# Patient Record
Sex: FEMALE | Race: BLACK OR AFRICAN AMERICAN | NOT HISPANIC OR LATINO
[De-identification: names, ages, dates, MRNs, and addresses within clinical notes are randomized per-mention and may not be internally consistent; named-entity substitution may affect disease eponyms.]

---

## 2016-12-18 NOTE — ED PROVIDER NOTE - CRITICAL CARE PROVIDED
interpretation of diagnostic studies/consultation with other physicians/documentation/additional history taking/direct patient care (not related to procedure)

## 2016-12-18 NOTE — ED PROVIDER NOTE - CRITICAL CARE INDICATION, MLM
patient was critically ill... Patient was critically ill with a high probability of imminent or life threatening deterioration.

## 2016-12-18 NOTE — ED PROVIDER NOTE - OBJECTIVE STATEMENT
66year old female presents BIBA from home, pt was found by her neighbor on the floor, pt was not seen by them in two days, they became concerned and entered her home, pt was found dishelved with feces on her and altered 66year old female presents BIBA from home, pt was found by her neighbor on the floor, pt was not seen by them in two days, they became concerned and entered her home, pt was found dishelved with feces on her and altered pt is unable to provide any history due to her altered state

## 2016-12-18 NOTE — ED PROVIDER NOTE - CARE PLAN
Principal Discharge DX:	Altered mental status Principal Discharge DX:	Altered mental status  Secondary Diagnosis:	Rhabdomyolysis  Secondary Diagnosis:	ARF (acute renal failure)

## 2016-12-18 NOTE — ED ADULT TRIAGE NOTE - CHIEF COMPLAINT QUOTE
pt down in bathroom for unknown length of times. neighbors called 911 after not hearing from patient for 2 days. as per pt's neighbor pt is more confused than baseline. at triage pt non verbal. pt noted with bruising and contracture to left arm. as per EMS pt was lying on her right arm when found. pt has history of htn and diabetes

## 2016-12-19 NOTE — H&P ADULT. - PROBLEM SELECTOR PLAN 7
-secondary to renal failure  -IV hydration now  if bicarb remain low/drops may need sodium bicarb drip  or renal consult for questionable dialysis.

## 2016-12-19 NOTE — H&P ADULT. - PROBLEM SELECTOR PLAN 8
-became hypotensive in ED  -fluids given  -sepsis work up sent: blood cult, ua , urine cult and lactate  antibiotics.  -

## 2016-12-19 NOTE — PROGRESS NOTE ADULT - PROBLEM SELECTOR PLAN 6
- bruise present to left upper thigh, could be due to fall as well/  -TTP/ITP work up, lDH, hepatoglobin, peripheral smear

## 2016-12-19 NOTE — CONSULT NOTE ADULT - SUBJECTIVE AND OBJECTIVE BOX
Patient is a 66y old  Female who presents with a chief complaint of unresponsiveness (19 Dec 2016 08:49)    HPI:  Patient is a 66 year old female with unknown PMHx, per ED, patient found unresponsive . Patient was not seen by neighbors in two days, they became concerned and entered her home and found patient on the floor covered with feces.  In ED patient found CK >14,000, in renal failure, hyperkalemic, cocktail was given, including kayexelate. Then patient had melena. stool occult blood was positive, low plts and metabolic acidosis. Patient was initially admitted to Telemetry, however this AM, found to be hypotensive, 94/56 was given fluids. ICU consulted, seen at bedside, alert, but confused. Patient is being admitted for AMS, Acute renal failure and rhabdomyolysis. (19 Dec 2016 08:49)    PAST MEDICAL & SURGICAL HISTORY:    FAMILY HISTORY:    No Known Allergies    MEDICATIONS  (STANDING):  insulin lispro (HumaLOG) corrective regimen sliding scale  SubCutaneous three times a day before meals  insulin lispro (HumaLOG) corrective regimen sliding scale  SubCutaneous at bedtime  dextrose 5%. 1000milliLiter(s) IV Continuous <Continuous>  dextrose 50% Injectable 12.5Gram(s) IV Push once  dextrose 50% Injectable 25Gram(s) IV Push once  dextrose 50% Injectable 25Gram(s) IV Push once  piperacillin/tazobactam IVPB. 3.375Gram(s) IV Intermittent every 12 hours  sodium chloride 0.9%. 1000milliLiter(s) IV Continuous <Continuous>  silver sulfADIAZINE 1% Cream 1Application(s) Topical daily    MEDICATIONS  (PRN):  dextrose Gel 1Dose(s) Oral once PRN Blood Glucose LESS THAN 70 milliGRAM(s)/deciliter  glucagon  Injectable 1milliGRAM(s) IntraMuscular once PRN Glucose LESS THAN 70 milligrams/deciliter    Vital Signs Last 24 Hrs  T(C): 36.4, Max: 36.7 (12-18 @ 23:30)  T(F): 97.6, Max: 98 (12-18 @ 23:30)  HR: 69 (64 - 109)  BP: 115/62 (96/52 - 137/116)  BP(mean): 74 (72 - 87)  RR: 19 (19 - 30)  SpO2: 97% (85% - 100%)    CAPILLARY BLOOD GLUCOSE  231 (19 Dec 2016 11:30)  243 (19 Dec 2016 08:07)    PHYSICAL EXAM:      T(C): 36.4, Max: 36.7 (12-18 @ 23:30)  HR: 69 (64 - 109)  BP: 115/62 (96/52 - 137/116)  RR: 19 (19 - 30)  SpO2: 97% (85% - 100%)  Wt(kg): --  Respiratory: clear anteriorly, decreased BS at bases  Cardiovascular: S1 S2  Gastrointestinal: soft NT ND +BS  Extremities:    1 + edema noted left side leg chest wall, breast and hand thermal injury              19 Dec 2016 04:19    146    |  110    |  113    ----------------------------<  324    4.5     |  15     |  7.34     Ca    8.5        19 Dec 2016 04:19    TPro  7.0    /  Alb  2.3    /  TBili  0.7    /  DBili  x      /  AST  347    /  ALT  108    /  AlkPhos  142    19 Dec 2016 04:19                          14.6   23.9  )-----------( 40       ( 19 Dec 2016 04:19 )             41.9     Urinalysis Basic - ( 18 Dec 2016 22:09 )    Color: Yellow / Appearance: very cloudy / S.015 / pH: x  Gluc: x / Ketone: Trace  / Bili: Small / Urobili: 1 mg/dL   Blood: x / Protein: 500 mg/dL / Nitrite: Positive   Leuk Esterase: Moderate / RBC: 3-5 /HPF / WBC >50   Sq Epi: x / Non Sq Epi: Moderate / Bacteria: Many      ABG - ( 18 Dec 2016 22:43 )  pH: x     /  pCO2: 24    /  pO2: 87    / HCO3: 12    / Base Excess: -11.4 /  SaO2: 94          Assessment and Plan  ANCELMO suspected pigment ATN due to severe rhabdomyolysis; oliguric.  Will likely require hemodialysis in the next 24 hours.  Labs q 8, follow K trend.  IVF with bicarbonate with no benefit with established ANCELMO; may be harmful if muscle edema worsens.  Discussed with Critical Care team.

## 2016-12-19 NOTE — H&P ADULT. - PROBLEM SELECTOR PLAN 2
-freitas insertion  -monitor urine output  -renal consult for questionable dialysis if remain acidodic, hyperkalemic -freitas insertion  -monitor urine output  -renal consult for questionable dialysis if remain acidotic, hyperkalemic

## 2016-12-19 NOTE — H&P ADULT. - ASSESSMENT
Patient is a 66 year old female with unknown PMHx, per ED, patient found unresponsive, admitted to critical care for Metabolic Encephalopathy, Acute Renal Failure, Hyperkalemia, Thrombocytopenia, Rhabdomyolysis.

## 2016-12-19 NOTE — PROGRESS NOTE ADULT - SUBJECTIVE AND OBJECTIVE BOX
Patient is a 66y old  Female who presents with a chief complaint of unresponsiveness (19 Dec 2016 08:49)      INTERVAL HPI/OVERNIGHT EVENTS:    66year old female presents BIBA from home, pt was found by her neighbor on the floor, pt was not seen by them in two days, they became concerned and entered her home, pt was found dishevelled with feces on her and altered  admitted with rhabdo,acute renal failure    MEDICATIONS  (STANDING):  insulin lispro (HumaLOG) corrective regimen sliding scale  SubCutaneous three times a day before meals  insulin lispro (HumaLOG) corrective regimen sliding scale  SubCutaneous at bedtime  dextrose 5%. 1000milliLiter(s) IV Continuous <Continuous>  dextrose 50% Injectable 12.5Gram(s) IV Push once  dextrose 50% Injectable 25Gram(s) IV Push once  dextrose 50% Injectable 25Gram(s) IV Push once  piperacillin/tazobactam IVPB. 3.375Gram(s) IV Intermittent every 12 hours  silver sulfADIAZINE 1% Cream 1Application(s) Topical daily  sodium bicarbonate  Infusion 0.179mEq/kG/Hr IV Continuous <Continuous>    MEDICATIONS  (PRN):  dextrose Gel 1Dose(s) Oral once PRN Blood Glucose LESS THAN 70 milliGRAM(s)/deciliter  glucagon  Injectable 1milliGRAM(s) IntraMuscular once PRN Glucose LESS THAN 70 milligrams/deciliter        REVIEW OF SYSTEMS:  CONSTITUTIONAL: No fever, weight loss, or fatigue  EYES: No eye pain, visual disturbances, or discharge  ENMT:  No difficulty hearing, tinnitus, vertigo; No sinus or throat pain  NECK: No pain or stiffness  RESPIRATORY: No cough, wheezing, chills or hemoptysis; No shortness of breath  CARDIOVASCULAR: No chest pain, palpitations, dizziness, or leg swelling  GASTROINTESTINAL: No abdominal or epigastric pain. No nausea, vomiting, or hematemesis; No diarrhea or constipation. No melena or hematochezia.  GENITOURINARY: No dysuria, frequency, hematuria, or incontinence  NEUROLOGICAL: No headaches, memory loss, loss of strength, numbness, or tremors  SKIN: No itching, burning, rashes, or lesions      Vital Signs Last 24 Hrs  T(C): 36.6, Max: 36.6 (- @ 23:16)  T(F): 97.8, Max: 97.8 (-19 @ 23:16)  HR: 71 (64 - 81)  BP: 163/60 (80/57 - 163/60)  BP(mean): 76 (62 - 100)  RR: 23 (16 - 28)  SpO2: 99% (85% - 100%)    PHYSICAL EXAM:  GENERAL: NAD, well-groomed, well-developed  HEAD:  Atraumatic, Normocephalic  EYES: EOMI, PERRLA, conjunctiva and sclera clear  ENMT: No tonsillar erythema, exudates, or enlargement; Moist mucous membranes   NECK: Supple, No JVD   NERVOUS SYSTEM:  Alert & Oriented X3, Good concentration; Motor Strength 5/5 B/L upper and lower extremities; DTRs 2+ intact and symmetric  CHEST/LUNG: Clear to percussion bilaterally; No rales, rhonchi, wheezing, or rubs  HEART: Regular rate and rhythm; No murmurs, rubs, or gallops  ABDOMEN: Soft, Nontender, Nondistended; Bowel sounds present  EXTREMITIES:  2+ Peripheral Pulses, No clubbing, cyanosis, or edema  LYMPH: No lymphadenopathy noted  SKIN: No rashes or lesions    LABS:                        13.5   19.8  )-----------( 36       ( 19 Dec 2016 12:35 )             39.9     19 Dec 2016 21:29    151    |  117    |  124    ----------------------------<  253    3.6     |  16     |  6.53     Ca    7.0        19 Dec 2016 21:29  Phos  6.3       19 Dec 2016 12:35  Mg     2.7       19 Dec 2016 12:35    TPro  6.4    /  Alb  2.0    /  TBili  0.5    /  DBili  x      /  AST  351    /  ALT  116    /  AlkPhos  134    19 Dec 2016 12:35    PT/INR - ( 18 Dec 2016 19:57 )   PT: 13.5 sec;   INR: 1.20 ratio         PTT - ( 18 Dec 2016 19:57 )  PTT:23.5 sec  Urinalysis Basic - ( 18 Dec 2016 22:09 )    Color: Yellow / Appearance: very cloudy / S.015 / pH: x  Gluc: x / Ketone: Trace  / Bili: Small / Urobili: 1 mg/dL   Blood: x / Protein: 500 mg/dL / Nitrite: Positive   Leuk Esterase: Moderate / RBC: 3-5 /HPF / WBC >50   Sq Epi: x / Non Sq Epi: Moderate / Bacteria: Many      CAPILLARY BLOOD GLUCOSE  230 (19 Dec 2016 21:47)  196 (19 Dec 2016 16:52)  231 (19 Dec 2016 11:30)  243 (19 Dec 2016 08:07)      RADIOLOGY & ADDITIONAL TESTS:    Imaging Personally Reviewed:  [ ] YES  [ ] NO    Consultant(s) Notes Reviewed:  [ ] YES  [ ] NO    Care Discussed with Consultants/Other Providers [ ] YES  [ ] NO

## 2016-12-19 NOTE — H&P ADULT. - PROBLEM SELECTOR PLAN 6
- bruise present to left upper thigh, could be due to fall as well/  -TTP/ITP work up - bruise present to left upper thigh, could be due to fall as well/  -TTP/ITP work up, lDH, hepatoglobin, preipheral smear

## 2016-12-19 NOTE — H&P ADULT. - SKIN COMMENTS
left upper thigh echymosis, and right knee laceration, and left arm laceration left lower, upper leg and breast ecchymosis, and right knee laceration, and left arm laceration, left hand hematoma

## 2016-12-19 NOTE — CONSULT NOTE ADULT - ASSESSMENT
I think these are pressure injuries, DTI, with skin necrosis, partial or full skin thickness, due to lying on the floor for unknown  amt of time, ?? in cold resulting in injuries above, rhabdo and renal failure

## 2016-12-19 NOTE — CONSULT NOTE ADULT - SUBJECTIVE AND OBJECTIVE BOX
Vascular Attending:  I was called by ICU DEVAN Gallegos for eval of left hand, forearm and ischemia and skin necrotic leisons on multiple areas of body. Left radial artery was not palpable,      HPI:  Patient is a 66 year old female with unknown PMHx, per ED, patient found unresponsive . Patient was not seen by neighbors in two days, they became concerned and entered her home and found patient on the floor covered with feces.  In ED patient found CK >14,000, in renal failure, hyperkalemic, cocktail was given, including kayexelate. Then patient had melena. stool occult blood was positive and metabolic acidosis. Patient was initially admitted to Telemetry, however this AM, found to be hypotensive, 94/56 was given fluids. ICU consulted, seen at bedside, alert, but confused. Patient is being admitted for AMS, Acute renal failure and rhabdomyolysis. (19 Dec 2016 08:49)      PAST MEDICAL & SURGICAL HISTORY:      REVIEW OF SYSTEMS Unable to review , pt not responsive,awake but non verbal   	    MEDICATIONS  (STANDING):  insulin lispro (HumaLOG) corrective regimen sliding scale  SubCutaneous three times a day before meals  insulin lispro (HumaLOG) corrective regimen sliding scale  SubCutaneous at bedtime  dextrose 5%. 1000milliLiter(s) IV Continuous <Continuous>  dextrose 50% Injectable 12.5Gram(s) IV Push once  dextrose 50% Injectable 25Gram(s) IV Push once  dextrose 50% Injectable 25Gram(s) IV Push once  piperacillin/tazobactam IVPB. 3.375Gram(s) IV Intermittent every 12 hours  sodium chloride 0.9%. 1000milliLiter(s) IV Continuous <Continuous>  furosemide   Injectable 60milliGRAM(s) IV Push once    MEDICATIONS  (PRN):  dextrose Gel 1Dose(s) Oral once PRN Blood Glucose LESS THAN 70 milliGRAM(s)/deciliter  glucagon  Injectable 1milliGRAM(s) IntraMuscular once PRN Glucose LESS THAN 70 milligrams/deciliter      Allergies    No Known Allergies    Intolerances        SOCIAL HISTORY:      Vital Signs Last 24 Hrs  T(C): 36.3, Max: 36.7 (12-18 @ 23:30)  T(F): 97.4, Max: 98 (12-18 @ 23:30)  HR: 65 (64 - 109)  BP: 111/63 (96/52 - 137/116)  BP(mean): 74 (72 - 87)  RR: 27 (21 - 30)  SpO2: 97% (85% - 100%)    PHYSICAL EXAM:      Constitutional:Non verabal AMS,    Eyes:    ENT:    Neck:    Breasts: Necrotic skin patch left breast of 8cms on left upper and outer quadrant    Back:    Respiratory:    Cardiovascular:    Gastrointestinal:    Genitourinary:    Rectal:    Extremities:Similar skin necrotic patches on left lat thigh, laft lat leg., left hand Volar, extending to the left forearm, volar and dorsal area    Vascular :Palpable left radial, and right radial, No arterial ischemia of the left had. No hematoma.    Neurological:    Skin: Necrosis of the skin on left side of body as discussed above.    Lymph Nodes:    Musculoskeletal:    Psychiatric:      Pulses:   Right:                                                                          Left:  FEM [ ]2+ [ ]1+ [ ]doppler                                             FEM [ ]2+ [ ]1+ [ ]doppler    POP [ ]2+ [ ]1+ [ ]doppler                                             POP [ ]2+ [ ]1+ [ ]doppler    DP [ ]2+ [ ]1+ [ ]doppler                                                DP [ ]2+ [ ]1+ [ ]doppler  PT[ ]2+ [ ]1+ [ ]doppler                                                  PT [ ]2+ [ ]1+ [ ]doppler      LABS:                        14.6   23.9  )-----------( 40       ( 19 Dec 2016 04:19 )             41.9     19 Dec 2016 04:19    146    |  110    |  113    ----------------------------<  324    4.5     |  15     |  7.34     Ca    8.5        19 Dec 2016 04:19    TPro  7.0    /  Alb  2.3    /  TBili  0.7    /  DBili  x      /  AST  347    /  ALT  108    /  AlkPhos  142    19 Dec 2016 04:19    PT/INR - ( 18 Dec 2016 19:57 )   PT: 13.5 sec;   INR: 1.20 ratio         PTT - ( 18 Dec 2016 19:57 )  PTT:23.5 sec  Urinalysis Basic - ( 18 Dec 2016 22:09 )    Color: Yellow / Appearance: very cloudy / S.015 / pH: x  Gluc: x / Ketone: Trace  / Bili: Small / Urobili: 1 mg/dL   Blood: x / Protein: 500 mg/dL / Nitrite: Positive   Leuk Esterase: Moderate / RBC: 3-5 /HPF / WBC >50   Sq Epi: x / Non Sq Epi: Moderate / Bacteria: Many        RADIOLOGY & ADDITIONAL STUDIES    Impression and Plan:

## 2016-12-19 NOTE — H&P ADULT. - HISTORY OF PRESENT ILLNESS
Patient is a 66 year old female with unknown PMHx, per ED, patient found unresponsive . Patient was not seen by neighbors in two days, they became concerned and entered her home and found patient on the floor covered with feces.  In ED patient found CK >14,000, in renal failure, hyperkalemic, cocktail was given, including kayexelate. Then patient had melena. stool occult blood was positive and metabolic acidosis. Patient was initially admitted to Telemetry, however this AM, found to be hypotensive, 94/56 was given fluids. ICU consulted, seen at bedside, alert, but confused. Patient is being admitted for AMS, Acute renal failure and rhabdomyolysis. Patient is a 66 year old female with unknown PMHx, per ED, patient found unresponsive . Patient was not seen by neighbors in two days, they became concerned and entered her home and found patient on the floor covered with feces.  In ED patient found CK >14,000, in renal failure, hyperkalemic, cocktail was given, including kayexelate. Then patient had melena. stool occult blood was positive, low plts and metabolic acidosis. Patient was initially admitted to Telemetry, however this AM, found to be hypotensive, 94/56 was given fluids. ICU consulted, seen at bedside, alert, but confused. Patient is being admitted for AMS, Acute renal failure and rhabdomyolysis.

## 2016-12-19 NOTE — PATIENT PROFILE ADULT. - LOCATION
LEFT BREAST LEFT HIP LEFT FOREARM EXTENDING TO LEFT HAND 1ST & 2ND FINGER LEFT OUTER LEG LEFT WRIST EXTENDING TO LEFT PALM

## 2016-12-19 NOTE — H&P ADULT. - PROBLEM SELECTOR PLAN 1
-pt found urnesponsive on floor, unknown cause  -awake but confuse  -monitor airway -pt found urnesponsive on floor, unknown cause  -awake, oriented x1 but confuse  -monitor airway

## 2016-12-20 NOTE — PROGRESS NOTE ADULT - ASSESSMENT
Patient is a 66 year old female with unknown PMHx, per ED, patient found unresponsive, admitted to critical care for Metabolic Encephalopathy, Acute Renal Failure, Hyperkalemia, Thrombocytopenia, Rhabdomyolysis.     ·  Problem: Metabolic encephalopathy.  Plan: -pt found urnesponsive on floor, unknown cause  -more awake, oriented x2   -monitor airway.     ·  Problem: ARF (acute renal failure).  Plan: urine output improved, off bicarb drip now as per renal  -d/c IVF and lasix 80mg as per renal  -monitor urine output  -HD if worsens, currently improving    ·  Problem: Rhabdomyolysis.  Plan: -aggressive hydration  -follow ck level closely  -CK level trending down     Problem: Melena.  Plan: stool occult blood +  -cbc serially  -no evidence of GIB     Problem: Thrombocytopenia. Plan: LDH high, haptoglobin , fup peripheral smear  -fup with Heme for TTP eval      Problem: Sepsis.  Plan: -became hypotensive in ED, fluids given, now stable  -sepsis work up sent: blood cult growing GNR, cont on broad sepctrum abx.     DVT/GI ppx

## 2016-12-20 NOTE — PROGRESS NOTE ADULT - SUBJECTIVE AND OBJECTIVE BOX
Patient is a 66y old  Female who presents with a chief complaint of unresponsiveness (19 Dec 2016 08:49)      INTERVAL HPI/OVERNIGHT EVENTS:    Awake and responsive  hemodynamically stable    MEDICATIONS  (STANDING):  insulin lispro (HumaLOG) corrective regimen sliding scale  SubCutaneous at bedtime  dextrose 5%. 1000milliLiter(s) IV Continuous <Continuous>  dextrose 50% Injectable 12.5Gram(s) IV Push once  dextrose 50% Injectable 25Gram(s) IV Push once  dextrose 50% Injectable 25Gram(s) IV Push once  piperacillin/tazobactam IVPB. 3.375Gram(s) IV Intermittent every 12 hours  silver sulfADIAZINE 1% Cream 1Application(s) Topical daily  sodium chloride 0.45%. 1000milliLiter(s) IV Continuous <Continuous>  insulin lispro (HumaLOG) corrective regimen sliding scale  SubCutaneous three times a day before meals    MEDICATIONS  (PRN):  dextrose Gel 1Dose(s) Oral once PRN Blood Glucose LESS THAN 70 milliGRAM(s)/deciliter  glucagon  Injectable 1milliGRAM(s) IntraMuscular once PRN Glucose LESS THAN 70 milligrams/deciliter        REVIEW OF SYSTEMS:  CONSTITUTIONAL: No fever, weight loss, or fatigue  EYES: No eye pain, visual disturbances, or discharge  ENMT:  No difficulty hearing, tinnitus, vertigo; No sinus or throat pain  NECK: No pain or stiffness  RESPIRATORY: No cough, wheezing, chills or hemoptysis; No shortness of breath  CARDIOVASCULAR: No chest pain, palpitations, dizziness, or leg swelling  GASTROINTESTINAL: No abdominal or epigastric pain. No nausea, vomiting, or hematemesis; No diarrhea or constipation. No melena or hematochezia.  GENITOURINARY: No dysuria, frequency, hematuria, or incontinence  NEUROLOGICAL: No headaches, memory loss, loss of strength, numbness, or tremors  SKIN: No itching, burning, rashes, or lesions      Vital Signs Last 24 Hrs  T(C): 36.6, Max: 36.7 (12-20 @ 15:00)  T(F): 97.8, Max: 98 (12-20 @ 15:00)  HR: 66 (64 - 79)  BP: 138/65 (126/72 - 143/69)  BP(mean): 83 (78 - 96)  RR: 19 (16 - 32)  SpO2: 94% (81% - 100%)    PHYSICAL EXAM:  GENERAL: NAD, well-groomed, well-developed  HEAD:  Atraumatic, Normocephalic  EYES: EOMI, PERRLA, conjunctiva and sclera clear  ENMT: No tonsillar erythema, exudates, or enlargement; Moist mucous membranes   NECK: Supple, No JVD   NERVOUS SYSTEM:  Alert & Oriented X3, Good concentration; Motor Strength 5/5 B/L upper and lower extremities; DTRs 2+ intact and symmetric  CHEST/LUNG: Clear to percussion bilaterally; No rales, rhonchi, wheezing, or rubs  HEART: Regular rate and rhythm; No murmurs, rubs, or gallops  ABDOMEN: Soft, Nontender, Nondistended; Bowel sounds present  EXTREMITIES:  2+ Peripheral Pulses, No clubbing, cyanosis, or edema  LYMPH: No lymphadenopathy noted  SKIN: No rashes or lesions    LABS:                        12.8   14.4  )-----------( 46       ( 20 Dec 2016 05:49 )             36.6     20 Dec 2016 23:40    150    |  111    |  130    ----------------------------<  210    3.3     |  24     |  6.94     Ca    7.1        20 Dec 2016 23:40  Phos  6.6       20 Dec 2016 03:39  Mg     2.5       20 Dec 2016 03:39    TPro  6.4    /  Alb  2.0    /  TBili  0.5    /  DBili  x      /  AST  351    /  ALT  116    /  AlkPhos  134    19 Dec 2016 12:35        CAPILLARY BLOOD GLUCOSE  198 (20 Dec 2016 23:30)  214 (20 Dec 2016 17:28)  291 (20 Dec 2016 11:06)  319 (20 Dec 2016 07:58)      RADIOLOGY & ADDITIONAL TESTS:    Imaging Personally Reviewed:  [ ] YES  [ ] NO    Consultant(s) Notes Reviewed:  [ ] YES  [ ] NO    Care Discussed with Consultants/Other Providers [ ] YES  [ ] NO

## 2016-12-20 NOTE — PROGRESS NOTE ADULT - PROBLEM SELECTOR PLAN 1
-pt found unresponsive on floor, unknown cause  -awake, oriented x1 but confused  Airway patent  improving

## 2016-12-20 NOTE — PROGRESS NOTE ADULT - SUBJECTIVE AND OBJECTIVE BOX
Patient seen in follow up for  ANCELMO. Noted increasing UO; labs reviewed.      MEDICATIONS  (STANDING):  insulin lispro (HumaLOG) corrective regimen sliding scale  SubCutaneous three times a day before meals  insulin lispro (HumaLOG) corrective regimen sliding scale  SubCutaneous at bedtime  dextrose 5%. 1000milliLiter(s) IV Continuous <Continuous>  dextrose 50% Injectable 12.5Gram(s) IV Push once  dextrose 50% Injectable 25Gram(s) IV Push once  dextrose 50% Injectable 25Gram(s) IV Push once  piperacillin/tazobactam IVPB. 3.375Gram(s) IV Intermittent every 12 hours  silver sulfADIAZINE 1% Cream 1Application(s) Topical daily  sodium chloride 0.45%. 1000milliLiter(s) IV Continuous <Continuous>  furosemide   Injectable 80milliGRAM(s) IV Push once    MEDICATIONS  (PRN):  dextrose Gel 1Dose(s) Oral once PRN Blood Glucose LESS THAN 70 milliGRAM(s)/deciliter  glucagon  Injectable 1milliGRAM(s) IntraMuscular once PRN Glucose LESS THAN 70 milligrams/deciliter    PHYSICAL EXAM:      T(C): 36.4, Max: 36.6 (12-19 @ 23:16)  HR: 69 (64 - 79)  BP: 137/72 (87/53 - 163/60)  RR: 24 (16 - 27)  SpO2: 100% (81% - 100%)  Wt(kg): --  Respiratory: clear anteriorly, decreased BS at bases  Cardiovascular: S1 S2  Gastrointestinal: soft NT ND +BS  Extremities:  2 + edema                                    12.8   14.4  )-----------( 46       ( 20 Dec 2016 05:49 )             36.6     20 Dec 2016 11:22    150    |  111    |  129    ----------------------------<  312    3.1     |  24     |  6.92     Ca    6.8        20 Dec 2016 11:22  Phos  6.6       20 Dec 2016 03:39  Mg     2.5       20 Dec 2016 03:39    TPro  6.4    /  Alb  2.0    /  TBili  0.5    /  DBili  x      /  AST  351    /  ALT  116    /  AlkPhos  134    19 Dec 2016 12:35    ABG - ( 19 Dec 2016 15:52 )  pH: x     /  pCO2: 25    /  pO2: 90    / HCO3: 13    / Base Excess: -10.8 /  SaO2: 95                LIVER FUNCTIONS - ( 19 Dec 2016 12:35 )  Alb: 2.0 g/dL / Pro: 6.4 gm/dL / ALK PHOS: 134 U/L / ALT: 116 U/L / AST: 351 U/L / GGT: x             Assessment and Plan:    ANCELMO suspected pigment ATN, nonoliguric trend.  D/C bicarbonate IVF.  judicious K repletion.  Lasix 80 mg IV challenge as pt with edema and skin breakdown risk.  Will follow clinical course and HD indications closely 12-24 hrs.

## 2016-12-20 NOTE — PROGRESS NOTE ADULT - ASSESSMENT
More edema left wrist volar aspect and left palm, Most likely due to reactionary fluid? blood. Hand eval, consult called b  , i spoke with Dr lynne , He will see the pt

## 2016-12-20 NOTE — CONSULT NOTE ADULT - SUBJECTIVE AND OBJECTIVE BOX
see dictated note.  rec: keep left hand elevated  local wound care as you are doing  close observation, r/o impending compartment syndrome  will likely eventually require surgical intervention when medically better stabilized and gangrenous tissues better demarcated or if increased swelling of left hand noted.  prognosis for salvaging a fullu functioning hand is guarded.

## 2016-12-20 NOTE — PROGRESS NOTE ADULT - SUBJECTIVE AND OBJECTIVE BOX
Patient is a 66y old  Female who presents with a chief complaint of unresponsiveness (19 Dec 2016 08:49)    piperacillin/tazobactam IVPB. 3.375  piperacillin/tazobactam IVPB. 3.375    Allergies    No Known Allergies    Intolerances        Vital Signs Last 24 Hrs  T(C): 36.4, Max: 36.6 (12-19 @ 23:16)  T(F): 97.6, Max: 97.8 (12-19 @ 23:16)  HR: 68 (64 - 79)  BP: 131/65 (80/57 - 163/60)  BP(mean): 82 (62 - 100)  RR: 18 (16 - 27)  SpO2: 100% (81% - 100%)  I&O's Detail      Physical Exam:The left hand  and the left wrist are more swollen than yesterday. Palm has edema, fingers in flexed position but can be extended without difficulty or pain.There is blistering of the left LE wounds and left hand dorsum with sloughing of the superficial layers of skin. Left hand is warm and no arterial ischemia.Left radial artery pulse is present.    General: NAD, resting comfortably in bed  Pulmonary: Nonlabored breathing, no respiratory distress  Cardiovascular: NSR  Abdominal: soft, NT/ND  Extremities: WWP  Pulses:   Right:                                                                          Left:  FEM [ ]2+ [ ]1+ [ ]doppler                                             FEM [ ]2+ [ ]1+ [ ]doppler    POP [ ]2+ [ ]1+ [ ]doppler                                             POP [ ]2+ [ ]1+ [ ]doppler    DP [ ]2+ [ ]1+ [ ]doppler                                                DP [ ]2+ [ ]1+ [ ]doppler  PT[ ]2+ [ ]1+ [ ]doppler                                                  PT [ ]2+ [ ]1+ [ ]doppler      LABS:                        12.8   14.4  )-----------( 46       ( 20 Dec 2016 05:49 )             36.6     20 Dec 2016 03:39    150    |  115    |  121    ----------------------------<  318    3.8     |  18     |  6.56     Ca    6.9        20 Dec 2016 03:39  Phos  6.6       20 Dec 2016 03:39  Mg     2.5       20 Dec 2016 03:39    TPro  6.4    /  Alb  2.0    /  TBili  0.5    /  DBili  x      /  AST  351    /  ALT  116    /  AlkPhos  134    19 Dec 2016 12:35    PT/INR - ( 18 Dec 2016 19:57 )   PT: 13.5 sec;   INR: 1.20 ratio         PTT - ( 18 Dec 2016 19:57 )  PTT:23.5 sec  CAPILLARY BLOOD GLUCOSE  291 (20 Dec 2016 11:06)  319 (20 Dec 2016 07:58)  230 (19 Dec 2016 21:47)  196 (19 Dec 2016 16:52)    Radiology and Additional Studies:    Assessment and Plan: 66yFemaleALTERED MENTAL STATUS, ACUTE RENAL FAILURE   RHABDOMYOLYSIS  FOUND ON FLOOR

## 2016-12-20 NOTE — PROGRESS NOTE ADULT - SUBJECTIVE AND OBJECTIVE BOX
HPI:  Patient is a 66 year old female with unknown PMHx, per ED, patient found unresponsive . Patient was not seen by neighbors in two days, they became concerned and entered her home and found patient on the floor covered with feces.  In ED patient found CK >14,000, in renal failure, hyperkalemic, cocktail was given, including kayexelate. Then patient had melena. stool occult blood was positive, low plts and metabolic acidosis. Patient was initially admitted to Telemetry, however this AM, found to be hypotensive, 94/56 was given fluids. ICU consulted, seen at bedside, alert, but confused. Patient is being admitted for AMS, Acute renal failure and rhabdomyolysis. (19 Dec 2016 08:49)    Over 24 Hrs: pt CK is trending down, urine output improving, blood growing GNR. MS improved.    PAST MEDICAL & SURGICAL HISTORY:      ## ROS:  CONSTITUTIONAL: No fever, chills  EYES: No eye pain, visual disturbances  ENMT:  No difficulty hearing, No sinus or throat pain  RESPIRATORY: No cough, wheezing, hemoptysis; No shortness of breath  CARDIOVASCULAR: No chest pain, palpitations  GASTROINTESTINAL: No abdominal or epigastric pain. No nausea, vomiting, or hematemesis; No diarrhea. No melena or hematochezia.  GENITOURINARY: No dysuria, frequency, hematuria  NEUROLOGICAL: No headaches  ENDOCRINE: No heat or cold intolerance  MUSCULOSKELETAL: No joint pain or swelling; No muscle, back, or extremity pain    ## O/E:  Vitals: T(C): 36.7, Max: 36.7 (12-20 @ 15:00)  HR: 66 (64 - 79)  BP: 132/65 (105/58 - 163/60)  BP(mean): 83 (69 - 96)  RR: 18 (16 - 27)  SpO2: 98% (81% - 100%)  Wt(kg): --  Gen: lying comfortably in bed in no apparent distress  HEENT: PERRL, EOMI  Resp: CTA B/L no c/r/w  CVS: S1S2 no m/r/g  Abd: soft NT/ND +BS  Ext: no c/c/e  Neuro: A&Ox2    I & Os for 24h ending 12-20 @ 07:00  =============================================  IN: 5300 ml / OUT: 570 ml / NET: 4730 ml    I & Os for current day (as of 12-20 @ 15:35)  =============================================  IN: 1840 ml / OUT: 500 ml / NET: 1340 ml        ## Labs:                        12.8   14.4  )-----------( 46       ( 20 Dec 2016 05:49 )             36.6     20 Dec 2016 11:22    150    |  111    |  129    ----------------------------<  312    3.1     |  24     |  6.92     Ca    6.8        20 Dec 2016 11:22  Phos  6.6       20 Dec 2016 03:39  Mg     2.5       20 Dec 2016 03:39    TPro  6.4    /  Alb  2.0    /  TBili  0.5    /  DBili  x      /  AST  351    /  ALT  116    /  AlkPhos  134    19 Dec 2016 12:35    PT/INR - ( 18 Dec 2016 19:57 )   PT: 13.5 sec;   INR: 1.20 ratio         PTT - ( 18 Dec 2016 19:57 )  PTT:23.5 sec  ABG - ( 19 Dec 2016 15:52 )  pH: x     /  pCO2: 25    /  pO2: 90    / HCO3: 13    / Base Excess: -10.8 /  SaO2: 95          CARDIAC MARKERS ( 20 Dec 2016 11:22 )  .101 ng/mL / x     / 87976 U/L / x     / 70.6 ng/mL  CARDIAC MARKERS ( 19 Dec 2016 12:35 )  .117 ng/mL / x     / 14149 U/L / x     / x      CARDIAC MARKERS ( 19 Dec 2016 04:19 )  x     / x     / >00235 U/L / x     / x      CARDIAC MARKERS ( 19 Dec 2016 02:20 )  x     / x     / >60072 U/L / x     / x      CARDIAC MARKERS ( 18 Dec 2016 21:51 )  .225 ng/mL / x     / >91550 U/L / x     / 181.5 ng/mL      MEDICATIONS  (STANDING):  insulin lispro (HumaLOG) corrective regimen sliding scale  SubCutaneous three times a day before meals  insulin lispro (HumaLOG) corrective regimen sliding scale  SubCutaneous at bedtime  dextrose 5%. 1000milliLiter(s) IV Continuous <Continuous>  dextrose 50% Injectable 12.5Gram(s) IV Push once  dextrose 50% Injectable 25Gram(s) IV Push once  dextrose 50% Injectable 25Gram(s) IV Push once  piperacillin/tazobactam IVPB. 3.375Gram(s) IV Intermittent every 12 hours  silver sulfADIAZINE 1% Cream 1Application(s) Topical daily  sodium chloride 0.45%. 1000milliLiter(s) IV Continuous <Continuous>    MEDICATIONS  (PRN):  dextrose Gel 1Dose(s) Oral once PRN Blood Glucose LESS THAN 70 milliGRAM(s)/deciliter  glucagon  Injectable 1milliGRAM(s) IntraMuscular once PRN Glucose LESS THAN 70 milligrams/deciliter      ## Code status:  Goals of care discussion: [x] yes [ ] no  [x] full code  [ ] DNR  [ ] DNI  [ ] ALPHONSO

## 2016-12-20 NOTE — PROGRESS NOTE ADULT - ASSESSMENT
Patient is a 66 year old female with unknown PMHx, per ED, patient found unresponsive, admitted to critical care for Metabolic Encephalopathy, Acute Renal Failure, Hyperkalemia, Thrombocytopenia, Rhabdomyolysis.   Gram neg sepsis- improving

## 2016-12-21 NOTE — SWALLOW BEDSIDE ASSESSMENT ADULT - SWALLOW EVAL: RECOMMENDED FEEDING/EATING TECHNIQUES
maintain upright posture during/after eating for 30 mins/position upright (90 degrees)/small sips/bites

## 2016-12-21 NOTE — DIETITIAN INITIAL EVALUATION ADULT. - PERTINENT LABORATORY DATA
12-21 Na 149 mmol/L<H> Glu 218 mg/dL<H> K+ 4.4 mmol/L Cr  6.82 mg/dL<H>  mg/dL<H> Phos 7.0 mg/dL<H> Alb 2.0(12/19)   PAB n/a   Hgb 13.9 g/dL Hct 40.5 %, Mg=2.5(12/21)

## 2016-12-21 NOTE — PROGRESS NOTE ADULT - ASSESSMENT
Patient is a 66 year old female with unknown PMHx, per ED, patient found unresponsive, admitted to critical care for Metabolic Encephalopathy, Acute Renal Failure, Hyperkalemia, Thrombocytopenia, Rhabdomyolysis.     Problem: Metabolic encephalopathy.  Plan: -pt found unresponsive on floor, likely sec to sepsis and hypotension  -more awake, oriented x2   -monitor airway.     Problem: ARF (acute renal failure).  Plan: urine output much improved, off bicarb drip now as per renal  -monitor urine output, cr trending down  -HD if worsens, currently improving    Problem: Rhabdomyolysis.  resolving  -follow ck level closely  -CK level trending down     Problem: Melena.  Plan: initial stool occult blood +  -cbc serially  -no evidence of GIB     Problem: Thrombocytopenia. seen by Heme, no schistiocytes, likely sec to sepsis.     Problem: Sepsis.  Plan: -became hypotensive in ED, fluids given, now stable  -blood cult growing GNR, cont on broad sepctrum abx.     DVT/GI ppx

## 2016-12-21 NOTE — DIETITIAN INITIAL EVALUATION ADULT. - NS AS NUTRI INTERV MEALS SNACK
Other (specify)/Texture-modified diet/Recommend Renal/CCCO  with snack/Nectar thick liquids & Provide diet pudding & yogurt 3 x day Recommend Renal/CCCO  with snack/Puree/Nectar thick liquids & Provide diet pudding & yogurt 3 x day/Other (specify)/Texture-modified diet

## 2016-12-21 NOTE — PROGRESS NOTE ADULT - SUBJECTIVE AND OBJECTIVE BOX
Patient is a 66y old  Female who presents with a chief complaint of unresponsiveness (19 Dec 2016 08:49)      INTERVAL HPI/OVERNIGHT EVENTS:  stable  improving mental status    MEDICATIONS  (STANDING):  insulin lispro (HumaLOG) corrective regimen sliding scale  SubCutaneous at bedtime  dextrose 5%. 1000milliLiter(s) IV Continuous <Continuous>  dextrose 50% Injectable 12.5Gram(s) IV Push once  dextrose 50% Injectable 25Gram(s) IV Push once  dextrose 50% Injectable 25Gram(s) IV Push once  piperacillin/tazobactam IVPB. 3.375Gram(s) IV Intermittent every 12 hours  silver sulfADIAZINE 1% Cream 1Application(s) Topical daily  sodium chloride 0.45%. 1000milliLiter(s) IV Continuous <Continuous>  insulin lispro (HumaLOG) corrective regimen sliding scale  SubCutaneous three times a day before meals  calcium acetate 1334milliGRAM(s) Oral three times a day with meals    MEDICATIONS  (PRN):  dextrose Gel 1Dose(s) Oral once PRN Blood Glucose LESS THAN 70 milliGRAM(s)/deciliter  glucagon  Injectable 1milliGRAM(s) IntraMuscular once PRN Glucose LESS THAN 70 milligrams/deciliter        REVIEW OF SYSTEMS:  CONSTITUTIONAL: No fever, weight loss, or fatigue  EYES: No eye pain, visual disturbances, or discharge  ENMT:  No difficulty hearing, tinnitus, vertigo; No sinus or throat pain  NECK: No pain or stiffness  RESPIRATORY: No cough, wheezing, chills or hemoptysis; No shortness of breath  CARDIOVASCULAR: No chest pain, palpitations, dizziness, or leg swelling  GASTROINTESTINAL: No abdominal or epigastric pain. No nausea, vomiting, or hematemesis; No diarrhea or constipation. No melena or hematochezia.  GENITOURINARY: No dysuria, frequency, hematuria, or incontinence  NEUROLOGICAL: No headaches, memory loss, loss of strength, numbness, or tremors  SKIN: No itching, burning, rashes, or lesions      Vital Signs Last 24 Hrs  T(C): 36.7, Max: 37.1 (12-21 @ 15:22)  T(F): 98, Max: 98.8 (12-21 @ 15:22)  HR: 60 (57 - 82)  BP: 138/65 (108/58 - 155/139)  BP(mean): 84 (71 - 143)  RR: 15 (13 - 21)  SpO2: 97% (93% - 99%)    PHYSICAL EXAM:  GENERAL: NAD, well-groomed, well-developed  HEAD:  Atraumatic, Normocephalic  EYES: EOMI, PERRLA, conjunctiva and sclera clear  ENMT: No tonsillar erythema, exudates, or enlargement; Moist mucous membranes   NECK: Supple, No JVD   NERVOUS SYSTEM:  Alert & Oriented X3, Good concentration; Motor Strength 5/5 B/L upper and lower extremities; DTRs 2+ intact and symmetric  CHEST/LUNG: Clear to percussion bilaterally; No rales, rhonchi, wheezing, or rubs  HEART: Regular rate and rhythm; No murmurs, rubs, or gallops  ABDOMEN: Soft, Nontender, Nondistended; Bowel sounds present  EXTREMITIES:  2+ Peripheral Pulses, No clubbing, cyanosis, or edema. left upper/lower extremity swelling and skin changes  LYMPH: No lymphadenopathy noted  SKIN: No rashes or lesions    LABS:                        13.9   14.4  )-----------( 43       ( 21 Dec 2016 03:49 )             40.5     21 Dec 2016 03:49    149    |  110    |  132    ----------------------------<  218    4.4     |  22     |  6.82     Ca    7.1        21 Dec 2016 03:49  Phos  7.0       21 Dec 2016 03:49  Mg     2.5       21 Dec 2016 03:49          CAPILLARY BLOOD GLUCOSE  163 (21 Dec 2016 22:00)  225 (21 Dec 2016 16:58)  176 (21 Dec 2016 11:41)  179 (21 Dec 2016 07:34)      RADIOLOGY & ADDITIONAL TESTS:    Imaging Personally Reviewed:  [ ] YES  [ ] NO    Consultant(s) Notes Reviewed:  [ ] YES  [ ] NO    Care Discussed with Consultants/Other Providers [ ] YES  [ ] NO

## 2016-12-21 NOTE — PROGRESS NOTE ADULT - SUBJECTIVE AND OBJECTIVE BOX
pts mental status appears to be improving since yesterday  vss  left hand/wrist/forearm with somewhat improved swelling, mildly tender but with no fluctuance and/or drainage.  able to flex and extend but w fingers mildly contracted. the areas of ischemic tissue, both volar and dorsally with minimal to no demarcation at this time.  wounds redressed by me.  discussed with ICU and medical PAs. will continue current local wound care while awaiting better demarcation and improvement of her medical status. in the meantime, ICU will arrange a volar splint to minimize contractures.  keep elevated.

## 2016-12-21 NOTE — PROGRESS NOTE ADULT - SUBJECTIVE AND OBJECTIVE BOX
HPI:  Patient is a 66 year old female with unknown PMHx, per ED, patient found unresponsive . Patient was not seen by neighbors in two days, they became concerned and entered her home and found patient on the floor covered with feces.  In ED patient found CK >14,000, in renal failure, hyperkalemic, cocktail was given, including kayexelate. Then patient had melena. stool occult blood was positive, low plts and metabolic acidosis. Patient was initially admitted to Telemetry, however this AM, found to be hypotensive, 94/56 was given fluids. ICU consulted, seen at bedside, alert, but confused. Patient is being admitted for AMS, Acute renal failure and rhabdomyolysis. (19 Dec 2016 08:49)    Over 24 Hrs: pt more awake, diuresing well, GNR in blood and urine.      ## ROS:  CONSTITUTIONAL: No fever, chills  EYES: No eye pain, visual disturbances  ENMT:  No difficulty hearing, No sinus or throat pain  RESPIRATORY: No cough, wheezing, hemoptysis; No shortness of breath  CARDIOVASCULAR: No chest pain, palpitations  GASTROINTESTINAL: No abdominal or epigastric pain. No nausea, vomiting, or hematemesis; No diarrhea. No melena or hematochezia.  GENITOURINARY: No dysuria, frequency, hematuria  NEUROLOGICAL: No headaches  ENDOCRINE: No heat or cold intolerance  MUSCULOSKELETAL: No joint pain or swelling; No muscle, back, or extremity pain    ## O/E:  Vitals: T(C): 36.6, Max: 36.7 (12-20 @ 15:00)  HR: 65 (57 - 82)  BP: 148/71 (127/69 - 148/71)  BP(mean): 90 (77 - 90)  RR: 16 (13 - 32)  SpO2: 97% (93% - 100%)  Wt(kg): --  Gen: lying comfortably in bed in no apparent distress  HEENT: PERRL, EOMI  Resp: CTA B/L no c/r/w  CVS: S1S2 no m/r/g  Abd: soft NT/ND +BS  Ext: no c/c/e  Neuro: A&Ox2    I & Os for 24h ending 12-21 @ 07:00  =============================================  IN: 2540 ml / OUT: 1505 ml / NET: 1035 ml    I & Os for current day (as of 12-21 @ 14:03)  =============================================  IN: 240 ml / OUT: 600 ml / NET: -360 ml        ## Labs:                        13.9   14.4  )-----------( 43       ( 21 Dec 2016 03:49 )             40.5     21 Dec 2016 03:49    149    |  110    |  132    ----------------------------<  218    4.4     |  22     |  6.82     Ca    7.1        21 Dec 2016 03:49  Phos  7.0       21 Dec 2016 03:49  Mg     2.5       21 Dec 2016 03:49        ABG - ( 19 Dec 2016 15:52 )  pH: x     /  pCO2: 25    /  pO2: 90    / HCO3: 13    / Base Excess: -10.8 /  SaO2: 95          CARDIAC MARKERS ( 20 Dec 2016 11:22 )  .101 ng/mL / x     / 22526 U/L / x     / 70.6 ng/mL    ## Imaging: reviewed    MEDICATIONS  (STANDING):  insulin lispro (HumaLOG) corrective regimen sliding scale  SubCutaneous at bedtime  dextrose 5%. 1000milliLiter(s) IV Continuous <Continuous>  dextrose 50% Injectable 12.5Gram(s) IV Push once  dextrose 50% Injectable 25Gram(s) IV Push once  dextrose 50% Injectable 25Gram(s) IV Push once  piperacillin/tazobactam IVPB. 3.375Gram(s) IV Intermittent every 12 hours  silver sulfADIAZINE 1% Cream 1Application(s) Topical daily  sodium chloride 0.45%. 1000milliLiter(s) IV Continuous <Continuous>  insulin lispro (HumaLOG) corrective regimen sliding scale  SubCutaneous three times a day before meals    MEDICATIONS  (PRN):  dextrose Gel 1Dose(s) Oral once PRN Blood Glucose LESS THAN 70 milliGRAM(s)/deciliter  glucagon  Injectable 1milliGRAM(s) IntraMuscular once PRN Glucose LESS THAN 70 milligrams/deciliter      ## Code status:  Goals of care discussion: [x] yes [ ] no  [x] full code  [ ] DNR  [ ] DNI  [ ] MOLST

## 2016-12-21 NOTE — DIETITIAN INITIAL EVALUATION ADULT. - PROBLEM SELECTOR PLAN 2
-freitas insertion  -monitor urine output  -renal consult for questionable dialysis if remain acidotic, hyperkalemic

## 2016-12-21 NOTE — PROGRESS NOTE ADULT - SUBJECTIVE AND OBJECTIVE BOX
Patient seen in follow up for ANCELMO; appears comfortable; no distress.  UO improving.    MEDICATIONS  (STANDING):  insulin lispro (HumaLOG) corrective regimen sliding scale  SubCutaneous at bedtime  dextrose 5%. 1000milliLiter(s) IV Continuous <Continuous>  dextrose 50% Injectable 12.5Gram(s) IV Push once  dextrose 50% Injectable 25Gram(s) IV Push once  dextrose 50% Injectable 25Gram(s) IV Push once  piperacillin/tazobactam IVPB. 3.375Gram(s) IV Intermittent every 12 hours  silver sulfADIAZINE 1% Cream 1Application(s) Topical daily  sodium chloride 0.45%. 1000milliLiter(s) IV Continuous <Continuous>  insulin lispro (HumaLOG) corrective regimen sliding scale  SubCutaneous three times a day before meals    MEDICATIONS  (PRN):  dextrose Gel 1Dose(s) Oral once PRN Blood Glucose LESS THAN 70 milliGRAM(s)/deciliter  glucagon  Injectable 1milliGRAM(s) IntraMuscular once PRN Glucose LESS THAN 70 milligrams/deciliter    PHYSICAL EXAM:      T(C): 37.1, Max: 37.1 (12-21 @ 15:22)  HR: 62 (57 - 82)  BP: 108/58 (108/58 - 148/71)  RR: 15 (13 - 22)  SpO2: 99% (93% - 99%)  Wt(kg): --  Respiratory: clear anteriorly, decreased BS at bases  Cardiovascular: S1 S2  Gastrointestinal: soft NT ND +BS  Extremities:    2 +edema                                    13.9   14.4  )-----------( 43       ( 21 Dec 2016 03:49 )             40.5     21 Dec 2016 03:49    149    |  110    |  132    ----------------------------<  218    4.4     |  22     |  6.82     Ca    7.1        21 Dec 2016 03:49  Phos  7.0       21 Dec 2016 03:49  Mg     2.5       21 Dec 2016 03:49            Assessment and Plan:  ANCELMO, suspected pigment, ischemic ATN, nonoliguric.  Will follow very closely for HD indications; indices stabilizing with improved UO trend.   Will add phoslo.

## 2016-12-21 NOTE — SWALLOW BEDSIDE ASSESSMENT ADULT - SLP GENERAL OBSERVATIONS
pt. seen bedside, alert and oriented x1-2, she demonstrated difficulty responding to simple/biographical questions. Pt. did not verbalize wants and needs or follow directions. pt. seen bedside, alert and oriented x1-2, she demonstrated difficulty responding to simple/biographical questions. Pt. did not verbalize wants and needs or follow directions. Pt. inconsistently verbalized, producing 1-2 utterances. pt. seen bedside, alert and oriented x1-2. she demonstrated difficulty responding to simple/biographical questions. Pt. did not verbalize wants and needs or follow directions. Pt. inconsistently verbalized, producing 1-2 utterances.

## 2016-12-21 NOTE — SWALLOW BEDSIDE ASSESSMENT ADULT - SWALLOW EVAL: DIAGNOSIS
pt. is a 65 y/o female admitted pt. is a 65 y/o female admitted with AMS who presented with oropharyngeal phases of swallow grossly WNL, no overt signs of aspiration.

## 2016-12-21 NOTE — PROGRESS NOTE ADULT - SUBJECTIVE AND OBJECTIVE BOX
Patient seen and examined bedside in ICU with Dr Glass. Denies pain and offers no complaints.     T(F): 97.8, Max: 98 (12-20 @ 20:00)  HR: 65 (57 - 82)  BP: 148/71 (127/69 - 148/71)  RR: 16 (13 - 32)  SpO2: 97% (93% - 100%)    CAPILLARY BLOOD GLUCOSE  176 (21 Dec 2016 11:41)  179 (21 Dec 2016 07:34)  198 (20 Dec 2016 23:30)  214 (20 Dec 2016 17:28)      PHYSICAL EXAM:  General: NAD, WDWN  Neuro:  Alert & awake  HEENT: NCAT, EOMI  CV: +S1+S2 regular rate and rhythm  Lung: respirations nonlabored  Extremities: Left hand/wrist swelling improving. Nontender. 2+radial pulse. Hand is warm. +superficial discoloration and blistering posterior hand/wrist/arm. +Dark discoloration of palmar region.     LABS:                        13.9   14.4  )-----------( 43       ( 21 Dec 2016 03:49 )             40.5     21 Dec 2016 03:49    149    |  110    |  132    ----------------------------<  218    4.4     |  22     |  6.82     Ca    7.1        21 Dec 2016 03:49  Phos  7.0       21 Dec 2016 03:49  Mg     2.5       21 Dec 2016 03:49      Impression: 66y Female admitted with ALTERED MENTAL STATUS, ACUTE RENAL FAILURE, RHABDOMYOLYSIS  FOUND ON FLOOR with DTI and likely thermal injury to left upper extremity/hand and left thigh and chest    Plan  As discussed with Dr Glass, Dr Forbes and ICU team, continue to elevate LUE.   -will likely require debridement of palm next week (plastics)  -OT/PT for passive ROM and cock up splint for finger extension.  -continue present ICU supportive care Patient seen and examined bedside in ICU with Dr Glass. Denies pain and offers no complaints.     T(F): 97.8, Max: 98 (12-20 @ 20:00)  HR: 65 (57 - 82)  BP: 148/71 (127/69 - 148/71)  RR: 16 (13 - 32)  SpO2: 97% (93% - 100%)    CAPILLARY BLOOD GLUCOSE  176 (21 Dec 2016 11:41)  179 (21 Dec 2016 07:34)  198 (20 Dec 2016 23:30)  214 (20 Dec 2016 17:28)      PHYSICAL EXAM:  General: NAD, WDWN  Neuro:  Alert & awake  HEENT: NCAT, EOMI  CV: +S1+S2 regular rate and rhythm  Lung: respirations nonlabored  Extremities: Left hand/wrist swelling improving. Nontender. 2+radial pulse. Hand is warm. +superficial discoloration and mild blistering posterior hand/wrist/arm. +Dark discoloration of volar aspect of palm, predominantly of thenar eminence    LABS:                        13.9   14.4  )-----------( 43       ( 21 Dec 2016 03:49 )             40.5     21 Dec 2016 03:49    149    |  110    |  132    ----------------------------<  218    4.4     |  22     |  6.82     Ca    7.1        21 Dec 2016 03:49  Phos  7.0       21 Dec 2016 03:49  Mg     2.5       21 Dec 2016 03:49      Impression: 66y Female admitted with ALTERED MENTAL STATUS, ACUTE RENAL FAILURE, RHABDOMYOLYSIS  FOUND ON FLOOR with DTI and likely thermal injury to left upper extremity/hand and left thigh and chest    Plan  As discussed with Dr Glass, Dr Forbes and ICU team, continue to elevate LUE.   -will likely require debridement of palm next week (plastics)  -OT/PT for passive ROM and cock up splint for finger extension.  -continue present ICU supportive care

## 2016-12-21 NOTE — DIETITIAN INITIAL EVALUATION ADULT. - FACTORS AFF FOOD INTAKE
other (specify)/difficulty swallowing/change in mental status/Increased confusion; Pt coughing this am with thin liquids per RN

## 2016-12-21 NOTE — PROGRESS NOTE ADULT - PROBLEM SELECTOR PLAN 2
-freitas   -monitor urine output  -renal consult for questionable dialysis if remain acidotic, hyperkalemic

## 2016-12-21 NOTE — CONSULT NOTE ADULT - SUBJECTIVE AND OBJECTIVE BOX
REASON FOR CONSULTATION:  Thrombocytopenia.        Allergies    No Known Allergies    Intolerances        MEDICATIONS  (STANDING):  insulin lispro (HumaLOG) corrective regimen sliding scale  SubCutaneous at bedtime  dextrose 5%. 1000milliLiter(s) IV Continuous <Continuous>  dextrose 50% Injectable 12.5Gram(s) IV Push once  dextrose 50% Injectable 25Gram(s) IV Push once  dextrose 50% Injectable 25Gram(s) IV Push once  piperacillin/tazobactam IVPB. 3.375Gram(s) IV Intermittent every 12 hours  silver sulfADIAZINE 1% Cream 1Application(s) Topical daily  sodium chloride 0.45%. 1000milliLiter(s) IV Continuous <Continuous>  insulin lispro (HumaLOG) corrective regimen sliding scale  SubCutaneous three times a day before meals    MEDICATIONS  (PRN):  dextrose Gel 1Dose(s) Oral once PRN Blood Glucose LESS THAN 70 milliGRAM(s)/deciliter  glucagon  Injectable 1milliGRAM(s) IntraMuscular once PRN Glucose LESS THAN 70 milligrams/deciliter      Vital Signs Last 24 Hrs  T(C): 36, Max: 36.7 (12-20 @ 15:00)  T(F): 96.8, Max: 98 (12-20 @ 15:00)  HR: 58 (57 - 79)  BP: 128/68 (127/69 - 143/69)  BP(mean): 83 (77 - 91)  RR: 14 (13 - 32)  SpO2: 98% (94% - 100%)    PHYSICAL EXAM:        EYES: EOMI, PERRLA, conjunctiva and sclera clear    NECK: Supple, No JVD, Normal thyroid     CHEST/LUNG: Clear to percussion bilaterally;   HEART: Regular rate and rhythm;   ABDOMEN: Soft, Nontender, Nondistended;     LYMPH: No lymphadenopathy noted        LABS:                        13.9   14.4  )-----------( 43       ( 21 Dec 2016 03:49 )             40.5     21 Dec 2016 03:49    149    |  110    |  132    ----------------------------<  218    4.4     |  22     |  6.82     Ca    7.1        21 Dec 2016 03:49  Phos  7.0       21 Dec 2016 03:49  Mg     2.5       21 Dec 2016 03:49    TPro  6.4    /  Alb  2.0    /  TBili  0.5    /  DBili  x      /  AST  351    /  ALT  116    /  AlkPhos  134    19 Dec 2016 12:35            RADIOLOGY & ADDITIONAL STUDIES:    PATHOLOGY:

## 2016-12-21 NOTE — SWALLOW BEDSIDE ASSESSMENT ADULT - COMMENTS
CT Head 12/18/16 IMPRESSION: NONCONTRAST HEAD CT: No acute intracranial hemorrhage, mass effect, or midline shift. Microvascular disease. 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    X-Ray Chest 12/21/16 IMPRESSION: Stable chest. No focal airspace opacities are noted.

## 2016-12-21 NOTE — DIETITIAN INITIAL EVALUATION ADULT. - PROBLEM SELECTOR PLAN 6
- bruise present to left upper thigh, could be due to fall as well/  -TTP/ITP work up, lDH, hepatoglobin, preipheral smear

## 2016-12-21 NOTE — SWALLOW BEDSIDE ASSESSMENT ADULT - SLP PERTINENT HISTORY OF CURRENT PROBLEM
Patient is a 66 year old female with unknown PMHx, per ED, patient found unresponsive . Patient was not seen by neighbors in two days, they became concerned and entered her home and found patient on the floor covered with feces.  In ED patient found CK >14,000, in renal failure, hyperkalemic, cocktail was given, including kayexelate. Then patient had melena. stool occult blood was positive, low plts and metabolic acidosis. Patient was initially admitted to Telemetry, however this AM, found to be hypotensive, 94/56 was given fluids. ICU consulted, seen at bedside, alert, but confused. Patient is being admitted for AMS, Acute renal failure and rhabdomyolysis.

## 2016-12-22 NOTE — PROGRESS NOTE ADULT - SUBJECTIVE AND OBJECTIVE BOX
Patient is a 66y old  Female who presents with a chief complaint of unresponsiveness (19 Dec 2016 08:49)      INTERVAL HPI/OVERNIGHT EVENTS:  more alert, less confused  no fever  denies new symptoms    MEDICATIONS  (STANDING):  insulin lispro (HumaLOG) corrective regimen sliding scale  SubCutaneous at bedtime  dextrose 5%. 1000milliLiter(s) IV Continuous <Continuous>  dextrose 50% Injectable 12.5Gram(s) IV Push once  dextrose 50% Injectable 25Gram(s) IV Push once  dextrose 50% Injectable 25Gram(s) IV Push once  piperacillin/tazobactam IVPB. 3.375Gram(s) IV Intermittent every 12 hours  silver sulfADIAZINE 1% Cream 1Application(s) Topical daily  insulin lispro (HumaLOG) corrective regimen sliding scale  SubCutaneous three times a day before meals  calcium acetate 1334milliGRAM(s) Oral three times a day with meals  insulin glargine Injectable (LANTUS) 10Unit(s) SubCutaneous at bedtime  influenza   Vaccine 0.5milliLiter(s) IntraMuscular once    MEDICATIONS  (PRN):  dextrose Gel 1Dose(s) Oral once PRN Blood Glucose LESS THAN 70 milliGRAM(s)/deciliter  glucagon  Injectable 1milliGRAM(s) IntraMuscular once PRN Glucose LESS THAN 70 milligrams/deciliter        REVIEW OF SYSTEMS:  CONSTITUTIONAL: No fever, weight loss, or fatigue  EYES: No eye pain, visual disturbances, or discharge  ENMT:  No difficulty hearing, tinnitus, vertigo; No sinus or throat pain  NECK: No pain or stiffness  RESPIRATORY: No cough, wheezing, chills or hemoptysis; No shortness of breath  CARDIOVASCULAR: No chest pain, palpitations, dizziness, or leg swelling  GASTROINTESTINAL: No abdominal or epigastric pain. No nausea, vomiting, or hematemesis; No diarrhea or constipation. No melena or hematochezia.  GENITOURINARY: No dysuria, frequency, hematuria, or incontinence  NEUROLOGICAL: No headaches, memory loss, loss of strength, numbness, or tremors  SKIN: No itching, burning, rashes, or lesions      Vital Signs Last 24 Hrs  T(C): 36.9, Max: 37.7 (12-22 @ 15:03)  T(F): 98.4, Max: 99.8 (12-22 @ 15:03)  HR: 67 (54 - 76)  BP: 130/58 (110/58 - 161/73)  BP(mean): 77 (71 - 91)  RR: 18 (10 - 22)  SpO2: 95% (94% - 98%)    PHYSICAL EXAM:  GENERAL: NAD, well-groomed, well-developed  HEAD:  Atraumatic, Normocephalic  EYES: EOMI, PERRLA, conjunctiva and sclera clear  ENMT: No tonsillar erythema, exudates, or enlargement; Moist mucous membranes   NECK: Supple, No JVD   NERVOUS SYSTEM:  Alert & Oriented X3, Good concentration; Motor Strength 5/5 B/L upper and lower extremities; DTRs 2+ intact and symmetric  CHEST/LUNG: Clear to percussion bilaterally; No rales, rhonchi, wheezing, or rubs  HEART: Regular rate and rhythm; No murmurs, rubs, or gallops  ABDOMEN: Soft, Nontender, Nondistended; Bowel sounds present  EXTREMITIES:  2+ Peripheral Pulses, No clubbing, cyanosis, or edema  LYMPH: No lymphadenopathy noted  SKIN:left upper and lower extremity skin lesions improving(pressure)    LABS:                        12.5   14.9  )-----------( 65       ( 22 Dec 2016 03:38 )             36.7     22 Dec 2016 03:38    152    |  111    |  130    ----------------------------<  193    3.0     |  23     |  6.78     Ca    7.1        22 Dec 2016 03:38  Phos  8.0       22 Dec 2016 03:38  Mg     2.3       22 Dec 2016 03:38          CAPILLARY BLOOD GLUCOSE  210 (22 Dec 2016 22:23)  207 (22 Dec 2016 11:39)  179 (22 Dec 2016 07:54)      RADIOLOGY & ADDITIONAL TESTS:    Imaging Personally Reviewed:  [ ] YES  [ ] NO    Consultant(s) Notes Reviewed:  [ ] YES  [ ] NO    Care Discussed with Consultants/Other Providers [ ] YES  [ ] NO

## 2016-12-22 NOTE — CONSULT NOTE ADULT - SUBJECTIVE AND OBJECTIVE BOX
HPI:  Patient is a 66 year old female with unknown PMHx, per ED, patient found unresponsive . Patient was not seen by neighbors in two days, they became concerned and entered her home and found patient on the floor covered with feces.  In ED patient found CK >14,000, in renal failure, hyperkalemic, cocktail was given, including kayexelate. Then patient had melena. stool occult blood was positive, low plts and metabolic acidosis. Patient was initially admitted to Telemetry, however this AM, found to be hypotensive, 94/56 was given fluids. ICU consulted, seen at bedside, alert, but confused. Patient is being admitted for AMS, Acute renal failure and rhabdomyolysis.  all events noted       PAST MEDICAL & SURGICAL HISTORY:      SOCHX:   tobacco,  -  alcohol    FMHX: FA/MO  - contributory       Recent Travel:    Immunizations:    Allergies    No Known Allergies    Intolerances        MEDICATIONS  (STANDING):  insulin lispro (HumaLOG) corrective regimen sliding scale  SubCutaneous at bedtime  dextrose 5%. 1000milliLiter(s) IV Continuous <Continuous>  dextrose 50% Injectable 12.5Gram(s) IV Push once  dextrose 50% Injectable 25Gram(s) IV Push once  dextrose 50% Injectable 25Gram(s) IV Push once  piperacillin/tazobactam IVPB. 3.375Gram(s) IV Intermittent every 12 hours  silver sulfADIAZINE 1% Cream 1Application(s) Topical daily  insulin lispro (HumaLOG) corrective regimen sliding scale  SubCutaneous three times a day before meals  calcium acetate 1334milliGRAM(s) Oral three times a day with meals  insulin glargine Injectable (LANTUS) 10Unit(s) SubCutaneous at bedtime  influenza   Vaccine 0.5milliLiter(s) IntraMuscular once    MEDICATIONS  (PRN):  dextrose Gel 1Dose(s) Oral once PRN Blood Glucose LESS THAN 70 milliGRAM(s)/deciliter  glucagon  Injectable 1milliGRAM(s) IntraMuscular once PRN Glucose LESS THAN 70 milligrams/deciliter      REVIEW OF SYSTEMS:  CONSTITUTIONAL: No fever, weight loss, or fatigue  EYES: No eye pain, visual disturbances, or discharge  ENMT:  No difficulty hearing, tinnitus, vertigo; No sinus or throat pain  NECK: No pain or stiffness  BREASTS: No pain, masses, or nipple discharge  RESPIRATORY: No cough, wheezing, chills or hemoptysis; No shortness of breath  CARDIOVASCULAR: No chest pain, palpitations, dizziness, or leg swelling  GASTROINTESTINAL: No abdominal or epigastric pain. No nausea, vomiting, or hematemesis; No diarrhea or constipation. No melena or hematochezia.  GENITOURINARY: No dysuria, frequency, hematuria, or incontinence  NEUROLOGICAL: No headaches, memory loss, loss of strength, numbness, or tremors  SKIN: No itching, burning, rashes, or lesions   LYMPH NODES: No enlarged glands  ENDOCRINE: No heat or cold intolerance; No hair loss  MUSCULOSKELETAL: No joint pain or swelling; No muscle, back, or extremity pain  PSYCHIATRIC: No depression, anxiety, mood swings, or difficulty sleeping  HEME/LYMPH: No easy bruising, or bleeding gums  ALLERGY AND IMMUNOLOGIC: No hives or eczema    VITAL SIGNS:    T(C): 36.9, Max: 37.7 (12-22 @ 15:03)  T(F): 98.4, Max: 99.8 (12-22 @ 15:03)  HR: 67 (54 - 76)  BP: 130/58 (110/58 - 161/73)  RR: 18 (10 - 22)  SpO2: 95% (94% - 98%)  Wt(kg): --    PHYSICAL EXAM:    GENERAL: NAD, well-groomed, well-developed  HEAD:  Atraumatic, Normocephalic  EYES: EOMI, PERRLA, conjunctiva and sclera clear  ENMT: No tonsillar erythema, exudates, or enlargement; Moist mucous membranes, Good dentition, No lesions  NECK: Supple, No JVD, Normal thyroid  NERVOUS SYSTEM:  Alert & Oriented X3, Good concentration; Motor Strength 5/5 B/L upper and lower extremities; DTRs 2+ intact and symmetric  CHEST/LUNG: Clear to percussion bilaterally; No rales, rhonchi, wheezing bilaterally  HEART: Regular rate and rhythm; No murmurs, rubs, or gallops  ABDOMEN: Soft, Nontender, Nondistended; Bowel sounds present  EXTREMITIES:  2+ Peripheral Pulses, No clubbing, cyanosis, or edema  LYMPH: No lymphadenopathy noted  SKIN: No rashes or lesions  BACK: no pressor sore     LABS:                         12.5   14.9  )-----------( 65       ( 22 Dec 2016 03:38 )             36.7     22 Dec 2016 03:38    152    |  111    |  130    ----------------------------<  193    3.0     |  23     |  6.78     Ca    7.1        22 Dec 2016 03:38  Phos  8.0       22 Dec 2016 03:38  Mg     2.3       22 Dec 2016 03:38              CARDIAC MARKERS ( 22 Dec 2016 03:38 )  x     / x     / 6830 U/L / x     / x                  Vancomycin Level, Trough: 13.4 ug/mL (12-22 @ 03:38)        Culture Results:   Growth in aerobic and anaerobic bottles: Escherichia coli  See previous culture [id number] (12-19 @ 18:15)  Culture Results:   >100,000 CFU/ml Escherichia coli (12-19 @ 08:14)  Culture Results:   Growth in aerobic and anaerobic bottles: Escherichia coli (12-19 @ 08:10)                Radiology: HPI:  Patient is a 66 year old female with unknown PMHx, per ED, patient found unresponsive . Patient was not seen by neighbors in two days, they became concerned and entered her home and found patient on the floor covered with feces.  In ED patient found CK >14,000, in renal failure, hyperkalemic, cocktail was given, including kayexelate. Then patient had melena. stool occult blood was positive, low plts and metabolic acidosis. Patient was initially admitted to Telemetry, however this AM, found to be hypotensive, 94/56 was given fluids.  Patient is being admitted for AMS, Acute renal failure and rhabdomyolysis.  all events noted   remains in icu but mentally is much better      PAST MEDICAL & SURGICAL HISTORY:      SOCHX:   tobacco,  -  alcohol    FMHX: FA/MO  - contributory       Recent Travel:    Immunizations:    Allergies    No Known Allergies    Intolerances        MEDICATIONS  (STANDING):  insulin lispro (HumaLOG) corrective regimen sliding scale  SubCutaneous at bedtime  dextrose 5%. 1000milliLiter(s) IV Continuous <Continuous>  dextrose 50% Injectable 12.5Gram(s) IV Push once  dextrose 50% Injectable 25Gram(s) IV Push once  dextrose 50% Injectable 25Gram(s) IV Push once  piperacillin/tazobactam IVPB. 3.375Gram(s) IV Intermittent every 12 hours  silver sulfADIAZINE 1% Cream 1Application(s) Topical daily  insulin lispro (HumaLOG) corrective regimen sliding scale  SubCutaneous three times a day before meals  calcium acetate 1334milliGRAM(s) Oral three times a day with meals  insulin glargine Injectable (LANTUS) 10Unit(s) SubCutaneous at bedtime  influenza   Vaccine 0.5milliLiter(s) IntraMuscular once    MEDICATIONS  (PRN):  dextrose Gel 1Dose(s) Oral once PRN Blood Glucose LESS THAN 70 milliGRAM(s)/deciliter  glucagon  Injectable 1milliGRAM(s) IntraMuscular once PRN Glucose LESS THAN 70 milligrams/deciliter      REVIEW OF SYSTEMS:  poor historian   all events noted   pain left hand         VITAL SIGNS:    T(C): 36.9, Max: 37.7 (12-22 @ 15:03)  T(F): 98.4, Max: 99.8 (12-22 @ 15:03)  HR: 67 (54 - 76)  BP: 130/58 (110/58 - 161/73)  RR: 18 (10 - 22)  SpO2: 95% (94% - 98%)  Wt(kg): --    PHYSICAL EXAM:    GENERAL: NAD, well-groomed, well-developed  HEAD:  Atraumatic, Normocephalic  EYES: EOMI, PERRLA, conjunctiva and sclera clear  ENMT: No tonsillar erythema, exudates, or enlargement; Moist mucous membranes, Good dentition, No lesions  NECK: Supple, No JVD, Normal thyroid  NERVOUS SYSTEM:  Alert & Oriented X 2   bilateral weak    upper and lower extremities; DTRs 2+ intact and symmetric  CHEST/LUNG: Clear to percussion bilaterally; No rales, rhonchi, wheezing bilaterally  HEART: Regular rate and rhythm; No murmurs, rubs, or gallops  ABDOMEN: Soft, Nontender, Nondistended; Bowel sounds present  EXTREMITIES:  2+ Peripheral Pulses, No clubbing, cyanosis, or edema  LYMPH: No lymphadenopathy noted  SKIN: lefty  hand and arm is swollen large necrotic area on hand stiff and areas of blackness   left leg large area of skin necrosis ; also in back    LABS:                         12.5   14.9  )-----------( 65       ( 22 Dec 2016 03:38 )             36.7     22 Dec 2016 03:38    152    |  111    |  130    ----------------------------<  193    3.0     |  23     |  6.78     Ca    7.1        22 Dec 2016 03:38  Phos  8.0       22 Dec 2016 03:38  Mg     2.3       22 Dec 2016 03:38              CARDIAC MARKERS ( 22 Dec 2016 03:38 )  x     / x     / 6830 U/L / x     / x                  Vancomycin Level, Trough: 13.4 ug/mL (12-22 @ 03:38)        Culture Results:   Growth in aerobic and anaerobic bottles: Escherichia coli  See previous culture [id number] (12-19 @ 18:15)  Culture Results:   >100,000 CFU/ml Escherichia coli (12-19 @ 08:14)  Culture Results:   Growth in aerobic and anaerobic bottles: Escherichia coli (12-19 @ 08:10)                Radiology:  MPRESSION:   Stable chest.  No focal airspace opacities are noted.

## 2016-12-22 NOTE — CHART NOTE - NSCHARTNOTEFT_GEN_A_CORE
Pt stable for transfer to medical floor.  ID called for consult to follow on floors.  Signed out to Dr. Molina.

## 2016-12-22 NOTE — PROGRESS NOTE ADULT - SUBJECTIVE AND OBJECTIVE BOX
Subjective: No pain or dyspnea. Improving appetite. Denies nausea, vomiting      MEDICATIONS  (STANDING):  insulin lispro (HumaLOG) corrective regimen sliding scale  SubCutaneous at bedtime  dextrose 5%. 1000milliLiter(s) IV Continuous <Continuous>  dextrose 50% Injectable 12.5Gram(s) IV Push once  dextrose 50% Injectable 25Gram(s) IV Push once  dextrose 50% Injectable 25Gram(s) IV Push once  piperacillin/tazobactam IVPB. 3.375Gram(s) IV Intermittent every 12 hours  silver sulfADIAZINE 1% Cream 1Application(s) Topical daily  insulin lispro (HumaLOG) corrective regimen sliding scale  SubCutaneous three times a day before meals  calcium acetate 1334milliGRAM(s) Oral three times a day with meals  insulin glargine Injectable (LANTUS) 10Unit(s) SubCutaneous at bedtime  influenza   Vaccine 0.5milliLiter(s) IntraMuscular once    MEDICATIONS  (PRN):  dextrose Gel 1Dose(s) Oral once PRN Blood Glucose LESS THAN 70 milliGRAM(s)/deciliter  glucagon  Injectable 1milliGRAM(s) IntraMuscular once PRN Glucose LESS THAN 70 milligrams/deciliter          T(C): 35.8, Max: 37.1 (12-21 @ 15:22)  HR: 62 (54 - 82)  BP: 140/87 (108/58 - 155/139)  RR: 15 (10 - 21)  SpO2: 94% (94% - 99%)  Wt(kg): --        I&O's Detail    2470/1920       PHYSICAL EXAM:    GENERAL: no distress  EYES: EOMI, PERRLA, conjunctiva and sclera clear  NECK: Supple, no inc in JVP  CHEST/LUNG: Clear  HEART: S1S2  ABDOMEN: Soft, Nontender, Nondistended; Bowel sounds present  EXTREMITIES:  no pedal edema. L arm protective device.   NEURO: no asterixis      LABS:  CBC Full  -  ( 22 Dec 2016 03:38 )  WBC Count : 14.9 K/uL  Hemoglobin : 12.5 g/dL  Hematocrit : 36.7 %  Platelet Count - Automated : 65 K/uL  Mean Cell Volume : 88.1 fl  Mean Cell Hemoglobin : 30.0 pg  Mean Cell Hemoglobin Concentration : 34.0 gm/dL  Auto Neutrophil # : x  Auto Lymphocyte # : x  Auto Monocyte # : x  Auto Eosinophil # : x  Auto Basophil # : x  Auto Neutrophil % : x  Auto Lymphocyte % : x  Auto Monocyte % : x  Auto Eosinophil % : x  Auto Basophil % : x    22 Dec 2016 03:38    152    |  111    |  130    ----------------------------<  193    3.0     |  23     |  6.78     Ca    7.1        22 Dec 2016 03:38  Phos  8.0       22 Dec 2016 03:38  Mg     2.3       22 Dec 2016 03:38          Culture Results:   Growth in anaerobic bottle: Gram Negative Rods  Growth in aerobic bottle: Gram Negative Rods Identification and  susceptibility to follow.  See previous culture [id number] (12-19 @ 18:15)      CXR: clear    ASSESSMENT and PLAN:    * ANCELMO -- multifactorial ATN including pigment induced. Non-oliguric, indices have reached plateau.   No overt uremia. No dialytic indications at present. Cont to monitor closely with daily re-eval.   Consider D5W gtt at 75cc/h while tightly controlling glycemia. Encourage PO water.   Supplement K with caution in face of markedly reduced clearance.

## 2016-12-22 NOTE — CONSULT NOTE ADULT - CONSULT REASON
multiple wounds
Hematoma /ischemia left upper extremity, skin leisons
Thrombocytopenia.
sepsis
ANCELMO

## 2016-12-22 NOTE — PROGRESS NOTE ADULT - SUBJECTIVE AND OBJECTIVE BOX
HPI:  Patient is a 66 year old female with unknown PMHx, per ED, patient found unresponsive . Patient was not seen by neighbors in two days, they became concerned and entered her home and found patient on the floor covered with feces.  In ED patient found CK >14,000, in renal failure, hyperkalemic, cocktail was given, including kayexelate. Then patient had melena. stool occult blood was positive, low plts and metabolic acidosis. Patient was initially admitted to Telemetry, however this AM, found to be hypotensive, 94/56 was given fluids. ICU consulted, seen at bedside, alert, but confused. Patient is being admitted for AMS, Acute renal failure and rhabdomyolysis. (19 Dec 2016 08:49)    Over 24 Hrs: pt is much more awake, good urine output. cr stable.      ## ROS:  CONSTITUTIONAL: No fever, chills  EYES: No eye pain, visual disturbances  ENMT:  No difficulty hearing, No sinus or throat pain  RESPIRATORY: No cough, wheezing, hemoptysis; No shortness of breath  CARDIOVASCULAR: No chest pain, palpitations  GASTROINTESTINAL: No abdominal or epigastric pain. No nausea, vomiting, or hematemesis; No diarrhea. No melena or hematochezia.  GENITOURINARY: No dysuria, frequency, hematuria  NEUROLOGICAL: No headaches  ENDOCRINE: No heat or cold intolerance  MUSCULOSKELETAL: No joint pain or swelling;+ hand swelling    ## O/E:  Vitals: T(C): 35.8, Max: 37.1 (12-21 @ 15:22)  HR: 76 (54 - 76)  BP: 161/73 (108/58 - 161/73)  BP(mean): 90 (71 - 143)  RR: 22 (10 - 22)  SpO2: 98% (94% - 99%)  Wt(kg): --  Gen: lying comfortably in bed in no apparent distress  HEENT: PERRL, EOMI  Resp: CTA B/L no c/r/w  CVS: S1S2 no m/r/g  Abd: soft NT/ND +BS  Ext: + bruises, + hand and breast hematoma  Neuro: A&Ox3    I & Os for 24h ending 12-22 @ 07:00  =============================================  IN: 2470 ml / OUT: 1920 ml / NET: 550 ml    I & Os for current day (as of 12-22 @ 14:21)  =============================================  IN: 280 ml / OUT: 400 ml / NET: -120 ml        ## Labs:                        12.5   14.9  )-----------( 65       ( 22 Dec 2016 03:38 )             36.7     22 Dec 2016 03:38    152    |  111    |  130    ----------------------------<  193    3.0     |  23     |  6.78     Ca    7.1        22 Dec 2016 03:38  Phos  8.0       22 Dec 2016 03:38  Mg     2.3       22 Dec 2016 03:38    CARDIAC MARKERS ( 22 Dec 2016 03:38 )  x     / x     / 6830 U/L / x     / x          MEDICATIONS  (STANDING):  insulin lispro (HumaLOG) corrective regimen sliding scale  SubCutaneous at bedtime  dextrose 5%. 1000milliLiter(s) IV Continuous <Continuous>  dextrose 50% Injectable 12.5Gram(s) IV Push once  dextrose 50% Injectable 25Gram(s) IV Push once  dextrose 50% Injectable 25Gram(s) IV Push once  piperacillin/tazobactam IVPB. 3.375Gram(s) IV Intermittent every 12 hours  silver sulfADIAZINE 1% Cream 1Application(s) Topical daily  insulin lispro (HumaLOG) corrective regimen sliding scale  SubCutaneous three times a day before meals  calcium acetate 1334milliGRAM(s) Oral three times a day with meals  insulin glargine Injectable (LANTUS) 10Unit(s) SubCutaneous at bedtime  influenza   Vaccine 0.5milliLiter(s) IntraMuscular once    MEDICATIONS  (PRN):  dextrose Gel 1Dose(s) Oral once PRN Blood Glucose LESS THAN 70 milliGRAM(s)/deciliter  glucagon  Injectable 1milliGRAM(s) IntraMuscular once PRN Glucose LESS THAN 70 milligrams/deciliter    ## Code status:  Goals of care discussion: [x] yes [ ] no  [x] full code  [ ] DNR  [ ] DNI  [ ] ROWDYST

## 2016-12-22 NOTE — OCCUPATIONAL THERAPY INITIAL EVALUATION ADULT - GENERAL OBSERVATIONS, REHAB EVAL
Pt was encountered supine in bed; NAD, cardiac monitor on, L arm propped on arm wedge, freitas catheter in palce, AXOX2, cooperative, confused at times, followed 1 step commands, requires verbal and physical cueing for task sequencing, L lateral calf dressing dry and intact, L hip foam dressing, L hand gauze dressing clean and intact, L hand 1+ edema, sensation intact all extremities. During functional evaluation, pt voiced to therapist "I have to use bed now." Communicated to RN about situation. Will follow up at a later time to perform functional assessment.

## 2016-12-22 NOTE — PROGRESS NOTE ADULT - SUBJECTIVE AND OBJECTIVE BOX
Sepsis.      Allergies    No Known Allergies    Intolerances        MEDICATIONS  (STANDING):  insulin lispro (HumaLOG) corrective regimen sliding scale  SubCutaneous at bedtime  dextrose 5%. 1000milliLiter(s) IV Continuous <Continuous>  dextrose 50% Injectable 12.5Gram(s) IV Push once  dextrose 50% Injectable 25Gram(s) IV Push once  dextrose 50% Injectable 25Gram(s) IV Push once  piperacillin/tazobactam IVPB. 3.375Gram(s) IV Intermittent every 12 hours  silver sulfADIAZINE 1% Cream 1Application(s) Topical daily  insulin lispro (HumaLOG) corrective regimen sliding scale  SubCutaneous three times a day before meals  calcium acetate 1334milliGRAM(s) Oral three times a day with meals  insulin glargine Injectable (LANTUS) 10Unit(s) SubCutaneous at bedtime  influenza   Vaccine 0.5milliLiter(s) IntraMuscular once    MEDICATIONS  (PRN):  dextrose Gel 1Dose(s) Oral once PRN Blood Glucose LESS THAN 70 milliGRAM(s)/deciliter  glucagon  Injectable 1milliGRAM(s) IntraMuscular once PRN Glucose LESS THAN 70 milligrams/deciliter      Vital Signs Last 24 Hrs  T(C): 35.8, Max: 37.1 (12-21 @ 15:22)  T(F): 96.4, Max: 98.8 (12-21 @ 15:22)  HR: 62 (54 - 82)  BP: 140/87 (108/58 - 155/139)  BP(mean): 91 (71 - 143)  RR: 15 (10 - 21)  SpO2: 94% (94% - 99%)    PHYSICAL EXAM:    GENERAL: NAD,   HEAD:  Atraumatic, Normocephalic  EYES: EOMI, PERRLA, conjunctiva and sclera clear    NECK: Supple, No JVD, Normal thyroid  NERVOUS SYSTEM:    CHEST/LUNG: Clear to percussion bilaterally; No rales, rhonchi,   HEART: Regular rate and rhythm;   ABDOMEN: Soft, Nontender.  EXTREMITIES:   edema:-  LYMPH: No lymphadenopathy noted        LABS:                        12.5   14.9  )-----------( 65       ( 22 Dec 2016 03:38 )             36.7     22 Dec 2016 03:38    152    |  111    |  130    ----------------------------<  193    3.0     |  23     |  6.78     Ca    7.1        22 Dec 2016 03:38  Phos  8.0       22 Dec 2016 03:38  Mg     2.3       22 Dec 2016 03:38              RADIOLOGY & ADDITIONAL STUDIES:    PATHOLOGY:

## 2016-12-22 NOTE — PROGRESS NOTE ADULT - SUBJECTIVE AND OBJECTIVE BOX
pt appears more alert and oriented but still does not recall the syncopal event.  vss  left hand and forearm and thigh/leg wound areas are stable and slowly demarcating with no erythema od drainage or fluctuance. redressed by me.  left thenar/hypothenar and wrist regions appear to have deep and probably full thickness skin necrosis and will likely eventually require debridement and some form of reconstructive surgery depending on the final demarcated area which may not be apparent for another few days.  discussed w nursing and pt at the bedside and awaiting ot eval for splinting.  conyinue local care for now.

## 2016-12-22 NOTE — PROGRESS NOTE ADULT - ASSESSMENT
Patient is a 66 year old female with unknown PMHx, per ED, patient found unresponsive, admitted to critical care for Metabolic Encephalopathy, Acute Renal Failure, Hyperkalemia, Thrombocytopenia, Rhabdomyolysis.     Problem: Metabolic encephalopathy.  Plan: -pt found unresponsive on floor at home, likely sec to sepsis and hypotension  -more awake, oriented x3     Problem: ARF (acute renal failure).  Plan: urine output much improved  -monitor urine output, cr trending down  -HD if worsens, currently improving    Problem: Rhabdomyolysis.  resolving  -follow ck level closely  -CK level trending down     Problem: Melena.  Plan: initial stool occult blood +  -cbc serially  -no evidence of GIB     Problem: Thrombocytopenia. seen by Heme, no schistiocytes, likely sec to sepsis.     Problem: Sepsis.  Plan: -became hypotensive in ED, fluids given, now stable  -blood cult growing ecoli, cont on zosyn.    DVT/GI ppx

## 2016-12-22 NOTE — STUDENT SIGN OFF DOCUMENT - DOCUMENTS STUDENTS ARE SIGNED OFF ON
Assessment and Intervention/Constant Observation/Vital Signs/Plan of Care Physical Therapy Evaluation

## 2016-12-23 NOTE — PROGRESS NOTE ADULT - PROBLEM SELECTOR PLAN 6
has resolved  multiple areas of skin necroses   will need prolonged plastics follow up and debridement   silvadine for now

## 2016-12-23 NOTE — PROGRESS NOTE ADULT - SUBJECTIVE AND OBJECTIVE BOX
HPI:  Patient is a 66 year old female with unknown PMHx, per ED, patient found unresponsive . Patient was not seen by neighbors in two days, they became concerned and entered her home and found patient on the floor covered with feces.  In ED patient found CK >14,000, in renal failure, hyperkalemic, cocktail was given, including kayexelate. Then patient had melena. stool occult blood was positive, low plts and metabolic acidosis. Patient was initially admitted to Telemetry, however later found to be hypotensive, 94/56 was given fluids. ICU consulted, . Patient  admitted for AMS, Acute renal failure and rhabdomyolysis.   now with multiple dtis and skin necrosis at multiple sites     Allergies    No Known Allergies    Intolerances        MEDICATIONS  (STANDING):  insulin lispro (HumaLOG) corrective regimen sliding scale  SubCutaneous at bedtime  dextrose 5%. 1000milliLiter(s) IV Continuous <Continuous>  dextrose 50% Injectable 12.5Gram(s) IV Push once  dextrose 50% Injectable 25Gram(s) IV Push once  dextrose 50% Injectable 25Gram(s) IV Push once  piperacillin/tazobactam IVPB. 3.375Gram(s) IV Intermittent every 12 hours  silver sulfADIAZINE 1% Cream 1Application(s) Topical daily  insulin lispro (HumaLOG) corrective regimen sliding scale  SubCutaneous three times a day before meals  calcium acetate 1334milliGRAM(s) Oral three times a day with meals  insulin glargine Injectable (LANTUS) 10Unit(s) SubCutaneous at bedtime  influenza   Vaccine 0.5milliLiter(s) IntraMuscular once    MEDICATIONS  (PRN):  dextrose Gel 1Dose(s) Oral once PRN Blood Glucose LESS THAN 70 milliGRAM(s)/deciliter  glucagon  Injectable 1milliGRAM(s) IntraMuscular once PRN Glucose LESS THAN 70 milligrams/deciliter      REVIEW OF SYSTEMS:  feels better  not confused now  CONSTITUTIONAL: No fever, chills, weight loss, or fatigue  HEENT: No sore throat, runny nose, ear ache  RESPIRATORY: No cough, wheezing, No shortness of breath  CARDIOVASCULAR: No chest pain, palpitations, dizziness  GASTROINTESTINAL: No abdominal pain. No nausea, vomiting, diarrhea  GENITOURINARY: No dysuria, increase frequency, hematuria, or incontinence  NEUROLOGICAL: No headaches, memory loss, loss of strength, numbness, or tremors, no weakness  EXTREMITY: No pedal edema BLE  SKIN: multiple lesions     VITAL SIGNS:  T(C): 37.3, Max: 37.6 (12-23 @ 04:00)  T(F): 99.2, Max: 99.6 (12-23 @ 04:00)  HR: 70 (66 - 80)  BP: 162/70 (130/58 - 184/98)  RR: 17 (15 - 19)  SpO2: 96% (95% - 98%)  Wt(kg): --    PHYSICAL EXAM:  alert and oriented x3 in nad  GENERAL: not in any distress  HEENT: Neck is supple, normocephalic, atraumatic   CHEST/LUNG: Clear to percussion bilaterally; No rales, rhonchi, wheezing  HEART: Regular rate and rhythm; No murmurs, rubs, or gallops  ABDOMEN: Soft, Nontender, Nondistended; Bowel sounds present, no rebound   EXTREMITIES:  2+ Peripheral Pulses, No clubbing, cyanosis, or edema  GENITOURINARY: NEGATIVE   SKIN: stabe extensive areas of necroses   BACK: no pressor sore   NERVOUS SYSTEM:  Alert & Oriented X3, Good concentration  PSYCH: normal affect     LABS:                         12.9   16.6  )-----------( 111      ( 23 Dec 2016 03:48 )             36.1     23 Dec 2016 03:48    150    |  110    |  133    ----------------------------<  239    3.2     |  24     |  6.38     Ca    7.8        23 Dec 2016 03:48  Phos  7.5       23 Dec 2016 03:48  Mg     2.3       23 Dec 2016 03:48              CARDIAC MARKERS ( 22 Dec 2016 03:38 )  x     / x     / 6830 U/L / x     / x                  Vancomycin Level, Trough: 13.4 ug/mL (12-22 @ 03:38)        Culture Results:   Growth in aerobic and anaerobic bottles: Escherichia coli  See previous culture [id number] (12-19 @ 18:15)  Culture Results:   >100,000 CFU/ml Escherichia coli (12-19 @ 08:14)  Culture Results:   Growth in aerobic and anaerobic bottles: Escherichia coli (12-19 @ 08:10)                Radiology:

## 2016-12-23 NOTE — PROGRESS NOTE ADULT - SUBJECTIVE AND OBJECTIVE BOX
Patient is a 66y old  Female who presents with a chief complaint of unresponsiveness (19 Dec 2016 08:49)      INTERVAL HPI/OVERNIGHT EVENTS:  no new problems    MEDICATIONS  (STANDING):  insulin lispro (HumaLOG) corrective regimen sliding scale  SubCutaneous at bedtime  dextrose 5%. 1000milliLiter(s) IV Continuous <Continuous>  dextrose 50% Injectable 12.5Gram(s) IV Push once  dextrose 50% Injectable 25Gram(s) IV Push once  dextrose 50% Injectable 25Gram(s) IV Push once  piperacillin/tazobactam IVPB. 3.375Gram(s) IV Intermittent every 12 hours  silver sulfADIAZINE 1% Cream 1Application(s) Topical daily  insulin lispro (HumaLOG) corrective regimen sliding scale  SubCutaneous three times a day before meals  calcium acetate 1334milliGRAM(s) Oral three times a day with meals  insulin glargine Injectable (LANTUS) 10Unit(s) SubCutaneous at bedtime  influenza   Vaccine 0.5milliLiter(s) IntraMuscular once  vancomycin  IVPB 1000milliGRAM(s) IV Intermittent once    MEDICATIONS  (PRN):  dextrose Gel 1Dose(s) Oral once PRN Blood Glucose LESS THAN 70 milliGRAM(s)/deciliter  glucagon  Injectable 1milliGRAM(s) IntraMuscular once PRN Glucose LESS THAN 70 milligrams/deciliter        REVIEW OF SYSTEMS:  CONSTITUTIONAL: No fever, weight loss, or fatigue  EYES: No eye pain, visual disturbances, or discharge  ENMT:  No difficulty hearing, tinnitus, vertigo; No sinus or throat pain  NECK: No pain or stiffness  RESPIRATORY: No cough, wheezing, chills or hemoptysis; No shortness of breath  CARDIOVASCULAR: No chest pain, palpitations, dizziness, or leg swelling  GASTROINTESTINAL: No abdominal or epigastric pain. No nausea, vomiting, or hematemesis; No diarrhea or constipation. No melena or hematochezia.  GENITOURINARY: No dysuria, frequency, hematuria, or incontinence  NEUROLOGICAL: No headaches, memory loss, loss of strength, numbness, or tremors  SKIN: No itching, burning, rashes, or lesions .skin DTI  Vital Signs Last 24 Hrs  T(C): 37.2, Max: 37.6 (12-23 @ 04:00)  T(F): 99, Max: 99.6 (12-23 @ 04:00)  HR: 82 (66 - 82)  BP: 154/73 (143/63 - 184/98)  BP(mean): 92 (83 - 120)  RR: 16 (16 - 18)  SpO2: 99% (95% - 99%)    PHYSICAL EXAM:  GENERAL: NAD, well-groomed, well-developed  HEAD:  Atraumatic, Normocephalic  EYES: EOMI, PERRLA, conjunctiva and sclera clear  ENMT: No tonsillar erythema, exudates, or enlargement; Moist mucous membranes   NECK: Supple, No JVD   NERVOUS SYSTEM:  Alert & Oriented X3, Good concentration; Motor Strength 5/5 B/L upper and lower extremities; DTRs 2+ intact and symmetric  CHEST/LUNG: Clear to percussion bilaterally; No rales, rhonchi, wheezing, or rubs  HEART: Regular rate and rhythm; No murmurs, rubs, or gallops  ABDOMEN: Soft, Nontender, Nondistended; Bowel sounds present  EXTREMITIES:  2+ Peripheral Pulses, No clubbing, cyanosis, or edema  LYMPH: No lymphadenopathy noted  SKIN: No rashes or lesions    LABS:                        12.9   16.6  )-----------( 111      ( 23 Dec 2016 03:48 )             36.1     23 Dec 2016 03:48    150    |  110    |  133    ----------------------------<  239    3.2     |  24     |  6.38     Ca    7.8        23 Dec 2016 03:48  Phos  7.5       23 Dec 2016 03:48  Mg     2.3       23 Dec 2016 03:48          CAPILLARY BLOOD GLUCOSE  175 (23 Dec 2016 21:12)  180 (23 Dec 2016 15:53)  204 (23 Dec 2016 07:20)      RADIOLOGY & ADDITIONAL TESTS:    Imaging Personally Reviewed:  [ ] YES  [ ] NO    Consultant(s) Notes Reviewed:  [ ] YES  [ ] NO    Care Discussed with Consultants/Other Providers [ ] YES  [ ] NO

## 2016-12-23 NOTE — PROGRESS NOTE ADULT - ASSESSMENT
Patient is a 66 year old female with unknown PMHx, per ED, patient found unresponsive, admitted to critical care for Metabolic Encephalopathy, Acute Renal Failure, Hyperkalemia, Thrombocytopenia, Rhabdomyolysis.   Continue present therapy including antibiotics

## 2016-12-23 NOTE — PROGRESS NOTE ADULT - SUBJECTIVE AND OBJECTIVE BOX
Patient seen in follow up for ANCELMO; appears comfortable, no distress.    MEDICATIONS  (STANDING):  insulin lispro (HumaLOG) corrective regimen sliding scale  SubCutaneous at bedtime  dextrose 5%. 1000milliLiter(s) IV Continuous <Continuous>  dextrose 50% Injectable 12.5Gram(s) IV Push once  dextrose 50% Injectable 25Gram(s) IV Push once  dextrose 50% Injectable 25Gram(s) IV Push once  piperacillin/tazobactam IVPB. 3.375Gram(s) IV Intermittent every 12 hours  silver sulfADIAZINE 1% Cream 1Application(s) Topical daily  insulin lispro (HumaLOG) corrective regimen sliding scale  SubCutaneous three times a day before meals  calcium acetate 1334milliGRAM(s) Oral three times a day with meals  insulin glargine Injectable (LANTUS) 10Unit(s) SubCutaneous at bedtime  influenza   Vaccine 0.5milliLiter(s) IntraMuscular once    MEDICATIONS  (PRN):  dextrose Gel 1Dose(s) Oral once PRN Blood Glucose LESS THAN 70 milliGRAM(s)/deciliter  glucagon  Injectable 1milliGRAM(s) IntraMuscular once PRN Glucose LESS THAN 70 milligrams/deciliter    PHYSICAL EXAM:      T(C): 37.6, Max: 37.7 (12-22 @ 15:03)  HR: 73 (64 - 80)  BP: 164/66 (130/58 - 184/98)  RR: 18 (15 - 19)  SpO2: 96% (95% - 98%)  Wt(kg): --  Respiratory: clear anteriorly, decreased BS at bases  Cardiovascular: S1 S2  Gastrointestinal: soft NT ND +BS  Extremities:  1-2 +  edema                                    12.9   16.6  )-----------( 111      ( 23 Dec 2016 03:48 )             36.1     23 Dec 2016 03:48    150    |  110    |  133    ----------------------------<  239    3.2     |  24     |  6.38     Ca    7.8        23 Dec 2016 03:48  Phos  7.5       23 Dec 2016 03:48  Mg     2.3       23 Dec 2016 03:48            Assessment and Plan:    ANCELMO, ischemic and pigment ATN; nonoliguric.  Will follow closely for hemodialytic indications.

## 2016-12-24 NOTE — PROGRESS NOTE ADULT - SUBJECTIVE AND OBJECTIVE BOX
Patient is a 66y old  Female who presents with a chief complaint of unresponsiveness (19 Dec 2016 08:49)      INTERVAL HPI/OVERNIGHT EVENTS:    MEDICATIONS  (STANDING):  insulin lispro (HumaLOG) corrective regimen sliding scale  SubCutaneous at bedtime  dextrose 5%. 1000milliLiter(s) IV Continuous <Continuous>  dextrose 50% Injectable 12.5Gram(s) IV Push once  dextrose 50% Injectable 25Gram(s) IV Push once  dextrose 50% Injectable 25Gram(s) IV Push once  piperacillin/tazobactam IVPB. 3.375Gram(s) IV Intermittent every 12 hours  silver sulfADIAZINE 1% Cream 1Application(s) Topical daily  insulin lispro (HumaLOG) corrective regimen sliding scale  SubCutaneous three times a day before meals  calcium acetate 1334milliGRAM(s) Oral three times a day with meals  insulin glargine Injectable (LANTUS) 10Unit(s) SubCutaneous at bedtime  influenza   Vaccine 0.5milliLiter(s) IntraMuscular once    MEDICATIONS  (PRN):  dextrose Gel 1Dose(s) Oral once PRN Blood Glucose LESS THAN 70 milliGRAM(s)/deciliter  glucagon  Injectable 1milliGRAM(s) IntraMuscular once PRN Glucose LESS THAN 70 milligrams/deciliter        REVIEW OF SYSTEMS:  CONSTITUTIONAL: No fever, weight loss, or fatigue  EYES: No eye pain, visual disturbances, or discharge  ENMT:  No difficulty hearing, tinnitus, vertigo; No sinus or throat pain  NECK: No pain or stiffness  RESPIRATORY: No cough, wheezing, chills or hemoptysis; No shortness of breath  CARDIOVASCULAR: No chest pain, palpitations, dizziness, or leg swelling  GASTROINTESTINAL: No abdominal or epigastric pain. No nausea, vomiting, or hematemesis; No diarrhea or constipation. No melena or hematochezia.  GENITOURINARY: No dysuria, frequency, hematuria, or incontinence  NEUROLOGICAL: No headaches, memory loss, loss of strength, numbness, or tremors  SKIN: No itching, burning, rashes, or lesions      Vital Signs Last 24 Hrs  T(C): 36.1, Max: 37.2 (12-24 @ 00:07)  T(F): 97, Max: 99 (12-24 @ 00:07)  HR: 79 (67 - 89)  BP: 152/67 (98/63 - 161/84)  BP(mean): --  RR: 18 (16 - 18)  SpO2: 95% (95% - 99%)    PHYSICAL EXAM:  GENERAL: NAD, well-groomed, well-developed  HEAD:  Atraumatic, Normocephalic  EYES: EOMI, PERRLA, conjunctiva and sclera clear  ENMT: No tonsillar erythema, exudates, or enlargement; Moist mucous membranes   NECK: Supple, No JVD   NERVOUS SYSTEM:  Alert & Oriented X3, Good concentration; Motor Strength 5/5 B/L upper and lower extremities; DTRs 2+ intact and symmetric  CHEST/LUNG: Clear to percussion bilaterally; No rales, rhonchi, wheezing, or rubs  HEART: Regular rate and rhythm; No murmurs, rubs, or gallops  ABDOMEN: Soft, Nontender, Nondistended; Bowel sounds present  EXTREMITIES:  2+ Peripheral Pulses, No clubbing, cyanosis, or edema  LYMPH: No lymphadenopathy noted  SKIN: +abrasions            +hematoma left hand    LABS:                        12.0   18.9  )-----------( 174      ( 24 Dec 2016 07:44 )             35.7     24 Dec 2016 07:44    148    |  109    |  134    ----------------------------<  178    3.2     |  25     |  5.76     Ca    8.1        24 Dec 2016 07:44  Phos  6.4       24 Dec 2016 07:44  Mg     2.5       24 Dec 2016 07:44          CAPILLARY BLOOD GLUCOSE  161 (24 Dec 2016 15:56)  165 (24 Dec 2016 07:51)      RADIOLOGY & ADDITIONAL TESTS:    Imaging Personally Reviewed:  [ ] YES  [ ] NO    Consultant(s) Notes Reviewed:  [ ] YES  [ ] NO    Care Discussed with Consultants/Other Providers [ ] YES  [ ] NO

## 2016-12-24 NOTE — PROGRESS NOTE ADULT - SUBJECTIVE AND OBJECTIVE BOX
Subjective: comfortable      MEDICATIONS  (STANDING):  insulin lispro (HumaLOG) corrective regimen sliding scale  SubCutaneous at bedtime  dextrose 5%. 1000milliLiter(s) IV Continuous <Continuous>  dextrose 50% Injectable 12.5Gram(s) IV Push once  dextrose 50% Injectable 25Gram(s) IV Push once  dextrose 50% Injectable 25Gram(s) IV Push once  piperacillin/tazobactam IVPB. 3.375Gram(s) IV Intermittent every 12 hours  silver sulfADIAZINE 1% Cream 1Application(s) Topical daily  insulin lispro (HumaLOG) corrective regimen sliding scale  SubCutaneous three times a day before meals  calcium acetate 1334milliGRAM(s) Oral three times a day with meals  insulin glargine Injectable (LANTUS) 10Unit(s) SubCutaneous at bedtime  influenza   Vaccine 0.5milliLiter(s) IntraMuscular once    MEDICATIONS  (PRN):  dextrose Gel 1Dose(s) Oral once PRN Blood Glucose LESS THAN 70 milliGRAM(s)/deciliter  glucagon  Injectable 1milliGRAM(s) IntraMuscular once PRN Glucose LESS THAN 70 milligrams/deciliter          T(C): 36.7, Max: 37.3 (12-23 @ 15:36)  HR: 76 (67 - 89)  BP: 158/66 (98/63 - 168/70)  RR: 18 (16 - 18)  SpO2: 99% (96% - 99%)        PHYSICAL EXAM:    ENERAL: no distress  EYES: EOMI, PERRLA, conjunctiva and sclera clear  NECK: Supple, no inc in JVP  CHEST/LUNG: Clear  HEART: S1S2  ABDOMEN: Soft, Nontender, distended; Bowel sounds present  EXTREMITIES:  no pedal edema. L arm protective device.   NEURO: no asterixis        LABS:  CBC Full  -  ( 24 Dec 2016 07:44 )  WBC Count : 18.9 K/uL  Hemoglobin : 12.0 g/dL  Hematocrit : 35.7 %  Platelet Count - Automated : 174 K/uL  Mean Cell Volume : 88.5 fl  Mean Cell Hemoglobin : 29.8 pg  Mean Cell Hemoglobin Concentration : 33.7 gm/dL  Auto Neutrophil # : x  Auto Lymphocyte # : x  Auto Monocyte # : x  Auto Eosinophil # : x  Auto Basophil # : x  Auto Neutrophil % : x  Auto Lymphocyte % : x  Auto Monocyte % : x  Auto Eosinophil % : x  Auto Basophil % : x    24 Dec 2016 07:44    148    |  109    |  134    ----------------------------<  178    3.2     |  25     |  5.76     Ca    8.1        24 Dec 2016 07:44  Phos  6.4       24 Dec 2016 07:44  Mg     2.5       24 Dec 2016 07:44          Culture Results:   No growth to date. (12-23 @ 08:45)  Culture Results:   No growth to date. (12-23 @ 08:43)          ASSESSMENT and PLAN:      ANCELMO -- multifactorial ATN including pigment induced. Non-oliguric, indices have reached plateau.   No overt uremia. No dialytic indications at present. Cont to monitor closely with daily re-eval.   Encourage PO water.   Supplement K with caution in face of markedly reduced clearance.   Check bladder scan

## 2016-12-24 NOTE — PROGRESS NOTE ADULT - SUBJECTIVE AND OBJECTIVE BOX
HPI:  Patient is a 66 year old female with unknown PMHx, per ED, patient found unresponsive . Patient was not seen by neighbors in two days, they became concerned and entered her home and found patient on the floor covered with feces.  In ED patient found CK >14,000, in renal failure, hyperkalemic, cocktail was given, including kayexelate. Then patient had melena. stool occult blood was positive, low plts and metabolic acidosis. Patient was initially admitted to Telemetry, however this AM, found to be hypotensive, 94/56 was given fluids. ICU consulted, seen at bedside, alert, but confused. Patient is being admitted for AMS, Acute renal failure and rhabdomyolysis. (19 Dec 2016 08:49)      Allergies    No Known Allergies    Intolerances        MEDICATIONS  (STANDING):  insulin lispro (HumaLOG) corrective regimen sliding scale  SubCutaneous at bedtime  dextrose 5%. 1000milliLiter(s) IV Continuous <Continuous>  dextrose 50% Injectable 12.5Gram(s) IV Push once  dextrose 50% Injectable 25Gram(s) IV Push once  dextrose 50% Injectable 25Gram(s) IV Push once  piperacillin/tazobactam IVPB. 3.375Gram(s) IV Intermittent every 12 hours  silver sulfADIAZINE 1% Cream 1Application(s) Topical daily  insulin lispro (HumaLOG) corrective regimen sliding scale  SubCutaneous three times a day before meals  calcium acetate 1334milliGRAM(s) Oral three times a day with meals  insulin glargine Injectable (LANTUS) 10Unit(s) SubCutaneous at bedtime  influenza   Vaccine 0.5milliLiter(s) IntraMuscular once    MEDICATIONS  (PRN):  dextrose Gel 1Dose(s) Oral once PRN Blood Glucose LESS THAN 70 milliGRAM(s)/deciliter  glucagon  Injectable 1milliGRAM(s) IntraMuscular once PRN Glucose LESS THAN 70 milligrams/deciliter      REVIEW OF SYSTEMS:    CONSTITUTIONAL: No fever, chills, weight loss, or fatigue  HEENT: No sore throat, runny nose, ear ache  RESPIRATORY: No cough, wheezing, No shortness of breath  CARDIOVASCULAR: No chest pain, palpitations, dizziness  GASTROINTESTINAL: No abdominal pain. No nausea, vomiting, diarrhea  GENITOURINARY: No dysuria, increase frequency, hematuria, or incontinence  NEUROLOGICAL: No headaches, memory loss, loss of strength, numbness, or tremors, no weakness  EXTREMITY: No pedal edema BLE  SKIN: No itching, burning, rashes, or lesions     VITAL SIGNS:  T(C): 36.5, Max: 37.2 (12-24 @ 00:07)  T(F): 97.7, Max: 99 (12-24 @ 00:07)  HR: 77 (67 - 89)  BP: 172/68 (98/63 - 172/68)  RR: 17 (16 - 18)  SpO2: 97% (95% - 99%)  Wt(kg): --    PHYSICAL EXAM:    GENERAL: not in any distress  HEENT: Neck is supple, normocephalic, atraumatic   CHEST/LUNG: Clear to percussion bilaterally; No rales, rhonchi, wheezing  HEART: Regular rate and rhythm; No murmurs, rubs, or gallops  ABDOMEN: Soft, Nontender, Nondistended; Bowel sounds present, no rebound   EXTREMITIES:  2+ Peripheral Pulses, No clubbing, cyanosis, or edema  GENITOURINARY:   SKIN: No rashes or lesions  BACK: no pressor sore   NERVOUS SYSTEM:  Alert & Oriented X3, Good concentration  PSYCH: normal affect     LABS:                         11.0   17.8  )-----------( 198      ( 24 Dec 2016 21:50 )             32.0     24 Dec 2016 07:44    148    |  109    |  134    ----------------------------<  178    3.2     |  25     |  5.76     Ca    8.1        24 Dec 2016 07:44  Phos  6.4       24 Dec 2016 07:44  Mg     2.5       24 Dec 2016 07:44                          Vancomycin Level, Trough: 13.4 ug/mL (12-22 @ 03:38)        Culture Results:   No growth to date. (12-23 @ 08:45)  Culture Results:   No growth to date. (12-23 @ 08:43)  Culture Results:   Growth in aerobic and anaerobic bottles: Escherichia coli  See previous culture [id number] (12-19 @ 18:15)  Culture Results:   >100,000 CFU/ml Escherichia coli (12-19 @ 08:14)  Culture Results:   Growth in aerobic and anaerobic bottles: Escherichia coli (12-19 @ 08:10)                Radiology: HPI:  Patient is a 66 year old female with unknown PMHx, per ED, patient found unresponsive . Patient was not seen by neighbors in two days, they became concerned and entered her home and found patient on the floor covered with feces.  In ED patient found CK >14,000, in renal failure, hyperkalemic, cocktail was given, including kayexelate. Then patient had melena. stool occult blood was positive, low plts and metabolic acidosis. Patient was initially admitted to Telemetry, then icu for hypotension   stabilized and transferred to regular floor  seen with PMD by bedside      Allergies    No Known Allergies    Intolerances        MEDICATIONS  (STANDING):  insulin lispro (HumaLOG) corrective regimen sliding scale  SubCutaneous at bedtime  dextrose 5%. 1000milliLiter(s) IV Continuous <Continuous>  dextrose 50% Injectable 12.5Gram(s) IV Push once  dextrose 50% Injectable 25Gram(s) IV Push once  dextrose 50% Injectable 25Gram(s) IV Push once  piperacillin/tazobactam IVPB. 3.375Gram(s) IV Intermittent every 12 hours  silver sulfADIAZINE 1% Cream 1Application(s) Topical daily  insulin lispro (HumaLOG) corrective regimen sliding scale  SubCutaneous three times a day before meals  calcium acetate 1334milliGRAM(s) Oral three times a day with meals  insulin glargine Injectable (LANTUS) 10Unit(s) SubCutaneous at bedtime  influenza   Vaccine 0.5milliLiter(s) IntraMuscular once    MEDICATIONS  (PRN):  dextrose Gel 1Dose(s) Oral once PRN Blood Glucose LESS THAN 70 milliGRAM(s)/deciliter  glucagon  Injectable 1milliGRAM(s) IntraMuscular once PRN Glucose LESS THAN 70 milligrams/deciliter      REVIEW OF SYSTEMS:    CONSTITUTIONAL: No fever, chills, weight loss, or fatigue  HEENT: No sore throat, runny nose, ear ache  RESPIRATORY: No cough, wheezing, No shortness of breath  CARDIOVASCULAR: No chest pain, palpitations, dizziness  GASTROINTESTINAL: No abdominal pain. No nausea, vomiting, diarrhea  GENITOURINARY: No dysuria, increase frequency, hematuria, or incontinence  NEUROLOGICAL: No headaches, memory loss, loss of strength, numbness, or tremors, no weakness  EXTREMITY: No pedal edema BLE  SKIN: multiple areas of skin necroses but left hand is swollen decreased ROM and stiff with hard hematoma ; ?? plastic follow up   denies pain    VITAL SIGNS:  T(C): 36.5, Max: 37.2 (12-24 @ 00:07)  T(F): 97.7, Max: 99 (12-24 @ 00:07)  HR: 77 (67 - 89)  BP: 172/68 (98/63 - 172/68)  RR: 17 (16 - 18)  SpO2: 97% (95% - 99%)  Wt(kg): --    PHYSICAL EXAM:    GENERAL: not in any distress  HEENT: Neck is supple, normocephalic, atraumatic   CHEST/LUNG: Clear to percussion bilaterally; No rales, rhonchi, wheezing  HEART: Regular rate and rhythm; No murmurs, rubs, or gallops  ABDOMEN: Soft, Nontender, Nondistended; Bowel sounds present, no rebound   EXTREMITIES:  2+ Peripheral Pulses, No clubbing, cyanosis, or mackenzie  SKIN: multiple areas of necroses and hand hematoma tense of left hand   BACK: no pressor sore   NERVOUS SYSTEM:  Alert & Oriented X3, Good concentration  PSYCH: normal affect     LABS:                         11.0   17.8  )-----------( 198      ( 24 Dec 2016 21:50 )             32.0     24 Dec 2016 07:44    148    |  109    |  134    ----------------------------<  178    3.2     |  25     |  5.76     Ca    8.1        24 Dec 2016 07:44  Phos  6.4       24 Dec 2016 07:44  Mg     2.5       24 Dec 2016 07:44                          Vancomycin Level, Trough: 13.4 ug/mL (12-22 @ 03:38)        Culture Results:   No growth to date. (12-23 @ 08:45)  Culture Results:   No growth to date. (12-23 @ 08:43)  Culture Results:   Growth in aerobic and anaerobic bottles: Escherichia coli  See previous culture [id number] (12-19 @ 18:15)  Culture Results:   >100,000 CFU/ml Escherichia coli (12-19 @ 08:14)  Culture Results:   Growth in aerobic and anaerobic bottles: Escherichia coli (12-19 @ 08:10)                Radiology:

## 2016-12-25 NOTE — PROGRESS NOTE ADULT - SUBJECTIVE AND OBJECTIVE BOX
Subjective: no distress      MEDICATIONS  (STANDING):  insulin lispro (HumaLOG) corrective regimen sliding scale  SubCutaneous at bedtime  dextrose 5%. 1000milliLiter(s) IV Continuous <Continuous>  dextrose 50% Injectable 12.5Gram(s) IV Push once  dextrose 50% Injectable 25Gram(s) IV Push once  dextrose 50% Injectable 25Gram(s) IV Push once  piperacillin/tazobactam IVPB. 3.375Gram(s) IV Intermittent every 12 hours  silver sulfADIAZINE 1% Cream 1Application(s) Topical daily  insulin lispro (HumaLOG) corrective regimen sliding scale  SubCutaneous three times a day before meals  calcium acetate 1334milliGRAM(s) Oral three times a day with meals  insulin glargine Injectable (LANTUS) 10Unit(s) SubCutaneous at bedtime  influenza   Vaccine 0.5milliLiter(s) IntraMuscular once    MEDICATIONS  (PRN):  dextrose Gel 1Dose(s) Oral once PRN Blood Glucose LESS THAN 70 milliGRAM(s)/deciliter  glucagon  Injectable 1milliGRAM(s) IntraMuscular once PRN Glucose LESS THAN 70 milligrams/deciliter          T(C): 37, Max: 37 (12-25 @ 04:37)  HR: 82 (75 - 89)  BP: 137/72 (137/72 - 172/68)  RR: 19 (17 - 19)  SpO2: 97% (95% - 99%)         PHYSICAL EXAM:      GENERAL: no distress  EYES: EOMI, PERRLA, conjunctiva and sclera clear  NECK: Supple, no inc in JVP  CHEST/LUNG: Clear  HEART: S1S2  ABDOMEN: Soft, Nontender, distended; Bowel sounds present  EXTREMITIES:  no pedal edema. L arm drssed  NEURO: no asterixis        LABS:  CBC Full  -  ( 25 Dec 2016 06:56 )  WBC Count : 16.9 K/uL  Hemoglobin : 10.7 g/dL  Hematocrit : 31.6 %  Platelet Count - Automated : 210 K/uL  Mean Cell Volume : 87.4 fl  Mean Cell Hemoglobin : 29.7 pg  Mean Cell Hemoglobin Concentration : 34.0 gm/dL  Auto Neutrophil # : x  Auto Lymphocyte # : x  Auto Monocyte # : x  Auto Eosinophil # : x  Auto Basophil # : x  Auto Neutrophil % : x  Auto Lymphocyte % : x  Auto Monocyte % : x  Auto Eosinophil % : x  Auto Basophil % : x    25 Dec 2016 06:56    148    |  110    |  115    ----------------------------<  206    3.4     |  25     |  4.71     Ca    7.9        25 Dec 2016 06:56  Phos  5.2       25 Dec 2016 06:56  Mg     2.5       24 Dec 2016 07:44    TPro  6.2    /  Alb  1.9    /  TBili  0.5    /  DBili  x      /  AST  83     /  ALT  68     /  AlkPhos  104    25 Dec 2016 06:56        Culture Results:   No growth to date. (12-23 @ 08:45)  Culture Results:   No growth to date. (12-23 @ 08:43)        ASSESSMENT and PLAN:    ANCELMO -- multifactorial ATN including pigment induced. Non-oliguric, indices improved over 24h.   No overt uremia. No dialytic indications at present. Cont to monitor closely with daily re-eval.   Encourage PO water.   Supplement K with caution in face of markedly reduced clearance.

## 2016-12-25 NOTE — PROGRESS NOTE ADULT - SUBJECTIVE AND OBJECTIVE BOX
Patient is a 66y old  Female who presents with a chief complaint of unresponsiveness (19 Dec 2016 08:49)      INTERVAL HPI/OVERNIGHT EVENTS:  no acute complaints    K 3.4  CK 2312  Blood culture 12/24/2016: no growth  WBC 16.9  Hemoglobin 10.7 Hematocrit 31.6    MEDICATIONS  (STANDING):  insulin lispro (HumaLOG) corrective regimen sliding scale  SubCutaneous at bedtime  dextrose 5%. 1000milliLiter(s) IV Continuous <Continuous>  dextrose 50% Injectable 12.5Gram(s) IV Push once  dextrose 50% Injectable 25Gram(s) IV Push once  dextrose 50% Injectable 25Gram(s) IV Push once  piperacillin/tazobactam IVPB. 3.375Gram(s) IV Intermittent every 12 hours  silver sulfADIAZINE 1% Cream 1Application(s) Topical daily  insulin lispro (HumaLOG) corrective regimen sliding scale  SubCutaneous three times a day before meals  calcium acetate 1334milliGRAM(s) Oral three times a day with meals  insulin glargine Injectable (LANTUS) 10Unit(s) SubCutaneous at bedtime  influenza   Vaccine 0.5milliLiter(s) IntraMuscular once    MEDICATIONS  (PRN):  dextrose Gel 1Dose(s) Oral once PRN Blood Glucose LESS THAN 70 milliGRAM(s)/deciliter  glucagon  Injectable 1milliGRAM(s) IntraMuscular once PRN Glucose LESS THAN 70 milligrams/deciliter        REVIEW OF SYSTEMS:  CONSTITUTIONAL: No fever, weight loss, or fatigue  EYES: No eye pain, visual disturbances, or discharge  ENMT:  No difficulty hearing, tinnitus, vertigo; No sinus or throat pain  NECK: No pain or stiffness  RESPIRATORY: No cough, wheezing, chills or hemoptysis; No shortness of breath  CARDIOVASCULAR: No chest pain, palpitations, dizziness, or leg swelling  GASTROINTESTINAL: No abdominal or epigastric pain. No nausea, vomiting, or hematemesis; No diarrhea or constipation. No melena or hematochezia.  GENITOURINARY: No dysuria, frequency, hematuria, or incontinence  NEUROLOGICAL: No headaches, memory loss, loss of strength, numbness, or tremors  SKIN: No itching, burning, rashes, or lesions      Vital Signs Last 24 Hrs  T(C): 36.1, Max: 37.2 (12-24 @ 00:07)  T(F): 97, Max: 99 (12-24 @ 00:07)  HR: 79 (67 - 89)  BP: 152/67 (98/63 - 161/84)  BP(mean): --  RR: 18 (16 - 18)  SpO2: 95% (95% - 99%)    PHYSICAL EXAM:  GENERAL: NAD, well-groomed, well-developed  HEAD:  Atraumatic, Normocephalic  EYES: EOMI, PERRLA, conjunctiva and sclera clear  ENMT: No tonsillar erythema, exudates, or enlargement; Moist mucous membranes   NECK: Supple, No JVD   NERVOUS SYSTEM:  Alert & Oriented X3, Good concentration; Motor Strength 5/5 B/L upper and lower extremities; DTRs 2+ intact and symmetric  CHEST/LUNG: Clear to percussion bilaterally; No rales, rhonchi, wheezing, or rubs  HEART: Regular rate and rhythm; No murmurs, rubs, or gallops  ABDOMEN: Soft, Nontender, Nondistended; Bowel sounds present  EXTREMITIES:  2+ Peripheral Pulses, No clubbing, cyanosis, or edema  LYMPH: No lymphadenopathy noted  SKIN: +abrasions            +hematoma left hand    LABS:                        12.0   18.9  )-----------( 174      ( 24 Dec 2016 07:44 )             35.7     24 Dec 2016 07:44    148    |  109    |  134    ----------------------------<  178    3.2     |  25     |  5.76     Ca    8.1        24 Dec 2016 07:44  Phos  6.4       24 Dec 2016 07:44  Mg     2.5       24 Dec 2016 07:44          CAPILLARY BLOOD GLUCOSE  161 (24 Dec 2016 15:56)  165 (24 Dec 2016 07:51)      RADIOLOGY & ADDITIONAL TESTS:    Imaging Personally Reviewed:  [ ] YES  [ ] NO    Consultant(s) Notes Reviewed:  [ ] YES  [ ] NO    Care Discussed with Consultants/Other Providers [ ] YES  [ ] NO

## 2016-12-26 NOTE — PROGRESS NOTE ADULT - ASSESSMENT
- NACELMO  - Rhabdo    Improving renal function and CPK levels. Enocurage PO fluids. Avoid nephrotoxic meds as possible. Will follow lytes and RF trend.

## 2016-12-26 NOTE — PROGRESS NOTE ADULT - SUBJECTIVE AND OBJECTIVE BOX
Patient is a 66 year old female with unknown PMHx, per ED, patient found unresponsive . Patient was not seen by neighbors in two days, they became concerned and entered her home and found patient on the floor covered with feces.  In ED patient found CK >14,000, in renal failure, hyperkalemic, cocktail was given, including kayexelate. Then patient had melena. stool occult blood was positive, low plts and metabolic acidosis. Patient was initially admitted to Telemetry, then icu for hypotension   stabilized and transferred to regular floor    Allergies    No Known Allergies    Intolerances        MEDICATIONS  (STANDING):  insulin lispro (HumaLOG) corrective regimen sliding scale  SubCutaneous at bedtime  dextrose 5%. 1000milliLiter(s) IV Continuous <Continuous>  dextrose 50% Injectable 12.5Gram(s) IV Push once  dextrose 50% Injectable 25Gram(s) IV Push once  dextrose 50% Injectable 25Gram(s) IV Push once  piperacillin/tazobactam IVPB. 3.375Gram(s) IV Intermittent every 12 hours  silver sulfADIAZINE 1% Cream 1Application(s) Topical daily  insulin lispro (HumaLOG) corrective regimen sliding scale  SubCutaneous three times a day before meals  calcium acetate 1334milliGRAM(s) Oral three times a day with meals  insulin glargine Injectable (LANTUS) 10Unit(s) SubCutaneous at bedtime  influenza   Vaccine 0.5milliLiter(s) IntraMuscular once    MEDICATIONS  (PRN):  dextrose Gel 1Dose(s) Oral once PRN Blood Glucose LESS THAN 70 milliGRAM(s)/deciliter  glucagon  Injectable 1milliGRAM(s) IntraMuscular once PRN Glucose LESS THAN 70 milligrams/deciliter      REVIEW OF SYSTEMS:    CONSTITUTIONAL: No fever, chills, weight loss, or fatigue  HEENT: No sore throat, runny nose, ear ache  RESPIRATORY: No cough, wheezing, No shortness of breath  CARDIOVASCULAR: No chest pain, palpitations, dizziness  GASTROINTESTINAL: No abdominal pain. No nausea, vomiting, diarrhea  GENITOURINARY: No dysuria, increase frequency, hematuria, or incontinence  NEUROLOGICAL: No headaches, memory loss, loss of strength, numbness, or tremors, no weakness  EXTREMITY: No pedal edema BLE  SKIN: No itching, burning, rashes, or lesions     VITAL SIGNS:  T(C): 37.2, Max: 37.2 (12-26 @ 13:00)  T(F): 99, Max: 99 (12-26 @ 13:00)  HR: 94 (85 - 94)  BP: 154/81 (138/62 - 154/81)  RR: 18 (17 - 18)  SpO2: 97% (95% - 97%)  Wt(kg): --    PHYSICAL EXAM:    GENERAL: not in any distress  HEENT: Neck is supple, normocephalic, atraumatic   CHEST/LUNG: Clear to percussion bilaterally; No rales, rhonchi, wheezing  HEART: Regular rate and rhythm; No murmurs, rubs, or gallops  ABDOMEN: Soft, Nontender, Nondistended; Bowel sounds present, no rebound   EXTREMITIES:  2+ Peripheral Pulses, No clubbing, cyanosis, or edema   SKIN: multiple areas of skin necroses but left hand is swollen decreased ROM and stiff with hard hematoma ; ?? plastic follow up   denies pain  BACK: no pressor sore   NERVOUS SYSTEM:  Alert & Oriented X3,but something is off still  PSYCH: normal affect     LABS:                         10.6   16.6  )-----------( 294      ( 26 Dec 2016 10:03 )             30.6     26 Dec 2016 10:03    147    |  111    |  109    ----------------------------<  266    3.8     |  24     |  4.03     Ca    8.3        26 Dec 2016 10:03  Phos  5.2       25 Dec 2016 06:56    TPro  6.2    /  Alb  1.9    /  TBili  0.5    /  DBili  x      /  AST  83     /  ALT  68     /  AlkPhos  104    25 Dec 2016 06:56    LIVER FUNCTIONS - ( 25 Dec 2016 06:56 )  Alb: 1.9 g/dL / Pro: 6.2 gm/dL / ALK PHOS: 104 U/L / ALT: 68 U/L / AST: 83 U/L / GGT: x                 CARDIAC MARKERS ( 26 Dec 2016 10:03 )  x     / x     / 1783 U/L / x     / x      CARDIAC MARKERS ( 25 Dec 2016 06:56 )  x     / x     / 2312 U/L / x     / x          Culture Results:   No growth to date. (12-24 @ 10:02)  Culture Results:   No growth to date. (12-23 @ 08:45)  Culture Results:   No growth to date. (12-23 @ 08:43)  Culture Results:   Growth in aerobic and anaerobic bottles: Escherichia coli  See previous culture [id number] (12-19 @ 18:15)                Radiology: Patient is a 66 year old female with unknown PMHx, per ED, patient found unresponsive . Patient was not seen by neighbors in two days, they became concerned and entered her home and found patient on the floor covered with feces.  In ED patient found CK >14,000, in renal failure, hyperkalemic, cocktail was given, including kayexelate. Then patient had melena. stool occult blood was positive, low plts and metabolic acidosis. Patient was initially admitted to Telemetry, then icu for hypotension   stabilized and transferred to regular floor    Allergies    No Known Allergies    Intolerances        MEDICATIONS  (STANDING):  insulin lispro (HumaLOG) corrective regimen sliding scale  SubCutaneous at bedtime  dextrose 5%. 1000milliLiter(s) IV Continuous <Continuous>  dextrose 50% Injectable 12.5Gram(s) IV Push once  dextrose 50% Injectable 25Gram(s) IV Push once  dextrose 50% Injectable 25Gram(s) IV Push once  piperacillin/tazobactam IVPB. 3.375Gram(s) IV Intermittent every 12 hours  silver sulfADIAZINE 1% Cream 1Application(s) Topical daily  insulin lispro (HumaLOG) corrective regimen sliding scale  SubCutaneous three times a day before meals  calcium acetate 1334milliGRAM(s) Oral three times a day with meals  insulin glargine Injectable (LANTUS) 10Unit(s) SubCutaneous at bedtime  influenza   Vaccine 0.5milliLiter(s) IntraMuscular once    MEDICATIONS  (PRN):  dextrose Gel 1Dose(s) Oral once PRN Blood Glucose LESS THAN 70 milliGRAM(s)/deciliter  glucagon  Injectable 1milliGRAM(s) IntraMuscular once PRN Glucose LESS THAN 70 milligrams/deciliter      REVIEW OF SYSTEMS:    CONSTITUTIONAL: No fever, chills, weight loss, or fatigue  HEENT: No sore throat, runny nose, ear ache  RESPIRATORY: No cough, wheezing, No shortness of breath  CARDIOVASCULAR: No chest pain, palpitations, dizziness  GASTROINTESTINAL: No abdominal pain. No nausea, vomiting, diarrhea  GENITOURINARY: No dysuria, increase frequency, hematuria, or incontinence  NEUROLOGICAL: No headaches, memory loss, loss of strength, numbness, or tremors, no weakness  EXTREMITY: No pedal edema BLE  SKIN: No change     VITAL SIGNS:  T(C): 37.2, Max: 37.2 (12-26 @ 13:00)  T(F): 99, Max: 99 (12-26 @ 13:00)  HR: 94 (85 - 94)  BP: 154/81 (138/62 - 154/81)  RR: 18 (17 - 18)  SpO2: 97% (95% - 97%)  Wt(kg): --    PHYSICAL EXAM:    GENERAL: not in any distress  HEENT: Neck is supple, normocephalic, atraumatic   CHEST/LUNG: Clear to percussion bilaterally; No rales, rhonchi, wheezing  HEART: Regular rate and rhythm; No murmurs, rubs, or gallops  ABDOMEN: Soft, Nontender, Nondistended; Bowel sounds present, no rebound   EXTREMITIES:  2+ Peripheral Pulses, No clubbing, cyanosis, or edema   SKIN: multiple areas of skin necroses but left hand is swollen decreased ROM and stiff with hard hematoma ; ?? plastic follow up ;;and hot  denies pain  BACK: no pressor sore   NERVOUS SYSTEM:  Alert & Oriented X3,but something is off still  PSYCH: normal affect     LABS:                         10.6   16.6  )-----------( 294      ( 26 Dec 2016 10:03 )             30.6     26 Dec 2016 10:03    147    |  111    |  109    ----------------------------<  266    3.8     |  24     |  4.03     Ca    8.3        26 Dec 2016 10:03  Phos  5.2       25 Dec 2016 06:56    TPro  6.2    /  Alb  1.9    /  TBili  0.5    /  DBili  x      /  AST  83     /  ALT  68     /  AlkPhos  104    25 Dec 2016 06:56    LIVER FUNCTIONS - ( 25 Dec 2016 06:56 )  Alb: 1.9 g/dL / Pro: 6.2 gm/dL / ALK PHOS: 104 U/L / ALT: 68 U/L / AST: 83 U/L / GGT: x                 CARDIAC MARKERS ( 26 Dec 2016 10:03 )  x     / x     / 1783 U/L / x     / x      CARDIAC MARKERS ( 25 Dec 2016 06:56 )  x     / x     / 2312 U/L / x     / x          Culture Results:   No growth to date. (12-24 @ 10:02)  Culture Results:   No growth to date. (12-23 @ 08:45)  Culture Results:   No growth to date. (12-23 @ 08:43)  Culture Results:   Growth in aerobic and anaerobic bottles: Escherichia coli  See previous culture [id number] (12-19 @ 18:15)                Radiology:

## 2016-12-26 NOTE — PROGRESS NOTE ADULT - PROBLEM SELECTOR PLAN 6
has resolved  multiple areas of skin necroses   will need prolonged plastics follow up and debridement   silvadine for now has resolved  multiple areas of skin necroses   will need prolonged plastics follow up and debridement   silvadine for now  needs plastic ; several blisters need deroofing   hand needs debridement now   will get mri hand

## 2016-12-26 NOTE — PROGRESS NOTE ADULT - PROBLEM SELECTOR PLAN 8
-resolved   antibiotics. to continue; pan sensitive e coli from blood on admission  but ongoing leucocytosis from ??   plastics follow up ?? for hand and large areas of skin necrosis  -

## 2016-12-26 NOTE — PROGRESS NOTE ADULT - SUBJECTIVE AND OBJECTIVE BOX
Patient seen in follow up for       MEDICATIONS  (STANDING):  insulin lispro (HumaLOG) corrective regimen sliding scale  SubCutaneous at bedtime  dextrose 5%. 1000milliLiter(s) IV Continuous <Continuous>  dextrose 50% Injectable 12.5Gram(s) IV Push once  dextrose 50% Injectable 25Gram(s) IV Push once  dextrose 50% Injectable 25Gram(s) IV Push once  piperacillin/tazobactam IVPB. 3.375Gram(s) IV Intermittent every 12 hours  silver sulfADIAZINE 1% Cream 1Application(s) Topical daily  insulin lispro (HumaLOG) corrective regimen sliding scale  SubCutaneous three times a day before meals  calcium acetate 1334milliGRAM(s) Oral three times a day with meals  insulin glargine Injectable (LANTUS) 10Unit(s) SubCutaneous at bedtime  influenza   Vaccine 0.5milliLiter(s) IntraMuscular once    MEDICATIONS  (PRN):  dextrose Gel 1Dose(s) Oral once PRN Blood Glucose LESS THAN 70 milliGRAM(s)/deciliter  glucagon  Injectable 1milliGRAM(s) IntraMuscular once PRN Glucose LESS THAN 70 milligrams/deciliter    T(C): 37.2, Max: 37.7 (12-25 @ 11:00)  HR: 94 (82 - 97)  BP: 154/81 (137/72 - 154/81)  RR: 18 (17 - 19)  SpO2: 97% (95% - 97%)  Wt(kg): --  I&O's Detail      PHYSICAL EXAM:  General: NAD  Respiratory: b/l air entry  Cardiovascular: S1 S2  Gastrointestinal: soft  Extremities:      CBC Full  -  ( 26 Dec 2016 10:03 )  WBC Count : 16.6 K/uL  Hemoglobin : 10.6 g/dL  Hematocrit : 30.6 %  Platelet Count - Automated : 294 K/uL  Mean Cell Volume : 88.9 fl  Mean Cell Hemoglobin : 30.9 pg  Mean Cell Hemoglobin Concentration : 34.7 gm/dL  Auto Neutrophil # : x  Auto Lymphocyte # : x  Auto Monocyte # : x  Auto Eosinophil # : x  Auto Basophil # : x  Auto Neutrophil % : x  Auto Lymphocyte % : x  Auto Monocyte % : x  Auto Eosinophil % : x  Auto Basophil % : x    26 Dec 2016 10:03    147    |  111    |  109    ----------------------------<  266    3.8     |  24     |  4.03     Ca    8.3        26 Dec 2016 10:03  Phos  5.2       25 Dec 2016 06:56    TPro  6.2    /  Alb  1.9    /  TBili  0.5    /  DBili  x      /  AST  83     /  ALT  68     /  AlkPhos  104    25 Dec 2016 06:56        Sodium, Serum: 147 (12-26 @ 10:03)  Sodium, Serum: 148 (12-25 @ 06:56)  Sodium, Serum: 148 (12-24 @ 07:44)    Creatinine, Serum: 4.03 (12-26 @ 10:03)  Creatinine, Serum: 4.71 (12-25 @ 06:56)  Creatinine, Serum: 5.76 (12-24 @ 07:44)    Potassium, Serum: 3.8 (12-26 @ 10:03)  Potassium, Serum: 3.4 (12-25 @ 06:56)  Potassium, Serum: 3.2 (12-24 @ 07:44)    Hemoglobin: 10.6 (12-26 @ 10:03)  Hemoglobin: 10.7 (12-25 @ 06:56)  Hemoglobin: 11.0 (12-24 @ 21:50)  Hemoglobin: 12.0 (12-24 @ 07:44)

## 2016-12-26 NOTE — PROGRESS NOTE ADULT - SUBJECTIVE AND OBJECTIVE BOX
HPI:  Patient is a 66 year old female with unknown PMHx, per ED, patient found unresponsive . Patient was not seen by neighbors in two days, they became concerned and entered her home and found patient on the floor covered with feces.  In ED patient found CK >14,000, in renal failure, hyperkalemic, cocktail was given, including kayexelate. Then patient had melena. stool occult blood was positive, low plts and metabolic acidosis. Patient was initially admitted to Telemetry, however this AM, found to be hypotensive, 94/56 was given fluids. ICU consulted, seen at bedside, alert, but confused. Patient is being admitted for AMS, Acute renal failure and rhabdomyolysis. (19 Dec 2016 08:49). Pt resting in bed. Denies active bleeding      REVIEW OF SYSTEMS      General:	    Skin/Breast:  	  Ophthalmologic:  	  ENMT:	    Respiratory and Thorax: no SOB  	  Cardiovascular:	no chest pain    Gastrointestinal:	 no bleeding    Genitourinary:	    Musculoskeletal:	    Neurological:	    Psychiatric:	    Hematology/Lymphatics:	    Endocrine:	    Allergic/Immunologic:	    MEDICATIONS  (STANDING):  insulin lispro (HumaLOG) corrective regimen sliding scale  SubCutaneous at bedtime  dextrose 5%. 1000milliLiter(s) IV Continuous <Continuous>  dextrose 50% Injectable 12.5Gram(s) IV Push once  dextrose 50% Injectable 25Gram(s) IV Push once  dextrose 50% Injectable 25Gram(s) IV Push once  piperacillin/tazobactam IVPB. 3.375Gram(s) IV Intermittent every 12 hours  silver sulfADIAZINE 1% Cream 1Application(s) Topical daily  insulin lispro (HumaLOG) corrective regimen sliding scale  SubCutaneous three times a day before meals  calcium acetate 1334milliGRAM(s) Oral three times a day with meals  insulin glargine Injectable (LANTUS) 10Unit(s) SubCutaneous at bedtime  influenza   Vaccine 0.5milliLiter(s) IntraMuscular once    MEDICATIONS  (PRN):  dextrose Gel 1Dose(s) Oral once PRN Blood Glucose LESS THAN 70 milliGRAM(s)/deciliter  glucagon  Injectable 1milliGRAM(s) IntraMuscular once PRN Glucose LESS THAN 70 milligrams/deciliter      Vital Signs Last 24 Hrs  T(C): 37.2, Max: 37.2 (12-26 @ 13:00)  T(F): 99, Max: 99 (12-26 @ 13:00)  HR: 94 (85 - 94)  BP: 154/81 (138/62 - 154/81)  BP(mean): --  RR: 18 (17 - 18)  SpO2: 97% (95% - 97%)    PHYSICAL EXAM:      Constitutional:    Eyes:    ENMT:    Neck: supple    Breasts:    Back:    Respiratory: Clear    Cardiovascular: Wnl    Gastrointestinal: soft and non tender    Genitourinary:    Rectal:    Extremities:    Vascular:    Neurological:    Skin:    Lymph Nodes:    Musculoskeletal:    Psychiatric:            HEALTH ISSUES - PROBLEM Dx:  GI bleed: GI bleed  Hyperphosphatemia: Hyperphosphatemia  Hypernatremia: Hypernatremia  Hypokalemia: Hypokalemia  Altered mental status: Altered mental status  Neurogenic orthostatic hypotension: Neurogenic orthostatic hypotension  Sepsis due to Escherichia coli: Sepsis due to Escherichia coli  Sepsis: Sepsis  Hypotension: Hypotension  Metabolic acidosis: Metabolic acidosis  Thrombocytopenia: Thrombocytopenia  Hyperkalemia: Hyperkalemia  Melena: Melena  Rhabdomyolysis: Rhabdomyolysis  ARF (acute renal failure): ARF (acute renal failure)  Metabolic encephalopathy: Metabolic encephalopathy            Assesment & Plan: GI bleeding. Colonoscopy/ EGD when stable

## 2016-12-27 NOTE — PROGRESS NOTE ADULT - PROBLEM SELECTOR PLAN 2
GNR sepsis   Also h/o craniotomy  Patient may have psychosis, may consider Psych evaluation Resolving ANCELMO  Renal in case

## 2016-12-27 NOTE — PROGRESS NOTE ADULT - SUBJECTIVE AND OBJECTIVE BOX
INTERVAL HPI/OVERNIGHT EVENTS:  Patient looks somewhat confused and ? psychotic  +Fever    ANTIBIOTICS/RELEVANT:    MEDICATIONS  (STANDING):  insulin lispro (HumaLOG) corrective regimen sliding scale  SubCutaneous at bedtime  dextrose 5%. 1000milliLiter(s) IV Continuous <Continuous>  dextrose 50% Injectable 12.5Gram(s) IV Push once  dextrose 50% Injectable 25Gram(s) IV Push once  dextrose 50% Injectable 25Gram(s) IV Push once  silver sulfADIAZINE 1% Cream 1Application(s) Topical daily  insulin lispro (HumaLOG) corrective regimen sliding scale  SubCutaneous three times a day before meals  calcium acetate 1334milliGRAM(s) Oral three times a day with meals  insulin glargine Injectable (LANTUS) 10Unit(s) SubCutaneous at bedtime  influenza   Vaccine 0.5milliLiter(s) IntraMuscular once  sodium chloride 0.45%. 1000milliLiter(s) IV Continuous <Continuous>    MEDICATIONS  (PRN):  dextrose Gel 1Dose(s) Oral once PRN Blood Glucose LESS THAN 70 milliGRAM(s)/deciliter  glucagon  Injectable 1milliGRAM(s) IntraMuscular once PRN Glucose LESS THAN 70 milligrams/deciliter      Allergies    No Known Allergies    Intolerances        Vital Signs Last 24 Hrs  T(C): 38.2, Max: 38.2 (12-27 @ 16:43)  T(F): 100.8, Max: 100.8 (12-27 @ 16:43)  HR: 57 (57 - 113)  BP: 156/71 (129/70 - 156/71)  BP(mean): --  RR: 17 (16 - 18)  SpO2: 94% (94% - 99%)    PHYSICAL EXAM:    General: not in any distress    HEENT: no pallor, moist oral cavity    Respiratory: clear to auscultation bilaterally    Cardiovascular: S1, S2, RRR    Gastrointestinal: +BS, soft, NT, NT    Extremities: No pedal edema BLE     LABS:                        10.6   16.6  )-----------( 294      ( 26 Dec 2016 10:03 )             30.6     27 Dec 2016 08:07    149    |  113    |  95     ----------------------------<  290    3.7     |  24     |  3.63     Ca    8.1        27 Dec 2016 08:07  Phos  4.9       27 Dec 2016 08:07    MICROBIOLOGY:  Culture - Blood in AM (12.24.16 @ 10:02)    Specimen Source: .Blood Blood    Culture Results:   No growth to date.  Culture - Urine (12.19.16 @ 08:14)    -  Cefoxitin: S <=8    -  Trimethoprim/Sulfamethoxazole: S <=2/38    -  Aztreonam: S <=4    -  Cefazolin: S <=8    -  Ceftazidime: S <=1    -  Ciprofloxacin: S <=1    -  Meropenem: S <=1    -  Tobramycin: S <=4    -  Ampicillin: S <=8    -  Ampicillin/Sulbactam: S <=8/4    -  Ceftriaxone: S <=1    -  Levofloxacin: S <=2    -  Piperacillin/Tazobactam: S <=16    -  Amikacin: S <=16    -  Cefepime: S <=4    -  Ertapenem: S <=1    -  Gentamicin: S <=4    -  Imipenem: S <=1    -  Nitrofurantoin: S <=32    Specimen Source: .Urine Clean Catch (Midstream)    Culture Results:   >100,000 CFU/ml Escherichia coli    Organism Identification: Escherichia coli    Organism: Escherichia coli    Method Type: NEHEMIAS  Culture - Blood (12.19.16 @ 08:10)    -  Ampicillin: S <=8    -  Aztreonam: S <=4    -  Cefazolin: S <=8    -  Ceftazidime: S <=1    -  Tobramycin: S <=4    -  Amikacin: S <=16    -  Ampicillin/Sulbactam: S <=8/4    -  Ceftriaxone: S <=1    -  Levofloxacin: S <=2    -  Piperacillin/Tazobactam: S <=16    Gram Stain:   Growth in anaerobic bottle: Gram Negative Rods  Growth in aerobic bottle: Gram Negative Rods    -  Cefepime: S <=4    -  Cefoxitin: S <=8    -  Ertapenem: S <=1    -  Gentamicin: S <=4    -  Ciprofloxacin: S <=1    -  Imipenem: S <=1    -  Meropenem: S <=1    -  Trimethoprim/Sulfamethoxazole: S <=2/38    Specimen Source: .Blood Blood    Organism: Escherichia coli    Culture Results:   Growth in aerobic and anaerobic bottles: Escherichia coli    Organism Identification: Escherichia coli    Method Type: NEHEMIAS    RADIOLOGY & ADDITIONAL STUDIES:  CXR  No focal infiltrates or pleural effusions seen. No pneumothorax.  There are degenerative changes of the thoracic spine.  US sonogram: no hydro INTERVAL HPI/OVERNIGHT EVENTS:  +Fever    ANTIBIOTICS/RELEVANT:    MEDICATIONS  (STANDING):  insulin lispro (HumaLOG) corrective regimen sliding scale  SubCutaneous at bedtime  dextrose 5%. 1000milliLiter(s) IV Continuous <Continuous>  dextrose 50% Injectable 12.5Gram(s) IV Push once  dextrose 50% Injectable 25Gram(s) IV Push once  dextrose 50% Injectable 25Gram(s) IV Push once  silver sulfADIAZINE 1% Cream 1Application(s) Topical daily  insulin lispro (HumaLOG) corrective regimen sliding scale  SubCutaneous three times a day before meals  calcium acetate 1334milliGRAM(s) Oral three times a day with meals  insulin glargine Injectable (LANTUS) 10Unit(s) SubCutaneous at bedtime  influenza   Vaccine 0.5milliLiter(s) IntraMuscular once  sodium chloride 0.45%. 1000milliLiter(s) IV Continuous <Continuous>    MEDICATIONS  (PRN):  dextrose Gel 1Dose(s) Oral once PRN Blood Glucose LESS THAN 70 milliGRAM(s)/deciliter  glucagon  Injectable 1milliGRAM(s) IntraMuscular once PRN Glucose LESS THAN 70 milligrams/deciliter      Allergies    No Known Allergies    Intolerances        Vital Signs Last 24 Hrs  T(C): 38.2, Max: 38.2 (12-27 @ 16:43)  T(F): 100.8, Max: 100.8 (12-27 @ 16:43)  HR: 57 (57 - 113)  BP: 156/71 (129/70 - 156/71)  BP(mean): --  RR: 17 (16 - 18)  SpO2: 94% (94% - 99%)    PHYSICAL EXAM:    General: not in any distress    HEENT: no pallor, moist oral cavity    Respiratory: clear to auscultation bilaterally    Cardiovascular: S1, S2, RRR    Gastrointestinal: +BS, soft, NT, NT    Extremities: left hand and leg wound     LABS:                        10.6   16.6  )-----------( 294      ( 26 Dec 2016 10:03 )             30.6     27 Dec 2016 08:07    149    |  113    |  95     ----------------------------<  290    3.7     |  24     |  3.63     Ca    8.1        27 Dec 2016 08:07  Phos  4.9       27 Dec 2016 08:07    MICROBIOLOGY:  Culture - Blood in AM (12.24.16 @ 10:02)    Specimen Source: .Blood Blood    Culture Results:   No growth to date.  Culture - Urine (12.19.16 @ 08:14)    -  Cefoxitin: S <=8    -  Trimethoprim/Sulfamethoxazole: S <=2/38    -  Aztreonam: S <=4    -  Cefazolin: S <=8    -  Ceftazidime: S <=1    -  Ciprofloxacin: S <=1    -  Meropenem: S <=1    -  Tobramycin: S <=4    -  Ampicillin: S <=8    -  Ampicillin/Sulbactam: S <=8/4    -  Ceftriaxone: S <=1    -  Levofloxacin: S <=2    -  Piperacillin/Tazobactam: S <=16    -  Amikacin: S <=16    -  Cefepime: S <=4    -  Ertapenem: S <=1    -  Gentamicin: S <=4    -  Imipenem: S <=1    -  Nitrofurantoin: S <=32    Specimen Source: .Urine Clean Catch (Midstream)    Culture Results:   >100,000 CFU/ml Escherichia coli    Organism Identification: Escherichia coli    Organism: Escherichia coli    Method Type: NEHEMIAS  Culture - Blood (12.19.16 @ 08:10)    -  Ampicillin: S <=8    -  Aztreonam: S <=4    -  Cefazolin: S <=8    -  Ceftazidime: S <=1    -  Tobramycin: S <=4    -  Amikacin: S <=16    -  Ampicillin/Sulbactam: S <=8/4    -  Ceftriaxone: S <=1    -  Levofloxacin: S <=2    -  Piperacillin/Tazobactam: S <=16    Gram Stain:   Growth in anaerobic bottle: Gram Negative Rods  Growth in aerobic bottle: Gram Negative Rods    -  Cefepime: S <=4    -  Cefoxitin: S <=8    -  Ertapenem: S <=1    -  Gentamicin: S <=4    -  Ciprofloxacin: S <=1    -  Imipenem: S <=1    -  Meropenem: S <=1    -  Trimethoprim/Sulfamethoxazole: S <=2/38    Specimen Source: .Blood Blood    Organism: Escherichia coli    Culture Results:   Growth in aerobic and anaerobic bottles: Escherichia coli    Organism Identification: Escherichia coli    Method Type: NEHEMIAS    RADIOLOGY & ADDITIONAL STUDIES:  CXR  No focal infiltrates or pleural effusions seen. No pneumothorax.  There are degenerative changes of the thoracic spine.  US sonogram: no hydro

## 2016-12-27 NOTE — PROGRESS NOTE ADULT - PROBLEM SELECTOR PLAN 1
blood culture and urine culture grew E coli  Renal sonogram=no hydro  CT A/P=left non obstructive stone

## 2016-12-27 NOTE — PROGRESS NOTE ADULT - SUBJECTIVE AND OBJECTIVE BOX
HPI:  Patient is a 66 year old female with unknown PMHx, per ED, patient found unresponsive . Patient was not seen by neighbors in two days, they became concerned and entered her home and found patient on the floor covered with feces.  In ED patient found CK >14,000, in renal failure, hyperkalemic, cocktail was given, including kayexelate. Then patient had melena. stool occult blood was positive, low plts and metabolic acidosis. Patient was initially admitted to Telemetry, however this AM, found to be hypotensive, 94/56 was given fluids. ICU consulted, seen at bedside, alert, but confused. Patient is being admitted for AMS, Acute renal failure and rhabdomyolysis. (19 Dec 2016 08:49). No active bleeding. Awaiting stabilization prior to GI w/u      REVIEW OF SYSTEMS wnl      General:	    Skin/Breast:  	  Ophthalmologic:  	  ENMT:	    Respiratory and Thorax:  	  Cardiovascular:	    Gastrointestinal:	    Genitourinary:	    Musculoskeletal:	    Neurological:	    Psychiatric:	    Hematology/Lymphatics:	    Endocrine:	    Allergic/Immunologic:	    MEDICATIONS  (STANDING):  insulin lispro (HumaLOG) corrective regimen sliding scale  SubCutaneous at bedtime  dextrose 5%. 1000milliLiter(s) IV Continuous <Continuous>  dextrose 50% Injectable 12.5Gram(s) IV Push once  dextrose 50% Injectable 25Gram(s) IV Push once  dextrose 50% Injectable 25Gram(s) IV Push once  silver sulfADIAZINE 1% Cream 1Application(s) Topical daily  insulin lispro (HumaLOG) corrective regimen sliding scale  SubCutaneous three times a day before meals  calcium acetate 1334milliGRAM(s) Oral three times a day with meals  insulin glargine Injectable (LANTUS) 10Unit(s) SubCutaneous at bedtime  influenza   Vaccine 0.5milliLiter(s) IntraMuscular once  meropenem IVPB 500milliGRAM(s) IV Intermittent every 12 hours    MEDICATIONS  (PRN):  dextrose Gel 1Dose(s) Oral once PRN Blood Glucose LESS THAN 70 milliGRAM(s)/deciliter  glucagon  Injectable 1milliGRAM(s) IntraMuscular once PRN Glucose LESS THAN 70 milligrams/deciliter      Vital Signs Last 24 Hrs  T(C): 37.4, Max: 37.6 (12-26 @ 23:07)  T(F): 99.3, Max: 99.6 (12-26 @ 23:07)  HR: 101 (94 - 101)  BP: 148/82 (148/78 - 166/68)  BP(mean): --  RR: 17 (17 - 19)  SpO2: 98% (96% - 99%)    PHYSICAL EXAM:      Constitutional:    Eyes:    ENMT:    Neck: supple    Breasts:    Back:    Respiratory: normal    Cardiovascular: no S3 gallop    Gastrointestinal: normal    Genitourinary:    Rectal:    Extremities:    Vascular:    Neurological:    Skin:    Lymph Nodes:    Musculoskeletal:    Psychiatric:            HEALTH ISSUES - PROBLEM Dx:  GI bleed: GI bleed  Hyperphosphatemia: Hyperphosphatemia  Hypernatremia: Hypernatremia  Hypokalemia: Hypokalemia  Altered mental status: Altered mental status  Neurogenic orthostatic hypotension: Neurogenic orthostatic hypotension  Sepsis due to Escherichia coli: Sepsis due to Escherichia coli  Sepsis: Sepsis  Hypotension: Hypotension  Metabolic acidosis: Metabolic acidosis  Thrombocytopenia: Thrombocytopenia  Hyperkalemia: Hyperkalemia  Melena: Melena  Rhabdomyolysis: Rhabdomyolysis  ARF (acute renal failure): ARF (acute renal failure)  Metabolic encephalopathy: Metabolic encephalopathy            Assesment & Plan: Occult GI bleeding. GI w/u when stable

## 2016-12-27 NOTE — PROGRESS NOTE ADULT - ASSESSMENT
Resolving ATN, CPK down to 1700, mild hypernatremia. To continue with hyponatric IVF, monitor renal indices

## 2016-12-27 NOTE — PROGRESS NOTE ADULT - SUBJECTIVE AND OBJECTIVE BOX
Patient is a 66y old  Female who presents with a chief complaint of unresponsiveness (19 Dec 2016 08:49)      INTERVAL HPI/OVERNIGHT EVENTS:  change in mental status improving  left sided skin changes secondary to pressure noted    MEDICATIONS  (STANDING):  insulin lispro (HumaLOG) corrective regimen sliding scale  SubCutaneous at bedtime  dextrose 5%. 1000milliLiter(s) IV Continuous <Continuous>  dextrose 50% Injectable 12.5Gram(s) IV Push once  dextrose 50% Injectable 25Gram(s) IV Push once  dextrose 50% Injectable 25Gram(s) IV Push once  silver sulfADIAZINE 1% Cream 1Application(s) Topical daily  insulin lispro (HumaLOG) corrective regimen sliding scale  SubCutaneous three times a day before meals  calcium acetate 1334milliGRAM(s) Oral three times a day with meals  insulin glargine Injectable (LANTUS) 10Unit(s) SubCutaneous at bedtime  influenza   Vaccine 0.5milliLiter(s) IntraMuscular once  sodium chloride 0.45%. 1000milliLiter(s) IV Continuous <Continuous>    MEDICATIONS  (PRN):  dextrose Gel 1Dose(s) Oral once PRN Blood Glucose LESS THAN 70 milliGRAM(s)/deciliter  glucagon  Injectable 1milliGRAM(s) IntraMuscular once PRN Glucose LESS THAN 70 milligrams/deciliter        REVIEW OF SYSTEMS:  CONSTITUTIONAL: No fever, weight loss, or fatigue  EYES: No eye pain, visual disturbances, or discharge  ENMT:  No difficulty hearing, tinnitus, vertigo; No sinus or throat pain  NECK: No pain or stiffness  RESPIRATORY: No cough, wheezing, chills or hemoptysis; No shortness of breath  CARDIOVASCULAR: No chest pain, palpitations, dizziness, or leg swelling  GASTROINTESTINAL: No abdominal or epigastric pain. No nausea, vomiting, or hematemesis; No diarrhea or constipation. No melena or hematochezia.  GENITOURINARY: No dysuria, frequency, hematuria, or incontinence  NEUROLOGICAL: No headaches, memory loss, loss of strength, numbness, or tremors  SKIN: No itching, burning, rashes, or lesions      Vital Signs Last 24 Hrs  T(C): 37.7, Max: 38.2 (12-27 @ 16:43)  T(F): 99.8, Max: 100.8 (12-27 @ 16:43)  HR: 100 (57 - 113)  BP: 140/85 (129/70 - 156/71)  BP(mean): --  RR: 18 (16 - 18)  SpO2: 96% (94% - 99%)    PHYSICAL EXAM:  GENERAL: NAD, well-groomed, well-developed  HEAD:  Atraumatic, Normocephalic  EYES: EOMI, PERRLA, conjunctiva and sclera clear  ENMT: No tonsillar erythema, exudates, or enlargement; Moist mucous membranes   NECK: Supple, No JVD   NERVOUS SYSTEM:  Alert & Oriented X3, Good concentration; Motor Strength 5/5 B/L upper and lower extremities; DTRs 2+ intact and symmetric  CHEST/LUNG: Clear to percussion bilaterally; No rales, rhonchi, wheezing, or rubs  HEART: Regular rate and rhythm; No murmurs, rubs, or gallops  ABDOMEN: Soft, Nontender, Nondistended; Bowel sounds present  EXTREMITIES:  2+ Peripheral Pulses, No clubbing, cyanosis, or edema  LYMPH: No lymphadenopathy noted  SKIN: left breast- skin injury with partial denudement           left infra mammary skin break +           left forearm with devitalized skin, proximal palm with dry gangrenous patch  left thigh with devitalized skin changes    LABS:                        10.6   16.6  )-----------( 294      ( 26 Dec 2016 10:03 )             30.6     27 Dec 2016 08:07    149    |  113    |  95     ----------------------------<  290    3.7     |  24     |  3.63     Ca    8.1        27 Dec 2016 08:07  Phos  4.9       27 Dec 2016 08:07          CAPILLARY BLOOD GLUCOSE  188 (27 Dec 2016 21:46)  155 (27 Dec 2016 16:15)  229 (27 Dec 2016 12:23)  302 (27 Dec 2016 08:23)      RADIOLOGY & ADDITIONAL TESTS:    Imaging Personally Reviewed:  [ ] YES  [ ] NO    Consultant(s) Notes Reviewed:  [ ] YES  [ ] NO    Care Discussed with Consultants/Other Providers [ ] YES  [ ] NO

## 2016-12-27 NOTE — PROGRESS NOTE ADULT - SUBJECTIVE AND OBJECTIVE BOX
pt is alert and awake  vss  problem of E-coli sepsis appreciated, however, the gangrenous tissues of L. hand hand elsewhere do not seem the source. those areas appear stable and demarcating, however somewhat tender to touch, redreassed, will eventually require debridement when she is medicalle more stable. continue local wound care. discussed with the staff.

## 2016-12-27 NOTE — PROGRESS NOTE ADULT - SUBJECTIVE AND OBJECTIVE BOX
Patient seen in follow up for ANCELMO due to ATN/Rhabdo      MEDICATIONS  (STANDING):  insulin lispro (HumaLOG) corrective regimen sliding scale  SubCutaneous at bedtime  dextrose 5%. 1000milliLiter(s) IV Continuous <Continuous>  dextrose 50% Injectable 12.5Gram(s) IV Push once  dextrose 50% Injectable 25Gram(s) IV Push once  dextrose 50% Injectable 25Gram(s) IV Push once  silver sulfADIAZINE 1% Cream 1Application(s) Topical daily  insulin lispro (HumaLOG) corrective regimen sliding scale  SubCutaneous three times a day before meals  calcium acetate 1334milliGRAM(s) Oral three times a day with meals  insulin glargine Injectable (LANTUS) 10Unit(s) SubCutaneous at bedtime  influenza   Vaccine 0.5milliLiter(s) IntraMuscular once  sodium chloride 0.45%. 1000milliLiter(s) IV Continuous <Continuous>    MEDICATIONS  (PRN):  dextrose Gel 1Dose(s) Oral once PRN Blood Glucose LESS THAN 70 milliGRAM(s)/deciliter  glucagon  Injectable 1milliGRAM(s) IntraMuscular once PRN Glucose LESS THAN 70 milligrams/deciliter    T(C): 38.2, Max: 38.2 (12-27 @ 16:43)  HR: 57 (57 - 113)  BP: 156/71 (129/70 - 166/68)  RR: 17 (16 - 19)  SpO2: 94% (94% - 99%)        PHYSICAL EXAM:  General: NAD, supine, somnolent  Cardiovascular: S1 S2 regular  Gastrointestinal: soft  Extremities: no edema     CBC Full  -  ( 26 Dec 2016 10:03 )  WBC Count : 16.6 K/uL  Hemoglobin : 10.6 g/dL  Hematocrit : 30.6 %  Platelet Count - Automated : 294 K/uL  Mean Cell Volume : 88.9 fl  Mean Cell Hemoglobin : 30.9 pg  Mean Cell Hemoglobin Concentration : 34.7 gm/dL  Auto Neutrophil # : x  Auto Lymphocyte # : x  Auto Monocyte # : x  Auto Eosinophil # : x  Auto Basophil # : x  Auto Neutrophil % : x  Auto Lymphocyte % : x  Auto Monocyte % : x  Auto Eosinophil % : x  Auto Basophil % : x    27 Dec 2016 08:07    149    |  113    |  95     ----------------------------<  290    3.7     |  24     |  3.63     Ca    8.1        27 Dec 2016 08:07  Phos  4.9       27 Dec 2016 08:07          Sodium, Serum: 149 (12-27 @ 08:07)  Sodium, Serum: 147 (12-26 @ 10:03)  Sodium, Serum: 148 (12-25 @ 06:56)    Creatinine, Serum: 3.63 (12-27 @ 08:07)  Creatinine, Serum: 4.03 (12-26 @ 10:03)  Creatinine, Serum: 4.71 (12-25 @ 06:56)    Potassium, Serum: 3.7 (12-27 @ 08:07)  Potassium, Serum: 3.8 (12-26 @ 10:03)  Potassium, Serum: 3.4 (12-25 @ 06:56)    Hemoglobin: 10.6 (12-26 @ 10:03)  Hemoglobin: 10.7 (12-25 @ 06:56)

## 2016-12-28 NOTE — PROGRESS NOTE ADULT - SUBJECTIVE AND OBJECTIVE BOX
alert and more oriented today  vss  wounds without significant change  on top of left hand dressing, a short arm volar splint was fabricated and applied by me in order to minimized the risk of flexion contractures.  cont present management.

## 2016-12-28 NOTE — PROGRESS NOTE ADULT - SUBJECTIVE AND OBJECTIVE BOX
Patient is a 66y old  Female who presents with a chief complaint of unresponsiveness (19 Dec 2016 08:49)      INTERVAL HPI/OVERNIGHT EVENTS:  no new problems  plastic follow up noted  MEDICATIONS  (STANDING):  insulin lispro (HumaLOG) corrective regimen sliding scale  SubCutaneous at bedtime  dextrose 5%. 1000milliLiter(s) IV Continuous <Continuous>  dextrose 50% Injectable 12.5Gram(s) IV Push once  dextrose 50% Injectable 25Gram(s) IV Push once  dextrose 50% Injectable 25Gram(s) IV Push once  silver sulfADIAZINE 1% Cream 1Application(s) Topical daily  insulin lispro (HumaLOG) corrective regimen sliding scale  SubCutaneous three times a day before meals  calcium acetate 1334milliGRAM(s) Oral three times a day with meals  insulin glargine Injectable (LANTUS) 10Unit(s) SubCutaneous at bedtime  influenza   Vaccine 0.5milliLiter(s) IntraMuscular once  sodium chloride 0.45%. 1000milliLiter(s) IV Continuous <Continuous>    MEDICATIONS  (PRN):  dextrose Gel 1Dose(s) Oral once PRN Blood Glucose LESS THAN 70 milliGRAM(s)/deciliter  glucagon  Injectable 1milliGRAM(s) IntraMuscular once PRN Glucose LESS THAN 70 milligrams/deciliter  acetaminophen   Tablet 650milliGRAM(s) Oral every 6 hours PRN For Temp greater than 38 C (100.4 F)        REVIEW OF SYSTEMS:  CONSTITUTIONAL: No fever, weight loss, or fatigue  EYES: No eye pain, visual disturbances, or discharge  ENMT:  No difficulty hearing, tinnitus, vertigo; No sinus or throat pain  NECK: No pain or stiffness  RESPIRATORY: No cough, wheezing, chills or hemoptysis; No shortness of breath  CARDIOVASCULAR: No chest pain, palpitations, dizziness, or leg swelling  GASTROINTESTINAL: No abdominal or epigastric pain. No nausea, vomiting, or hematemesis; No diarrhea or constipation. No melena or hematochezia.  GENITOURINARY: No dysuria, frequency, hematuria, or incontinence  NEUROLOGICAL: No headaches, memory loss, loss of strength, numbness, or tremors  SKIN: No itching, burning, rashes, or lesions      Vital Signs Last 24 Hrs  T(C): 37, Max: 38.2 (12-28 @ 18:58)  T(F): 98.6, Max: 100.8 (12-28 @ 18:58)  HR: 90 (85 - 96)  BP: 160/65 (147/72 - 165/80)  BP(mean): --  RR: 16 (16 - 19)  SpO2: 99% (96% - 99%)    PHYSICAL EXAM:  GENERAL: NAD, well-groomed, well-developed  HEAD:  Atraumatic, Normocephalic  EYES: EOMI, PERRLA, conjunctiva and sclera clear  ENMT: No tonsillar erythema, exudates, or enlargement; Moist mucous membranes   NECK: Supple, No JVD   NERVOUS SYSTEM:  Alert & Oriented X3, Good concentration; Motor Strength 5/5 B/L upper and lower extremities; DTRs 2+ intact and symmetric  CHEST/LUNG: Clear to percussion bilaterally; No rales, rhonchi, wheezing, or rubs  HEART: Regular rate and rhythm; No murmurs, rubs, or gallops  ABDOMEN: Soft, Nontender, Nondistended; Bowel sounds present  EXTREMITIES:  2+ Peripheral Pulses, No clubbing, cyanosis, or edema  LYMPH: No lymphadenopathy noted  SKIN: No rashes or lesions    LABS:                        9.4    14.6  )-----------( 420      ( 28 Dec 2016 06:34 )             28.0     28 Dec 2016 06:34    153    |  117    |  84     ----------------------------<  191    3.7     |  25     |  3.07     Ca    8.0        28 Dec 2016 06:34  Phos  4.9       27 Dec 2016 08:07          CAPILLARY BLOOD GLUCOSE  190 (28 Dec 2016 20:40)  204 (28 Dec 2016 16:03)  233 (28 Dec 2016 11:09)  195 (28 Dec 2016 08:23)      RADIOLOGY & ADDITIONAL TESTS:    Imaging Personally Reviewed:  [ ] YES  [ ] NO    Consultant(s) Notes Reviewed:  [ ] YES  [ ] NO    Care Discussed with Consultants/Other Providers [ ] YES  [ ] NO

## 2016-12-28 NOTE — PROGRESS NOTE ADULT - SUBJECTIVE AND OBJECTIVE BOX
Patient seen in follow up for ANCELMO due to ATN/Rhabdo, hypernatremia. Patient is alert and conversant, denies history of Lithium therapy, states that she is thirsty "but nobody gives me water"      MEDICATIONS  (STANDING):  insulin lispro (HumaLOG) corrective regimen sliding scale  SubCutaneous at bedtime  dextrose 5%. 1000milliLiter(s) IV Continuous <Continuous>  dextrose 50% Injectable 12.5Gram(s) IV Push once  dextrose 50% Injectable 25Gram(s) IV Push once  dextrose 50% Injectable 25Gram(s) IV Push once  silver sulfADIAZINE 1% Cream 1Application(s) Topical daily  insulin lispro (HumaLOG) corrective regimen sliding scale  SubCutaneous three times a day before meals  calcium acetate 1334milliGRAM(s) Oral three times a day with meals  insulin glargine Injectable (LANTUS) 10Unit(s) SubCutaneous at bedtime  influenza   Vaccine 0.5milliLiter(s) IntraMuscular once  sodium chloride 0.45%. 1000milliLiter(s) IV Continuous <Continuous>    MEDICATIONS  (PRN):  dextrose Gel 1Dose(s) Oral once PRN Blood Glucose LESS THAN 70 milliGRAM(s)/deciliter  glucagon  Injectable 1milliGRAM(s) IntraMuscular once PRN Glucose LESS THAN 70 milligrams/deciliter  acetaminophen   Tablet 650milliGRAM(s) Oral every 6 hours PRN For Temp greater than 38 C (100.4 F)    T(C): 38.2, Max: 38.2 (12-27 @ 16:43)  HR: 95 (57 - 113)  BP: 165/80 (129/70 - 165/80)  RR: 16 (16 - 19)  SpO2: 99% (94% - 99%)      General: NAD, supine, conversant, alert  Cardiovascular: S1 S2 regular  Gastrointestinal: soft, NT  Extremities: no edema, left arm is dressed    CBC Full  -  ( 28 Dec 2016 06:34 )  WBC Count : 14.6 K/uL  Hemoglobin : 9.4 g/dL  Hematocrit : 28.0 %  Platelet Count - Automated : 420 K/uL  Mean Cell Volume : 90.0 fl  Mean Cell Hemoglobin : 30.4 pg  Mean Cell Hemoglobin Concentration : 33.7 gm/dL  Auto Neutrophil # : x  Auto Lymphocyte # : x  Auto Monocyte # : x  Auto Eosinophil # : x  Auto Basophil # : x  Auto Neutrophil % : x  Auto Lymphocyte % : x  Auto Monocyte % : x  Auto Eosinophil % : x  Auto Basophil % : x    28 Dec 2016 06:34    153    |  117    |  84     ----------------------------<  191    3.7     |  25     |  3.07     Ca    8.0        28 Dec 2016 06:34  Phos  4.9       27 Dec 2016 08:07          Sodium, Serum: 153 (12-28 @ 06:34)  Sodium, Serum: 149 (12-27 @ 08:07)  Sodium, Serum: 147 (12-26 @ 10:03)    Creatinine, Serum: 3.07 (12-28 @ 06:34)  Creatinine, Serum: 3.63 (12-27 @ 08:07)  Creatinine, Serum: 4.03 (12-26 @ 10:03)    Potassium, Serum: 3.7 (12-28 @ 06:34)  Potassium, Serum: 3.7 (12-27 @ 08:07)  Potassium, Serum: 3.8 (12-26 @ 10:03)    Hemoglobin: 9.4 (12-28 @ 06:34)  Hemoglobin: 10.6 (12-26 @ 10:03)

## 2016-12-28 NOTE — PROGRESS NOTE ADULT - PROBLEM SELECTOR PLAN 1
-pt found unresponsive on floor, unknown cause  -awake, oriented x1 but confusion resolved  -monitor airway

## 2016-12-28 NOTE — PROGRESS NOTE ADULT - ASSESSMENT
Resolving ATN, CPK down to 1528, persistent hypernatremia, probably component of diabetes insipidus. To continue with hyponatric IVF, supervised water intake ordered, continue to monitor renal indices

## 2016-12-29 NOTE — PROGRESS NOTE ADULT - SUBJECTIVE AND OBJECTIVE BOX
Patient is a 66y old  Female who presents with a chief complaint of unresponsiveness (19 Dec 2016 08:49)      INTERVAL HPI/OVERNIGHT EVENTS:  continues to improve   weakness to ileft hand/small fingers and Left LE persists  MEDICATIONS  (STANDING):  insulin lispro (HumaLOG) corrective regimen sliding scale  SubCutaneous at bedtime  dextrose 5%. 1000milliLiter(s) IV Continuous <Continuous>  dextrose 50% Injectable 12.5Gram(s) IV Push once  dextrose 50% Injectable 25Gram(s) IV Push once  dextrose 50% Injectable 25Gram(s) IV Push once  silver sulfADIAZINE 1% Cream 1Application(s) Topical daily  insulin lispro (HumaLOG) corrective regimen sliding scale  SubCutaneous three times a day before meals  calcium acetate 1334milliGRAM(s) Oral three times a day with meals  insulin glargine Injectable (LANTUS) 10Unit(s) SubCutaneous at bedtime  influenza   Vaccine 0.5milliLiter(s) IntraMuscular once  sodium chloride 0.45%. 1000milliLiter(s) IV Continuous <Continuous>  piperacillin/tazobactam IVPB. 3.375Gram(s) IV Intermittent every 8 hours    MEDICATIONS  (PRN):  dextrose Gel 1Dose(s) Oral once PRN Blood Glucose LESS THAN 70 milliGRAM(s)/deciliter  glucagon  Injectable 1milliGRAM(s) IntraMuscular once PRN Glucose LESS THAN 70 milligrams/deciliter  acetaminophen   Tablet 650milliGRAM(s) Oral every 6 hours PRN For Temp greater than 38 C (100.4 F)        REVIEW OF SYSTEMS:  CONSTITUTIONAL: No fever, weight loss, or fatigue  EYES: No eye pain, visual disturbances, or discharge  ENMT:  No difficulty hearing, tinnitus, vertigo; No sinus or throat pain  NECK: No pain or stiffness  RESPIRATORY: No cough, wheezing, chills or hemoptysis; No shortness of breath  CARDIOVASCULAR: No chest pain, palpitations, dizziness, or leg swelling  GASTROINTESTINAL: No abdominal or epigastric pain. No nausea, vomiting, or hematemesis; No diarrhea or constipation. No melena or hematochezia.  GENITOURINARY: No dysuria, frequency, hematuria, or incontinence  NEUROLOGICAL: No headaches, memory loss, loss of strength, numbness, or tremors  SKIN: left breast- skin injury with partial denudement           left infra mammary skin break +           left forearm with devitalized skin, proximal palm with dry gangrenous patch  left thigh with devitalized skin changes     Vital Signs Last 24 Hrs  T(C): 35.7, Max: 37.7 (12-29 @ 13:16)  T(F): 96.3, Max: 99.8 (12-29 @ 13:16)  HR: 98 (80 - 98)  BP: 147/63 (138/72 - 164/71)  BP(mean): --  RR: 18 (16 - 18)  SpO2: 97% (97% - 98%)    PHYSICAL EXAM:  GENERAL: NAD, well-groomed, well-developed  HEAD:  Atraumatic, Normocephalic  EYES: EOMI, PERRLA, conjunctiva and sclera clear  ENMT: No tonsillar erythema, exudates, or enlargement; Moist mucous membranes   NECK: Supple, No JVD   NERVOUS SYSTEM:  Alert & Oriented X3, Good concentration; Motor Strength 5/5 B/L upper and lower extremities; DTRs 2+ intact and symmetric  CHEST/LUNG: Clear to percussion bilaterally; No rales, rhonchi, wheezing, or rubs  HEART: Regular rate and rhythm; No murmurs, rubs, or gallops  ABDOMEN: Soft, Nontender, Nondistended; Bowel sounds present  EXTREMITIES:  2+ Peripheral Pulses, No clubbing, cyanosis, or edema  LYMPH: No lymphadenopathy noted  SKIN: No rashes or lesions    LABS:                        9.4    14.6  )-----------( 420      ( 28 Dec 2016 06:34 )             28.0     29 Dec 2016 07:50    149    |  114    |  60     ----------------------------<  178    3.4     |  26     |  2.45     Ca    7.6        29 Dec 2016 07:50  Phos  4.1       29 Dec 2016 07:50  Mg     1.9       29 Dec 2016 07:50    TPro  x      /  Alb  1.7    /  TBili  x      /  DBili  x      /  AST  x      /  ALT  x      /  AlkPhos  x      29 Dec 2016 07:50        CAPILLARY BLOOD GLUCOSE  195 (29 Dec 2016 22:00)  188 (29 Dec 2016 16:53)  173 (29 Dec 2016 11:17)  190 (29 Dec 2016 07:47)      RADIOLOGY & ADDITIONAL TESTS:    Imaging Personally Reviewed:  [ ] YES  [ ] NO    Consultant(s) Notes Reviewed:  [ ] YES  [ ] NO    Care Discussed with Consultants/Other Providers [ ] YES  [ ] NO

## 2016-12-29 NOTE — PROGRESS NOTE ADULT - SUBJECTIVE AND OBJECTIVE BOX
c/o pain in left hand  vss  alert, awake and ox3  left hand and thigh and leg wounds demarcated with hard firm skin, probably deep, atilio in left hand and prox palm  tender to touch  wounds cleansed and burn dressings applied by me, laft hand splint reapplied.  discussed with pt and Dr Peraza who cleared her for plan of debridement, possibly tomorrow.  pt understands probable need for further debridements and/or reconstructive surgeries w permanancy of scars, stiffness, need for pt, risks of infx, bleeding, etc.

## 2016-12-29 NOTE — PROGRESS NOTE ADULT - ASSESSMENT
Patient is a 66 year old female with unknown PMHx, per ED, patient found unresponsive, admitted to critical care for Metabolic Encephalopathy, Acute Renal Failure, Hyperkalemia, Thrombocytopenia, Rhabdomyolysis.   PATIENT is medically stable for plastic procedure

## 2016-12-29 NOTE — PROGRESS NOTE ADULT - ASSESSMENT
66 female admitted with unresponsive episode and found to have ANCELMO on CKD. She also has rhabdomyolysis which is slowly but steadily resolving. CPK down to 1500, mild hypernatremia. To continue with hypotonic IVF, monitor renal indices daily. will follow .medications reviewed.

## 2016-12-29 NOTE — PROGRESS NOTE ADULT - SUBJECTIVE AND OBJECTIVE BOX
INTERVAL HPI/OVERNIGHT EVENTS:  No new fever    ANTIBIOTICS/RELEVANT:    MEDICATIONS  (STANDING):  insulin lispro (HumaLOG) corrective regimen sliding scale  SubCutaneous at bedtime  dextrose 5%. 1000milliLiter(s) IV Continuous <Continuous>  dextrose 50% Injectable 12.5Gram(s) IV Push once  dextrose 50% Injectable 25Gram(s) IV Push once  dextrose 50% Injectable 25Gram(s) IV Push once  silver sulfADIAZINE 1% Cream 1Application(s) Topical daily  insulin lispro (HumaLOG) corrective regimen sliding scale  SubCutaneous three times a day before meals  calcium acetate 1334milliGRAM(s) Oral three times a day with meals  insulin glargine Injectable (LANTUS) 10Unit(s) SubCutaneous at bedtime  influenza   Vaccine 0.5milliLiter(s) IntraMuscular once  sodium chloride 0.45%. 1000milliLiter(s) IV Continuous <Continuous>  piperacillin/tazobactam IVPB. 3.375Gram(s) IV Intermittent every 8 hours    MEDICATIONS  (PRN):  dextrose Gel 1Dose(s) Oral once PRN Blood Glucose LESS THAN 70 milliGRAM(s)/deciliter  glucagon  Injectable 1milliGRAM(s) IntraMuscular once PRN Glucose LESS THAN 70 milligrams/deciliter  acetaminophen   Tablet 650milliGRAM(s) Oral every 6 hours PRN For Temp greater than 38 C (100.4 F)      Allergies    No Known Allergies    Intolerances        Vital Signs Last 24 Hrs  T(C): 37.2, Max: 37.7 (12-28 @ 23:14)  T(F): 98.9, Max: 99.8 (12-28 @ 23:14)  HR: 88 (80 - 91)  BP: 138/72 (138/72 - 165/69)  BP(mean): --  RR: 16 (16 - 16)  SpO2: 98% (97% - 98%)    PHYSICAL EXAM:    General: not in any distress    HEENT: no pallor, moist oral cavity    Respiratory: clear to auscultation bilaterally    Cardiovascular: S1, S2, RRR    Gastrointestinal: +BS, soft, NT, NT    Extremities: left hand and left lateral leg wound    LABS:                        9.4    14.6  )-----------( 420      ( 28 Dec 2016 06:34 )             28.0     29 Dec 2016 07:50    149    |  114    |  60     ----------------------------<  178    3.4     |  26     |  2.45     Ca    7.6        29 Dec 2016 07:50  Phos  4.1       29 Dec 2016 07:50  Mg     1.9       29 Dec 2016 07:50    TPro  x      /  Alb  1.7    /  TBili  x      /  DBili  x      /  AST  x      /  ALT  x      /  AlkPhos  x      29 Dec 2016 07:50      MICROBIOLOGY:  Culture - Blood in AM (12.24.16 @ 10:02)    Specimen Source: .Blood Blood    Culture Results:   No growth at 5 days.  Culture - Blood (12.19.16 @ 18:15)    Gram Stain:   Growth in anaerobic bottle: Gram Negative Rods  Growth in aerobic bottle: Gram Negative Rods    Specimen Source: .Blood Blood    Culture Results:   Growth in aerobic and anaerobic bottles: Escherichia coli  See previous culture [id number]          RADIOLOGY & ADDITIONAL STUDIES:

## 2016-12-29 NOTE — PROGRESS NOTE ADULT - SUBJECTIVE AND OBJECTIVE BOX
Patient seen in follow up for ANCELMO and rhabdomyolysis.    feels much better. appetite is still poor.   ANCELMO is improving.     PHYSICAL EXAM:    T(C): 36.7, Max: 38.2 (12-28 @ 18:58)  HR: 80 (80 - 95)  BP: 164/71 (158/77 - 165/80)  RR: 16 (16 - 17)  SpO2: 97% (97% - 99%)  Wt(kg): --    Respiratory: clear anteriorly, decreased BS at bases  Cardiovascular: S1 S2  Gastrointestinal: soft NT ND +BS  Extremities: trace edema  Neuro: Awake and alert              I&O's Detail    MEDICATIONS  (STANDING):  insulin lispro (HumaLOG) corrective regimen sliding scale  SubCutaneous at bedtime  dextrose 5%. 1000milliLiter(s) IV Continuous <Continuous>  dextrose 50% Injectable 12.5Gram(s) IV Push once  dextrose 50% Injectable 25Gram(s) IV Push once  dextrose 50% Injectable 25Gram(s) IV Push once  silver sulfADIAZINE 1% Cream 1Application(s) Topical daily  insulin lispro (HumaLOG) corrective regimen sliding scale  SubCutaneous three times a day before meals  calcium acetate 1334milliGRAM(s) Oral three times a day with meals  insulin glargine Injectable (LANTUS) 10Unit(s) SubCutaneous at bedtime  influenza   Vaccine 0.5milliLiter(s) IntraMuscular once  sodium chloride 0.45%. 1000milliLiter(s) IV Continuous <Continuous>    MEDICATIONS  (PRN):  dextrose Gel 1Dose(s) Oral once PRN Blood Glucose LESS THAN 70 milliGRAM(s)/deciliter  glucagon  Injectable 1milliGRAM(s) IntraMuscular once PRN Glucose LESS THAN 70 milligrams/deciliter  acetaminophen   Tablet 650milliGRAM(s) Oral every 6 hours PRN For Temp greater than 38 C (100.4 F)    CBC Full  -  ( 28 Dec 2016 06:34 )  WBC Count : 14.6 K/uL  Hemoglobin : 9.4 g/dL  Hematocrit : 28.0 %  Platelet Count - Automated : 420 K/uL  Mean Cell Volume : 90.0 fl  Mean Cell Hemoglobin : 30.4 pg  Mean Cell Hemoglobin Concentration : 33.7 gm/dL  Auto Neutrophil # : x  Auto Lymphocyte # : x  Auto Monocyte # : x  Auto Eosinophil # : x  Auto Basophil # : x  Auto Neutrophil % : x  Auto Lymphocyte % : x  Auto Monocyte % : x  Auto Eosinophil % : x  Auto Basophil % : x    29 Dec 2016 07:50    149    |  114    |  60     ----------------------------<  178    3.4     |  26     |  2.45     Ca    7.6        29 Dec 2016 07:50  Phos  4.1       29 Dec 2016 07:50  Mg     1.9       29 Dec 2016 07:50    TPro  x      /  Alb  1.7    /  TBili  x      /  DBili  x      /  AST  x      /  ALT  x      /  AlkPhos  x      29 Dec 2016 07:50        Assessment:    Plan:

## 2016-12-30 NOTE — PROGRESS NOTE ADULT - SUBJECTIVE AND OBJECTIVE BOX
INTERVAL HPI/OVERNIGHT EVENTS:  s/p I&D of left hand, thigh and leg ulcers by Plastics     ANTIBIOTICS/RELEVANT:    MEDICATIONS  (STANDING):  influenza   Vaccine 0.5milliLiter(s) IntraMuscular once  sodium chloride 0.9%. 1000milliLiter(s) IV Continuous <Continuous>  piperacillin/tazobactam IVPB. 3.375Gram(s) IV Intermittent every 8 hours  silver sulfADIAZINE 1% Cream 1Application(s) Topical daily  calcium acetate 1334milliGRAM(s) Oral three times a day with meals  insulin glargine Injectable (LANTUS) 10Unit(s) SubCutaneous at bedtime  insulin lispro (HumaLOG) corrective regimen sliding scale  SubCutaneous three times a day before meals  insulin lispro (HumaLOG) corrective regimen sliding scale  SubCutaneous at bedtime  dextrose 5%. 1000milliLiter(s) IV Continuous <Continuous>  dextrose 50% Injectable 12.5Gram(s) IV Push once  dextrose 50% Injectable 25Gram(s) IV Push once  dextrose 50% Injectable 25Gram(s) IV Push once    MEDICATIONS  (PRN):  oxyCODONE  5 mG/acetaminophen 325 mG 1Tablet(s) Oral every 4 hours PRN Severe Pain (7 - 10)  dextrose Gel 1Dose(s) Oral once PRN Blood Glucose LESS THAN 70 milliGRAM(s)/deciliter  glucagon  Injectable 1milliGRAM(s) IntraMuscular once PRN Glucose LESS THAN 70 milligrams/deciliter      Allergies    No Known Allergies    Intolerances        Vital Signs Last 24 Hrs  T(C): 36.9, Max: 38.1 (12-30 @ 07:43)  T(F): 98.4, Max: 100.6 (12-30 @ 07:43)  HR: 94 (88 - 100)  BP: 139/66 (125/61 - 161/73)  BP(mean): --  RR: 16 (16 - 23)  SpO2: 95% (94% - 100%)    PHYSICAL EXAM:    General: not in any distress    HEENT: no pallor, moist oral cavity    Respiratory: clear to auscultation bilaterally    Cardiovascular: S1, S2, RRR    Gastrointestinal: +BS, soft, NT, NT    Extremities: No pedal edema BLE     LABS:                        9.5    14.5  )-----------( 470      ( 30 Dec 2016 07:48 )             27.5     30 Dec 2016 07:48    147    |  113    |  50     ----------------------------<  209    4.1     |  25     |  2.41     Ca    7.7        30 Dec 2016 07:48  Phos  4.1       29 Dec 2016 07:50  Mg     2.0       30 Dec 2016 07:48    TPro  x      /  Alb  1.7    /  TBili  x      /  DBili  x      /  AST  x      /  ALT  x      /  AlkPhos  x      29 Dec 2016 07:50    MICROBIOLOGY:  Culture Results:   No growth to date. (12-29 @ 08:31)  Culture Results:   No growth to date. (12-29 @ 08:31)    RADIOLOGY & ADDITIONAL STUDIES:  MRI left upper extremity  Motion limited exam. Marked dorsal subcutaneous soft tissue   edema. Diffuse muscle edema signal, most pronounced in the thenar   musculature, that may be related to rhabdomyolysis or nonspecific   myositis.

## 2016-12-30 NOTE — PRE-OP CHECKLIST - 1.
Pt  t100.6 . Given tylenol 650 mg at                - no coverage Pt  t100.6 . Given tylenol 650 mg at  11:19              - no coverage

## 2016-12-30 NOTE — PROGRESS NOTE ADULT - SUBJECTIVE AND OBJECTIVE BOX
Patient is a 66y old  Female who presents with a chief complaint of unresponsiveness (19 Dec 2016 08:49)      INTERVAL HPI/OVERNIGHT EVENTS:  pt s/p debridement of gangrenous tissue to left upper and lower extremity    continues to improve   weakness to ileft hand/small fingers and Left LE persists  MEDICATIONS  (STANDING):  insulin lispro (HumaLOG) corrective regimen sliding scale  SubCutaneous at bedtime  dextrose 5%. 1000milliLiter(s) IV Continuous <Continuous>  dextrose 50% Injectable 12.5Gram(s) IV Push once  dextrose 50% Injectable 25Gram(s) IV Push once  dextrose 50% Injectable 25Gram(s) IV Push once  silver sulfADIAZINE 1% Cream 1Application(s) Topical daily  insulin lispro (HumaLOG) corrective regimen sliding scale  SubCutaneous three times a day before meals  calcium acetate 1334milliGRAM(s) Oral three times a day with meals  insulin glargine Injectable (LANTUS) 10Unit(s) SubCutaneous at bedtime  influenza   Vaccine 0.5milliLiter(s) IntraMuscular once  sodium chloride 0.45%. 1000milliLiter(s) IV Continuous <Continuous>  piperacillin/tazobactam IVPB. 3.375Gram(s) IV Intermittent every 8 hours    MEDICATIONS  (PRN):  dextrose Gel 1Dose(s) Oral once PRN Blood Glucose LESS THAN 70 milliGRAM(s)/deciliter  glucagon  Injectable 1milliGRAM(s) IntraMuscular once PRN Glucose LESS THAN 70 milligrams/deciliter  acetaminophen   Tablet 650milliGRAM(s) Oral every 6 hours PRN For Temp greater than 38 C (100.4 F)        REVIEW OF SYSTEMS:  CONSTITUTIONAL: No fever, weight loss, or fatigue  EYES: No eye pain, visual disturbances, or discharge  ENMT:  No difficulty hearing, tinnitus, vertigo; No sinus or throat pain  NECK: No pain or stiffness  RESPIRATORY: No cough, wheezing, chills or hemoptysis; No shortness of breath  CARDIOVASCULAR: No chest pain, palpitations, dizziness, or leg swelling  GASTROINTESTINAL: No abdominal or epigastric pain. No nausea, vomiting, or hematemesis; No diarrhea or constipation. No melena or hematochezia.  GENITOURINARY: No dysuria, frequency, hematuria, or incontinence  NEUROLOGICAL: No headaches, memory loss, loss of strength, numbness, or tremors  SKIN: left breast- skin injury with partial denudement           left infra mammary skin break +           left forearm with devitalized skin, proximal palm with dry gangrenous patch  left thigh with devitalized skin changes     Vital Signs Last 24 Hrs  T(C): 35.7, Max: 37.7 (12-29 @ 13:16)  T(F): 96.3, Max: 99.8 (12-29 @ 13:16)  HR: 98 (80 - 98)  BP: 147/63 (138/72 - 164/71)  BP(mean): --  RR: 18 (16 - 18)  SpO2: 97% (97% - 98%)    PHYSICAL EXAM:  GENERAL: NAD, well-groomed, well-developed  HEAD:  Atraumatic, Normocephalic  EYES: EOMI, PERRLA, conjunctiva and sclera clear  ENMT: No tonsillar erythema, exudates, or enlargement; Moist mucous membranes   NECK: Supple, No JVD   NERVOUS SYSTEM:  Alert & Oriented X3, Good concentration; Motor Strength 5/5 B/L upper and lower extremities; DTRs 2+ intact and symmetric  CHEST/LUNG: Clear to percussion bilaterally; No rales, rhonchi, wheezing, or rubs  HEART: Regular rate and rhythm; No murmurs, rubs, or gallops  ABDOMEN: Soft, Nontender, Nondistended; Bowel sounds present  EXTREMITIES:  2+ Peripheral Pulses, No clubbing, cyanosis, or edema  LYMPH: No lymphadenopathy noted  SKIN: No rashes or lesions    LABS:                        9.4    14.6  )-----------( 420      ( 28 Dec 2016 06:34 )             28.0     29 Dec 2016 07:50    149    |  114    |  60     ----------------------------<  178    3.4     |  26     |  2.45     Ca    7.6        29 Dec 2016 07:50  Phos  4.1       29 Dec 2016 07:50  Mg     1.9       29 Dec 2016 07:50    TPro  x      /  Alb  1.7    /  TBili  x      /  DBili  x      /  AST  x      /  ALT  x      /  AlkPhos  x      29 Dec 2016 07:50        CAPILLARY BLOOD GLUCOSE  195 (29 Dec 2016 22:00)  188 (29 Dec 2016 16:53)  173 (29 Dec 2016 11:17)  190 (29 Dec 2016 07:47)      RADIOLOGY & ADDITIONAL TESTS:    Imaging Personally Reviewed:  [ ] YES  [ ] NO    Consultant(s) Notes Reviewed:  [ ] YES  [ ] NO    Care Discussed with Consultants/Other Providers [ ] YES  [ ] NO

## 2016-12-31 NOTE — PROGRESS NOTE ADULT - SUBJECTIVE AND OBJECTIVE BOX
pod#1  no pain!  vss stable  op findings discussed  alert, awake  Left hand/wrist/forearm wounds clean w no drainage or wrythema. wet to dry dressing reapplied and resplinted by me today.  all other wounds clean and dry.  discussed w pt.  begin local wound care and cont iv abx.  will consider skin grafting of the UE wounds and eventual discharge, hopefully next wk.

## 2016-12-31 NOTE — PROGRESS NOTE ADULT - SUBJECTIVE AND OBJECTIVE BOX
HPI:  Patient is a 66 year old female with unknown PMHx, per ED, patient found unresponsive . Patient was not seen by neighbors in two days, they became concerned and entered her home and found patient on the floor covered with feces.  In ED patient found CK >14,000, in renal failure, hyperkalemic, cocktail was given, including kayexelate. Then patient had melena. stool occult blood was positive, low plts and metabolic acidosis. Patient was initially admitted to Telemetry, however this AM, found to be hypotensive, 94/56 was given fluids. ICU consulted, seen at bedside, alert, but confused. Patient is being admitted for AMS, Acute renal failure and rhabdomyolysis. (19 Dec 2016 08:49). She is s/p debridement of left hand wound. Pt remains anemic. No active bleeding. Elective colonoscopy/ EGD planned.      REVIEW OF SYSTEMS      General:	    Skin/Breast:  	  Ophthalmologic:  	  ENMT:	    Respiratory and Thorax: normal  	  Cardiovascular:	normal    Gastrointestinal:	 normal    Genitourinary:	    Musculoskeletal:	    Neurological:	    Psychiatric:	    Hematology/Lymphatics:	    Endocrine:	    Allergic/Immunologic:	    MEDICATIONS  (STANDING):  influenza   Vaccine 0.5milliLiter(s) IntraMuscular once  piperacillin/tazobactam IVPB. 3.375Gram(s) IV Intermittent every 8 hours  silver sulfADIAZINE 1% Cream 1Application(s) Topical daily  calcium acetate 1334milliGRAM(s) Oral three times a day with meals  insulin glargine Injectable (LANTUS) 10Unit(s) SubCutaneous at bedtime  insulin lispro (HumaLOG) corrective regimen sliding scale  SubCutaneous three times a day before meals  insulin lispro (HumaLOG) corrective regimen sliding scale  SubCutaneous at bedtime  dextrose 5%. 1000milliLiter(s) IV Continuous <Continuous>  dextrose 50% Injectable 12.5Gram(s) IV Push once  dextrose 50% Injectable 25Gram(s) IV Push once  dextrose 50% Injectable 25Gram(s) IV Push once  sodium chloride 0.45%. 1000milliLiter(s) IV Continuous <Continuous>    MEDICATIONS  (PRN):  oxyCODONE  5 mG/acetaminophen 325 mG 1Tablet(s) Oral every 4 hours PRN Severe Pain (7 - 10)  dextrose Gel 1Dose(s) Oral once PRN Blood Glucose LESS THAN 70 milliGRAM(s)/deciliter  glucagon  Injectable 1milliGRAM(s) IntraMuscular once PRN Glucose LESS THAN 70 milligrams/deciliter      Vital Signs Last 24 Hrs  T(C): 37.9, Max: 38.2 (12-31 @ 11:00)  T(F): 100.2, Max: 100.8 (12-31 @ 11:00)  HR: 95 (92 - 101)  BP: 128/74 (128/74 - 154/78)  BP(mean): --  RR: 18 (14 - 18)  SpO2: 95% (95% - 98%)    PHYSICAL EXAM:      Constitutional:    Eyes:    ENMT:    Neck: supple    Breasts:    Back:    Respiratory: normal breath sounds    Cardiovascular: no S3 gallop    Gastrointestinal: soft, non tender. No masses    Genitourinary:    Rectal:    Extremities:    Vascular:    Neurological:    Skin:    Lymph Nodes:    Musculoskeletal:    Psychiatric:            HEALTH ISSUES - PROBLEM Dx:  Wound infection: Wound infection  Skin sloughing: Skin sloughing  GI bleed: GI bleed  Hyperphosphatemia: Hyperphosphatemia  Hypernatremia: Hypernatremia  Hypokalemia: Hypokalemia  Altered mental status: Altered mental status  Neurogenic orthostatic hypotension: Neurogenic orthostatic hypotension  Sepsis due to Escherichia coli: Sepsis due to Escherichia coli  Sepsis: Sepsis  Hypotension: Hypotension  Metabolic acidosis: Metabolic acidosis  Thrombocytopenia: Thrombocytopenia  Hyperkalemia: Hyperkalemia  Melena: Melena  Rhabdomyolysis: Rhabdomyolysis  ARF (acute renal failure): ARF (acute renal failure)  Metabolic encephalopathy: Metabolic encephalopathy            Assesment & Plan: Occult GI bleeding. Elective colonoscopy/ EGD

## 2016-12-31 NOTE — PROGRESS NOTE ADULT - ASSESSMENT
Patient is a 66 year old female with unknown PMHx, per ED, patient found unresponsive, admitted to critical care for Metabolic Encephalopathy, Acute Renal Failure, Hyperkalemia, Thrombocytopenia, Rhabdomyolysis.   clinically stable  Continue per plastic

## 2017-01-01 NOTE — PROGRESS NOTE ADULT - SUBJECTIVE AND OBJECTIVE BOX
Patient is a 66y old  Female who presents with a chief complaint of unresponsiveness (19 Dec 2016 08:49)      INTERVAL HPI/OVERNIGHT EVENTS:  leucocytosis resolvedID follow up noted  no new complaints    MEDICATIONS  (STANDING):  influenza   Vaccine 0.5milliLiter(s) IntraMuscular once  piperacillin/tazobactam IVPB. 3.375Gram(s) IV Intermittent every 8 hours  silver sulfADIAZINE 1% Cream 1Application(s) Topical daily  calcium acetate 1334milliGRAM(s) Oral three times a day with meals  insulin glargine Injectable (LANTUS) 10Unit(s) SubCutaneous at bedtime  insulin lispro (HumaLOG) corrective regimen sliding scale  SubCutaneous three times a day before meals  insulin lispro (HumaLOG) corrective regimen sliding scale  SubCutaneous at bedtime  dextrose 5%. 1000milliLiter(s) IV Continuous <Continuous>  dextrose 50% Injectable 12.5Gram(s) IV Push once  dextrose 50% Injectable 25Gram(s) IV Push once  dextrose 50% Injectable 25Gram(s) IV Push once  sodium chloride 0.45%. 1000milliLiter(s) IV Continuous <Continuous>  heparin  Injectable 5000Unit(s) SubCutaneous every 12 hours    MEDICATIONS  (PRN):  oxyCODONE  5 mG/acetaminophen 325 mG 1Tablet(s) Oral every 4 hours PRN Severe Pain (7 - 10)  dextrose Gel 1Dose(s) Oral once PRN Blood Glucose LESS THAN 70 milliGRAM(s)/deciliter  glucagon  Injectable 1milliGRAM(s) IntraMuscular once PRN Glucose LESS THAN 70 milligrams/deciliter  acetaminophen   Tablet 650milliGRAM(s) Oral every 6 hours PRN For Temp greater than 38 C (100.4 F)        REVIEW OF SYSTEMS:  CONSTITUTIONAL: No fever, weight loss, or fatigue  EYES: No eye pain, visual disturbances, or discharge  ENMT:  No difficulty hearing, tinnitus, vertigo; No sinus or throat pain  NECK: No pain or stiffness  RESPIRATORY: No cough, wheezing, chills or hemoptysis; No shortness of breath  CARDIOVASCULAR: No chest pain, palpitations, dizziness, or leg swelling  GASTROINTESTINAL: No abdominal or epigastric pain. No nausea, vomiting, or hematemesis; No diarrhea or constipation. No melena or hematochezia.  GENITOURINARY: No dysuria, frequency, hematuria, or incontinence  NEUROLOGICAL: No headaches, memory loss, loss of strength, numbness, or tremors  SKIN: No itching, burning, rashes, or lesions      Vital Signs Last 24 Hrs  T(C): 37, Max: 38.1 (01-01 @ 00:13)  T(F): 98.6, Max: 100.6 (01-01 @ 00:13)  HR: 67 (67 - 96)  BP: 142/72 (142/72 - 154/72)  BP(mean): --  RR: 18 (16 - 18)  SpO2: 96% (96% - 98%)    PHYSICAL EXAM:  GENERAL: NAD, well-groomed, well-developed  HEAD:  Atraumatic, Normocephalic  EYES: EOMI, PERRLA, conjunctiva and sclera clear  ENMT: No tonsillar erythema, exudates, or enlargement; Moist mucous membranes   NECK: Supple, No JVD   NERVOUS SYSTEM:  Alert & Oriented X3, Good concentration; Motor Strength 5/5 B/L upper and lower extremities; DTRs 2+ intact and symmetric  CHEST/LUNG: Clear to percussion bilaterally; No rales, rhonchi, wheezing, or rubs  HEART: Regular rate and rhythm; No murmurs, rubs, or gallops  ABDOMEN: Soft, Nontender, Nondistended; Bowel sounds present  EXTREMITIES:  2+ Peripheral Pulses, No clubbing, cyanosis, or edema  LYMPH: No lymphadenopathy noted  SKIN: No rashes or lesions    LABS:                        7.5    7.7   )-----------( 398      ( 01 Jan 2017 07:12 )             22.8     31 Dec 2016 07:17    148    |  113    |  39     ----------------------------<  132    3.8     |  24     |  2.27     Ca    7.8        31 Dec 2016 07:17    TPro  6.4    /  Alb  1.7    /  TBili  0.3    /  DBili  x      /  AST  63     /  ALT  35     /  AlkPhos  55     31 Dec 2016 07:17        CAPILLARY BLOOD GLUCOSE  160 (01 Jan 2017 16:30)  200 (01 Jan 2017 11:39)  140 (01 Jan 2017 07:55)  124 (31 Dec 2016 21:28)      RADIOLOGY & ADDITIONAL TESTS:    Imaging Personally Reviewed:  [ ] YES  [ ] NO    Consultant(s) Notes Reviewed:  [ ] YES  [ ] NO    Care Discussed with Consultants/Other Providers [ ] YES  [ ] NO

## 2017-01-02 NOTE — PROGRESS NOTE ADULT - SUBJECTIVE AND OBJECTIVE BOX
Patient is a 66y old  Female who presents with a chief complaint of unresponsiveness (19 Dec 2016 08:49)      INTERVAL HPI/OVERNIGHT EVENTS:    continues to improve  severe anemia requiring transfusion    MEDICATIONS  (STANDING):  influenza   Vaccine 0.5milliLiter(s) IntraMuscular once  piperacillin/tazobactam IVPB. 3.375Gram(s) IV Intermittent every 8 hours  silver sulfADIAZINE 1% Cream 1Application(s) Topical daily  calcium acetate 1334milliGRAM(s) Oral three times a day with meals  insulin glargine Injectable (LANTUS) 10Unit(s) SubCutaneous at bedtime  insulin lispro (HumaLOG) corrective regimen sliding scale  SubCutaneous three times a day before meals  insulin lispro (HumaLOG) corrective regimen sliding scale  SubCutaneous at bedtime  dextrose 5%. 1000milliLiter(s) IV Continuous <Continuous>  dextrose 50% Injectable 12.5Gram(s) IV Push once  dextrose 50% Injectable 25Gram(s) IV Push once  dextrose 50% Injectable 25Gram(s) IV Push once  sodium chloride 0.45%. 1000milliLiter(s) IV Continuous <Continuous>  heparin  Injectable 5000Unit(s) SubCutaneous every 12 hours    MEDICATIONS  (PRN):  oxyCODONE  5 mG/acetaminophen 325 mG 1Tablet(s) Oral every 4 hours PRN Severe Pain (7 - 10)  dextrose Gel 1Dose(s) Oral once PRN Blood Glucose LESS THAN 70 milliGRAM(s)/deciliter  glucagon  Injectable 1milliGRAM(s) IntraMuscular once PRN Glucose LESS THAN 70 milligrams/deciliter  acetaminophen   Tablet 650milliGRAM(s) Oral every 6 hours PRN For Temp greater than 38 C (100.4 F)        REVIEW OF SYSTEMS:  CONSTITUTIONAL: No fever, weight loss, or fatigue  EYES: No eye pain, visual disturbances, or discharge  ENMT:  No difficulty hearing, tinnitus, vertigo; No sinus or throat pain  NECK: No pain or stiffness  RESPIRATORY: No cough, wheezing, chills or hemoptysis; No shortness of breath  CARDIOVASCULAR: No chest pain, palpitations, dizziness, or leg swelling  GASTROINTESTINAL: No abdominal or epigastric pain. No nausea, vomiting, or hematemesis; No diarrhea or constipation. No melena or hematochezia.  GENITOURINARY: No dysuria, frequency, hematuria, or incontinence  NEUROLOGICAL: No headaches, memory loss, loss of strength, numbness, or tremors  SKIN: No itching, burning, rashes, or lesions      Vital Signs Last 24 Hrs  T(C): 37.8, Max: 37.8 (01-02 @ 23:10)  T(F): 100, Max: 100 (01-02 @ 23:10)  HR: 87 (61 - 87)  BP: 145/69 (140/66 - 153/72)  BP(mean): --  RR: 16 (16 - 20)  SpO2: 100% (96% - 100%)    PHYSICAL EXAM:  GENERAL: NAD, well-groomed, well-developed  HEAD:  Atraumatic, Normocephalic  EYES: EOMI, PERRLA, conjunctiva and sclera clear  ENMT: No tonsillar erythema, exudates, or enlargement; Moist mucous membranes   NECK: Supple, No JVD   NERVOUS SYSTEM:  Alert & Oriented X3, Good concentration; Motor Strength 5/5 B/L upper and lower extremities; DTRs 2+ intact and symmetric  CHEST/LUNG: Clear to percussion bilaterally; No rales, rhonchi, wheezing, or rubs  HEART: Regular rate and rhythm; No murmurs, rubs, or gallops  ABDOMEN: Soft, Nontender, Nondistended; Bowel sounds present  EXTREMITIES:  2+ Peripheral Pulses, No clubbing, cyanosis, or edema  LYMPH: No lymphadenopathy noted  SKIN: No rashes or lesions    LABS:                        7.1    6.7   )-----------( 358      ( 02 Jan 2017 06:10 )             21.0     02 Jan 2017 06:10    145    |  111    |  28     ----------------------------<  122    3.4     |  24     |  1.96     Ca    7.6        02 Jan 2017 06:10          CAPILLARY BLOOD GLUCOSE  121 (02 Jan 2017 21:26)  136 (02 Jan 2017 16:02)  215 (02 Jan 2017 10:59)  107 (02 Jan 2017 07:40)      RADIOLOGY & ADDITIONAL TESTS:    Imaging Personally Reviewed:  [ ] YES  [ ] NO    Consultant(s) Notes Reviewed:  [ ] YES  [ ] NO    Care Discussed with Consultants/Other Providers [ ] YES  [ ] NO

## 2017-01-02 NOTE — PROGRESS NOTE ADULT - SUBJECTIVE AND OBJECTIVE BOX
vss  alert and oriented  NICHOLE wounds healing nicely  Left hand/wrist/forearm wounds clean with slow granulation tissue but with some recurring/ongoing necrotic tissues, including muscle tissue in the palm, that will require further debridement prior to or at the same time as definitive repairing, later this week.  recplinted.  cont local wound care, abx, splint.

## 2017-01-02 NOTE — PROGRESS NOTE ADULT - ASSESSMENT
Patient is a 66 year old female with unknown PMHx, per ED, patient found unresponsive, admitted to critical care for Metabolic Encephalopathy, Acute Renal Failure, Hyperkalemia, Thrombocytopenia, Rhabdomyolysis. ; all resolving nicely   all is resolving   issues that remain are essentially with large areas of skin necroses that had dev all over and now have been debrided

## 2017-01-02 NOTE — PROGRESS NOTE ADULT - SUBJECTIVE AND OBJECTIVE BOX
HPI:  Patient is a 66 year old female with unknown PMHx, per ED, patient found unresponsive . Patient was not seen by neighbors in two days, they became concerned and entered her home and found patient on the floor covered with feces.  In ED patient found CK >14,000, in renal failure, hyperkalemic, cocktail was given, including kayexelate. Then patient had melena. stool occult blood was positive, low plts and metabolic acidosis. Patient was initially admitted to Telemetry, however this AM, found to be hypotensive, 94/56 was given fluids. ICU consulted,   stabilized ; resolving acute renal failure and rhabdomyolysis   multiple skin necroses areas on admission ; deberided by plastics recently   plastics notes reviewed   has remained on broad spectrum antibiotics since admission       Allergies    No Known Allergies    Intolerances        MEDICATIONS  (STANDING):  influenza   Vaccine 0.5milliLiter(s) IntraMuscular once  piperacillin/tazobactam IVPB. 3.375Gram(s) IV Intermittent every 8 hours  silver sulfADIAZINE 1% Cream 1Application(s) Topical daily  calcium acetate 1334milliGRAM(s) Oral three times a day with meals  insulin glargine Injectable (LANTUS) 10Unit(s) SubCutaneous at bedtime  insulin lispro (HumaLOG) corrective regimen sliding scale  SubCutaneous three times a day before meals  insulin lispro (HumaLOG) corrective regimen sliding scale  SubCutaneous at bedtime  dextrose 5%. 1000milliLiter(s) IV Continuous <Continuous>  dextrose 50% Injectable 12.5Gram(s) IV Push once  dextrose 50% Injectable 25Gram(s) IV Push once  dextrose 50% Injectable 25Gram(s) IV Push once  sodium chloride 0.45%. 1000milliLiter(s) IV Continuous <Continuous>  heparin  Injectable 5000Unit(s) SubCutaneous every 12 hours    MEDICATIONS  (PRN):  oxyCODONE  5 mG/acetaminophen 325 mG 1Tablet(s) Oral every 4 hours PRN Severe Pain (7 - 10)  dextrose Gel 1Dose(s) Oral once PRN Blood Glucose LESS THAN 70 milliGRAM(s)/deciliter  glucagon  Injectable 1milliGRAM(s) IntraMuscular once PRN Glucose LESS THAN 70 milligrams/deciliter  acetaminophen   Tablet 650milliGRAM(s) Oral every 6 hours PRN For Temp greater than 38 C (100.4 F)      REVIEW OF SYSTEMS:    CONSTITUTIONAL: No fever, chills, weight loss, or fatigue  HEENT: No sore throat, runny nose, ear ache  RESPIRATORY: No cough, wheezing, No shortness of breath  CARDIOVASCULAR: No chest pain, palpitations, dizziness  GASTROINTESTINAL: No abdominal pain. No nausea, vomiting, diarrhea  GENITOURINARY: No dysuria, increase frequency, hematuria, or incontinence  NEUROLOGICAL: No headaches, memory loss, loss of strength, numbness, or tremors, no weakness  EXTREMITY: pain in hand   SKIN: multiple debrided areas     VITAL SIGNS:  T(C): 37, Max: 38 (01-02 @ 00:31)  T(F): 98.6, Max: 100.4 (01-02 @ 00:31)  HR: 83 (61 - 98)  BP: 142/78 (136/67 - 153/72)  RR: 20 (16 - 20)  SpO2: 97% (96% - 97%)  Wt(kg): --    PHYSICAL EXAM:    GENERAL: not in any distress  HEENT: Neck is supple, normocephalic, atraumatic   CHEST/LUNG: Clear to percussion bilaterally; No rales, rhonchi, wheezing  HEART: Regular rate and rhythm; No murmurs, rubs, or gallops  ABDOMEN: Soft, Nontender, Nondistended; Bowel sounds present, no rebound   EXTREMITIES:  2+ Peripheral Pulses, No clubbing, cyanosis, or edema  GENITOURINARY: NEGATIVE   SKIn ;; dressing on hand not touched   BACK: no pressor sore   NERVOUS SYSTEM:  Alert & Oriented X3, Good concentration  PSYCH: normal affect     LABS:                         7.1    6.7   )-----------( 358      ( 02 Jan 2017 06:10 )             21.0     02 Jan 2017 06:10    145    |  111    |  28     ----------------------------<  122    3.4     |  24     |  1.96     Ca    7.6        02 Jan 2017 06:10              CARDIAC MARKERS ( 02 Jan 2017 06:10 )  x     / x     / 538 U/L / x     / x      CARDIAC MARKERS ( 01 Jan 2017 07:12 )  x     / x     / 803 U/L / x     / x                        Culture Results:   No growth to date. (12-29 @ 08:31)  Culture Results:   No growth to date. (12-29 @ 08:31)                Radiology:  mri 12/27   IMPRESSION: Motion limited exam. Marked dorsal subcutaneous soft tissue   edema. Diffuse muscle edema signal, most pronounced in the thenar   musculature, that may be related to rhabdomyolysis or nonspecific   myositis.

## 2017-01-03 NOTE — PROGRESS NOTE ADULT - SUBJECTIVE AND OBJECTIVE BOX
Patient is a 66y old  Female who presents with a chief complaint of unresponsiveness (19 Dec 2016 08:49)      INTERVAL HPI/OVERNIGHT EVENTS:    continues to improve  severe anemia requiring transfusion  G.I. follow up pending  pt s/p debridement and drainage of left hand, wrist and forearm 1/3/2017    MEDICATIONS  (STANDING):  influenza   Vaccine 0.5milliLiter(s) IntraMuscular once  piperacillin/tazobactam IVPB. 3.375Gram(s) IV Intermittent every 8 hours  silver sulfADIAZINE 1% Cream 1Application(s) Topical daily  calcium acetate 1334milliGRAM(s) Oral three times a day with meals  insulin glargine Injectable (LANTUS) 10Unit(s) SubCutaneous at bedtime  insulin lispro (HumaLOG) corrective regimen sliding scale  SubCutaneous three times a day before meals  insulin lispro (HumaLOG) corrective regimen sliding scale  SubCutaneous at bedtime  dextrose 5%. 1000milliLiter(s) IV Continuous <Continuous>  dextrose 50% Injectable 12.5Gram(s) IV Push once  dextrose 50% Injectable 25Gram(s) IV Push once  dextrose 50% Injectable 25Gram(s) IV Push once  sodium chloride 0.45%. 1000milliLiter(s) IV Continuous <Continuous>  heparin  Injectable 5000Unit(s) SubCutaneous every 12 hours    MEDICATIONS  (PRN):  oxyCODONE  5 mG/acetaminophen 325 mG 1Tablet(s) Oral every 4 hours PRN Severe Pain (7 - 10)  dextrose Gel 1Dose(s) Oral once PRN Blood Glucose LESS THAN 70 milliGRAM(s)/deciliter  glucagon  Injectable 1milliGRAM(s) IntraMuscular once PRN Glucose LESS THAN 70 milligrams/deciliter  acetaminophen   Tablet 650milliGRAM(s) Oral every 6 hours PRN For Temp greater than 38 C (100.4 F)        REVIEW OF SYSTEMS:  CONSTITUTIONAL: No fever, weight loss, or fatigue  EYES: No eye pain, visual disturbances, or discharge  ENMT:  No difficulty hearing, tinnitus, vertigo; No sinus or throat pain  NECK: No pain or stiffness  RESPIRATORY: No cough, wheezing, chills or hemoptysis; No shortness of breath  CARDIOVASCULAR: No chest pain, palpitations, dizziness, or leg swelling  GASTROINTESTINAL: No abdominal or epigastric pain. No nausea, vomiting, or hematemesis; No diarrhea or constipation. No melena or hematochezia.  GENITOURINARY: No dysuria, frequency, hematuria, or incontinence  NEUROLOGICAL: No headaches, memory loss, loss of strength, numbness, or tremors  SKIN: No itching, burning, rashes, or lesions      Vital Signs Last 24 Hrs  T(C): 37.8, Max: 37.8 (01-02 @ 23:10)  T(F): 100, Max: 100 (01-02 @ 23:10)  HR: 87 (61 - 87)  BP: 145/69 (140/66 - 153/72)  BP(mean): --  RR: 16 (16 - 20)  SpO2: 100% (96% - 100%)    PHYSICAL EXAM:  GENERAL: NAD, well-groomed, well-developed  HEAD:  Atraumatic, Normocephalic  EYES: EOMI, PERRLA, conjunctiva and sclera clear  ENMT: No tonsillar erythema, exudates, or enlargement; Moist mucous membranes   NECK: Supple, No JVD   NERVOUS SYSTEM:  Alert & Oriented X3, Good concentration; Motor Strength 5/5 B/L upper and lower extremities; DTRs 2+ intact and symmetric  CHEST/LUNG: Clear to percussion bilaterally; No rales, rhonchi, wheezing, or rubs  HEART: Regular rate and rhythm; No murmurs, rubs, or gallops  ABDOMEN: Soft, Nontender, Nondistended; Bowel sounds present  EXTREMITIES:  2+ Peripheral Pulses, No clubbing, cyanosis, or edema  LYMPH: No lymphadenopathy noted  SKIN: No rashes or lesions    LABS:                        7.1    6.7   )-----------( 358      ( 02 Jan 2017 06:10 )             21.0     02 Jan 2017 06:10    145    |  111    |  28     ----------------------------<  122    3.4     |  24     |  1.96     Ca    7.6        02 Jan 2017 06:10          CAPILLARY BLOOD GLUCOSE  121 (02 Jan 2017 21:26)  136 (02 Jan 2017 16:02)  215 (02 Jan 2017 10:59)  107 (02 Jan 2017 07:40)      RADIOLOGY & ADDITIONAL TESTS:    Imaging Personally Reviewed:  [ ] YES  [ ] NO    Consultant(s) Notes Reviewed:  [ ] YES  [ ] NO    Care Discussed with Consultants/Other Providers [ ] YES  [ ] NO

## 2017-01-04 NOTE — PROGRESS NOTE ADULT - SUBJECTIVE AND OBJECTIVE BOX
Patient is a 66y old  Female who presents with a chief complaint of unresponsiveness (19 Dec 2016 08:49)      INTERVAL HPI/OVERNIGHT EVENTS:    continues to improve  severe anemia requiring transfusion  G.I. follow up pending  pt s/p debridement and drainage of left hand, wrist and forearm 1/3/2017  no acute complaints    MEDICATIONS  (STANDING):  influenza   Vaccine 0.5milliLiter(s) IntraMuscular once  piperacillin/tazobactam IVPB. 3.375Gram(s) IV Intermittent every 8 hours  silver sulfADIAZINE 1% Cream 1Application(s) Topical daily  calcium acetate 1334milliGRAM(s) Oral three times a day with meals  insulin glargine Injectable (LANTUS) 10Unit(s) SubCutaneous at bedtime  insulin lispro (HumaLOG) corrective regimen sliding scale  SubCutaneous three times a day before meals  insulin lispro (HumaLOG) corrective regimen sliding scale  SubCutaneous at bedtime  dextrose 5%. 1000milliLiter(s) IV Continuous <Continuous>  dextrose 50% Injectable 12.5Gram(s) IV Push once  dextrose 50% Injectable 25Gram(s) IV Push once  dextrose 50% Injectable 25Gram(s) IV Push once  sodium chloride 0.45%. 1000milliLiter(s) IV Continuous <Continuous>  heparin  Injectable 5000Unit(s) SubCutaneous every 12 hours    MEDICATIONS  (PRN):  oxyCODONE  5 mG/acetaminophen 325 mG 1Tablet(s) Oral every 4 hours PRN Severe Pain (7 - 10)  dextrose Gel 1Dose(s) Oral once PRN Blood Glucose LESS THAN 70 milliGRAM(s)/deciliter  glucagon  Injectable 1milliGRAM(s) IntraMuscular once PRN Glucose LESS THAN 70 milligrams/deciliter  acetaminophen   Tablet 650milliGRAM(s) Oral every 6 hours PRN For Temp greater than 38 C (100.4 F)        REVIEW OF SYSTEMS:  CONSTITUTIONAL: No fever, weight loss, or fatigue  EYES: No eye pain, visual disturbances, or discharge  ENMT:  No difficulty hearing, tinnitus, vertigo; No sinus or throat pain  NECK: No pain or stiffness  RESPIRATORY: No cough, wheezing, chills or hemoptysis; No shortness of breath  CARDIOVASCULAR: No chest pain, palpitations, dizziness, or leg swelling  GASTROINTESTINAL: No abdominal or epigastric pain. No nausea, vomiting, or hematemesis; No diarrhea or constipation. No melena or hematochezia.  GENITOURINARY: No dysuria, frequency, hematuria, or incontinence  NEUROLOGICAL: No headaches, memory loss, loss of strength, numbness, or tremors  SKIN: No itching, burning, rashes, or lesions      Vital Signs Last 24 Hrs  T(C): 37.8, Max: 37.8 (01-02 @ 23:10)  T(F): 100, Max: 100 (01-02 @ 23:10)  HR: 87 (61 - 87)  BP: 145/69 (140/66 - 153/72)  BP(mean): --  RR: 16 (16 - 20)  SpO2: 100% (96% - 100%)    PHYSICAL EXAM:  GENERAL: NAD, well-groomed, well-developed  HEAD:  Atraumatic, Normocephalic  EYES: EOMI, PERRLA, conjunctiva and sclera clear  ENMT: No tonsillar erythema, exudates, or enlargement; Moist mucous membranes   NECK: Supple, No JVD   NERVOUS SYSTEM:  Alert & Oriented X3, Good concentration; Motor Strength 5/5 B/L upper and lower extremities; DTRs 2+ intact and symmetric  CHEST/LUNG: Clear to percussion bilaterally; No rales, rhonchi, wheezing, or rubs  HEART: Regular rate and rhythm; No murmurs, rubs, or gallops  ABDOMEN: Soft, Nontender, Nondistended; Bowel sounds present  EXTREMITIES:  2+ Peripheral Pulses, No clubbing, cyanosis, or edema  LYMPH: No lymphadenopathy noted  SKIN: No rashes or lesions    LABS:                        7.1    6.7   )-----------( 358      ( 02 Jan 2017 06:10 )             21.0     02 Jan 2017 06:10    145    |  111    |  28     ----------------------------<  122    3.4     |  24     |  1.96     Ca    7.6        02 Jan 2017 06:10          CAPILLARY BLOOD GLUCOSE  121 (02 Jan 2017 21:26)  136 (02 Jan 2017 16:02)  215 (02 Jan 2017 10:59)  107 (02 Jan 2017 07:40)      RADIOLOGY & ADDITIONAL TESTS:    Imaging Personally Reviewed:  [ ] YES  [ ] NO    Consultant(s) Notes Reviewed:  [ ] YES  [ ] NO    Care Discussed with Consultants/Other Providers [ ] YES  [ ] NO

## 2017-01-05 NOTE — PROGRESS NOTE ADULT - SUBJECTIVE AND OBJECTIVE BOX
Patient is a 66 year old female with unknown PMHx, per ED, patient found unresponsive . Patient was not seen by neighbors in two days, they became concerned and entered her home and found patient on the floor covered with feces.  In ED patient found CK >14,000, in renal failure, hyperkalemic, cocktail was given, including kayexelate. Then patient had melena. stool occult blood was positive, low plts and metabolic acidosis. Patient was initially admitted to Telemetry, however this AM, found to be hypotensive, 94/56 was given fluids. ICU consulted,   stabilized ; resolving acute renal failure and rhabdomyolysis   multiple skin necroses areas on admission ; debrided by plastics again yesterday   plastics notes reviewed   has remained on broad spectrum antibiotics since admission   plastics notes reviewed    Allergies    No Known Allergies    Intolerances        MEDICATIONS  (STANDING):  influenza   Vaccine 0.5milliLiter(s) IntraMuscular once  lactated ringers. 1000milliLiter(s) IV Continuous <Continuous>  morphine  - Injectable 2milliGRAM(s) IV Push once  calcium acetate 1334milliGRAM(s) Oral three times a day with meals  insulin glargine Injectable (LANTUS) 10Unit(s) SubCutaneous at bedtime  insulin lispro (HumaLOG) corrective regimen sliding scale  SubCutaneous three times a day before meals  insulin lispro (HumaLOG) corrective regimen sliding scale  SubCutaneous at bedtime  dextrose 5%. 1000milliLiter(s) IV Continuous <Continuous>  dextrose 50% Injectable 12.5Gram(s) IV Push once  dextrose 50% Injectable 25Gram(s) IV Push once  dextrose 50% Injectable 25Gram(s) IV Push once  sodium chloride 0.45%. 1000milliLiter(s) IV Continuous <Continuous>  heparin  Injectable 5000Unit(s) SubCutaneous every 12 hours  piperacillin/tazobactam IVPB. 3.375Gram(s) IV Intermittent every 8 hours    MEDICATIONS  (PRN):  oxyCODONE  5 mG/acetaminophen 325 mG 1Tablet(s) Oral every 4 hours PRN Severe Pain (7 - 10)  dextrose Gel 1Dose(s) Oral once PRN Blood Glucose LESS THAN 70 milliGRAM(s)/deciliter  glucagon  Injectable 1milliGRAM(s) IntraMuscular once PRN Glucose LESS THAN 70 milligrams/deciliter  acetaminophen   Tablet 650milliGRAM(s) Oral every 6 hours PRN For Temp greater than 38 C (100.4 F)      REVIEW OF SYSTEMS:  numbness of fingers today   last night fingers were warm and ok  CONSTITUTIONAL: No fever, chills, weight loss, or fatigue  HEENT: No sore throat, runny nose, ear ache  RESPIRATORY: No cough, wheezing, No shortness of breath  CARDIOVASCULAR: No chest pain, palpitations, dizziness  GASTROINTESTINAL: No abdominal pain. No nausea, vomiting, diarrhea  GENITOURINARY: No dysuria, increase frequency, hematuria, or incontinence  NEUROLOGICAL: No headaches, memory loss, loss of strength, numbness, or tremors, no weakness  EXTREMITY: No pedal edema BLE  lVITAL SIGNS:  T(C): 37.4, Max: 38 (01-04 @ 23:51)  T(F): 99.4, Max: 100.4 (01-04 @ 23:51)  HR: 82 (77 - 91)  BP: 149/78 (149/78 - 169/69)  RR: 17 (16 - 19)  SpO2: 100% (92% - 100%)  Wt(kg): --    PHYSICAL EXAM:    GENERAL: not in any distress  HEENT: Neck is supple, normocephalic, atraumatic   CHEST/LUNG: Clear to percussion bilaterally; No rales, rhonchi, wheezing  HEART: Regular rate and rhythm; No murmurs, rubs, or gallops  ABDOMEN: Soft, Nontender, Nondistended; Bowel sounds present, no rebound   EXTREMITIES:  left hand thumb is nice and warm   fingers 234 colder pinky ok   dressing CDI   SKIN: multiple areas of necrosis ; improved some some need more debridement  BACK: no pressor sore   NERVOUS SYSTEM:  Alert & Oriented X3, Good concentration  PSYCH: normal affect     LABS:                         8.6    7.0   )-----------( 403      ( 05 Jan 2017 06:52 )             24.8     05 Jan 2017 06:52    145    |  110    |  18     ----------------------------<  134    3.2     |  28     |  1.61     Ca    8.1        05 Jan 2017 06:52              CARDIAC MARKERS ( 05 Jan 2017 06:52 )  x     / x     / 389 U/L / x     / x                  Radiology:  mri  IMPRESSION: Motion limited exam. Marked dorsal subcutaneous soft tissue   edema. Diffuse muscle edema signal, most pronounced in the thenar   musculature, that may be related to rhabdomyolysis or nonspecific   myositis.

## 2017-01-05 NOTE — PROGRESS NOTE ADULT - SUBJECTIVE AND OBJECTIVE BOX
addendum  i offered to call and discuss her situation w family members, but pt states her only child (son) is incarcerated for homicide and not available.

## 2017-01-05 NOTE — PROGRESS NOTE ADULT - ASSESSMENT
Patient is a 66 year old female with unknown PMHx, per ED, patient found unresponsive, admitted to critical care for Metabolic Encephalopathy, Acute Renal Failure, Hyperkalemia, Thrombocytopenia, Rhabdomyolysis. ; all resolving nicely   all is resolving   issues that remain are essentially with large areas of skin necroses that had dev all over and now have been debrided  issues with hand noted  plastics notes reviewed

## 2017-01-05 NOTE — PROGRESS NOTE ADULT - SUBJECTIVE AND OBJECTIVE BOX
op findings discussed w pt and Dr Peraza  pt c/o generalized numbness of left hand fingers  vss  alert and oriented  left hand and wrist and forearm tissues w minimal granulation tissues w some ongoing ischemic necrosis of areas, particularly encompassing the ulnar neurovascular bundle in the hypothenal region.  left finger tips, particularly ring and pinky, appear scaly and dark and insensate, signs of profound pressure and ischemia from the intial home injury, probably compounded by DM. no erythgema or drainage.  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  discussed at length w pt re; extent of her disease and difficulty of obtaining adequate viable tissue in order to allow further reconstructive and restorative procedures, such as grafting or other procedures. risk of eventual amputation of some type, finer or hand or other limb tissue was discussed.  wounds were cleansed by me and redressed with wet to dry and left hand resplinted.  continue local wound care and abx.  will consider further debridement and/or repairing of the wounds early next week.  prognosis:guarded.

## 2017-01-05 NOTE — PROGRESS NOTE ADULT - SUBJECTIVE AND OBJECTIVE BOX
Patient is a 66y old  Female who presents with a chief complaint of unresponsiveness (19 Dec 2016 08:49)      INTERVAL HPI/OVERNIGHT EVENTS:  stable  plastic follow up noted  Discussed with Niece who lives in [place]  Wants rehab in [place]    MEDICATIONS  (STANDING):  influenza   Vaccine 0.5milliLiter(s) IntraMuscular once  lactated ringers. 1000milliLiter(s) IV Continuous <Continuous>  morphine  - Injectable 2milliGRAM(s) IV Push once  calcium acetate 1334milliGRAM(s) Oral three times a day with meals  insulin glargine Injectable (LANTUS) 10Unit(s) SubCutaneous at bedtime  insulin lispro (HumaLOG) corrective regimen sliding scale  SubCutaneous three times a day before meals  insulin lispro (HumaLOG) corrective regimen sliding scale  SubCutaneous at bedtime  dextrose 5%. 1000milliLiter(s) IV Continuous <Continuous>  dextrose 50% Injectable 12.5Gram(s) IV Push once  dextrose 50% Injectable 25Gram(s) IV Push once  dextrose 50% Injectable 25Gram(s) IV Push once  sodium chloride 0.45%. 1000milliLiter(s) IV Continuous <Continuous>  heparin  Injectable 5000Unit(s) SubCutaneous every 12 hours  piperacillin/tazobactam IVPB. 3.375Gram(s) IV Intermittent every 8 hours    MEDICATIONS  (PRN):  oxyCODONE  5 mG/acetaminophen 325 mG 1Tablet(s) Oral every 4 hours PRN Severe Pain (7 - 10)  dextrose Gel 1Dose(s) Oral once PRN Blood Glucose LESS THAN 70 milliGRAM(s)/deciliter  glucagon  Injectable 1milliGRAM(s) IntraMuscular once PRN Glucose LESS THAN 70 milligrams/deciliter  acetaminophen   Tablet 650milliGRAM(s) Oral every 6 hours PRN For Temp greater than 38 C (100.4 F)        REVIEW OF SYSTEMS:  CONSTITUTIONAL: No fever, weight loss, or fatigue  EYES: No eye pain, visual disturbances, or discharge  ENMT:  No difficulty hearing, tinnitus, vertigo; No sinus or throat pain  NECK: No pain or stiffness  RESPIRATORY: No cough, wheezing, chills or hemoptysis; No shortness of breath  CARDIOVASCULAR: No chest pain, palpitations, dizziness, or leg swelling  GASTROINTESTINAL: No abdominal or epigastric pain. No nausea, vomiting, or hematemesis; No diarrhea or constipation. No melena or hematochezia.  GENITOURINARY: No dysuria, frequency, hematuria, or incontinence  NEUROLOGICAL: No headaches, memory loss, loss of strength, numbness, or tremors  SKIN: No itching, burning, rashes, or lesions      Vital Signs Last 24 Hrs  T(C): 37.3, Max: 37.7 (01-05 @ 18:43)  T(F): 99.2, Max: 99.8 (01-05 @ 18:43)  HR: 77 (77 - 85)  BP: 144/75 (144/75 - 175/84)  BP(mean): --  RR: 18 (16 - 19)  SpO2: 96% (92% - 100%)    PHYSICAL EXAM:  GENERAL: NAD, well-groomed, well-developed  HEAD:  Atraumatic, Normocephalic  EYES: EOMI, PERRLA, conjunctiva and sclera clear  ENMT: No tonsillar erythema, exudates, or enlargement; Moist mucous membranes   NECK: Supple, No JVD   NERVOUS SYSTEM:  Alert & Oriented X3, Good concentration; Motor Strength 5/5 B/L upper and lower extremities; DTRs 2+ intact and symmetric  CHEST/LUNG: Clear to percussion bilaterally; No rales, rhonchi, wheezing, or rubs  HEART: Regular rate and rhythm; No murmurs, rubs, or gallops  ABDOMEN: Soft, Nontender, Nondistended; Bowel sounds present  EXTREMITIES:  2+ Peripheral Pulses, No clubbing, cyanosis, or edema, dressing in situ  LYMPH: No lymphadenopathy noted  SKIN: No rashes or lesions    LABS:                        8.6    7.0   )-----------( 403      ( 05 Jan 2017 06:52 )             24.8     05 Jan 2017 06:52    145    |  110    |  18     ----------------------------<  134    3.2     |  28     |  1.61     Ca    8.1        05 Jan 2017 06:52          CAPILLARY BLOOD GLUCOSE  144 (05 Jan 2017 21:58)      RADIOLOGY & ADDITIONAL TESTS:    Imaging Personally Reviewed:  [ ] YES  [ ] NO    Consultant(s) Notes Reviewed:  [ ] YES  [ ] NO    Care Discussed with Consultants/Other Providers [ ] YES  [ ] NO

## 2017-01-05 NOTE — PROGRESS NOTE ADULT - SUBJECTIVE AND OBJECTIVE BOX
HPI:  Patient is a 66 year old female with unknown PMHx, per ED, patient found unresponsive . Patient was not seen by neighbors in two days, they became concerned and entered her home and found patient on the floor covered with feces.  In ED patient found CK >14,000, in renal failure, hyperkalemic, cocktail was given, including kayexelate. Then patient had melena. stool occult blood was positive, low plts and metabolic acidosis. Patient was initially admitted to Telemetry, however this AM, found to be hypotensive, 94/56 was given fluids. ICU consulted, seen at bedside, alert, but confused. Patient is being admitted for AMS, Acute renal failure and rhabdomyolysis. (19 Dec 2016 08:49). No active bleeding. She is guaiac positive and H/H is stable. Colonoscopy/ EGD planned when metabolic and infectious issues resolved      REVIEW OF SYSTEMS      General:	    Skin/Breast:  	  Ophthalmologic:  	  ENMT:	normal    Respiratory and Thorax: normal  	  Cardiovascular:	normal    Gastrointestinal:	 no active bleeding    Genitourinary:	    Musculoskeletal:	    Neurological:	    Psychiatric:	    Hematology/Lymphatics:	    Endocrine:	    Allergic/Immunologic:	    MEDICATIONS  (STANDING):  influenza   Vaccine 0.5milliLiter(s) IntraMuscular once  lactated ringers. 1000milliLiter(s) IV Continuous <Continuous>  morphine  - Injectable 2milliGRAM(s) IV Push once  calcium acetate 1334milliGRAM(s) Oral three times a day with meals  insulin glargine Injectable (LANTUS) 10Unit(s) SubCutaneous at bedtime  insulin lispro (HumaLOG) corrective regimen sliding scale  SubCutaneous three times a day before meals  insulin lispro (HumaLOG) corrective regimen sliding scale  SubCutaneous at bedtime  dextrose 5%. 1000milliLiter(s) IV Continuous <Continuous>  dextrose 50% Injectable 12.5Gram(s) IV Push once  dextrose 50% Injectable 25Gram(s) IV Push once  dextrose 50% Injectable 25Gram(s) IV Push once  sodium chloride 0.45%. 1000milliLiter(s) IV Continuous <Continuous>  heparin  Injectable 5000Unit(s) SubCutaneous every 12 hours  piperacillin/tazobactam IVPB. 3.375Gram(s) IV Intermittent every 8 hours    MEDICATIONS  (PRN):  oxyCODONE  5 mG/acetaminophen 325 mG 1Tablet(s) Oral every 4 hours PRN Severe Pain (7 - 10)  dextrose Gel 1Dose(s) Oral once PRN Blood Glucose LESS THAN 70 milliGRAM(s)/deciliter  glucagon  Injectable 1milliGRAM(s) IntraMuscular once PRN Glucose LESS THAN 70 milligrams/deciliter  acetaminophen   Tablet 650milliGRAM(s) Oral every 6 hours PRN For Temp greater than 38 C (100.4 F)      Vital Signs Last 24 Hrs  T(C): 37.7, Max: 38 (01-04 @ 23:51)  T(F): 99.8, Max: 100.4 (01-04 @ 23:51)  HR: 84 (77 - 91)  BP: 175/84 (149/78 - 175/84)  BP(mean): --  RR: 16 (16 - 19)  SpO2: 98% (92% - 100%)    PHYSICAL EXAM:      Constitutional:    Eyes:    ENMT:    Neck: Supple    Breasts:    Back:    Respiratory: normal    Cardiovascular: Normal    Gastrointestinal: No masses    Genitourinary:    Rectal:    Extremities:    Vascular:    Neurological:    Skin:    Lymph Nodes:    Musculoskeletal:    Psychiatric:            HEALTH ISSUES - PROBLEM Dx:  Wound infection: Wound infection  Skin sloughing: Skin sloughing  GI bleed: GI bleed  Hyperphosphatemia: Hyperphosphatemia  Hypernatremia: Hypernatremia  Hypokalemia: Hypokalemia  Altered mental status: Altered mental status  Neurogenic orthostatic hypotension: Neurogenic orthostatic hypotension  Sepsis due to Escherichia coli: Sepsis due to Escherichia coli  Sepsis: Sepsis  Hypotension: Hypotension  Metabolic acidosis: Metabolic acidosis  Thrombocytopenia: Thrombocytopenia  Hyperkalemia: Hyperkalemia  Melena: Melena  Rhabdomyolysis: Rhabdomyolysis  ARF (acute renal failure): ARF (acute renal failure)  Metabolic encephalopathy: Metabolic encephalopathy            Assesment & Plan: Occult GI bleeding. Colonoscopy/ EGD when stable

## 2017-01-05 NOTE — PROGRESS NOTE ADULT - ASSESSMENT
Patient is a 66 year old female with unknown PMHx, per ED, patient found unresponsive, admitted to critical care for Metabolic Encephalopathy, Acute Renal Failure, Hyperkalemia, Thrombocytopenia, Rhabdomyolysis.   clinically stable  Continue per plastic surgery

## 2017-01-06 NOTE — PROGRESS NOTE ADULT - SUBJECTIVE AND OBJECTIVE BOX
Patient is a 66y old  Female who presents with a chief complaint of unresponsiveness (19 Dec 2016 08:49)      INTERVAL HPI/OVERNIGHT EVENTS:  1/6/2017: PICC line placed right arm  still c/o diminished sensation to left digits upper extremity  Plastic surgery follow up noted; for possible further debridement early next week    stable  plastic follow up noted  Discussed with Niece who lives in [place]  Wants rehab in [place]    MEDICATIONS  (STANDING):  influenza   Vaccine 0.5milliLiter(s) IntraMuscular once  lactated ringers. 1000milliLiter(s) IV Continuous <Continuous>  morphine  - Injectable 2milliGRAM(s) IV Push once  calcium acetate 1334milliGRAM(s) Oral three times a day with meals  insulin glargine Injectable (LANTUS) 10Unit(s) SubCutaneous at bedtime  insulin lispro (HumaLOG) corrective regimen sliding scale  SubCutaneous three times a day before meals  insulin lispro (HumaLOG) corrective regimen sliding scale  SubCutaneous at bedtime  dextrose 5%. 1000milliLiter(s) IV Continuous <Continuous>  dextrose 50% Injectable 12.5Gram(s) IV Push once  dextrose 50% Injectable 25Gram(s) IV Push once  dextrose 50% Injectable 25Gram(s) IV Push once  sodium chloride 0.45%. 1000milliLiter(s) IV Continuous <Continuous>  heparin  Injectable 5000Unit(s) SubCutaneous every 12 hours  piperacillin/tazobactam IVPB. 3.375Gram(s) IV Intermittent every 8 hours    MEDICATIONS  (PRN):  oxyCODONE  5 mG/acetaminophen 325 mG 1Tablet(s) Oral every 4 hours PRN Severe Pain (7 - 10)  dextrose Gel 1Dose(s) Oral once PRN Blood Glucose LESS THAN 70 milliGRAM(s)/deciliter  glucagon  Injectable 1milliGRAM(s) IntraMuscular once PRN Glucose LESS THAN 70 milligrams/deciliter  acetaminophen   Tablet 650milliGRAM(s) Oral every 6 hours PRN For Temp greater than 38 C (100.4 F)        REVIEW OF SYSTEMS:  CONSTITUTIONAL: No fever, weight loss, or fatigue  EYES: No eye pain, visual disturbances, or discharge  ENMT:  No difficulty hearing, tinnitus, vertigo; No sinus or throat pain  NECK: No pain or stiffness  RESPIRATORY: No cough, wheezing, chills or hemoptysis; No shortness of breath  CARDIOVASCULAR: No chest pain, palpitations, dizziness, or leg swelling  GASTROINTESTINAL: No abdominal or epigastric pain. No nausea, vomiting, or hematemesis; No diarrhea or constipation. No melena or hematochezia.  GENITOURINARY: No dysuria, frequency, hematuria, or incontinence  NEUROLOGICAL: No headaches, memory loss, loss of strength, numbness, or tremors  SKIN: No itching, burning, rashes, or lesions      Vital Signs Last 24 Hrs  T(C): 37.3, Max: 37.7 (01-05 @ 18:43)  T(F): 99.2, Max: 99.8 (01-05 @ 18:43)  HR: 77 (77 - 85)  BP: 144/75 (144/75 - 175/84)  BP(mean): --  RR: 18 (16 - 19)  SpO2: 96% (92% - 100%)    PHYSICAL EXAM:  GENERAL: NAD, well-groomed, well-developed  HEAD:  Atraumatic, Normocephalic  EYES: EOMI, PERRLA, conjunctiva and sclera clear  ENMT: No tonsillar erythema, exudates, or enlargement; Moist mucous membranes   NECK: Supple, No JVD   NERVOUS SYSTEM:  Alert & Oriented X3, Good concentration; Motor Strength 5/5 B/L upper and lower extremities; DTRs 2+ intact and symmetric  CHEST/LUNG: Clear to percussion bilaterally; No rales, rhonchi, wheezing, or rubs  HEART: Regular rate and rhythm; No murmurs, rubs, or gallops  ABDOMEN: Soft, Nontender, Nondistended; Bowel sounds present  EXTREMITIES:  2+ Peripheral Pulses, No clubbing, cyanosis, or edema, dressing in situ  LYMPH: No lymphadenopathy noted  SKIN: No rashes or lesions    LABS:                        8.6    7.0   )-----------( 403      ( 05 Jan 2017 06:52 )             24.8     05 Jan 2017 06:52    145    |  110    |  18     ----------------------------<  134    3.2     |  28     |  1.61     Ca    8.1        05 Jan 2017 06:52          CAPILLARY BLOOD GLUCOSE  144 (05 Jan 2017 21:58)      RADIOLOGY & ADDITIONAL TESTS:    Imaging Personally Reviewed:  [ ] YES  [ ] NO    Consultant(s) Notes Reviewed:  [ ] YES  [ ] NO    Care Discussed with Consultants/Other Providers [ ] YES  [ ] NO

## 2017-01-06 NOTE — PROGRESS NOTE ADULT - SUBJECTIVE AND OBJECTIVE BOX
Patient is a 66 year old female with unknown PMHx, per ED, patient found unresponsive . Patient was not seen by neighbors in two days, they became concerned and entered her home and found patient on the floor covered with feces.  In ED patient found CK >14,000, in renal failure, hyperkalemic, cocktail was given, including kayexelate. Then patient had melena. stool occult blood was positive, low plts and metabolic acidosis. Patient was initially admitted to Telemetry, however this AM, found to be hypotensive, 94/56 was given fluids. ICU consulted,   stabilized ; resolving acute renal failure and rhabdomyolysis   multiple skin necroses areas on admission ; debrided by plastics again yesterday   plastics notes reviewed   has remained on broad spectrum antibiotics since admission   plastics notes reviewed  Allergies    No Known Allergies    Intolerances        MEDICATIONS  (STANDING):  influenza   Vaccine 0.5milliLiter(s) IntraMuscular once  lactated ringers. 1000milliLiter(s) IV Continuous <Continuous>  morphine  - Injectable 2milliGRAM(s) IV Push once  calcium acetate 1334milliGRAM(s) Oral three times a day with meals  insulin glargine Injectable (LANTUS) 10Unit(s) SubCutaneous at bedtime  insulin lispro (HumaLOG) corrective regimen sliding scale  SubCutaneous three times a day before meals  insulin lispro (HumaLOG) corrective regimen sliding scale  SubCutaneous at bedtime  dextrose 5%. 1000milliLiter(s) IV Continuous <Continuous>  dextrose 50% Injectable 12.5Gram(s) IV Push once  dextrose 50% Injectable 25Gram(s) IV Push once  dextrose 50% Injectable 25Gram(s) IV Push once  sodium chloride 0.45%. 1000milliLiter(s) IV Continuous <Continuous>  heparin  Injectable 5000Unit(s) SubCutaneous every 12 hours  piperacillin/tazobactam IVPB. 3.375Gram(s) IV Intermittent every 8 hours    MEDICATIONS  (PRN):  oxyCODONE  5 mG/acetaminophen 325 mG 1Tablet(s) Oral every 4 hours PRN Severe Pain (7 - 10)  dextrose Gel 1Dose(s) Oral once PRN Blood Glucose LESS THAN 70 milliGRAM(s)/deciliter  glucagon  Injectable 1milliGRAM(s) IntraMuscular once PRN Glucose LESS THAN 70 milligrams/deciliter  acetaminophen   Tablet 650milliGRAM(s) Oral every 6 hours PRN For Temp greater than 38 C (100.4 F)      REVIEW OF SYSTEMS:  foul diarrhea   VITAL SIGNS:  T(C): 36.7, Max: 37.7 (01-05 @ 18:43)  T(F): 98, Max: 99.8 (01-05 @ 18:43)  HR: 80 (74 - 84)  BP: 151/72 (144/75 - 175/84)  RR: 18 (16 - 18)  SpO2: 99% (95% - 99%)  Wt(kg): --    PHYSICAL EXAM:    GENERAL: not in any distress  HEENT: Neck is supple, normocephalic, atraumatic   CHEST/LUNG: Clear to percussion bilaterally; No rales, rhonchi, wheezing  HEART: Regular rate and rhythm; No murmurs, rubs, or gallops  ABDOMEN: Soft, Nontender, Nondistended; Bowel sounds present, no rebound   EXTREMITIES:  2+ Peripheral Pulses, No clubbing, cyanosis, or edema  GENITOURINARY: NEGATIVE   sp picc  SKIN: no change in status   hand not opened   BACK: no pressor sore   NERVOUS SYSTEM:  Alert & Oriented X3, Good concentration  PSYCH: normal affect     LABS:                         8.6    7.0   )-----------( 403      ( 05 Jan 2017 06:52 )             24.8     05 Jan 2017 06:52    145    |  110    |  18     ----------------------------<  134    3.2     |  28     |  1.61     Ca    8.1        05 Jan 2017 06:52              CARDIAC MARKERS ( 05 Jan 2017 06:52 )  x     / x     / 389 U/L / x     / x                                      Radiology:

## 2017-01-06 NOTE — PROGRESS NOTE ADULT - PROBLEM SELECTOR PLAN 9
pt s/p debridement of gangrenous tissue to left upper and lower extremity  as per plastic surgery consult, for possible further debridement early next week

## 2017-01-07 NOTE — PROGRESS NOTE ADULT - SUBJECTIVE AND OBJECTIVE BOX
Patient is a 66y old  Female who presents with a chief complaint of unresponsiveness (19 Dec 2016 08:49)      INTERVAL HPI/OVERNIGHT EVENTS:  1/7/2017: stable. B/P 185/97 P=85; will add Toprol 25mg E.R. once daily and re-evaluate  potassium supplemented for hypokalemia    1/6/2017: PICC line placed right arm  still c/o diminished sensation to left digits upper extremity  Plastic surgery follow up noted; for possible further debridement early next week        MEDICATIONS  (STANDING):  influenza   Vaccine 0.5milliLiter(s) IntraMuscular once  lactated ringers. 1000milliLiter(s) IV Continuous <Continuous>  morphine  - Injectable 2milliGRAM(s) IV Push once  calcium acetate 1334milliGRAM(s) Oral three times a day with meals  insulin glargine Injectable (LANTUS) 10Unit(s) SubCutaneous at bedtime  insulin lispro (HumaLOG) corrective regimen sliding scale  SubCutaneous three times a day before meals  insulin lispro (HumaLOG) corrective regimen sliding scale  SubCutaneous at bedtime  dextrose 5%. 1000milliLiter(s) IV Continuous <Continuous>  dextrose 50% Injectable 12.5Gram(s) IV Push once  dextrose 50% Injectable 25Gram(s) IV Push once  dextrose 50% Injectable 25Gram(s) IV Push once  sodium chloride 0.45%. 1000milliLiter(s) IV Continuous <Continuous>  heparin  Injectable 5000Unit(s) SubCutaneous every 12 hours  piperacillin/tazobactam IVPB. 3.375Gram(s) IV Intermittent every 8 hours    MEDICATIONS  (PRN):  oxyCODONE  5 mG/acetaminophen 325 mG 1Tablet(s) Oral every 4 hours PRN Severe Pain (7 - 10)  dextrose Gel 1Dose(s) Oral once PRN Blood Glucose LESS THAN 70 milliGRAM(s)/deciliter  glucagon  Injectable 1milliGRAM(s) IntraMuscular once PRN Glucose LESS THAN 70 milligrams/deciliter  acetaminophen   Tablet 650milliGRAM(s) Oral every 6 hours PRN For Temp greater than 38 C (100.4 F)        REVIEW OF SYSTEMS:  CONSTITUTIONAL: No fever, weight loss, or fatigue  EYES: No eye pain, visual disturbances, or discharge  ENMT:  No difficulty hearing, tinnitus, vertigo; No sinus or throat pain  NECK: No pain or stiffness  RESPIRATORY: No cough, wheezing, chills or hemoptysis; No shortness of breath  CARDIOVASCULAR: No chest pain, palpitations, dizziness, or leg swelling  GASTROINTESTINAL: No abdominal or epigastric pain. No nausea, vomiting, or hematemesis; No diarrhea or constipation. No melena or hematochezia.  GENITOURINARY: No dysuria, frequency, hematuria, or incontinence  NEUROLOGICAL: No headaches, memory loss, loss of strength, numbness, or tremors  SKIN: No itching, burning, rashes, or lesions      Vital Signs Last 24 Hrs  T(C): 37.3, Max: 37.7 (01-05 @ 18:43)  T(F): 99.2, Max: 99.8 (01-05 @ 18:43)  HR: 77 (77 - 85)  BP: 144/75 (144/75 - 175/84)  BP(mean): --  RR: 18 (16 - 19)  SpO2: 96% (92% - 100%)    PHYSICAL EXAM:  GENERAL: NAD, well-groomed, well-developed  HEAD:  Atraumatic, Normocephalic  EYES: EOMI, PERRLA, conjunctiva and sclera clear  ENMT: No tonsillar erythema, exudates, or enlargement; Moist mucous membranes   NECK: Supple, No JVD   NERVOUS SYSTEM:  Alert & Oriented X3, Good concentration; Motor Strength 5/5 B/L upper and lower extremities; DTRs 2+ intact and symmetric  CHEST/LUNG: Clear to percussion bilaterally; No rales, rhonchi, wheezing, or rubs  HEART: Regular rate and rhythm; No murmurs, rubs, or gallops  ABDOMEN: Soft, Nontender, Nondistended; Bowel sounds present  EXTREMITIES:  2+ Peripheral Pulses, No clubbing, cyanosis, or edema, dressing in situ  LYMPH: No lymphadenopathy noted  SKIN: No rashes or lesions    LABS:                        8.6    7.0   )-----------( 403      ( 05 Jan 2017 06:52 )             24.8     05 Jan 2017 06:52    145    |  110    |  18     ----------------------------<  134    3.2     |  28     |  1.61     Ca    8.1        05 Jan 2017 06:52          CAPILLARY BLOOD GLUCOSE  144 (05 Jan 2017 21:58)      RADIOLOGY & ADDITIONAL TESTS:    Imaging Personally Reviewed:  [ ] YES  [ ] NO    Consultant(s) Notes Reviewed:  [ ] YES  [ ] NO    Care Discussed with Consultants/Other Providers [ ] YES  [ ] NO

## 2017-01-08 NOTE — PROGRESS NOTE ADULT - SUBJECTIVE AND OBJECTIVE BOX
Patient is a 66 year old female with unknown PMHx, per ED, patient found unresponsive . Patient was not seen by neighbors in two days, they became concerned and entered her home and found patient on the floor covered with feces.  In ED patient found CK >14,000, in renal failure, hyperkalemic, cocktail was given, including kayexelate. Then patient had melena. stool occult blood was positive, low plts and metabolic acidosis. Patient was initially admitted to Telemetry, however this AM, found to be hypotensive, 94/56 was given fluids. ICU consulted,   stabilized ; resolving acute renal failure and rhabdomyolysis   multiple skin necroses areas on admission ; debrided by plastics again yesterday   plastics notes reviewed     Allergies    No Known Allergies    Intolerances        MEDICATIONS  (STANDING):  influenza   Vaccine 0.5milliLiter(s) IntraMuscular once  lactated ringers. 1000milliLiter(s) IV Continuous <Continuous>  morphine  - Injectable 2milliGRAM(s) IV Push once  calcium acetate 1334milliGRAM(s) Oral three times a day with meals  insulin glargine Injectable (LANTUS) 10Unit(s) SubCutaneous at bedtime  insulin lispro (HumaLOG) corrective regimen sliding scale  SubCutaneous three times a day before meals  insulin lispro (HumaLOG) corrective regimen sliding scale  SubCutaneous at bedtime  dextrose 5%. 1000milliLiter(s) IV Continuous <Continuous>  dextrose 50% Injectable 12.5Gram(s) IV Push once  dextrose 50% Injectable 25Gram(s) IV Push once  dextrose 50% Injectable 25Gram(s) IV Push once  sodium chloride 0.45%. 1000milliLiter(s) IV Continuous <Continuous>  heparin  Injectable 5000Unit(s) SubCutaneous every 12 hours  meropenem IVPB 500milliGRAM(s) IV Intermittent every 8 hours  vancomycin  IVPB 1000milliGRAM(s) IV Intermittent every 24 hours  meropenem IVPB  IV Intermittent   metoprolol succinate ER 25milliGRAM(s) Oral daily  vancomycin    Solution 125milliGRAM(s) Oral every 6 hours    MEDICATIONS  (PRN):  oxyCODONE  5 mG/acetaminophen 325 mG 1Tablet(s) Oral every 4 hours PRN Severe Pain (7 - 10)  dextrose Gel 1Dose(s) Oral once PRN Blood Glucose LESS THAN 70 milliGRAM(s)/deciliter  glucagon  Injectable 1milliGRAM(s) IntraMuscular once PRN Glucose LESS THAN 70 milligrams/deciliter  acetaminophen   Tablet 650milliGRAM(s) Oral every 6 hours PRN For Temp greater than 38 C (100.4 F)      REVIEW OF SYSTEMS:    CONSTITUTIONAL: No fever, chills, weight loss, or fatigue  HEENT: No sore throat, runny nose, ear ache  RESPIRATORY: No cough, wheezing, No shortness of breath  CARDIOVASCULAR: No chest pain, palpitations, dizziness  GASTROINTESTINAL: No abdominal pain. No nausea, vomiting, STILL  diarrhea  GENITOURINARY: No dysuria, increase frequency, hematuria, or incontinence  NEUROLOGICAL: No headaches, memory loss, loss of strength, numbness, or tremors, no weakness  EXTREMITY: No pedal edema BLE  SKIN: No itching, burning, rashes, or lesions     VITAL SIGNS:  T(C): 37.2, Max: 38.1 (01-08 @ 00:30)  T(F): 99, Max: 100.6 (01-08 @ 00:30)  HR: 76 (70 - 78)  BP: 156/79 (152/76 - 175/52)  RR: 18 (16 - 18)  SpO2: 96% (95% - 97%)  Wt(kg): --    PHYSICAL EXAM:    GENERAL: not in any distress  HEENT: Neck is supple, normocephalic, atraumatic   CHEST/LUNG: Clear to percussion bilaterally; No rales, rhonchi, wheezing  HEART: Regular rate and rhythm; No murmurs, rubs, or gallops  ABDOMEN: Soft, Nontender, Nondistended; Bowel sounds present, no rebound   EXTREMITIES:  2+ Peripheral Pulses, No clubbing, cyanosis, or edeMA  left hand dressing    SKIN: multiple areas open from fall  BACK: no pressor sore   NERVOUS SYSTEM:  Alert & Oriented X3, Good concentration  PSYCH: normal affect     LABS:     08 Jan 2017 06:43    144    |  105    |  15     ----------------------------<  129    3.5     |  32     |  1.45     Ca    8.3        08 Jan 2017 06:43                          Vancomycin Level, Trough: 1.4 ug/mL (01-07 @ 16:56)        Culture Results:   Insufficient growth-culture reincubated (01-07 @ 03:29)                Radiology:      HPI:  Patient is a 66 year old female with unknown PMHx, per ED, patient found unresponsive . Patient was not seen by neighbors in two days, they became concerned and entered her home and found patient on the floor covered with feces.  In ED patient found CK >14,000, in renal failure, hyperkalemic, cocktail was given, including kayexelate. Then patient had melena. stool occult blood was positive, low plts and metabolic acidosis. Patient was initially admitted to Telemetry, however this AM, found to be hypotensive, 94/56 was given fluids. ICU consulted, seen at bedside, alert, but confused. Patient is being admitted for AMS, Acute renal failure and rhabdomyolysis. (19 Dec 2016 08:49)      Allergies    No Known Allergies    Intolerances        MEDICATIONS  (STANDING):  influenza   Vaccine 0.5milliLiter(s) IntraMuscular once  lactated ringers. 1000milliLiter(s) IV Continuous <Continuous>  morphine  - Injectable 2milliGRAM(s) IV Push once  calcium acetate 1334milliGRAM(s) Oral three times a day with meals  insulin glargine Injectable (LANTUS) 10Unit(s) SubCutaneous at bedtime  insulin lispro (HumaLOG) corrective regimen sliding scale  SubCutaneous three times a day before meals  insulin lispro (HumaLOG) corrective regimen sliding scale  SubCutaneous at bedtime  dextrose 5%. 1000milliLiter(s) IV Continuous <Continuous>  dextrose 50% Injectable 12.5Gram(s) IV Push once  dextrose 50% Injectable 25Gram(s) IV Push once  dextrose 50% Injectable 25Gram(s) IV Push once  sodium chloride 0.45%. 1000milliLiter(s) IV Continuous <Continuous>  heparin  Injectable 5000Unit(s) SubCutaneous every 12 hours  meropenem IVPB 500milliGRAM(s) IV Intermittent every 8 hours  vancomycin  IVPB 1000milliGRAM(s) IV Intermittent every 24 hours  meropenem IVPB  IV Intermittent   metoprolol succinate ER 25milliGRAM(s) Oral daily  vancomycin    Solution 125milliGRAM(s) Oral every 6 hours    MEDICATIONS  (PRN):  oxyCODONE  5 mG/acetaminophen 325 mG 1Tablet(s) Oral every 4 hours PRN Severe Pain (7 - 10)  dextrose Gel 1Dose(s) Oral once PRN Blood Glucose LESS THAN 70 milliGRAM(s)/deciliter  glucagon  Injectable 1milliGRAM(s) IntraMuscular once PRN Glucose LESS THAN 70 milligrams/deciliter  acetaminophen   Tablet 650milliGRAM(s) Oral every 6 hours PRN For Temp greater than 38 C (100.4 F)      REVIEW OF SYSTEMS:    CONSTITUTIONAL: No fever, chills, weight loss, or fatigue  HEENT: No sore throat, runny nose, ear ache  RESPIRATORY: No cough, wheezing, No shortness of breath  CARDIOVASCULAR: No chest pain, palpitations, dizziness  GASTROINTESTINAL: No abdominal pain. No nausea, vomiting, diarrhea  GENITOURINARY: No dysuria, increase frequency, hematuria, or incontinence  NEUROLOGICAL: No headaches, memory loss, loss of strength, numbness, or tremors, no weakness  EXTREMITY: No pedal edema BLE  SKIN: No itching, burning, rashes, or lesions     VITAL SIGNS:  T(C): 37.2, Max: 38.1 (01-08 @ 00:30)  T(F): 99, Max: 100.6 (01-08 @ 00:30)  HR: 76 (70 - 78)  BP: 156/79 (152/76 - 175/52)  RR: 18 (16 - 18)  SpO2: 96% (95% - 97%)  Wt(kg): --    PHYSICAL EXAM:    GENERAL: not in any distress  HEENT: Neck is supple, normocephalic, atraumatic   CHEST/LUNG: Clear to percussion bilaterally; No rales, rhonchi, wheezing  HEART: Regular rate and rhythm; No murmurs, rubs, or gallops  ABDOMEN: Soft, Nontender, Nondistended; Bowel sounds present, no rebound   EXTREMITIES:  2+ Peripheral Pulses, No clubbing, cyanosis, or edema  GENITOURINARY:   SKIN: No rashes or lesions  BACK: no pressor sore   NERVOUS SYSTEM:  Alert & Oriented X3, Good concentration  PSYCH: normal affect     LABS:     08 Jan 2017 06:43    144    |  105    |  15     ----------------------------<  129    3.5     |  32     |  1.45     Ca    8.3        08 Jan 2017 06:43                          Vancomycin Level, Trough: 1.4 ug/mL (01-07 @ 16:56)        Culture Results:   Insufficient growth-culture reincubated (01-07 @ 03:29)                Radiology:

## 2017-01-08 NOTE — PROGRESS NOTE ADULT - SUBJECTIVE AND OBJECTIVE BOX
Patient is a 66y old  Female who presents with a chief complaint of unresponsiveness (19 Dec 2016 08:49)      INTERVAL HPI/OVERNIGHT EVENTS:  B/P better controlled today    1/7/2017: stable. B/P 185/97 P=85; will add Toprol 25mg E.R. once daily and re-evaluate  potassium supplemented for hypokalemia    1/6/2017: PICC line placed right arm  still c/o diminished sensation to left digits upper extremity  Plastic surgery follow up noted; for possible further debridement early next week        MEDICATIONS  (STANDING):  influenza   Vaccine 0.5milliLiter(s) IntraMuscular once  lactated ringers. 1000milliLiter(s) IV Continuous <Continuous>  morphine  - Injectable 2milliGRAM(s) IV Push once  calcium acetate 1334milliGRAM(s) Oral three times a day with meals  insulin glargine Injectable (LANTUS) 10Unit(s) SubCutaneous at bedtime  insulin lispro (HumaLOG) corrective regimen sliding scale  SubCutaneous three times a day before meals  insulin lispro (HumaLOG) corrective regimen sliding scale  SubCutaneous at bedtime  dextrose 5%. 1000milliLiter(s) IV Continuous <Continuous>  dextrose 50% Injectable 12.5Gram(s) IV Push once  dextrose 50% Injectable 25Gram(s) IV Push once  dextrose 50% Injectable 25Gram(s) IV Push once  sodium chloride 0.45%. 1000milliLiter(s) IV Continuous <Continuous>  heparin  Injectable 5000Unit(s) SubCutaneous every 12 hours  piperacillin/tazobactam IVPB. 3.375Gram(s) IV Intermittent every 8 hours    MEDICATIONS  (PRN):  oxyCODONE  5 mG/acetaminophen 325 mG 1Tablet(s) Oral every 4 hours PRN Severe Pain (7 - 10)  dextrose Gel 1Dose(s) Oral once PRN Blood Glucose LESS THAN 70 milliGRAM(s)/deciliter  glucagon  Injectable 1milliGRAM(s) IntraMuscular once PRN Glucose LESS THAN 70 milligrams/deciliter  acetaminophen   Tablet 650milliGRAM(s) Oral every 6 hours PRN For Temp greater than 38 C (100.4 F)        REVIEW OF SYSTEMS:  CONSTITUTIONAL: No fever, weight loss, or fatigue  EYES: No eye pain, visual disturbances, or discharge  ENMT:  No difficulty hearing, tinnitus, vertigo; No sinus or throat pain  NECK: No pain or stiffness  RESPIRATORY: No cough, wheezing, chills or hemoptysis; No shortness of breath  CARDIOVASCULAR: No chest pain, palpitations, dizziness, or leg swelling  GASTROINTESTINAL: No abdominal or epigastric pain. No nausea, vomiting, or hematemesis; No diarrhea or constipation. No melena or hematochezia.  GENITOURINARY: No dysuria, frequency, hematuria, or incontinence  NEUROLOGICAL: No headaches, memory loss, loss of strength, numbness, or tremors  SKIN: No itching, burning, rashes, or lesions      Vital Signs Last 24 Hrs  T(C): 37.3, Max: 37.7 (01-05 @ 18:43)  T(F): 99.2, Max: 99.8 (01-05 @ 18:43)  HR: 77 (77 - 85)  BP: 144/75 (144/75 - 175/84)  BP(mean): --  RR: 18 (16 - 19)  SpO2: 96% (92% - 100%)    PHYSICAL EXAM:  GENERAL: NAD, well-groomed, well-developed  HEAD:  Atraumatic, Normocephalic  EYES: EOMI, PERRLA, conjunctiva and sclera clear  ENMT: No tonsillar erythema, exudates, or enlargement; Moist mucous membranes   NECK: Supple, No JVD   NERVOUS SYSTEM:  Alert & Oriented X3, Good concentration; Motor Strength 5/5 B/L upper and lower extremities; DTRs 2+ intact and symmetric  CHEST/LUNG: Clear to percussion bilaterally; No rales, rhonchi, wheezing, or rubs  HEART: Regular rate and rhythm; No murmurs, rubs, or gallops  ABDOMEN: Soft, Nontender, Nondistended; Bowel sounds present  EXTREMITIES:  2+ Peripheral Pulses, No clubbing, cyanosis, or edema, dressing in situ  LYMPH: No lymphadenopathy noted  SKIN: No rashes or lesions    LABS:                        8.6    7.0   )-----------( 403      ( 05 Jan 2017 06:52 )             24.8     05 Jan 2017 06:52    145    |  110    |  18     ----------------------------<  134    3.2     |  28     |  1.61     Ca    8.1        05 Jan 2017 06:52          CAPILLARY BLOOD GLUCOSE  144 (05 Jan 2017 21:58)      RADIOLOGY & ADDITIONAL TESTS:    Imaging Personally Reviewed:  [ ] YES  [ ] NO    Consultant(s) Notes Reviewed:  [ ] YES  [ ] NO    Care Discussed with Consultants/Other Providers [ ] YES  [ ] NO

## 2017-01-08 NOTE — PROGRESS NOTE ADULT - ASSESSMENT
Patient is a 66 year old female with unknown PMHx, per ED, patient found unresponsive, admitted to critical care for Metabolic Encephalopathy, Acute Renal Failure, Hyperkalemia, Thrombocytopenia, Rhabdomyolysis. ; all resolving nicely   all is resolving   issues that remain are essentially with large areas of skin necroses that had dev all over and now have been debrided  issues with hand noted  plastics notes reviewed  now diarrhea ; nursing cant get specimen   plan po vanco

## 2017-01-09 NOTE — PROGRESS NOTE ADULT - ASSESSMENT
Patient is a 66 year old female with unknown PMHx, per ED, patient found unresponsive, admitted to critical care for Metabolic Encephalopathy, Acute Renal Failure, Hyperkalemia, Thrombocytopenia, Rhabdomyolysis. ; all resolving nicely   issues that remain are essentially with large areas of skin necroses that had dev all over and now have been debrided  issues with hand noted  plastics notes reviewed  discussed with vincent whyte today   plan stephanie yan

## 2017-01-09 NOTE — PROGRESS NOTE ADULT - SUBJECTIVE AND OBJECTIVE BOX
Patient is a 66y old  Female who presents with a chief complaint of unresponsiveness (19 Dec 2016 08:49)      INTERVAL HPI/OVERNIGHT EVENTS:    Post further debridement of hand today with more loss of muscles and other tissue  Viability of hand doubtful    MEDICATIONS  (STANDING):  influenza   Vaccine 0.5milliLiter(s) IntraMuscular once  lactated ringers. 1000milliLiter(s) IV Continuous <Continuous>  morphine  - Injectable 4milliGRAM(s) IV Push once  meropenem IVPB 500milliGRAM(s) IV Intermittent every 8 hours  vancomycin    Solution 125milliGRAM(s) Oral every 6 hours  vancomycin  IVPB 1000milliGRAM(s) IV Intermittent every 24 hours  heparin  Injectable 5000Unit(s) SubCutaneous every 12 hours  insulin glargine Injectable (LANTUS) 10Unit(s) SubCutaneous at bedtime  insulin lispro (HumaLOG) corrective regimen sliding scale  SubCutaneous three times a day before meals  insulin lispro (HumaLOG) corrective regimen sliding scale  SubCutaneous at bedtime  dextrose 5%. 1000milliLiter(s) IV Continuous <Continuous>  dextrose 50% Injectable 12.5Gram(s) IV Push once  dextrose 50% Injectable 25Gram(s) IV Push once  dextrose 50% Injectable 25Gram(s) IV Push once  calcium acetate 1334milliGRAM(s) Oral three times a day with meals  insulin glargine Injectable (LANTUS) 10Unit(s) SubCutaneous at bedtime  metoprolol succinate ER 25milliGRAM(s) Oral daily  fluconAZOLE   Tablet 100milliGRAM(s) Oral daily    MEDICATIONS  (PRN):  dextrose Gel 1Dose(s) Oral once PRN Blood Glucose LESS THAN 70 milliGRAM(s)/deciliter  glucagon  Injectable 1milliGRAM(s) IntraMuscular once PRN Glucose LESS THAN 70 milligrams/deciliter  acetaminophen   Tablet 650milliGRAM(s) Oral every 6 hours PRN For Temp greater than 38 C (100.4 F)  oxyCODONE  5 mG/acetaminophen 325 mG 1Tablet(s) Oral every 4 hours PRN Severe Pain (7 - 10)        REVIEW OF SYSTEMS:  CONSTITUTIONAL: No fever, weight loss, or fatigue  EYES: No eye pain, visual disturbances, or discharge  ENMT:  No difficulty hearing, tinnitus, vertigo; No sinus or throat pain  NECK: No pain or stiffness  RESPIRATORY: No cough, wheezing, chills or hemoptysis; No shortness of breath  CARDIOVASCULAR: No chest pain, palpitations, dizziness, or leg swelling  GASTROINTESTINAL: No abdominal or epigastric pain. No nausea, vomiting, or hematemesis; No diarrhea or constipation. No melena or hematochezia.  GENITOURINARY: No dysuria, frequency, hematuria, or incontinence  NEUROLOGICAL: No headaches, memory loss, loss of strength, numbness, or tremors  SKIN: No itching, burning, rashes, or lesions      Vital Signs Last 24 Hrs  T(C): 36.9, Max: 37.2 (01-09 @ 06:22)  T(F): 98.5, Max: 99 (01-09 @ 06:22)  HR: 87 (69 - 87)  BP: 172/88 (130/70 - 182/76)  BP(mean): --  RR: 15 (15 - 20)  SpO2: 95% (95% - 100%)    PHYSICAL EXAM:  GENERAL: NAD, well-groomed, well-developed  HEAD:  Atraumatic, Normocephalic  EYES: EOMI, PERRLA, conjunctiva and sclera clear  ENMT: No tonsillar erythema, exudates, or enlargement; Moist mucous membranes   NECK: Supple, No JVD   NERVOUS SYSTEM:  Alert & Oriented X3, Good concentration; Motor Strength 5/5 B/L upper and lower extremities; DTRs 2+ intact and symmetric  CHEST/LUNG: Clear to percussion bilaterally; No rales, rhonchi, wheezing, or rubs  HEART: Regular rate and rhythm; No murmurs, rubs, or gallops  ABDOMEN: Soft, Nontender, Nondistended; Bowel sounds present  EXTREMITIES:  2+ Peripheral Pulses, No clubbing, cyanosis, or edema  LYMPH: No lymphadenopathy noted  SKIN: No rashes or lesions    LABS:                        8.2    8.4   )-----------( 392      ( 09 Jan 2017 07:55 )             24.5     09 Jan 2017 07:55    146    |  104    |  13     ----------------------------<  131    3.2     |  32     |  1.17     Ca    8.3        09 Jan 2017 07:55    TPro  6.6    /  Alb  1.9    /  TBili  0.3    /  DBili  x      /  AST  45     /  ALT  20     /  AlkPhos  73     09 Jan 2017 07:55        CAPILLARY BLOOD GLUCOSE  183 (09 Jan 2017 22:00)      RADIOLOGY & ADDITIONAL TESTS:    Imaging Personally Reviewed:  [ ] YES  [ ] NO    Consultant(s) Notes Reviewed:  [ ] YES  [ ] NO    Care Discussed with Consultants/Other Providers [ ] YES  [ ] NO

## 2017-01-09 NOTE — PROGRESS NOTE ADULT - PROBLEM SELECTOR PLAN 5
- bruise present to left upper thigh, could be due to fall as well/  -TTP/ITP work up, lDH, hepatoglobin, peripheral smear  Platelet count improved

## 2017-01-09 NOTE — PROGRESS NOTE ADULT - SUBJECTIVE AND OBJECTIVE BOX
Patient seen in follow up for ANCELMO; patient seen on 1B preoperatively earlier today. No distress. NPO on IVF    MEDICATIONS  (STANDING):  influenza   Vaccine 0.5milliLiter(s) IntraMuscular once  lactated ringers. 1000milliLiter(s) IV Continuous <Continuous>  morphine  - Injectable 4milliGRAM(s) IV Push once    MEDICATIONS  (PRN):    PHYSICAL EXAM:awake, alert      T(C): 36.4, Max: 37.7 (01-08 @ 17:07)  HR: 74 (68 - 74)  BP: 136/72 (130/70 - 182/76)  RR: 15 (15 - 20)  SpO2: 100% (96% - 100%)  Wt(kg): --  Respiratory: clear anteriorly, decreased BS at bases  Cardiovascular: S1 S2  Gastrointestinal: soft NT ND +BS  Extremities:  1+  edema                                    8.2    8.4   )-----------( 392      ( 09 Jan 2017 07:55 )             24.5     09 Jan 2017 07:55    146    |  104    |  13     ----------------------------<  131    3.2     |  32     |  1.17     Ca    8.3        09 Jan 2017 07:55    TPro  6.6    /  Alb  1.9    /  TBili  0.3    /  DBili  x      /  AST  45     /  ALT  20     /  AlkPhos  73     09 Jan 2017 07:55      LIVER FUNCTIONS - ( 09 Jan 2017 07:55 )  Alb: 1.9 g/dL / Pro: 6.6 gm/dL / ALK PHOS: 73 U/L / ALT: 20 U/L / AST: 45 U/L / GGT: x             Assessment and Plan:  ANCELMO, improving, replete K;   hypotonic ivf; will follow.

## 2017-01-09 NOTE — PROGRESS NOTE ADULT - ASSESSMENT
Patient is a 66 year old female with unknown PMHx, per ED, patient found unresponsive, admitted to critical care for Metabolic Encephalopathy, Acute Renal Failure, Hyperkalemia, Thrombocytopenia, Rhabdomyolysis. -- resolving  clinically stable  Further follow up per plastic surgery

## 2017-01-09 NOTE — PROGRESS NOTE ADULT - SUBJECTIVE AND OBJECTIVE BOX
Patient is a 66 year old female with unknown PMHx, per ED, patient found unresponsive . Patient was not seen by neighbors in two days, they became concerned and entered her home and found patient on the floor covered with feces.  In ED patient found CK >14,000, in renal failure, hyperkalemic, cocktail was given, including kayexelate. Then patient had melena. stool occult blood was positive, low plts and metabolic acidosis. Patient was initially admitted to Telemetry, however this AM, found to be hypotensive, 94/56 was given fluids. ICU consulted,   stabilized ; resolving acute renal failure and rhabdomyolysis  multiple skin necroses areas on admission ; debrided by plastics again yesterday   plastics notes reviewed   Allergies    No Known Allergies    Intolerances        MEDICATIONS  (STANDING):  influenza   Vaccine 0.5milliLiter(s) IntraMuscular once  lactated ringers. 1000milliLiter(s) IV Continuous <Continuous>  morphine  - Injectable 4milliGRAM(s) IV Push once  meropenem IVPB 500milliGRAM(s) IV Intermittent every 8 hours  vancomycin    Solution 125milliGRAM(s) Oral every 6 hours  vancomycin  IVPB 1000milliGRAM(s) IV Intermittent every 24 hours  heparin  Injectable 5000Unit(s) SubCutaneous every 12 hours  insulin glargine Injectable (LANTUS) 10Unit(s) SubCutaneous at bedtime  insulin lispro (HumaLOG) corrective regimen sliding scale  SubCutaneous three times a day before meals  insulin lispro (HumaLOG) corrective regimen sliding scale  SubCutaneous at bedtime  dextrose 5%. 1000milliLiter(s) IV Continuous <Continuous>  dextrose 50% Injectable 12.5Gram(s) IV Push once  dextrose 50% Injectable 25Gram(s) IV Push once  dextrose 50% Injectable 25Gram(s) IV Push once  calcium acetate 1334milliGRAM(s) Oral three times a day with meals  insulin glargine Injectable (LANTUS) 10Unit(s) SubCutaneous at bedtime  metoprolol succinate ER 25milliGRAM(s) Oral daily  fluconAZOLE   Tablet 100milliGRAM(s) Oral daily    MEDICATIONS  (PRN):  dextrose Gel 1Dose(s) Oral once PRN Blood Glucose LESS THAN 70 milliGRAM(s)/deciliter  glucagon  Injectable 1milliGRAM(s) IntraMuscular once PRN Glucose LESS THAN 70 milligrams/deciliter  acetaminophen   Tablet 650milliGRAM(s) Oral every 6 hours PRN For Temp greater than 38 C (100.4 F)  oxyCODONE  5 mG/acetaminophen 325 mG 1Tablet(s) Oral every 4 hours PRN Severe Pain (7 - 10)      REVIEW OF SYSTEMS:  in pain   diarrhea is better sp debridement today     VITAL SIGNS:  T(C): 36.1, Max: 37.7 (01-08 @ 17:07)  T(F): 97, Max: 99.8 (01-08 @ 17:07)  HR: 69 (68 - 75)  BP: 171/69 (130/70 - 182/76)  RR: 17 (15 - 20)  SpO2: 97% (96% - 100%)  Wt(kg): --    PHYSICAL EXAM:    GENERAL: not in any distress  HEENT: Neck is supple, normocephalic, atraumatic   CHEST/LUNG: Clear to percussion bilaterally; No rales, rhonchi, wheezing  HEART: Regular rate and rhythm; No murmurs, rubs, or gallops  ABDOMEN: Soft, Nontender, Nondistended; Bowel sounds present, no rebound   EXTREMITIES:  left hand or dressing CDI  SKIN: left thigh all better   left breast still some necrosis no cellulitis   BACK: no pressor sore   NERVOUS SYSTEM:  Alert & Oriented X3, Good concentration  PSYCH: normal affect     LABS:                         8.2    8.4   )-----------( 392      ( 09 Jan 2017 07:55 )             24.5     09 Jan 2017 07:55    146    |  104    |  13     ----------------------------<  131    3.2     |  32     |  1.17     Ca    8.3        09 Jan 2017 07:55    TPro  6.6    /  Alb  1.9    /  TBili  0.3    /  DBili  x      /  AST  45     /  ALT  20     /  AlkPhos  73     09 Jan 2017 07:55    LIVER FUNCTIONS - ( 09 Jan 2017 07:55 )  Alb: 1.9 g/dL / Pro: 6.6 gm/dL / ALK PHOS: 73 U/L / ALT: 20 U/L / AST: 45 U/L / GGT: x           Vancomycin Level, Trough: 1.4 ug/mL (01-07 @ 16:56)      Culture Results:   Moderate Presumptive Candida albicans (01-07 @ 03:29)                Radiology:

## 2017-01-09 NOTE — PROGRESS NOTE ADULT - PROBLEM SELECTOR PLAN 7
pt s/p debridement of gangrenous tissue to left upper and lower extremity  as per plastic surgery consult, for possible further debridement

## 2017-01-10 NOTE — PROGRESS NOTE ADULT - ASSESSMENT
Patient is a 66 year old female with unknown PMHx, per ED, patient found unresponsive, admitted to critical care for Metabolic Encephalopathy, Acute Renal Failure, Hyperkalemia, Thrombocytopenia, Rhabdomyolysis. -- resolving  clinically stable  Further follow up per plastic surgery ? further debridement

## 2017-01-10 NOTE — PROGRESS NOTE ADULT - SUBJECTIVE AND OBJECTIVE BOX
INTERVAL HPI/OVERNIGHT EVENTS:  OOB to chair    ANTIBIOTICS/RELEVANT:    MEDICATIONS  (STANDING):  influenza   Vaccine 0.5milliLiter(s) IntraMuscular once  lactated ringers. 1000milliLiter(s) IV Continuous <Continuous>  morphine  - Injectable 4milliGRAM(s) IV Push once  meropenem IVPB 500milliGRAM(s) IV Intermittent every 8 hours  vancomycin    Solution 125milliGRAM(s) Oral every 6 hours  vancomycin  IVPB 1000milliGRAM(s) IV Intermittent every 24 hours  heparin  Injectable 5000Unit(s) SubCutaneous every 12 hours  insulin lispro (HumaLOG) corrective regimen sliding scale  SubCutaneous three times a day before meals  insulin lispro (HumaLOG) corrective regimen sliding scale  SubCutaneous at bedtime  dextrose 5%. 1000milliLiter(s) IV Continuous <Continuous>  dextrose 50% Injectable 12.5Gram(s) IV Push once  dextrose 50% Injectable 25Gram(s) IV Push once  dextrose 50% Injectable 25Gram(s) IV Push once  calcium acetate 1334milliGRAM(s) Oral three times a day with meals  insulin glargine Injectable (LANTUS) 10Unit(s) SubCutaneous at bedtime  metoprolol succinate ER 25milliGRAM(s) Oral daily  fluconAZOLE   Tablet 100milliGRAM(s) Oral daily    MEDICATIONS  (PRN):  dextrose Gel 1Dose(s) Oral once PRN Blood Glucose LESS THAN 70 milliGRAM(s)/deciliter  glucagon  Injectable 1milliGRAM(s) IntraMuscular once PRN Glucose LESS THAN 70 milligrams/deciliter  acetaminophen   Tablet 650milliGRAM(s) Oral every 6 hours PRN For Temp greater than 38 C (100.4 F)  oxyCODONE  5 mG/acetaminophen 325 mG 1Tablet(s) Oral every 4 hours PRN Severe Pain (7 - 10)      Allergies    No Known Allergies    Intolerances        Vital Signs Last 24 Hrs  T(C): 37.6, Max: 37.6 (01-10 @ 09:01)  T(F): 99.6, Max: 99.6 (01-10 @ 09:01)  HR: 85 (70 - 87)  BP: 176/86 (153/93 - 182/82)  BP(mean): --  RR: 17 (14 - 17)  SpO2: 98% (95% - 98%)    PHYSICAL EXAM:    General: not in any distress    HEENT: no pallor, moist oral cavity    Respiratory: clear to auscultation bilaterally    Left breast: +black eschar and open wound in left lateral breast     Cardiovascular: S1, S2, RRR    Gastrointestinal: +BS, soft, NT, NT    Extremities: left lateral thigh has superficial and dry, pink wound   Left leg and left hand wound is covered with bandage     LABS:                        8.5    9.2   )-----------( 385      ( 10 Bo 2017 08:13 )             25.1     10 Ob 2017 08:13    145    |  104    |  16     ----------------------------<  143    3.3     |  32     |  1.14     Ca    8.1        10 Bo 2017 08:13    TPro  6.6    /  Alb  1.9    /  TBili  0.3    /  DBili  x      /  AST  45     /  ALT  20     /  AlkPhos  73     09 Jan 2017 07:55      MICROBIOLOGY:  Culture - Other (01.07.17 @ 03:29)    Specimen Source: Skin left hand wound    Culture Results:   Moderate Presumptive Candida albicans  Culture - Blood (12.19.16 @ 18:15)    Gram Stain:   Growth in anaerobic bottle: Gram Negative Rods  Growth in aerobic bottle: Gram Negative Rods    Specimen Source: .Blood Blood    Culture Results:   Growth in aerobic and anaerobic bottles: Escherichia coli  See previous culture [id number]    RADIOLOGY & ADDITIONAL STUDIES:  US kidney=no hydro

## 2017-01-10 NOTE — PROGRESS NOTE ADULT - SUBJECTIVE AND OBJECTIVE BOX
Patient is a 66y old  Female who presents with a chief complaint of unresponsiveness (19 Dec 2016 08:49)      INTERVAL HPI/OVERNIGHT EVENTS:    no new complaints    MEDICATIONS  (STANDING):  influenza   Vaccine 0.5milliLiter(s) IntraMuscular once  lactated ringers. 1000milliLiter(s) IV Continuous <Continuous>  morphine  - Injectable 4milliGRAM(s) IV Push once  meropenem IVPB 500milliGRAM(s) IV Intermittent every 8 hours  vancomycin    Solution 125milliGRAM(s) Oral every 6 hours  vancomycin  IVPB 1000milliGRAM(s) IV Intermittent every 24 hours  heparin  Injectable 5000Unit(s) SubCutaneous every 12 hours  insulin lispro (HumaLOG) corrective regimen sliding scale  SubCutaneous three times a day before meals  insulin lispro (HumaLOG) corrective regimen sliding scale  SubCutaneous at bedtime  dextrose 5%. 1000milliLiter(s) IV Continuous <Continuous>  dextrose 50% Injectable 12.5Gram(s) IV Push once  dextrose 50% Injectable 25Gram(s) IV Push once  dextrose 50% Injectable 25Gram(s) IV Push once  calcium acetate 1334milliGRAM(s) Oral three times a day with meals  insulin glargine Injectable (LANTUS) 10Unit(s) SubCutaneous at bedtime  metoprolol succinate ER 25milliGRAM(s) Oral daily  fluconAZOLE   Tablet 100milliGRAM(s) Oral daily    MEDICATIONS  (PRN):  dextrose Gel 1Dose(s) Oral once PRN Blood Glucose LESS THAN 70 milliGRAM(s)/deciliter  glucagon  Injectable 1milliGRAM(s) IntraMuscular once PRN Glucose LESS THAN 70 milligrams/deciliter  acetaminophen   Tablet 650milliGRAM(s) Oral every 6 hours PRN For Temp greater than 38 C (100.4 F)  oxyCODONE  5 mG/acetaminophen 325 mG 1Tablet(s) Oral every 4 hours PRN Severe Pain (7 - 10)        REVIEW OF SYSTEMS:  CONSTITUTIONAL: No fever, weight loss, or fatigue  EYES: No eye pain, visual disturbances, or discharge  ENMT:  No difficulty hearing, tinnitus, vertigo; No sinus or throat pain  NECK: No pain or stiffness  RESPIRATORY: No cough, wheezing, chills or hemoptysis; No shortness of breath  CARDIOVASCULAR: No chest pain, palpitations, dizziness, or leg swelling  GASTROINTESTINAL: No abdominal or epigastric pain. No nausea, vomiting, or hematemesis; No diarrhea or constipation. No melena or hematochezia.  GENITOURINARY: No dysuria, frequency, hematuria, or incontinence  NEUROLOGICAL: No headaches, memory loss, loss of strength, numbness, or tremors  SKIN: No itching, burning, rashes, or lesions      Vital Signs Last 24 Hrs  T(C): 36.7, Max: 37.6 (01-10 @ 09:01)  T(F): 98, Max: 99.6 (01-10 @ 09:01)  HR: 87 (79 - 87)  BP: 180/80 (156/72 - 182/82)  BP(mean): --  RR: 14 (14 - 17)  SpO2: 98% (95% - 98%)    PHYSICAL EXAM:  GENERAL: NAD, well-groomed, well-developed  HEAD:  Atraumatic, Normocephalic  EYES: EOMI, PERRLA, conjunctiva and sclera clear  ENMT: No tonsillar erythema, exudates, or enlargement; Moist mucous membranes   NECK: Supple, No JVD   NERVOUS SYSTEM:  Alert & Oriented X3, Good concentration; Motor Strength 5/5 B/L upper and lower extremities; DTRs 2+ intact and symmetric  CHEST/LUNG: Clear to percussion bilaterally; No rales, rhonchi, wheezing, or rubs  HEART: Regular rate and rhythm; No murmurs, rubs, or gallops  ABDOMEN: Soft, Nontender, Nondistended; Bowel sounds present  EXTREMITIES:  2+ Peripheral Pulses, No clubbing, cyanosis, or edema  LYMPH: No lymphadenopathy noted  SKIN: No rashes or lesions    LABS:                        8.5    9.2   )-----------( 385      ( 10 Bo 2017 08:13 )             25.1     10 Bo 2017 08:13    145    |  104    |  16     ----------------------------<  143    3.3     |  32     |  1.14     Ca    8.1        10 Bo 2017 08:13    TPro  6.6    /  Alb  1.9    /  TBili  0.3    /  DBili  x      /  AST  45     /  ALT  20     /  AlkPhos  73     09 Jan 2017 07:55        CAPILLARY BLOOD GLUCOSE  172 (10 Bo 2017 16:40)  211 (10 Bo 2017 12:05)  151 (10 Bo 2017 08:15)      RADIOLOGY & ADDITIONAL TESTS:    Imaging Personally Reviewed:  [ ] YES  [ ] NO    Consultant(s) Notes Reviewed:  [ ] YES  [ ] NO    Care Discussed with Consultants/Other Providers [ ] YES  [ ] NO

## 2017-01-10 NOTE — PROGRESS NOTE ADULT - PROBLEM SELECTOR PLAN 4
Plastics noted  S/P  I&D  left hand, thigh and leg  MRI=myositis   Local care for now Plastics noted  S/P  I&D  left hand, thigh and leg  MRI=myositis   On Meropenem, Vancomycin and Diflucan   Local care for now

## 2017-01-11 NOTE — PROGRESS NOTE ADULT - PROBLEM SELECTOR PLAN 1
-pt found unresponsive on floor, unknown cause  -awake, oriented x1 but confusion resolved -monitor urine output  -renal consult - resolved acidosis, hyperkalemia  Improving creatinine

## 2017-01-11 NOTE — PROGRESS NOTE ADULT - PROBLEM SELECTOR PLAN 2
-monitor urine output  -renal consult - resolved acidosis, hyperkalemia  Improving creatinine -aggressive hydration  -follow ck level- resolving

## 2017-01-11 NOTE — PROGRESS NOTE ADULT - SUBJECTIVE AND OBJECTIVE BOX
pt seen w PA and Dr Lambert  op findings discussed at length w pt  Dr Suárez will assume further debridement and care of L leg wound. L thigh wound fully reepithelialized and Dr Moore from gen surg will look after L breast wound  vss  alert and oriented  left forearm/hand tissue with continued ischemic necrosis, including nerve and flexors.  discussed at length re options, alternatives, risks and cxs discussed at length. 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 she understands probabale need for multiple future procedures and wishes to proceed with salvage.  wounds redressed and resplinted.  pt has no definite ulnar pulse and a very weak radial pulse,  cont local wound care and abx.  consider transfusion in moreno of recurrent anemia. source of bleeding?plan for possible redebridement of NICHOLE/hand in few days.

## 2017-01-11 NOTE — PROGRESS NOTE ADULT - PROBLEM SELECTOR PLAN 8
pt s/p debridement of gangrenous tissue to left upper and lower extremity  For probable skin graft Replaced potassium   Monitor BMP

## 2017-01-11 NOTE — PROGRESS NOTE ADULT - ATTENDING COMMENTS
Pt with necrotic laft caf muscles that require debridement. Pt is scheduled for friday at 12pm. Pt seen with Dr ylnne. Left hand will be followed by Dr lynne.
clinically stable for plastic surgery procedure
continue present tx

## 2017-01-11 NOTE — PROGRESS NOTE ADULT - PROBLEM SELECTOR PLAN 10
Low sodium diet  Add Toprol 25mg E.R. tablet daily; Re-evaluate Hemoglobin/hematocrit relatively stable  Transfuse 2 pack of red blood cells  Monitor CBC  G.I. follow up

## 2017-01-11 NOTE — PROGRESS NOTE ADULT - SUBJECTIVE AND OBJECTIVE BOX
HPI:  Patient is a 66 year old female with unknown PMHx, per ED, patient found unresponsive . Patient was not seen by neighbors in two days, they became concerned and entered her home and found patient on the floor covered with feces.  In ED patient found CK >14,000, in renal failure, hyperkalemic, cocktail was given, including kayexelate. Then patient had melena. stool occult blood was positive, low plts and metabolic acidosis. Patient was initially admitted to Telemetry, however this AM, found to be hypotensive, 94/56 was given fluids. ICU consulted, seen at bedside, alert, but confused. Patient is being admitted for AMS, Acute renal failure and rhabdomyolysis. She has occult GI bleeding (19 Dec 2016 08:49)      REVIEW OF SYSTEMS      General:	    Skin/Breast:  	  Ophthalmologic:  	  ENMT:	    Respiratory and Thorax:  	  Cardiovascular:	no chest pain    Gastrointestinal:	 No active bleeding    Genitourinary:	    Musculoskeletal:	    Neurological:	    Psychiatric:	    Hematology/Lymphatics:	    Endocrine:	    Allergic/Immunologic:	    MEDICATIONS  (STANDING):  influenza   Vaccine 0.5milliLiter(s) IntraMuscular once  lactated ringers. 1000milliLiter(s) IV Continuous <Continuous>  morphine  - Injectable 4milliGRAM(s) IV Push once  meropenem IVPB 500milliGRAM(s) IV Intermittent every 8 hours  vancomycin    Solution 125milliGRAM(s) Oral every 6 hours  vancomycin  IVPB 1000milliGRAM(s) IV Intermittent every 24 hours  heparin  Injectable 5000Unit(s) SubCutaneous every 12 hours  insulin lispro (HumaLOG) corrective regimen sliding scale  SubCutaneous three times a day before meals  insulin lispro (HumaLOG) corrective regimen sliding scale  SubCutaneous at bedtime  dextrose 5%. 1000milliLiter(s) IV Continuous <Continuous>  dextrose 50% Injectable 12.5Gram(s) IV Push once  dextrose 50% Injectable 25Gram(s) IV Push once  dextrose 50% Injectable 25Gram(s) IV Push once  calcium acetate 1334milliGRAM(s) Oral three times a day with meals  insulin glargine Injectable (LANTUS) 10Unit(s) SubCutaneous at bedtime  fluconAZOLE   Tablet 100milliGRAM(s) Oral daily  metoprolol 25milliGRAM(s) Oral two times a day    MEDICATIONS  (PRN):  dextrose Gel 1Dose(s) Oral once PRN Blood Glucose LESS THAN 70 milliGRAM(s)/deciliter  glucagon  Injectable 1milliGRAM(s) IntraMuscular once PRN Glucose LESS THAN 70 milligrams/deciliter  acetaminophen   Tablet 650milliGRAM(s) Oral every 6 hours PRN For Temp greater than 38 C (100.4 F)  oxyCODONE  5 mG/acetaminophen 325 mG 1Tablet(s) Oral every 4 hours PRN Severe Pain (7 - 10)      Vital Signs Last 24 Hrs  T(C): 37.6, Max: 37.7 (01-11 @ 18:00)  T(F): 99.7, Max: 99.8 (01-11 @ 18:00)  HR: 76 (76 - 85)  BP: 176/77 (152/80 - 178/89)  BP(mean): --  RR: 16 (14 - 16)  SpO2: 97% (96% - 98%)    PHYSICAL EXAM:      Constitutional: in no distress    Eyes:    ENMT:    Neck: supple    Breasts:    Back:    Respiratory: normal breath sounds    Cardiovascular:    Gastrointestinal: No masses    Genitourinary:    Rectal:    Extremities:    Vascular:    Neurological:    Skin:    Lymph Nodes:    Musculoskeletal:    Psychiatric:            HEALTH ISSUES - PROBLEM Dx:  Anemia due to blood loss: Anemia due to blood loss  Essential hypertension: Essential hypertension  Wound infection: Wound infection  Skin sloughing: Skin sloughing  GI bleed: GI bleed  Hyperphosphatemia: Hyperphosphatemia  Hypernatremia: Hypernatremia  Hypokalemia: Hypokalemia  Altered mental status: Altered mental status  Neurogenic orthostatic hypotension: Neurogenic orthostatic hypotension  Sepsis due to Escherichia coli: Sepsis due to Escherichia coli  Sepsis: Sepsis  Hypotension: Hypotension  Metabolic acidosis: Metabolic acidosis  Thrombocytopenia: Thrombocytopenia  Hyperkalemia: Hyperkalemia  Melena: Melena  Rhabdomyolysis: Rhabdomyolysis  ARF (acute renal failure): ARF (acute renal failure)  Metabolic encephalopathy: Metabolic encephalopathy            Assesment & Plan: Occult GI bleeding. GI w/u when stable

## 2017-01-11 NOTE — PROGRESS NOTE ADULT - PROBLEM SELECTOR PLAN 4
Plastics noted  S/P  I&D  left hand, thigh and leg  MRI=myositis   Wound culture in hand=Candida   On Meropenem, Vancomycin and Diflucan   Local care for now

## 2017-01-11 NOTE — PROGRESS NOTE ADULT - SUBJECTIVE AND OBJECTIVE BOX
Patient is a 66y old  Female who presents with a chief complaint of unresponsiveness (19 Dec 2016 08:49)      INTERVAL HPI/OVERNIGHT EVENTS:    no new complaints    MEDICATIONS  (STANDING):  influenza   Vaccine 0.5milliLiter(s) IntraMuscular once  lactated ringers. 1000milliLiter(s) IV Continuous <Continuous>  morphine  - Injectable 4milliGRAM(s) IV Push once  meropenem IVPB 500milliGRAM(s) IV Intermittent every 8 hours  vancomycin    Solution 125milliGRAM(s) Oral every 6 hours  vancomycin  IVPB 1000milliGRAM(s) IV Intermittent every 24 hours  heparin  Injectable 5000Unit(s) SubCutaneous every 12 hours  insulin lispro (HumaLOG) corrective regimen sliding scale  SubCutaneous three times a day before meals  insulin lispro (HumaLOG) corrective regimen sliding scale  SubCutaneous at bedtime  dextrose 5%. 1000milliLiter(s) IV Continuous <Continuous>  dextrose 50% Injectable 12.5Gram(s) IV Push once  dextrose 50% Injectable 25Gram(s) IV Push once  dextrose 50% Injectable 25Gram(s) IV Push once  calcium acetate 1334milliGRAM(s) Oral three times a day with meals  insulin glargine Injectable (LANTUS) 10Unit(s) SubCutaneous at bedtime  metoprolol succinate ER 25milliGRAM(s) Oral daily  fluconAZOLE   Tablet 100milliGRAM(s) Oral daily    MEDICATIONS  (PRN):  dextrose Gel 1Dose(s) Oral once PRN Blood Glucose LESS THAN 70 milliGRAM(s)/deciliter  glucagon  Injectable 1milliGRAM(s) IntraMuscular once PRN Glucose LESS THAN 70 milligrams/deciliter  acetaminophen   Tablet 650milliGRAM(s) Oral every 6 hours PRN For Temp greater than 38 C (100.4 F)  oxyCODONE  5 mG/acetaminophen 325 mG 1Tablet(s) Oral every 4 hours PRN Severe Pain (7 - 10)        REVIEW OF SYSTEMS:  CONSTITUTIONAL: No fever, weight loss, or fatigue  EYES: No eye pain, visual disturbances, or discharge  ENMT:  No difficulty hearing, tinnitus, vertigo; No sinus or throat pain  NECK: No pain or stiffness  RESPIRATORY: No cough, wheezing, chills or hemoptysis; No shortness of breath  CARDIOVASCULAR: No chest pain, palpitations, dizziness, or leg swelling  GASTROINTESTINAL: No abdominal or epigastric pain. No nausea, vomiting, or hematemesis; No diarrhea or constipation. No melena or hematochezia.  GENITOURINARY: No dysuria, frequency, hematuria, or incontinence  NEUROLOGICAL: No headaches, memory loss, loss of strength, numbness, or tremors  SKIN: No itching, burning, rashes, or lesions      Vital Signs Last 24 Hrs  T(C): 36.7, Max: 37.6 (01-10 @ 09:01)  T(F): 98, Max: 99.6 (01-10 @ 09:01)  HR: 87 (79 - 87)  BP: 180/80 (156/72 - 182/82)  BP(mean): --  RR: 14 (14 - 17)  SpO2: 98% (95% - 98%)    PHYSICAL EXAM:  GENERAL: NAD, well-groomed, well-developed  HEAD:  Atraumatic, Normocephalic  EYES: EOMI, PERRLA, conjunctiva and sclera clear  ENMT: No tonsillar erythema, exudates, or enlargement; Moist mucous membranes   NECK: Supple, No JVD   NERVOUS SYSTEM:  Alert & Oriented X3, Good concentration; Motor Strength 5/5 B/L upper and lower extremities; DTRs 2+ intact and symmetric  CHEST/LUNG: Clear to percussion bilaterally; No rales, rhonchi, wheezing, or rubs  HEART: Regular rate and rhythm; No murmurs, rubs, or gallops  ABDOMEN: Soft, Nontender, Nondistended; Bowel sounds present  EXTREMITIES:  2+ Peripheral Pulses, No clubbing, cyanosis, or edema  LYMPH: No lymphadenopathy noted  SKIN: No rashes or lesions    LABS:                        8.5    9.2   )-----------( 385      ( 10 Bo 2017 08:13 )             25.1     10 Bo 2017 08:13    145    |  104    |  16     ----------------------------<  143    3.3     |  32     |  1.14     Ca    8.1        10 Bo 2017 08:13    TPro  6.6    /  Alb  1.9    /  TBili  0.3    /  DBili  x      /  AST  45     /  ALT  20     /  AlkPhos  73     09 Jan 2017 07:55        CAPILLARY BLOOD GLUCOSE  172 (10 Bo 2017 16:40)  211 (10 Bo 2017 12:05)  151 (10 Bo 2017 08:15)      RADIOLOGY & ADDITIONAL TESTS:    Imaging Personally Reviewed:  [ ] YES  [ ] NO    Consultant(s) Notes Reviewed:  [ ] YES  [ ] NO    Care Discussed with Consultants/Other Providers [ ] YES  [ ] NO Patient is a 66y old  Female who presents with a chief complaint of unresponsiveness (19 Dec 2016 08:49)      INTERVAL HPI/OVERNIGHT EVENTS:    no new complaints  plastic surgery consult noted  pt for possible further debridement   anemia 2nd to GI bleed  further GI work up pending when stable  Will transfuse 2 pack of red blood cells   Hemoglobin 8.0 Hematocrit 23.6 on 1/11/2017  no s/s of distress      MEDICATIONS  (STANDING):  influenza   Vaccine 0.5milliLiter(s) IntraMuscular once  lactated ringers. 1000milliLiter(s) IV Continuous <Continuous>  morphine  - Injectable 4milliGRAM(s) IV Push once  meropenem IVPB 500milliGRAM(s) IV Intermittent every 8 hours  vancomycin    Solution 125milliGRAM(s) Oral every 6 hours  vancomycin  IVPB 1000milliGRAM(s) IV Intermittent every 24 hours  heparin  Injectable 5000Unit(s) SubCutaneous every 12 hours  insulin lispro (HumaLOG) corrective regimen sliding scale  SubCutaneous three times a day before meals  insulin lispro (HumaLOG) corrective regimen sliding scale  SubCutaneous at bedtime  dextrose 5%. 1000milliLiter(s) IV Continuous <Continuous>  dextrose 50% Injectable 12.5Gram(s) IV Push once  dextrose 50% Injectable 25Gram(s) IV Push once  dextrose 50% Injectable 25Gram(s) IV Push once  calcium acetate 1334milliGRAM(s) Oral three times a day with meals  insulin glargine Injectable (LANTUS) 10Unit(s) SubCutaneous at bedtime  metoprolol succinate ER 25milliGRAM(s) Oral daily  fluconAZOLE   Tablet 100milliGRAM(s) Oral daily    MEDICATIONS  (PRN):  dextrose Gel 1Dose(s) Oral once PRN Blood Glucose LESS THAN 70 milliGRAM(s)/deciliter  glucagon  Injectable 1milliGRAM(s) IntraMuscular once PRN Glucose LESS THAN 70 milligrams/deciliter  acetaminophen   Tablet 650milliGRAM(s) Oral every 6 hours PRN For Temp greater than 38 C (100.4 F)  oxyCODONE  5 mG/acetaminophen 325 mG 1Tablet(s) Oral every 4 hours PRN Severe Pain (7 - 10)        REVIEW OF SYSTEMS:  CONSTITUTIONAL: No fever, weight loss, or fatigue  EYES: No eye pain, visual disturbances, or discharge  ENMT:  No difficulty hearing, tinnitus, vertigo; No sinus or throat pain  NECK: No pain or stiffness  RESPIRATORY: No cough, wheezing, chills or hemoptysis; No shortness of breath  CARDIOVASCULAR: No chest pain, palpitations, dizziness, or leg swelling  GASTROINTESTINAL: No abdominal or epigastric pain. No nausea, vomiting, or hematemesis; No diarrhea or constipation. No melena or hematochezia.  GENITOURINARY: No dysuria, frequency, hematuria, or incontinence  NEUROLOGICAL: No headaches, memory loss, loss of strength, numbness, or tremors  SKIN: No itching, burning, rashes, or lesions      Vital Signs Last 24 Hrs  T(C): 36.7, Max: 37.6 (01-10 @ 09:01)  T(F): 98, Max: 99.6 (01-10 @ 09:01)  HR: 87 (79 - 87)  BP: 180/80 (156/72 - 182/82)  BP(mean): --  RR: 14 (14 - 17)  SpO2: 98% (95% - 98%)    PHYSICAL EXAM:  GENERAL: NAD, well-groomed, well-developed  HEAD:  Atraumatic, Normocephalic  EYES: EOMI, PERRLA, conjunctiva and sclera clear  ENMT: No tonsillar erythema, exudates, or enlargement; Moist mucous membranes   NECK: Supple, No JVD   NERVOUS SYSTEM:  Alert & Oriented X3, Good concentration; Motor Strength 5/5 B/L upper and lower extremities; DTRs 2+ intact and symmetric  CHEST/LUNG: Clear to percussion bilaterally; No rales, rhonchi, wheezing, or rubs  HEART: Regular rate and rhythm; No murmurs, rubs, or gallops  ABDOMEN: Soft, Nontender, Nondistended; Bowel sounds present  EXTREMITIES:  2+ Peripheral Pulses, No clubbing, cyanosis, or edema  LYMPH: No lymphadenopathy noted  SKIN: No rashes or lesions    LABS:                        8.5    9.2   )-----------( 385      ( 10 Bo 2017 08:13 )             25.1     10 Jan 2017 08:13    145    |  104    |  16     ----------------------------<  143    3.3     |  32     |  1.14     Ca    8.1        10 Bo 2017 08:13    TPro  6.6    /  Alb  1.9    /  TBili  0.3    /  DBili  x      /  AST  45     /  ALT  20     /  AlkPhos  73     09 Jan 2017 07:55        CAPILLARY BLOOD GLUCOSE  172 (10 Bo 2017 16:40)  211 (10 Bo 2017 12:05)  151 (10 Bo 2017 08:15)      RADIOLOGY & ADDITIONAL TESTS:    Imaging Personally Reviewed:  [ ] YES  [ ] NO    Consultant(s) Notes Reviewed:  [ ] YES  [ ] NO    Care Discussed with Consultants/Other Providers [ ] YES  [ ] NO

## 2017-01-11 NOTE — PROGRESS NOTE ADULT - SUBJECTIVE AND OBJECTIVE BOX
Patient seen and examined bedside with Dr Glass and Dr Forbes.  No complaints offered. She states she wants to attempt to save the Left hand even if function is lost.    T(F): 97, Max: 99.5 (01-10 @ 19:00)  HR: 83 (79 - 87)  BP: 152/80 (152/80 - 180/80)  RR: 14 (14 - 16)  SpO2: 98% (96% - 98%)    CAPILLARY BLOOD GLUCOSE  204 (11 Jan 2017 11:18)  153 (11 Jan 2017 08:41)  150 (10 Bo 2017 22:08)  172 (10 Bo 2017 16:40)    PHYSICAL EXAM:  General: NAD, WDWN  Neuro:  Alert & oriented  HEENT: NCAT, EOMI, conjunctiva clear  Extremities: L hand s/p debridement thenar aspect, palmar surface extending to anterior wrist and dorsal aspect. No active bleeding or drainage noted. Dressed with hydrogel and dry dressing. splint applied.  L lateral lower leg wound with necrotic tissue. +tender. No active drainage or purulence. Hydrogel and dry dressing applied.    LABS:                        8.5    9.2   )-----------( 385      ( 10 Bo 2017 08:13 )             25.1     10 Bo 2017 08:13    145    |  104    |  16     ----------------------------<  143    3.3     |  32     |  1.14     Ca    8.1        10 Bo 2017 08:13    Culture Results:   Moderate Presumptive Candida albicans (01-07 @ 03:29)    Impression: 66y Female admitted s/p found on floor with deep tissue injury to left hand and left lower leg, rhabdomyolysis, s/p debridement by plastics    Plan:  -continue present medical management, abx per ID. Anemia noted, med Follow up for possible transfusion/preop planning  -local wound care with hydrogel daily per RN, orders written  -Left hand management per Dr Forbes, will plan for redebridement. No vascular intervention planned.  -Left lower leg will require intervention by Dr Glass, will follow and likely schedule for OR next week. Patient seen and examined bedside with Dr Glass and Dr Forbes.  No complaints offered. She states she wants to attempt to save the Left hand even if function is lost.    T(F): 97, Max: 99.5 (01-10 @ 19:00)  HR: 83 (79 - 87)  BP: 152/80 (152/80 - 180/80)  RR: 14 (14 - 16)  SpO2: 98% (96% - 98%)    CAPILLARY BLOOD GLUCOSE  204 (11 Jan 2017 11:18)  153 (11 Jan 2017 08:41)  150 (10 Bo 2017 22:08)  172 (10 Bo 2017 16:40)    PHYSICAL EXAM:  General: NAD, WDWN  Neuro:  Alert & oriented  HEENT: NCAT, EOMI, conjunctiva clear  Extremities: L hand open wounds with exposed tendons s/p debridement thenar aspect, palmar surface extending to anterior wrist and dorsal aspect. No active bleeding or drainage noted. Dressed with hydrogel and dry dressing. splint applied.   L lateral lower leg wound with necrotic tissue. +tender. No active drainage or purulence. Hydrogel and dry dressing applied.    LABS:                        8.5    9.2   )-----------( 385      ( 10 Bo 2017 08:13 )             25.1     10 Bo 2017 08:13    145    |  104    |  16     ----------------------------<  143    3.3     |  32     |  1.14     Ca    8.1        10 Bo 2017 08:13    Culture Results:   Moderate Presumptive Candida albicans (01-07 @ 03:29)    Impression: 66y Female admitted s/p found on floor with deep tissue injury to left hand and left lower leg, rhabdomyolysis, s/p debridement by plastics    Plan:  -continue present medical management, abx per ID. Anemia noted, med Follow up for possible transfusion/preop planning  -local wound care with hydrogel daily per RN, orders written  -Left hand management per Dr Forbes, will plan for redebridement. No vascular intervention planned.  -Left lower leg will require intervention by Dr Glass, will follow and likely schedule for OR next week.

## 2017-01-11 NOTE — PROGRESS NOTE ADULT - PROBLEM SELECTOR PLAN 5
- bruise present to left upper thigh, could be due to fall as well/  -TTP/ITP work up, lDH, hepatoglobin, peripheral smear  Platelet count improved -secondary to renal failure  resolved

## 2017-01-11 NOTE — PROGRESS NOTE ADULT - SUBJECTIVE AND OBJECTIVE BOX
INTERVAL HPI/OVERNIGHT EVENTS:  No complaints  Refused opening dressing off schedule due to pain and discomfort     ANTIBIOTICS/RELEVANT:    MEDICATIONS  (STANDING):  influenza   Vaccine 0.5milliLiter(s) IntraMuscular once  lactated ringers. 1000milliLiter(s) IV Continuous <Continuous>  morphine  - Injectable 4milliGRAM(s) IV Push once  meropenem IVPB 500milliGRAM(s) IV Intermittent every 8 hours  vancomycin    Solution 125milliGRAM(s) Oral every 6 hours  vancomycin  IVPB 1000milliGRAM(s) IV Intermittent every 24 hours  heparin  Injectable 5000Unit(s) SubCutaneous every 12 hours  insulin lispro (HumaLOG) corrective regimen sliding scale  SubCutaneous three times a day before meals  insulin lispro (HumaLOG) corrective regimen sliding scale  SubCutaneous at bedtime  dextrose 5%. 1000milliLiter(s) IV Continuous <Continuous>  dextrose 50% Injectable 12.5Gram(s) IV Push once  dextrose 50% Injectable 25Gram(s) IV Push once  dextrose 50% Injectable 25Gram(s) IV Push once  calcium acetate 1334milliGRAM(s) Oral three times a day with meals  insulin glargine Injectable (LANTUS) 10Unit(s) SubCutaneous at bedtime  metoprolol succinate ER 25milliGRAM(s) Oral daily  fluconAZOLE   Tablet 100milliGRAM(s) Oral daily    MEDICATIONS  (PRN):  dextrose Gel 1Dose(s) Oral once PRN Blood Glucose LESS THAN 70 milliGRAM(s)/deciliter  glucagon  Injectable 1milliGRAM(s) IntraMuscular once PRN Glucose LESS THAN 70 milligrams/deciliter  acetaminophen   Tablet 650milliGRAM(s) Oral every 6 hours PRN For Temp greater than 38 C (100.4 F)  oxyCODONE  5 mG/acetaminophen 325 mG 1Tablet(s) Oral every 4 hours PRN Severe Pain (7 - 10)      Allergies    No Known Allergies    Intolerances        Vital Signs Last 24 Hrs  T(C): 36.1, Max: 37.5 (01-10 @ 19:00)  T(F): 97, Max: 99.5 (01-10 @ 19:00)  HR: 83 (79 - 87)  BP: 152/80 (152/80 - 180/80)  BP(mean): --  RR: 14 (14 - 16)  SpO2: 98% (96% - 98%)    PHYSICAL EXAM:    General: not in any distress    HEENT: no pallor, moist oral cavity    Respiratory: clear to auscultation bilaterally    Left lateral aspect of breast wound=dry but +blackening in color    Cardiovascular: S1, S2, RRR    Gastrointestinal: +BS, soft, NT, NT    Extremities: left lateral thigh wound=dry  Left leg wound is covered with bandage  Left palm wound is covered with bandage       LABS:                        8.5    9.2   )-----------( 385      ( 10 Bo 2017 08:13 )             25.1     10 Bo 2017 08:13    145    |  104    |  16     ----------------------------<  143    3.3     |  32     |  1.14     Ca    8.1        10 Bo 2017 08:13      MICROBIOLOGY:  Culture - Other (01.07.17 @ 03:29)    Specimen Source: Skin left hand wound    Culture Results:   Moderate Presumptive Jacqueline albicans    RADIOLOGY & ADDITIONAL STUDIES:  US renal   No hydronephrosis.

## 2017-01-11 NOTE — PROGRESS NOTE ADULT - PROBLEM SELECTOR PLAN 4
-stool occult blood +  -cbc serially  -possible GI consult - bruise present to left upper thigh, could be due to fall as well/  -TTP/ITP work up, lDH, hepatoglobin, peripheral smear  Platelet count improved

## 2017-01-11 NOTE — PROGRESS NOTE ADULT - PROBLEM SELECTOR PLAN 6
-secondary to renal failure  resolved pt s/p debridement of gangrenous tissue to left upper and lower extremity  as per plastic surgery consult, for possible further debridement

## 2017-01-11 NOTE — PROGRESS NOTE ADULT - PROBLEM SELECTOR PLAN 3
-aggressive hydration  -follow ck level- resolving -stool occult blood +  -cbc serially  -possible GI consult

## 2017-01-11 NOTE — PROGRESS NOTE ADULT - PROBLEM SELECTOR PLAN 7
pt s/p debridement of gangrenous tissue to left upper and lower extremity  as per plastic surgery consult, for possible further debridement pt s/p debridement of gangrenous tissue to left upper and lower extremity  For probable skin graft

## 2017-01-12 NOTE — PROGRESS NOTE ADULT - SUBJECTIVE AND OBJECTIVE BOX
Patient is a 66 year old female with unknown PMHx, per ED, patient found unresponsive . Patient was not seen by neighbors in two days, they became concerned and entered her home and found patient on the floor covered with feces.  In ED patient found CK >14,000, in renal failure, hyperkalemic, cocktail was given, including kayexelate. Then patient had melena. stool occult blood was positive, low plts and metabolic acidosis. Patient was initially admitted to Telemetry, however this AM, found to be hypotensive, 94/56 was given fluids. ICU consulted,   stabilized ; resolving acute renal failure and rhabdomyolysis  multiple skin necroses areas on admission ; debrided by plastics again yesterday   plastics notes reviewed   Allergies    No Known Allergies    Intolerances        MEDICATIONS  (STANDING):  influenza   Vaccine 0.5milliLiter(s) IntraMuscular once  lactated ringers. 1000milliLiter(s) IV Continuous <Continuous>  morphine  - Injectable 4milliGRAM(s) IV Push once  meropenem IVPB 500milliGRAM(s) IV Intermittent every 8 hours  vancomycin    Solution 125milliGRAM(s) Oral every 6 hours  vancomycin  IVPB 1000milliGRAM(s) IV Intermittent every 24 hours  heparin  Injectable 5000Unit(s) SubCutaneous every 12 hours  insulin lispro (HumaLOG) corrective regimen sliding scale  SubCutaneous three times a day before meals  insulin lispro (HumaLOG) corrective regimen sliding scale  SubCutaneous at bedtime  dextrose 5%. 1000milliLiter(s) IV Continuous <Continuous>  dextrose 50% Injectable 12.5Gram(s) IV Push once  dextrose 50% Injectable 25Gram(s) IV Push once  dextrose 50% Injectable 25Gram(s) IV Push once  calcium acetate 1334milliGRAM(s) Oral three times a day with meals  insulin glargine Injectable (LANTUS) 10Unit(s) SubCutaneous at bedtime  fluconAZOLE   Tablet 100milliGRAM(s) Oral daily  metoprolol 25milliGRAM(s) Oral two times a day  furosemide   Injectable 20milliGRAM(s) IV Push daily    MEDICATIONS  (PRN):  dextrose Gel 1Dose(s) Oral once PRN Blood Glucose LESS THAN 70 milliGRAM(s)/deciliter  glucagon  Injectable 1milliGRAM(s) IntraMuscular once PRN Glucose LESS THAN 70 milligrams/deciliter  acetaminophen   Tablet 650milliGRAM(s) Oral every 6 hours PRN For Temp greater than 38 C (100.4 F)  oxyCODONE  5 mG/acetaminophen 325 mG 1Tablet(s) Oral every 4 hours PRN Severe Pain (7 - 10)      REVIEW OF SYSTEMS:  diarrhea acting up again   CONSTITUTIONAL: No fever, chills, weight loss, or fatigue  HEENT: No sore throat, runny nose, ear ache  RESPIRATORY: No cough, wheezing, No shortness of breath  CARDIOVASCULAR: No chest pain, palpitations, dizziness  GASTROINTESTINAL: No abdominal pain. No nausea, vomiting, diarrhea  GENITOURINARY: No dysuria, increase frequency, hematuria, or incontinence  NEUROLOGICAL: No headaches, memory loss, loss of strength, numbness, or tremors, no weakness  EXTREMITY: No pedal edema BLE  SKIN: status quo     VITAL SIGNS:  T(C): 37.1, Max: 37.7 (01-11 @ 18:00)  T(F): 98.8, Max: 99.9 (01-12 @ 01:28)  HR: 81 (64 - 87)  BP: 153/83 (152/90 - 189/83)  RR: 18 (16 - 20)  SpO2: 98% (96% - 98%)  Wt(kg): --    PHYSICAL EXAM:    GENERAL: not in any distress  HEENT: Neck is supple, normocephalic, atraumatic   CHEST/LUNG: Clear to percussion bilaterally; No rales, rhonchi, wheezing  HEART: Regular rate and rhythm; No murmurs, rubs, or gallops  ABDOMEN: Soft, Nontender, Nondistended; Bowel sounds present, no rebound   EXTREMITIES:  2+ Peripheral Pulses, No clubbing, cyanosis, or edema  GENITOURINARY: NEGATIVE   SKIN: multiple areas of necrosis are stable   BACK: no pressor sore   NERVOUS SYSTEM:  Alert & Oriented X3, Good concentration  PSYCH: normal affect     LABS:                         8.0    9.6   )-----------( 373      ( 11 Jan 2017 19:18 )             23.6       Sedimentation Rate, Erythrocyte: 85 mm/hr (01-12 @ 09:25)      Vancomycin Level, Trough: 10.3 ug/mL (01-09 @ 15:58)        Culture Results:   Moderate Presumptive Candida albicans (01-07 @ 03:29)     Radiology:

## 2017-01-12 NOTE — PROGRESS NOTE ADULT - SUBJECTIVE AND OBJECTIVE BOX
Patient is a 66y old  Female who presents with a chief complaint of unresponsiveness (19 Dec 2016 08:49)      INTERVAL HPI/OVERNIGHT EVENTS:  Stable  2nd pack of red blood cells transfused today w/o adverse effects  Awaiting further debridement by plastic surgeon        MEDICATIONS  (STANDING):  influenza   Vaccine 0.5milliLiter(s) IntraMuscular once  lactated ringers. 1000milliLiter(s) IV Continuous <Continuous>  morphine  - Injectable 4milliGRAM(s) IV Push once  meropenem IVPB 500milliGRAM(s) IV Intermittent every 8 hours  vancomycin    Solution 125milliGRAM(s) Oral every 6 hours  vancomycin  IVPB 1000milliGRAM(s) IV Intermittent every 24 hours  heparin  Injectable 5000Unit(s) SubCutaneous every 12 hours  insulin lispro (HumaLOG) corrective regimen sliding scale  SubCutaneous three times a day before meals  insulin lispro (HumaLOG) corrective regimen sliding scale  SubCutaneous at bedtime  dextrose 5%. 1000milliLiter(s) IV Continuous <Continuous>  dextrose 50% Injectable 12.5Gram(s) IV Push once  dextrose 50% Injectable 25Gram(s) IV Push once  dextrose 50% Injectable 25Gram(s) IV Push once  calcium acetate 1334milliGRAM(s) Oral three times a day with meals  insulin glargine Injectable (LANTUS) 10Unit(s) SubCutaneous at bedtime  metoprolol succinate ER 25milliGRAM(s) Oral daily  fluconAZOLE   Tablet 100milliGRAM(s) Oral daily    MEDICATIONS  (PRN):  dextrose Gel 1Dose(s) Oral once PRN Blood Glucose LESS THAN 70 milliGRAM(s)/deciliter  glucagon  Injectable 1milliGRAM(s) IntraMuscular once PRN Glucose LESS THAN 70 milligrams/deciliter  acetaminophen   Tablet 650milliGRAM(s) Oral every 6 hours PRN For Temp greater than 38 C (100.4 F)  oxyCODONE  5 mG/acetaminophen 325 mG 1Tablet(s) Oral every 4 hours PRN Severe Pain (7 - 10)        REVIEW OF SYSTEMS:  CONSTITUTIONAL: No fever, weight loss, or fatigue  EYES: No eye pain, visual disturbances, or discharge  ENMT:  No difficulty hearing, tinnitus, vertigo; No sinus or throat pain  NECK: No pain or stiffness  RESPIRATORY: No cough, wheezing, chills or hemoptysis; No shortness of breath  CARDIOVASCULAR: No chest pain, palpitations, dizziness, or leg swelling  GASTROINTESTINAL: No abdominal or epigastric pain. No nausea, vomiting, or hematemesis; No diarrhea or constipation. No melena or hematochezia.  GENITOURINARY: No dysuria, frequency, hematuria, or incontinence  NEUROLOGICAL: No headaches, memory loss, loss of strength, numbness, or tremors  SKIN: No itching, burning, rashes, or lesions      Vital Signs Last 24 Hrs  T(C): 36.7, Max: 37.6 (01-10 @ 09:01)  T(F): 98, Max: 99.6 (01-10 @ 09:01)  HR: 87 (79 - 87)  BP: 180/80 (156/72 - 182/82)  BP(mean): --  RR: 14 (14 - 17)  SpO2: 98% (95% - 98%)    PHYSICAL EXAM:  GENERAL: NAD, well-groomed, well-developed  HEAD:  Atraumatic, Normocephalic  EYES: EOMI, PERRLA, conjunctiva and sclera clear  ENMT: No tonsillar erythema, exudates, or enlargement; Moist mucous membranes   NECK: Supple, No JVD   NERVOUS SYSTEM:  Alert & Oriented X3, Good concentration; Motor Strength 5/5 B/L upper and lower extremities; DTRs 2+ intact and symmetric  CHEST/LUNG: Clear to percussion bilaterally; No rales, rhonchi, wheezing, or rubs  HEART: Regular rate and rhythm; No murmurs, rubs, or gallops  ABDOMEN: Soft, Nontender, Nondistended; Bowel sounds present  EXTREMITIES:  2+ Peripheral Pulses, No clubbing, cyanosis, or edema  LYMPH: No lymphadenopathy noted  SKIN: No rashes or lesions    LABS:                        8.5    9.2   )-----------( 385      ( 10 Bo 2017 08:13 )             25.1     10 Bo 2017 08:13    145    |  104    |  16     ----------------------------<  143    3.3     |  32     |  1.14     Ca    8.1        10 Bo 2017 08:13    TPro  6.6    /  Alb  1.9    /  TBili  0.3    /  DBili  x      /  AST  45     /  ALT  20     /  AlkPhos  73     09 Jan 2017 07:55        CAPILLARY BLOOD GLUCOSE  172 (10 Bo 2017 16:40)  211 (10 Bo 2017 12:05)  151 (10 Bo 2017 08:15)      RADIOLOGY & ADDITIONAL TESTS:    Imaging Personally Reviewed:  [ ] YES  [ ] NO    Consultant(s) Notes Reviewed:  [ ] YES  [ ] NO    Care Discussed with Consultants/Other Providers [ ] YES  [ ] NO

## 2017-01-12 NOTE — PROGRESS NOTE ADULT - ASSESSMENT
Patient is a 66 year old female with unknown PMHx, per ED, patient found unresponsive, admitted to critical care for Metabolic Encephalopathy, Acute Renal Failure, Hyperkalemia, Thrombocytopenia, Rhabdomyolysis. ; all resolving nicely   issues that remain are essentially with large areas of skin necroses that had dev all over and now have been debrided  issues with hand noted  plastics notes reviewed  discussed with vincent whyte todayodaarlyn   plan stephanie yan

## 2017-01-12 NOTE — PROGRESS NOTE ADULT - SUBJECTIVE AND OBJECTIVE BOX
HPI:  Patient is a 66 year old female with unknown PMHx, per ED, patient found unresponsive . Patient was not seen by neighbors in two days, they became concerned and entered her home and found patient on the floor covered with feces.  In ED patient found CK >14,000, in renal failure, hyperkalemic, cocktail was given, including kayexelate. Then patient had melena. stool occult blood was positive, low plts and metabolic acidosis. Patient was initially admitted to Telemetry, however this AM, found to be hypotensive, 94/56 was given fluids. ICU consulted, seen at bedside, alert, but confused. Patient is being admitted for AMS, Acute renal failure and rhabdomyolysis. (19 Dec 2016 08:49). Pt with occult bleeding      REVIEW OF SYSTEMS      General:	    Skin/Breast:  	  Ophthalmologic:  	  ENMT:	    Respiratory and Thorax: normal  	  Cardiovascular:	normal    Gastrointestinal:	 no pain    Genitourinary:	    Musculoskeletal:	    Neurological:	    Psychiatric:	    Hematology/Lymphatics:	    Endocrine:	    Allergic/Immunologic:	    MEDICATIONS  (STANDING):  influenza   Vaccine 0.5milliLiter(s) IntraMuscular once  lactated ringers. 1000milliLiter(s) IV Continuous <Continuous>  morphine  - Injectable 4milliGRAM(s) IV Push once  meropenem IVPB 500milliGRAM(s) IV Intermittent every 8 hours  vancomycin    Solution 125milliGRAM(s) Oral every 6 hours  vancomycin  IVPB 1000milliGRAM(s) IV Intermittent every 24 hours  heparin  Injectable 5000Unit(s) SubCutaneous every 12 hours  insulin lispro (HumaLOG) corrective regimen sliding scale  SubCutaneous three times a day before meals  insulin lispro (HumaLOG) corrective regimen sliding scale  SubCutaneous at bedtime  dextrose 5%. 1000milliLiter(s) IV Continuous <Continuous>  dextrose 50% Injectable 12.5Gram(s) IV Push once  dextrose 50% Injectable 25Gram(s) IV Push once  dextrose 50% Injectable 25Gram(s) IV Push once  calcium acetate 1334milliGRAM(s) Oral three times a day with meals  insulin glargine Injectable (LANTUS) 10Unit(s) SubCutaneous at bedtime  fluconAZOLE   Tablet 100milliGRAM(s) Oral daily  metoprolol 25milliGRAM(s) Oral two times a day  furosemide   Injectable 20milliGRAM(s) IV Push daily    MEDICATIONS  (PRN):  dextrose Gel 1Dose(s) Oral once PRN Blood Glucose LESS THAN 70 milliGRAM(s)/deciliter  glucagon  Injectable 1milliGRAM(s) IntraMuscular once PRN Glucose LESS THAN 70 milligrams/deciliter  acetaminophen   Tablet 650milliGRAM(s) Oral every 6 hours PRN For Temp greater than 38 C (100.4 F)  oxyCODONE  5 mG/acetaminophen 325 mG 1Tablet(s) Oral every 4 hours PRN Severe Pain (7 - 10)      Vital Signs Last 24 Hrs  T(C): 37.8, Max: 37.8 (01-12 @ 17:12)  T(F): 100, Max: 100 (01-12 @ 17:12)  HR: 85 (64 - 87)  BP: 117/86 (117/86 - 189/83)  BP(mean): --  RR: 16 (16 - 20)  SpO2: 97% (97% - 98%)    PHYSICAL EXAM:      Constitutional:    Eyes:    ENMT:    Neck: supple    Breasts:    Back:    Respiratory: normal    Cardiovascular: normal    Gastrointestinal: no masses    Genitourinary:    Rectal:    Extremities:    Vascular:    Neurological:    Skin:    Lymph Nodes:    Musculoskeletal:    Psychiatric:            HEALTH ISSUES - PROBLEM Dx:  Anemia due to blood loss: Anemia due to blood loss  Essential hypertension: Essential hypertension  Wound infection: Wound infection  Skin sloughing: Skin sloughing  GI bleed: GI bleed  Hyperphosphatemia: Hyperphosphatemia  Hypernatremia: Hypernatremia  Hypokalemia: Hypokalemia  Altered mental status: Altered mental status  Neurogenic orthostatic hypotension: Neurogenic orthostatic hypotension  Sepsis due to Escherichia coli: Sepsis due to Escherichia coli  Sepsis: Sepsis  Hypotension: Hypotension  Metabolic acidosis: Metabolic acidosis  Thrombocytopenia: Thrombocytopenia  Hyperkalemia: Hyperkalemia  Melena: Melena  Rhabdomyolysis: Rhabdomyolysis  ARF (acute renal failure): ARF (acute renal failure)  Metabolic encephalopathy: Metabolic encephalopathy            Assesment & Plan: Occult GI bleeding. GI w/u when stable

## 2017-01-13 ENCOUNTER — TRANSCRIPTION ENCOUNTER (OUTPATIENT)
Age: 67
End: 2017-01-13

## 2017-01-13 NOTE — PROGRESS NOTE ADULT - PROBLEM SELECTOR PLAN 4
Plastics noted  S/P  I&D  left hand, thigh and leg  MRI=myositis   Wound culture in hand=Candida   On Meropenem, Vancomycin and Diflucan   Local care for now Plastics noted  S/P  I&D  left hand, thigh and leg  for redebridement   MRI=myositis   Wound culture in hand=Candida   On Meropenem, Vancomycin and Diflucan   Local care for now

## 2017-01-13 NOTE — PROGRESS NOTE ADULT - SUBJECTIVE AND OBJECTIVE BOX
Patient is a 66 year old female with unknown PMHx, per ED, patient found unresponsive . Patient was not seen by neighbors in two days, they became concerned and entered her home and found patient on the floor covered with feces.  In ED patient found CK >14,000, in renal failure, hyperkalemic, cocktail was given, including kayexelate. Then patient had melena. stool occult blood was positive, low plts and metabolic acidosis. Patient was initially admitted to Telemetry, icu course and now on floor with major issues with left hand   resolved acute kidney injury and rhabdo  multiple skin necroses areas on admission ; debrided by plastics repeatedly   last wound culture only culture albicans   plastics notes reviewed     Allergies    No Known Allergies    Intolerances        MEDICATIONS  (STANDING):  influenza   Vaccine 0.5milliLiter(s) IntraMuscular once  lactated ringers. 1000milliLiter(s) IV Continuous <Continuous>  morphine  - Injectable 4milliGRAM(s) IV Push once  meropenem IVPB 500milliGRAM(s) IV Intermittent every 8 hours  vancomycin    Solution 125milliGRAM(s) Oral every 6 hours  vancomycin  IVPB 1000milliGRAM(s) IV Intermittent every 24 hours  heparin  Injectable 5000Unit(s) SubCutaneous every 12 hours  insulin lispro (HumaLOG) corrective regimen sliding scale  SubCutaneous three times a day before meals  insulin lispro (HumaLOG) corrective regimen sliding scale  SubCutaneous at bedtime  dextrose 5%. 1000milliLiter(s) IV Continuous <Continuous>  dextrose 50% Injectable 12.5Gram(s) IV Push once  dextrose 50% Injectable 25Gram(s) IV Push once  dextrose 50% Injectable 25Gram(s) IV Push once  calcium acetate 1334milliGRAM(s) Oral three times a day with meals  insulin glargine Injectable (LANTUS) 10Unit(s) SubCutaneous at bedtime  fluconAZOLE   Tablet 100milliGRAM(s) Oral daily  metoprolol 25milliGRAM(s) Oral two times a day  furosemide   Injectable 20milliGRAM(s) IV Push daily    MEDICATIONS  (PRN):  dextrose Gel 1Dose(s) Oral once PRN Blood Glucose LESS THAN 70 milliGRAM(s)/deciliter  glucagon  Injectable 1milliGRAM(s) IntraMuscular once PRN Glucose LESS THAN 70 milligrams/deciliter  acetaminophen   Tablet 650milliGRAM(s) Oral every 6 hours PRN For Temp greater than 38 C (100.4 F)  oxyCODONE  5 mG/acetaminophen 325 mG 1Tablet(s) Oral every 4 hours PRN Severe Pain (7 - 10)      REVIEW OF SYSTEMS:  today max 100   CONSTITUTIONAL: No fever, chills, weight loss,  some  fatigue  HEENT: No sore throat, runny nose, ear ache  RESPIRATORY: No cough, wheezing, No shortness of breath  CARDIOVASCULAR: No chest pain, palpitations, dizziness  GASTROINTESTINAL: No abdominal pain. No nausea, vomiting, diarrhea  GENITOURINARY: No dysuria, increase frequency, hematuria, or incontinence  NEUROLOGICAL: No headaches, memory loss, loss of strength, numbness, or tremors, no weakness  EXTREMITY: No pedal edema BLE  SKIN: breast necroses ?? debride deferred to podiatry    VITAL SIGNS:  T(C): 37.1, Max: 37.8 (01-12 @ 17:12)  T(F): 98.8, Max: 100 (01-12 @ 17:12)  HR: 78 (68 - 85)  BP: 181/71 (117/86 - 181/80)  RR: 20 (16 - 20)  SpO2: 98% (97% - 98%)  Wt(kg): --    PHYSICAL EXAM:    GENERAL: not in any distress  HEENT: Neck is supple, normocephalic, atraumatic   CHEST/LUNG: Clear to percussion bilaterally; No rales, rhonchi, wheezing  HEART: Regular rate and rhythm; No murmurs, rubs, or gallops  ABDOMEN: Soft, Nontender, Nondistended; Bowel sounds present, no rebound   EXTREMITIES:  2+ Peripheral Pulses, No clubbing, cyanosis, or edema  GENITOURINARY: NEGATIVE   SKIN: necroses thigh ; breast and hand   BACK: no pressor sore   NERVOUS SYSTEM:  Alert & Oriented X3, Good concentration  PSYCH: normal affect     LABS:                         11.0   10.3  )-----------( 448      ( 13 Jan 2017 10:11 )             32.7     13 Jan 2017 10:11    145    |  102    |  17     ----------------------------<  135    3.3     |  35     |  1.15     Ca    8.7        13 Jan 2017 10:11                      Sedimentation Rate, Erythrocyte: 85 mm/hr (01-12 @ 09:25)            Culture Results:   Moderate Presumptive Candida albicans (01-07 @ 03:29)                Radiology: Patient is a 66 year old female with unknown PMHx, per ED, patient found unresponsive . Patient was not seen by neighbors in two days, they became concerned and entered her home and found patient on the floor covered with feces.  In ED patient found CK >14,000, in renal failure, hyperkalemic, cocktail was given, including kayexelate. Then patient had melena. stool occult blood was positive, low plts and metabolic acidosis. Patient was initially admitted to Telemetry, icu course and now on floor with major issues with left hand   resolved acute kidney injury and rhabdo  multiple skin necroses areas on admission ; debrided by plastics repeatedly   last wound culture only culture albicans   plastics notes reviewed     Allergies    No Known Allergies    Intolerances        MEDICATIONS  (STANDING):  influenza   Vaccine 0.5milliLiter(s) IntraMuscular once  lactated ringers. 1000milliLiter(s) IV Continuous <Continuous>  morphine  - Injectable 4milliGRAM(s) IV Push once  meropenem IVPB 500milliGRAM(s) IV Intermittent every 8 hours  vancomycin    Solution 125milliGRAM(s) Oral every 6 hours  vancomycin  IVPB 1000milliGRAM(s) IV Intermittent every 24 hours  heparin  Injectable 5000Unit(s) SubCutaneous every 12 hours  insulin lispro (HumaLOG) corrective regimen sliding scale  SubCutaneous three times a day before meals  insulin lispro (HumaLOG) corrective regimen sliding scale  SubCutaneous at bedtime  dextrose 5%. 1000milliLiter(s) IV Continuous <Continuous>  dextrose 50% Injectable 12.5Gram(s) IV Push once  dextrose 50% Injectable 25Gram(s) IV Push once  dextrose 50% Injectable 25Gram(s) IV Push once  calcium acetate 1334milliGRAM(s) Oral three times a day with meals  insulin glargine Injectable (LANTUS) 10Unit(s) SubCutaneous at bedtime  fluconAZOLE   Tablet 100milliGRAM(s) Oral daily  metoprolol 25milliGRAM(s) Oral two times a day  furosemide   Injectable 20milliGRAM(s) IV Push daily    MEDICATIONS  (PRN):  dextrose Gel 1Dose(s) Oral once PRN Blood Glucose LESS THAN 70 milliGRAM(s)/deciliter  glucagon  Injectable 1milliGRAM(s) IntraMuscular once PRN Glucose LESS THAN 70 milligrams/deciliter  acetaminophen   Tablet 650milliGRAM(s) Oral every 6 hours PRN For Temp greater than 38 C (100.4 F)  oxyCODONE  5 mG/acetaminophen 325 mG 1Tablet(s) Oral every 4 hours PRN Severe Pain (7 - 10)      REVIEW OF SYSTEMS:  today max 100   CONSTITUTIONAL: No fever, chills, weight loss,  some  fatigue  HEENT: No sore throat, runny nose, ear ache  RESPIRATORY: No cough, wheezing, No shortness of breath  CARDIOVASCULAR: No chest pain, palpitations, dizziness  GASTROINTESTINAL: No abdominal pain. No nausea, vomiting, diarrhea  GENITOURINARY: No dysuria, increase frequency, hematuria, or incontinence  NEUROLOGICAL: No headaches, memory loss, loss of strength, numbness, or tremors, no weakness  EXTREMITY: No pedal edema BLE  SKIN: breast necroses ?? debride deferred to plastics     VITAL SIGNS:  T(C): 37.1, Max: 37.8 (01-12 @ 17:12)  T(F): 98.8, Max: 100 (01-12 @ 17:12)  HR: 78 (68 - 85)  BP: 181/71 (117/86 - 181/80)  RR: 20 (16 - 20)  SpO2: 98% (97% - 98%)  Wt(kg): --    PHYSICAL EXAM:    GENERAL: not in any distress  HEENT: Neck is supple, normocephalic, atraumatic   CHEST/LUNG: Clear to percussion bilaterally; No rales, rhonchi, wheezing  HEART: Regular rate and rhythm; No murmurs, rubs, or gallops  ABDOMEN: Soft, Nontender, Nondistended; Bowel sounds present, no rebound   EXTREMITIES:  2+ Peripheral Pulses, No clubbing, cyanosis, or edema  GENITOURINARY: NEGATIVE   SKIN: necroses thigh ; breast and hand   BACK: no pressor sore   NERVOUS SYSTEM:  Alert & Oriented X3, Good concentration  PSYCH: normal affect     LABS:                         11.0   10.3  )-----------( 448      ( 13 Jan 2017 10:11 )             32.7     13 Jan 2017 10:11    145    |  102    |  17     ----------------------------<  135    3.3     |  35     |  1.15     Ca    8.7        13 Jan 2017 10:11                      Sedimentation Rate, Erythrocyte: 85 mm/hr (01-12 @ 09:25)            Culture Results:   Moderate Presumptive Candida albicans (01-07 @ 03:29)                Radiology:

## 2017-01-13 NOTE — PROGRESS NOTE ADULT - SUBJECTIVE AND OBJECTIVE BOX
Patient is a 66y old  Female who presents with a chief complaint of unresponsiveness (19 Dec 2016 08:49)      INTERVAL HPI/OVERNIGHT EVENTS:  Stable  S/P 2 pack red blood cells transfused  hemoglobin 11 hematocrit 32.7  Potassium supplemented  Pt is NPO for O.R. for further debridement today        MEDICATIONS  (STANDING):  influenza   Vaccine 0.5milliLiter(s) IntraMuscular once  lactated ringers. 1000milliLiter(s) IV Continuous <Continuous>  morphine  - Injectable 4milliGRAM(s) IV Push once  meropenem IVPB 500milliGRAM(s) IV Intermittent every 8 hours  vancomycin    Solution 125milliGRAM(s) Oral every 6 hours  vancomycin  IVPB 1000milliGRAM(s) IV Intermittent every 24 hours  heparin  Injectable 5000Unit(s) SubCutaneous every 12 hours  insulin lispro (HumaLOG) corrective regimen sliding scale  SubCutaneous three times a day before meals  insulin lispro (HumaLOG) corrective regimen sliding scale  SubCutaneous at bedtime  dextrose 5%. 1000milliLiter(s) IV Continuous <Continuous>  dextrose 50% Injectable 12.5Gram(s) IV Push once  dextrose 50% Injectable 25Gram(s) IV Push once  dextrose 50% Injectable 25Gram(s) IV Push once  calcium acetate 1334milliGRAM(s) Oral three times a day with meals  insulin glargine Injectable (LANTUS) 10Unit(s) SubCutaneous at bedtime  metoprolol succinate ER 25milliGRAM(s) Oral daily  fluconAZOLE   Tablet 100milliGRAM(s) Oral daily    MEDICATIONS  (PRN):  dextrose Gel 1Dose(s) Oral once PRN Blood Glucose LESS THAN 70 milliGRAM(s)/deciliter  glucagon  Injectable 1milliGRAM(s) IntraMuscular once PRN Glucose LESS THAN 70 milligrams/deciliter  acetaminophen   Tablet 650milliGRAM(s) Oral every 6 hours PRN For Temp greater than 38 C (100.4 F)  oxyCODONE  5 mG/acetaminophen 325 mG 1Tablet(s) Oral every 4 hours PRN Severe Pain (7 - 10)        REVIEW OF SYSTEMS:  CONSTITUTIONAL: No fever, weight loss, or fatigue  EYES: No eye pain, visual disturbances, or discharge  ENMT:  No difficulty hearing, tinnitus, vertigo; No sinus or throat pain  NECK: No pain or stiffness  RESPIRATORY: No cough, wheezing, chills or hemoptysis; No shortness of breath  CARDIOVASCULAR: No chest pain, palpitations, dizziness, or leg swelling  GASTROINTESTINAL: No abdominal or epigastric pain. No nausea, vomiting, or hematemesis; No diarrhea or constipation. No melena or hematochezia.  GENITOURINARY: No dysuria, frequency, hematuria, or incontinence  NEUROLOGICAL: No headaches, memory loss, loss of strength, numbness, or tremors  SKIN: No itching, burning, rashes, or lesions      Vital Signs Last 24 Hrs  T(C): 36.7, Max: 37.6 (01-10 @ 09:01)  T(F): 98, Max: 99.6 (01-10 @ 09:01)  HR: 87 (79 - 87)  BP: 180/80 (156/72 - 182/82)  BP(mean): --  RR: 14 (14 - 17)  SpO2: 98% (95% - 98%)    PHYSICAL EXAM:  GENERAL: NAD, well-groomed, well-developed  HEAD:  Atraumatic, Normocephalic  EYES: EOMI, PERRLA, conjunctiva and sclera clear  ENMT: No tonsillar erythema, exudates, or enlargement; Moist mucous membranes   NECK: Supple, No JVD   NERVOUS SYSTEM:  Alert & Oriented X3, Good concentration; Motor Strength 5/5 B/L upper and lower extremities; DTRs 2+ intact and symmetric  CHEST/LUNG: Clear to percussion bilaterally; No rales, rhonchi, wheezing, or rubs  HEART: Regular rate and rhythm; No murmurs, rubs, or gallops  ABDOMEN: Soft, Nontender, Nondistended; Bowel sounds present  EXTREMITIES:  2+ Peripheral Pulses, No clubbing, cyanosis, or edema  LYMPH: No lymphadenopathy noted  SKIN: No rashes or lesions    LABS:                        8.5    9.2   )-----------( 385      ( 10 Bo 2017 08:13 )             25.1     10 Bo 2017 08:13    145    |  104    |  16     ----------------------------<  143    3.3     |  32     |  1.14     Ca    8.1        10 Bo 2017 08:13    TPro  6.6    /  Alb  1.9    /  TBili  0.3    /  DBili  x      /  AST  45     /  ALT  20     /  AlkPhos  73     09 Jan 2017 07:55        CAPILLARY BLOOD GLUCOSE  172 (10 Bo 2017 16:40)  211 (10 Bo 2017 12:05)  151 (10 Bo 2017 08:15)      RADIOLOGY & ADDITIONAL TESTS:    Imaging Personally Reviewed:  [ ] YES  [ ] NO    Consultant(s) Notes Reviewed:  [ ] YES  [ ] NO    Care Discussed with Consultants/Other Providers [ ] YES  [ ] NO

## 2017-01-13 NOTE — PROGRESS NOTE ADULT - SUBJECTIVE AND OBJECTIVE BOX
HPI:  Patient is a 66 year old female with unknown PMHx, per ED, patient found unresponsive . Patient was not seen by neighbors in two days, they became concerned and entered her home and found patient on the floor covered with feces.  In ED patient found CK >14,000, in renal failure, hyperkalemic, cocktail was given, including kayexelate. Then patient had melena. stool occult blood was positive, low plts and metabolic acidosis. Patient was initially admitted to Telemetry, however this AM, found to be hypotensive, 94/56 was given fluids. ICU consulted, seen at bedside, alert, but confused. Patient is being admitted for AMS, Acute renal failure and rhabdomyolysis. She is s/p wound debridement. ID evaluation noted. Awaiting GI w/u when stable (19 Dec 2016 08:49)      REVIEW OF SYSTEMS      General:	    Skin/Breast:  	  Ophthalmologic:  	  ENMT:	    Respiratory and Thorax: no chest pain  	  Cardiovascular:	normal    Gastrointestinal:	 appetite good    Genitourinary:	    Musculoskeletal:	    Neurological:	    Psychiatric:	    Hematology/Lymphatics:	    Endocrine:	    Allergic/Immunologic:	    MEDICATIONS  (STANDING):  influenza   Vaccine 0.5milliLiter(s) IntraMuscular once  lactated ringers. 1000milliLiter(s) IV Continuous <Continuous>  meropenem IVPB 500milliGRAM(s) IV Intermittent every 8 hours  vancomycin    Solution 125milliGRAM(s) Oral every 6 hours  vancomycin  IVPB 1000milliGRAM(s) IV Intermittent every 24 hours  heparin  Injectable 5000Unit(s) SubCutaneous every 12 hours  insulin lispro (HumaLOG) corrective regimen sliding scale  SubCutaneous three times a day before meals  dextrose 50% Injectable 12.5Gram(s) IV Push once  dextrose 50% Injectable 25Gram(s) IV Push once  calcium acetate 1334milliGRAM(s) Oral three times a day with meals  insulin glargine Injectable (LANTUS) 10Unit(s) SubCutaneous at bedtime  fluconAZOLE   Tablet 100milliGRAM(s) Oral daily  furosemide   Injectable 20milliGRAM(s) IV Push daily    MEDICATIONS  (PRN):  dextrose Gel 1Dose(s) Oral once PRN Blood Glucose LESS THAN 70 milliGRAM(s)/deciliter  glucagon  Injectable 1milliGRAM(s) IntraMuscular once PRN Glucose LESS THAN 70 milligrams/deciliter  acetaminophen   Tablet 650milliGRAM(s) Oral every 6 hours PRN For Temp greater than 38 C (100.4 F)  oxyCODONE  5 mG/acetaminophen 325 mG 1Tablet(s) Oral every 4 hours PRN Severe Pain (7 - 10)      Vital Signs Last 24 Hrs  T(C): 37.7, Max: 37.7 (01-13 @ 21:15)  T(F): 99.8, Max: 99.8 (01-13 @ 21:15)  HR: 75 (64 - 80)  BP: 178/71 (136/77 - 186/70)  BP(mean): --  RR: 16 (15 - 20)  SpO2: 97% (95% - 100%)    PHYSICAL EXAM:      Constitutional:    Eyes:    ENMT:    Neck: supple    Breasts:    Back:    Respiratory: normal    Cardiovascular: normal    Gastrointestinal: no masses    Genitourinary:    Rectal:    Extremities:    Vascular:    Neurological:    Skin:    Lymph Nodes:    Musculoskeletal:    Psychiatric:            HEALTH ISSUES - PROBLEM Dx:  Anemia due to blood loss: Anemia due to blood loss  Essential hypertension: Essential hypertension  Wound infection: Wound infection  Skin sloughing: Skin sloughing  GI bleed: GI bleed  Hyperphosphatemia: Hyperphosphatemia  Hypernatremia: Hypernatremia  Hypokalemia: Hypokalemia  Altered mental status: Altered mental status  Neurogenic orthostatic hypotension: Neurogenic orthostatic hypotension  Sepsis due to Escherichia coli: Sepsis due to Escherichia coli  Sepsis: Sepsis  Hypotension: Hypotension  Metabolic acidosis: Metabolic acidosis  Thrombocytopenia: Thrombocytopenia  Hyperkalemia: Hyperkalemia  Melena: Melena  Rhabdomyolysis: Rhabdomyolysis  ARF (acute renal failure): ARF (acute renal failure)  Metabolic encephalopathy: Metabolic encephalopathy            Assesment & Plan: Occult GI bleeding. Elective GI w/u

## 2017-01-13 NOTE — PROGRESS NOTE ADULT - PROBLEM SELECTOR PLAN 10
Hemoglobin/hematocrit relatively stable  Transfuse 2 pack of red blood cells completed on 1/12/2017  Monitor CBC  G.I. follow up

## 2017-01-13 NOTE — PROGRESS NOTE ADULT - ASSESSMENT
Patient is a 66 year old female with unknown PMHx, per ED, patient found unresponsive, admitted to critical care for Metabolic Encephalopathy, Acute Renal Failure, Hyperkalemia, Thrombocytopenia, Rhabdomyolysis. ; all resolving nicely   issues that remain are essentially with large areas of skin necroses that had dev all over and now have been debrided  issues with hand noted  plastics notes reviewed  discussed with vincent whyte todayodaarlyn   plan stephanie yan Patient is a 66 year old female with unknown PMHx, per ED, patient found unresponsive, admitted to critical care for Metabolic Encephalopathy, Acute Renal Failure, Hyperkalemia, Thrombocytopenia, Rhabdomyolysis. ; all resolving nicely   issues that remain are essentially with large areas of skin necroses that had dev all over and now have been debrided  issues with hand noted  for debridement of breast and thigh  diarrhea is status quo

## 2017-01-14 NOTE — PROGRESS NOTE ADULT - SUBJECTIVE AND OBJECTIVE BOX
Patient is a 66y old  Female who presents with a chief complaint of unresponsiveness (19 Dec 2016 08:49)      INTERVAL HPI/OVERNIGHT EVENTS:  clinically stable  further plastic surgery plan for left arm- ? close wound/graft  MEDICATIONS  (STANDING):  influenza   Vaccine 0.5milliLiter(s) IntraMuscular once  lactated ringers. 1000milliLiter(s) IV Continuous <Continuous>  meropenem IVPB 500milliGRAM(s) IV Intermittent every 8 hours  vancomycin    Solution 125milliGRAM(s) Oral every 6 hours  vancomycin  IVPB 1000milliGRAM(s) IV Intermittent every 24 hours  heparin  Injectable 5000Unit(s) SubCutaneous every 12 hours  insulin lispro (HumaLOG) corrective regimen sliding scale  SubCutaneous three times a day before meals  dextrose 50% Injectable 12.5Gram(s) IV Push once  dextrose 50% Injectable 25Gram(s) IV Push once  calcium acetate 1334milliGRAM(s) Oral three times a day with meals  insulin glargine Injectable (LANTUS) 10Unit(s) SubCutaneous at bedtime  fluconAZOLE   Tablet 100milliGRAM(s) Oral daily  furosemide   Injectable 20milliGRAM(s) IV Push daily  metoprolol 25milliGRAM(s) Oral two times a day    MEDICATIONS  (PRN):  dextrose Gel 1Dose(s) Oral once PRN Blood Glucose LESS THAN 70 milliGRAM(s)/deciliter  glucagon  Injectable 1milliGRAM(s) IntraMuscular once PRN Glucose LESS THAN 70 milligrams/deciliter  acetaminophen   Tablet 650milliGRAM(s) Oral every 6 hours PRN For Temp greater than 38 C (100.4 F)  oxyCODONE  5 mG/acetaminophen 325 mG 1Tablet(s) Oral every 4 hours PRN Severe Pain (7 - 10)        REVIEW OF SYSTEMS:  CONSTITUTIONAL: No fever, weight loss, or fatigue  EYES: No eye pain, visual disturbances, or discharge  ENMT:  No difficulty hearing, tinnitus, vertigo; No sinus or throat pain  NECK: No pain or stiffness  RESPIRATORY: No cough, wheezing, chills or hemoptysis; No shortness of breath  CARDIOVASCULAR: No chest pain, palpitations, dizziness, or leg swelling  GASTROINTESTINAL: No abdominal or epigastric pain. No nausea, vomiting, or hematemesis; No diarrhea or constipation. No melena or hematochezia.  GENITOURINARY: No dysuria, frequency, hematuria, or incontinence  NEUROLOGICAL: No headaches, memory loss, loss of strength, numbness, or tremors  SKIN: No itching, burning, rashes, or lesions      Vital Signs Last 24 Hrs  T(C): 36.7, Max: 37.7 (01-13 @ 21:15)  T(F): 98, Max: 99.8 (01-13 @ 21:15)  HR: 73 (67 - 80)  BP: 181/77 (148/83 - 184/70)  BP(mean): --  RR: 18 (15 - 18)  SpO2: 99% (95% - 100%)    PHYSICAL EXAM:  GENERAL: NAD, well-groomed, well-developed  HEAD:  Atraumatic, Normocephalic  EYES: EOMI, PERRLA, conjunctiva and sclera clear  ENMT: No tonsillar erythema, exudates, or enlargement; Moist mucous membranes   NECK: Supple, No JVD   NERVOUS SYSTEM:  Alert & Oriented X3, Good concentration; Motor Strength 5/5 B/L upper and lower extremities; DTRs 2+ intact and symmetric  CHEST/LUNG: Clear to percussion bilaterally; No rales, rhonchi, wheezing, or rubs  HEART: Regular rate and rhythm; No murmurs, rubs, or gallops  ABDOMEN: Soft, Nontender, Nondistended; Bowel sounds present  EXTREMITIES:  2+ Peripheral Pulses, No clubbing, cyanosis, or edema  LYMPH: No lymphadenopathy noted  SKIN: No rashes or lesions    LABS:                        11.0   10.3  )-----------( 448      ( 13 Jan 2017 10:11 )             32.7     14 Jan 2017 07:36    141    |  101    |  18     ----------------------------<  145    3.7     |  32     |  1.38     Ca    8.3        14 Jan 2017 07:36          CAPILLARY BLOOD GLUCOSE  232 (13 Jan 2017 21:15)  99 (13 Jan 2017 18:00)      RADIOLOGY & ADDITIONAL TESTS:    Imaging Personally Reviewed:  [ ] YES  [ ] NO    Consultant(s) Notes Reviewed:  [ ] YES  [ ] NO    Care Discussed with Consultants/Other Providers [ ] YES  [ ] NO

## 2017-01-14 NOTE — PROGRESS NOTE ADULT - SUBJECTIVE AND OBJECTIVE BOX
Postoperative Day #: 1  Patient seen and examined bedside resting comfortably.  Reports pain in left hand. No f/c or n/v.     T(F): 99.1, Max: 99.2 (01-13 @ 23:30)  HR: 77 (67 - 80)  BP: 185/88 (148/83 - 185/88)  RR: 16 (16 - 18)  SpO2: 95% (95% - 99%)      PHYSICAL EXAM:  General: NAD, WDWN  Neuro:  Alert & awake  HEENT: NCAT, EOMI, conjunctiva clear  Chest: respirations nonlabored Left breast dressing CDI, no bleeding noted  Extremities: Left lower leg and left hand ACE dressings CDI. No obvious swelling     LABS:                        11.0   10.3  )-----------( 448      ( 13 Jan 2017 10:11 )             32.7     14 Jan 2017 07:36    141    |  101    |  18     ----------------------------<  145    3.7     |  32     |  1.38     Ca    8.3        14 Jan 2017 07:36      Impression:  66y Female admitted s/p found on floor with deep tissue injury to left hand and left lower leg, rhabdomyolysis, s/p debridement of Left hand by plastics and now POD#1 s/p left lower leg and left breast debridement.    Plan:  -continue present medical management and abx per ID  -D/c IVF  -continue VTE prophylaxis c heparin   -f/u AM labs  -discussed with Dr Glass - for left leg and breast dressing changes tomorrow morning  -Resume Left hand wound care with hydrogel gauze. Plastics Follow up for OR planning next week.

## 2017-01-15 NOTE — PROGRESS NOTE ADULT - SUBJECTIVE AND OBJECTIVE BOX
HPI:  Patient is a 66 year old female with unknown PMHx, per ED, patient found unresponsive . here in rhabdomyolysis ; severe sepsis ; multiple areas of skin necrosis and fascitis left hand   sp multiple debridements and yesterday breast and left lower extremity debrided  feels fine   diarrhea is better      Allergies    No Known Allergies    Intolerances        MEDICATIONS  (STANDING):  influenza   Vaccine 0.5milliLiter(s) IntraMuscular once  meropenem IVPB 500milliGRAM(s) IV Intermittent every 8 hours  vancomycin    Solution 125milliGRAM(s) Oral every 6 hours  vancomycin  IVPB 1000milliGRAM(s) IV Intermittent every 24 hours  heparin  Injectable 5000Unit(s) SubCutaneous every 12 hours  insulin lispro (HumaLOG) corrective regimen sliding scale  SubCutaneous three times a day before meals  dextrose 50% Injectable 12.5Gram(s) IV Push once  dextrose 50% Injectable 25Gram(s) IV Push once  calcium acetate 1334milliGRAM(s) Oral three times a day with meals  insulin glargine Injectable (LANTUS) 10Unit(s) SubCutaneous at bedtime  fluconAZOLE   Tablet 100milliGRAM(s) Oral daily  metoprolol 50milliGRAM(s) Oral two times a day  triamterene 37.5 mG/hydrochlorothiazide 25 mG Capsule 1Capsule(s) Oral daily    MEDICATIONS  (PRN):  dextrose Gel 1Dose(s) Oral once PRN Blood Glucose LESS THAN 70 milliGRAM(s)/deciliter  glucagon  Injectable 1milliGRAM(s) IntraMuscular once PRN Glucose LESS THAN 70 milligrams/deciliter  acetaminophen   Tablet 650milliGRAM(s) Oral every 6 hours PRN For Temp greater than 38 C (100.4 F)  oxyCODONE  5 mG/acetaminophen 325 mG 1Tablet(s) Oral every 4 hours PRN Severe Pain (7 - 10)      REVIEW OF SYSTEMS:    CONSTITUTIONAL: No fever, chills, weight loss, or fatigue  HEENT: No sore throat, runny nose, ear ache  RESPIRATORY: No cough, wheezing, No shortness of breath  CARDIOVASCULAR: No chest pain, palpitations, dizziness  GASTROINTESTINAL: No abdominal pain. No nausea, vomiting, diarrhea  GENITOURINARY: No dysuria, increase frequency, hematuria, or incontinence  NEUROLOGICAL: No headaches, memory loss, loss of strength, numbness, or tremors, no weakness  EXTREMITY: left hand contracture  SKIN: dressing over left breast and left lower extremity CDI    VITAL SIGNS:  T(C): 36.7, Max: 37.9 (01-15 @ 00:08)  T(F): 98, Max: 100.2 (01-15 @ 00:08)  HR: 74 (63 - 77)  BP: 187/69 (155/77 - 187/69)  RR: 20 (16 - 20)  SpO2: 97% (95% - 97%)  Wt(kg): --    PHYSICAL EXAM:    GENERAL: not in any distress  HEENT: Neck is supple, normocephalic, atraumatic   CHEST/LUNG: Clear to percussion bilaterally; No rales, rhonchi, wheezing  HEART: Regular rate and rhythm; No murmurs, rubs, or gallops  ABDOMEN: Soft, Nontender, Nondistended; Bowel sounds present, no rebound   EXTREMITIES:  2+ Peripheral Pulses, No clubbing, cyanosis, or edema  GENITOURINARY: NEGATIVE   dressing plus left breast ; left lower extremity   SKIN:as above   NERVOUS SYSTEM:  Alert & Oriented X3, Good concentration  PSYCH: normal affect     LABS:                         10.4   10.3  )-----------( 386      ( 15 Bo 2017 07:39 )             31.0     15 Bo 2017 07:39    143    |  103    |  19     ----------------------------<  145    3.7     |  34     |  1.17     Ca    8.2        15 Bo 2017 07:39                      Sedimentation Rate, Erythrocyte: 85 mm/hr (01-12 @ 09:25)            Culture Results:   Numerous Enterococcus faecium  Susceptibility to follow. (01-14 @ 03:53)                Radiology:

## 2017-01-15 NOTE — PROGRESS NOTE ADULT - PROBLEM SELECTOR PLAN 4
Plastics noted  S/P  I&D  left hand, thigh and leg  sp  redebridement friday  On Meropenem, Vancomycin and Diflucan   Local care for now

## 2017-01-15 NOTE — PROGRESS NOTE ADULT - SUBJECTIVE AND OBJECTIVE BOX
Patient seen and examined at bedside in no distress. Patient is without complaints today. Denies fever, chills, pain.     Vital Signs Last 24 Hrs  T(F): 98, Max: 100.2 (01-15 @ 00:08)  HR: 74  BP: 187/69  RR: 20  SpO2: 97%    General: Alert, oriented, NAD  Chest: respirations nonlabored. Left breast dressing c/d/i. Dressing removed to reveal clean wound with no drainage or gross blood appreciated. Wound irrigated. Xeroform and dry sterile dressing applied.   Extremities: Left hand ace bandage c/d/i, fingers without swelling. Left lower leg dressing c/d/i. Dressing removed to reveal clean wound with no drainage or gross blood appreciated. Wound irrigated. Xeroform and dry sterile dressing applied.     I&O's Detail      LABS:                        10.4   10.3  )-----------( 386      ( 15 Bo 2017 07:39 )             31.0     15 Bo 2017 07:39    143    |  103    |  19     ----------------------------<  145    3.7     |  34     |  1.17     Ca    8.2        15 Bo 2017 07:39      Impression:  66 year old female admitted s/p fall to floor with deep tissue injury to left hand and left lower leg, rhabdomyolysis, s/p debridement of left hand by plastics and now POD#2 s/p left lower leg and left breast debridement.    Plan:  -dressing changes by surgical team   -plastics f/u for OR planning for left hand   -continue abx per ID  -continue DVT ppx  -continue with all medical management  -f/u AM labs  -will d/w Dr. Dai Patient seen and examined at bedside in no distress. Patient is without complaints today. Denies fever, chills, pain.     Vital Signs Last 24 Hrs  T(F): 98, Max: 100.2 (01-15 @ 00:08)  HR: 74  BP: 187/69  RR: 20  SpO2: 97%    General: Alert, oriented, NAD  Chest: respirations nonlabored. Left breast dressing c/d/i. Dressing removed to reveal clean wound with no drainage or gross blood appreciated. Wound irrigated. Xeroform and dry sterile dressing applied.   Extremities: Left hand ace bandage c/d/i, fingers without swelling. Left lateral lower leg dressing c/d/i. Dressing removed to reveal clean wound with no active drainage or gross blood appreciated. Wound irrigated. Xeroform and dry sterile dressing applied.       LABS:                        10.4   10.3  )-----------( 386      ( 15 Bo 2017 07:39 )             31.0     15 Bo 2017 07:39    143    |  103    |  19     ----------------------------<  145    3.7     |  34     |  1.17     Ca    8.2        15 Bo 2017 07:39      Impression:  66 year old female admitted s/p fall to floor with deep tissue injury to left hand and left lower leg, rhabdomyolysis, s/p debridement of left hand by plastics and now POD#2 s/p left lower leg and left breast debridement.    Plan:  -dressing changes by surgical team   -plastics f/u for OR planning for left hand   -continue abx per ID  -continue DVT ppx  -continue with all medical management  -f/u AM labs  -will d/w Dr. Dai Patient seen and examined at bedside in no distress. Patient is without complaints today. Denies fever, chills, pain.     Vital Signs Last 24 Hrs  T(F): 98, Max: 100.2 (01-15 @ 00:08)  HR: 74  BP: 187/69  RR: 20  SpO2: 97%    General: Alert, oriented, NAD  Chest: respirations nonlabored. Left breast dressing c/d/i. Dressing removed to reveal clean wound with no drainage or gross blood appreciated. Wound irrigated. Xeroform and dry sterile dressing applied.   Extremities: Left hand ace bandage c/d/i, fingers without swelling. Left lateral lower leg dressing c/d/i. Dressing removed to reveal clean wound with no active drainage or gross blood appreciated. Wound irrigated. Xeroform and dry sterile dressing applied.       LABS:                        10.4   10.3  )-----------( 386      ( 15 Bo 2017 07:39 )             31.0     15 Bo 2017 07:39    143    |  103    |  19     ----------------------------<  145    3.7     |  34     |  1.17     Ca    8.2        15 Bo 2017 07:39      Impression:  66 year old female admitted s/p fall to floor with deep tissue injury to left hand and left lower leg, rhabdomyolysis, s/p debridement of left hand by plastics and now POD#2 s/p left lower leg and left breast debridement.    Plan:  -dressing changes by surgical team, c/w xeroform gauze dressing changes daily x1 week. Reassess daily  -plastics f/u for OR planning for left hand   -continue abx per ID  -continue DVT ppx  -continue with all medical management  -f/u AM labs  -will d/w Dr. Dai

## 2017-01-15 NOTE — PROGRESS NOTE ADULT - ASSESSMENT
Patient is a 66 year old female with unknown PMHx, per ED, patient found unresponsive, admitted to critical care for Metabolic Encephalopathy, Acute Renal Failure, Hyperkalemia, Thrombocytopenia, Rhabdomyolysis. -- resolving  clinically stable  Further follow up per plastic surgery - final procedure?  Dc planning for LIS

## 2017-01-15 NOTE — PROGRESS NOTE ADULT - ASSESSMENT
Patient is a 66 year old female with unknown PMHx, per ED, patient found unresponsive, admitted to critical care for Metabolic Encephalopathy, Acute Renal Failure, Hyperkalemia, Thrombocytopenia, Rhabdomyolysis. ; all resolving nicely   issues that remain are essentially with large areas of skin necroses that had dev all over and now have been debrided  issues with hand noted  for debridement of breast and thigh  diarrhea is status quo

## 2017-01-15 NOTE — PROGRESS NOTE ADULT - SUBJECTIVE AND OBJECTIVE BOX
Patient is a 66y old  Female who presents with a chief complaint of unresponsiveness (19 Dec 2016 08:49)      INTERVAL HPI/OVERNIGHT EVENTS:    denies complaints  elevated bp noted  MEDICATIONS  (STANDING):  influenza   Vaccine 0.5milliLiter(s) IntraMuscular once  meropenem IVPB 500milliGRAM(s) IV Intermittent every 8 hours  vancomycin    Solution 125milliGRAM(s) Oral every 6 hours  vancomycin  IVPB 1000milliGRAM(s) IV Intermittent every 24 hours  heparin  Injectable 5000Unit(s) SubCutaneous every 12 hours  insulin lispro (HumaLOG) corrective regimen sliding scale  SubCutaneous three times a day before meals  dextrose 50% Injectable 12.5Gram(s) IV Push once  dextrose 50% Injectable 25Gram(s) IV Push once  calcium acetate 1334milliGRAM(s) Oral three times a day with meals  insulin glargine Injectable (LANTUS) 10Unit(s) SubCutaneous at bedtime  fluconAZOLE   Tablet 100milliGRAM(s) Oral daily  metoprolol 50milliGRAM(s) Oral two times a day  triamterene 37.5 mG/hydrochlorothiazide 25 mG Capsule 1Capsule(s) Oral daily    MEDICATIONS  (PRN):  dextrose Gel 1Dose(s) Oral once PRN Blood Glucose LESS THAN 70 milliGRAM(s)/deciliter  glucagon  Injectable 1milliGRAM(s) IntraMuscular once PRN Glucose LESS THAN 70 milligrams/deciliter  acetaminophen   Tablet 650milliGRAM(s) Oral every 6 hours PRN For Temp greater than 38 C (100.4 F)  oxyCODONE  5 mG/acetaminophen 325 mG 1Tablet(s) Oral every 4 hours PRN Severe Pain (7 - 10)        REVIEW OF SYSTEMS:  CONSTITUTIONAL: No fever, weight loss, or fatigue  EYES: No eye pain, visual disturbances, or discharge  ENMT:  No difficulty hearing, tinnitus, vertigo; No sinus or throat pain  NECK: No pain or stiffness  RESPIRATORY: No cough, wheezing, chills or hemoptysis; No shortness of breath  CARDIOVASCULAR: No chest pain, palpitations, dizziness, or leg swelling  GASTROINTESTINAL: No abdominal or epigastric pain. No nausea, vomiting, or hematemesis; No diarrhea or constipation. No melena or hematochezia.  GENITOURINARY: No dysuria, frequency, hematuria, or incontinence  NEUROLOGICAL: No headaches, memory loss, loss of strength, numbness, or tremors  SKIN: No itching, burning, rashes, or lesions      Vital Signs Last 24 Hrs  T(C): 36.7, Max: 37.9 (01-15 @ 00:08)  T(F): 98, Max: 100.2 (01-15 @ 00:08)  HR: 74 (63 - 77)  BP: 187/69 (155/77 - 187/69)  BP(mean): --  RR: 20 (16 - 20)  SpO2: 97% (95% - 97%)    PHYSICAL EXAM:  GENERAL: NAD, well-groomed, well-developed  HEAD:  Atraumatic, Normocephalic  EYES: EOMI, PERRLA, conjunctiva and sclera clear  ENMT: No tonsillar erythema, exudates, or enlargement; Moist mucous membranes   NECK: Supple, No JVD   NERVOUS SYSTEM:  Alert & Oriented X3, Good concentration; Motor Strength 5/5 B/L upper and lower extremities; DTRs 2+ intact and symmetric  CHEST/LUNG: Clear to percussion bilaterally; No rales, rhonchi, wheezing, or rubs  HEART: Regular rate and rhythm; No murmurs, rubs, or gallops  ABDOMEN: Soft, Nontender, Nondistended; Bowel sounds present  EXTREMITIES:  2+ Peripheral Pulses, No clubbing, cyanosis, or edema  LYMPH: No lymphadenopathy noted  SKIN: No rashes or lesions    LABS:                        10.4   10.3  )-----------( 386      ( 15 Bo 2017 07:39 )             31.0     15 Bo 2017 07:39    143    |  103    |  19     ----------------------------<  145    3.7     |  34     |  1.17     Ca    8.2        15 Bo 2017 07:39          CAPILLARY BLOOD GLUCOSE  150 (14 Jan 2017 22:00)      RADIOLOGY & ADDITIONAL TESTS:    Imaging Personally Reviewed:  [ ] YES  [ ] NO    Consultant(s) Notes Reviewed:  [ ] YES  [ ] NO    Care Discussed with Consultants/Other Providers [ ] YES  [ ] NO

## 2017-01-15 NOTE — PROGRESS NOTE ADULT - PROBLEM SELECTOR PLAN 1
blood culture and urine culture grew E coli  Renal sonogram=no hydro  new culture E fecium sensitivity pending

## 2017-01-16 NOTE — PROGRESS NOTE ADULT - PROBLEM SELECTOR PLAN 6
pt s/p debridement of gangrenous tissue to left upper and lower extremity  as per plastic surgery consult, for possible further debridement  Surgical wound left lower left: + vancomycin resistant enterococcus; Zyvox started on 1/16/2017

## 2017-01-16 NOTE — PROGRESS NOTE ADULT - SUBJECTIVE AND OBJECTIVE BOX
HPI:  Patient is a 66 year old female with unknown PMHx, per ED, patient found unresponsive . Patient was not seen by neighbors in two days, they became concerned and entered her home and found patient on the floor covered with feces.  In ED patient found CK >14,000, in renal failure, hyperkalemic, cocktail was given, including kayexelate. Then patient had melena. stool occult blood was positive, low plts and metabolic acidosis. stormy initial course ; complicated by extensive areas of pressure necrosis and major issues female left hand sp multiple debridements by plastics   most recent breast 2 days ago   now vancomycin resistant enterococcus in wounds       Allergies    No Known Allergies    Intolerances        MEDICATIONS  (STANDING):  influenza   Vaccine 0.5milliLiter(s) IntraMuscular once  meropenem IVPB 500milliGRAM(s) IV Intermittent every 8 hours  vancomycin    Solution 125milliGRAM(s) Oral every 6 hours  heparin  Injectable 5000Unit(s) SubCutaneous every 12 hours  insulin lispro (HumaLOG) corrective regimen sliding scale  SubCutaneous three times a day before meals  dextrose 50% Injectable 12.5Gram(s) IV Push once  dextrose 50% Injectable 25Gram(s) IV Push once  calcium acetate 1334milliGRAM(s) Oral three times a day with meals  insulin glargine Injectable (LANTUS) 10Unit(s) SubCutaneous at bedtime  fluconAZOLE   Tablet 100milliGRAM(s) Oral daily  metoprolol 50milliGRAM(s) Oral two times a day  triamterene 37.5 mG/hydrochlorothiazide 25 mG Capsule 1Capsule(s) Oral daily  linezolid    Tablet 600milliGRAM(s) Oral every 12 hours    MEDICATIONS  (PRN):  dextrose Gel 1Dose(s) Oral once PRN Blood Glucose LESS THAN 70 milliGRAM(s)/deciliter  glucagon  Injectable 1milliGRAM(s) IntraMuscular once PRN Glucose LESS THAN 70 milligrams/deciliter  acetaminophen   Tablet 650milliGRAM(s) Oral every 6 hours PRN For Temp greater than 38 C (100.4 F)  oxyCODONE  5 mG/acetaminophen 325 mG 1Tablet(s) Oral every 4 hours PRN Severe Pain (7 - 10)      REVIEW OF SYSTEMS:  hand contractures   diarrhea is status quo up and down  CONSTITUTIONAL: No fever, chills, weight loss, or fatigue  HEENT: No sore throat, runny nose, ear ache  RESPIRATORY: No cough, wheezing, No shortness of breath  CARDIOVASCULAR: No chest pain, palpitations, dizziness  GASTROINTESTINAL: No abdominal pain. No nausea, vomiting, diarrhea  GENITOURINARY: No dysuria, increase frequency, hematuria, or incontinence  NEUROLOGICAL: No headaches, memory loss, loss of strength, numbness, or tremors, no weakness  EXTREMITY: No pedal edema BLE  SKIN: No itching, burning, rashes, or lesions     VITAL SIGNS:  T(C): 36.9, Max: 37.7 (01-15 @ 22:30)  T(F): 98.4, Max: 99.8 (01-15 @ 22:30)  HR: 67 (67 - 74)  BP: 173/90 (160/101 - 191/77)  RR: 20 (16 - 20)  SpO2: 98% (97% - 98%)  Wt(kg): --    PHYSICAL EXAM:    GENERAL: not in any distress  HEENT: Neck is supple, normocephalic, atraumatic   CHEST/LUNG: Clear to percussion bilaterally; No rales, rhonchi, wheezing  HEART: Regular rate and rhythm; No murmurs, rubs, or gallops  ABDOMEN: Soft, Nontender, Nondistended; Bowel sounds present, no rebound   EXTREMITIES:  2+ Peripheral Pulses, No clubbing, cyanosis, or edema  left hand contractures and splint in place   GENITOURINARY: NEGATIVE   SKIN no change in status   BACK: no pressor sore   NERVOUS SYSTEM:  Alert & Oriented X3, Good concentration  PSYCH: normal affect     LABS:                         10.4   10.3  )-----------( 386      ( 15 Bo 2017 07:39 )             31.0     15 Bo 2017 07:39    143    |  103    |  19     ----------------------------<  145    3.7     |  34     |  1.17     Ca    8.2        15 Bo 2017 07:39        Culture - Surgical Swab (01.14.17 @ 03:53)    -  Ciprofloxacin: R >2    -  Tetra/Doxy: R >8    -  Vancomycin: R >16    -  Linezolid: S 2    -  Ampicillin: R >8    Specimen Source: .Surgical Swab lower left leg wound    Culture Results:   Numerous Enterococcus faecium (vancomycin resistant)  Few Yeast    Organism Identification: Enterococcus faecium (vancomycin resistant)    Organism: Enterococcus faecium (vancomycin resistant)    Method Type: DOMI          ~domi        Sedimentation Rate, Erythrocyte: 85 mm/hr (01-12 @ 09:25)            Culture Results:   Numerous Enterococcus faecium (vancomycin resistant)  Few Yeast (01-14 @ 03:53)                Radiology:

## 2017-01-16 NOTE — PROGRESS NOTE ADULT - ASSESSMENT
Patient is a 66 year old female with unknown PMHx, per ED, patient found unresponsive, admitted to critical care for Metabolic Encephalopathy, Acute Renal Failure, Hyperkalemia, Thrombocytopenia, Rhabdomyolysis. ; all resolving nicely   issues that remain are essentially with large areas of skin necroses that had dev all over and now have been debrided  issues with hand noted  sp  debridement of breast and leg   diarrhea is status quo

## 2017-01-16 NOTE — PROGRESS NOTE ADULT - PROBLEM SELECTOR PLAN 1
blood culture and urine culture grew E coli  Renal sonogram=no hydro  new culture E fecium  ; vancomycin resistant enterococcus   dc vanco start zyvox

## 2017-01-16 NOTE — PROGRESS NOTE ADULT - SUBJECTIVE AND OBJECTIVE BOX
Patient is a 66y old  Female who presents with a chief complaint of unresponsiveness (19 Dec 2016 08:49)      INTERVAL HPI/OVERNIGHT EVENTS:  Surgical wound left lower left: + vancomycin resistant enterococcus  Infectious disease follow up noted  Sensitive to Zyvox which was initiated today  Pt is for possible further debridement to affected left hand     denies complaints  elevated bp noted    MEDICATIONS  (STANDING):  influenza   Vaccine 0.5milliLiter(s) IntraMuscular once  meropenem IVPB 500milliGRAM(s) IV Intermittent every 8 hours  vancomycin    Solution 125milliGRAM(s) Oral every 6 hours  vancomycin  IVPB 1000milliGRAM(s) IV Intermittent every 24 hours  heparin  Injectable 5000Unit(s) SubCutaneous every 12 hours  insulin lispro (HumaLOG) corrective regimen sliding scale  SubCutaneous three times a day before meals  dextrose 50% Injectable 12.5Gram(s) IV Push once  dextrose 50% Injectable 25Gram(s) IV Push once  calcium acetate 1334milliGRAM(s) Oral three times a day with meals  insulin glargine Injectable (LANTUS) 10Unit(s) SubCutaneous at bedtime  fluconAZOLE   Tablet 100milliGRAM(s) Oral daily  metoprolol 50milliGRAM(s) Oral two times a day  triamterene 37.5 mG/hydrochlorothiazide 25 mG Capsule 1Capsule(s) Oral daily    MEDICATIONS  (PRN):  dextrose Gel 1Dose(s) Oral once PRN Blood Glucose LESS THAN 70 milliGRAM(s)/deciliter  glucagon  Injectable 1milliGRAM(s) IntraMuscular once PRN Glucose LESS THAN 70 milligrams/deciliter  acetaminophen   Tablet 650milliGRAM(s) Oral every 6 hours PRN For Temp greater than 38 C (100.4 F)  oxyCODONE  5 mG/acetaminophen 325 mG 1Tablet(s) Oral every 4 hours PRN Severe Pain (7 - 10)        REVIEW OF SYSTEMS:  CONSTITUTIONAL: No fever, weight loss, or fatigue  EYES: No eye pain, visual disturbances, or discharge  ENMT:  No difficulty hearing, tinnitus, vertigo; No sinus or throat pain  NECK: No pain or stiffness  RESPIRATORY: No cough, wheezing, chills or hemoptysis; No shortness of breath  CARDIOVASCULAR: No chest pain, palpitations, dizziness, or leg swelling  GASTROINTESTINAL: No abdominal or epigastric pain. No nausea, vomiting, or hematemesis; No diarrhea or constipation. No melena or hematochezia.  GENITOURINARY: No dysuria, frequency, hematuria, or incontinence  NEUROLOGICAL: No headaches, memory loss, loss of strength, numbness, or tremors  SKIN: No itching, burning, rashes, or lesions      Vital Signs Last 24 Hrs  T(C): 36.7, Max: 37.9 (01-15 @ 00:08)  T(F): 98, Max: 100.2 (01-15 @ 00:08)  HR: 74 (63 - 77)  BP: 187/69 (155/77 - 187/69)  BP(mean): --  RR: 20 (16 - 20)  SpO2: 97% (95% - 97%)    PHYSICAL EXAM:  GENERAL: NAD, well-groomed, well-developed  HEAD:  Atraumatic, Normocephalic  EYES: EOMI, PERRLA, conjunctiva and sclera clear  ENMT: No tonsillar erythema, exudates, or enlargement; Moist mucous membranes   NECK: Supple, No JVD   NERVOUS SYSTEM:  Alert & Oriented X3, Good concentration; Motor Strength 5/5 B/L upper and lower extremities; DTRs 2+ intact and symmetric  CHEST/LUNG: Clear to percussion bilaterally; No rales, rhonchi, wheezing, or rubs  HEART: Regular rate and rhythm; No murmurs, rubs, or gallops  ABDOMEN: Soft, Nontender, Nondistended; Bowel sounds present  EXTREMITIES:  2+ Peripheral Pulses, No clubbing, cyanosis, or edema  LYMPH: No lymphadenopathy noted  SKIN: No rashes or lesions    LABS:                        10.4   10.3  )-----------( 386      ( 15 Bo 2017 07:39 )             31.0     15 Bo 2017 07:39    143    |  103    |  19     ----------------------------<  145    3.7     |  34     |  1.17     Ca    8.2        15 Bo 2017 07:39          CAPILLARY BLOOD GLUCOSE  150 (14 Jan 2017 22:00)      RADIOLOGY & ADDITIONAL TESTS:    Imaging Personally Reviewed:  [ ] YES  [ ] NO    Consultant(s) Notes Reviewed:  [ ] YES  [ ] NO    Care Discussed with Consultants/Other Providers [ ] YES  [ ] NO

## 2017-01-17 NOTE — PROGRESS NOTE ADULT - SUBJECTIVE AND OBJECTIVE BOX
Patient is a 66y old  Female who presents with a chief complaint of unresponsiveness (19 Dec 2016 08:49)      INTERVAL HPI/OVERNIGHT EVENTS:  Pt is s/p debridement of left axilla wound over pectoralis muscle. CONRAD drain in place with small amt sanguinous fluid  s/p debridement of necrotic tissue of left wrist     Surgical wound left lower left: + vancomycin resistant enterococcus  Infectious disease follow up noted  Sensitive to Zyvox which was initiated on 1/16/2017    denies complaints  elevated bp noted    MEDICATIONS  (STANDING):  influenza   Vaccine 0.5milliLiter(s) IntraMuscular once  meropenem IVPB 500milliGRAM(s) IV Intermittent every 8 hours  vancomycin    Solution 125milliGRAM(s) Oral every 6 hours  vancomycin  IVPB 1000milliGRAM(s) IV Intermittent every 24 hours  heparin  Injectable 5000Unit(s) SubCutaneous every 12 hours  insulin lispro (HumaLOG) corrective regimen sliding scale  SubCutaneous three times a day before meals  dextrose 50% Injectable 12.5Gram(s) IV Push once  dextrose 50% Injectable 25Gram(s) IV Push once  calcium acetate 1334milliGRAM(s) Oral three times a day with meals  insulin glargine Injectable (LANTUS) 10Unit(s) SubCutaneous at bedtime  fluconAZOLE   Tablet 100milliGRAM(s) Oral daily  metoprolol 50milliGRAM(s) Oral two times a day  triamterene 37.5 mG/hydrochlorothiazide 25 mG Capsule 1Capsule(s) Oral daily    MEDICATIONS  (PRN):  dextrose Gel 1Dose(s) Oral once PRN Blood Glucose LESS THAN 70 milliGRAM(s)/deciliter  glucagon  Injectable 1milliGRAM(s) IntraMuscular once PRN Glucose LESS THAN 70 milligrams/deciliter  acetaminophen   Tablet 650milliGRAM(s) Oral every 6 hours PRN For Temp greater than 38 C (100.4 F)  oxyCODONE  5 mG/acetaminophen 325 mG 1Tablet(s) Oral every 4 hours PRN Severe Pain (7 - 10)        REVIEW OF SYSTEMS:  CONSTITUTIONAL: No fever, weight loss, or fatigue  EYES: No eye pain, visual disturbances, or discharge  ENMT:  No difficulty hearing, tinnitus, vertigo; No sinus or throat pain  NECK: No pain or stiffness  RESPIRATORY: No cough, wheezing, chills or hemoptysis; No shortness of breath  CARDIOVASCULAR: No chest pain, palpitations, dizziness, or leg swelling  GASTROINTESTINAL: No abdominal or epigastric pain. No nausea, vomiting, or hematemesis; No diarrhea or constipation. No melena or hematochezia.  GENITOURINARY: No dysuria, frequency, hematuria, or incontinence  NEUROLOGICAL: No headaches, memory loss, loss of strength, numbness, or tremors  SKIN: No itching, burning, rashes, or lesions      Vital Signs Last 24 Hrs  T(C): 36.7, Max: 37.9 (01-15 @ 00:08)  T(F): 98, Max: 100.2 (01-15 @ 00:08)  HR: 74 (63 - 77)  BP: 187/69 (155/77 - 187/69)  BP(mean): --  RR: 20 (16 - 20)  SpO2: 97% (95% - 97%)    PHYSICAL EXAM:  GENERAL: NAD, well-groomed, well-developed  HEAD:  Atraumatic, Normocephalic  EYES: EOMI, PERRLA, conjunctiva and sclera clear  ENMT: No tonsillar erythema, exudates, or enlargement; Moist mucous membranes   NECK: Supple, No JVD   NERVOUS SYSTEM:  Alert & Oriented X3, Good concentration; Motor Strength 5/5 B/L upper and lower extremities; DTRs 2+ intact and symmetric  CHEST/LUNG: Clear to percussion bilaterally; No rales, rhonchi, wheezing, or rubs  HEART: Regular rate and rhythm; No murmurs, rubs, or gallops  ABDOMEN: Soft, Nontender, Nondistended; Bowel sounds present  EXTREMITIES:  2+ Peripheral Pulses, No clubbing, cyanosis, or edema  LYMPH: No lymphadenopathy noted  SKIN: No rashes or lesions    LABS:                        10.4   10.3  )-----------( 386      ( 15 Bo 2017 07:39 )             31.0     15 Bo 2017 07:39    143    |  103    |  19     ----------------------------<  145    3.7     |  34     |  1.17     Ca    8.2        15 Bo 2017 07:39          CAPILLARY BLOOD GLUCOSE  150 (14 Jan 2017 22:00)      RADIOLOGY & ADDITIONAL TESTS:    Imaging Personally Reviewed:  [ ] YES  [ ] NO    Consultant(s) Notes Reviewed:  [ ] YES  [ ] NO    Care Discussed with Consultants/Other Providers [ ] YES  [ ] NO

## 2017-01-17 NOTE — BRIEF OPERATIVE NOTE - SPECIMENS
Tissue Pectoralis, tissue axillary skin, tissue breast, tissue hypothenar muscles, tissue volar wrist, tissue necrotic nerve

## 2017-01-18 NOTE — PHYSICAL THERAPY INITIAL EVALUATION ADULT - RANGE OF MOTION EXAMINATION, REHAB EVAL
deficits as listed below/LUE wrist/fingers not assessed (splint and dressing in place)
no ROM deficits were identified

## 2017-01-18 NOTE — PROGRESS NOTE ADULT - SUBJECTIVE AND OBJECTIVE BOX
Patient seen and examined for dressing change. Pt in no distress; denies pain, fever/chills.  Left axilla dressing c/d/i. Dressing removed to reveal sutures. Area clean, no drainage/gross blood appreciated. CONRAD drain intact with 10mL serosanguinous fluid. New DSD applied.  Left lower extremity ACE bandage removed. Dressing c/d/i. Dressing removed. Wound clean, no local erythema/warmth/drainage/signs of infection noted. Wound irrigated with NS. Xeroform applied, new DSD applied.  Pt tolerated well.

## 2017-01-18 NOTE — PHYSICAL THERAPY INITIAL EVALUATION ADULT - DID THE PATIENT HAVE SURGERY?
yes/Debridement of Necrotic tissue left axilla and left volar wrist and hand , removal of necrotic pectoralis tissue and closure with CONRAD drain placement

## 2017-01-18 NOTE — PROGRESS NOTE ADULT - SUBJECTIVE AND OBJECTIVE BOX
Pt S&E. Pain controlled. No acute events overnight  AVSS  Gen: NAD  RUE:  Dsg C/D/I  +AIN/PIN/radial/ulnar  CR BRisk  soft compartments, - calf ttp

## 2017-01-18 NOTE — PROGRESS NOTE ADULT - PROBLEM/PLAN-8
DISPLAY PLAN FREE TEXT

## 2017-01-18 NOTE — PROGRESS NOTE ADULT - SUBJECTIVE AND OBJECTIVE BOX
Patient is a 66y old  Female who presents with a chief complaint of unresponsiveness (19 Dec 2016 08:49)      INTERVAL HPI/OVERNIGHT EVENTS:  Pt is stable  CONRAD drain in place    Pt is s/p debridement of left axilla wound over pectoralis muscle. CONRAD drain in place with small amt sanguinous fluid  s/p debridement of necrotic tissue of left wrist     Surgical wound left lower left: + vancomycin resistant enterococcus  Infectious disease follow up noted  Sensitive to Zyvox which was initiated on 1/16/2017    denies complaints  elevated bp noted    MEDICATIONS  (STANDING):  influenza   Vaccine 0.5milliLiter(s) IntraMuscular once  meropenem IVPB 500milliGRAM(s) IV Intermittent every 8 hours  vancomycin    Solution 125milliGRAM(s) Oral every 6 hours  vancomycin  IVPB 1000milliGRAM(s) IV Intermittent every 24 hours  heparin  Injectable 5000Unit(s) SubCutaneous every 12 hours  insulin lispro (HumaLOG) corrective regimen sliding scale  SubCutaneous three times a day before meals  dextrose 50% Injectable 12.5Gram(s) IV Push once  dextrose 50% Injectable 25Gram(s) IV Push once  calcium acetate 1334milliGRAM(s) Oral three times a day with meals  insulin glargine Injectable (LANTUS) 10Unit(s) SubCutaneous at bedtime  fluconAZOLE   Tablet 100milliGRAM(s) Oral daily  metoprolol 50milliGRAM(s) Oral two times a day  triamterene 37.5 mG/hydrochlorothiazide 25 mG Capsule 1Capsule(s) Oral daily    MEDICATIONS  (PRN):  dextrose Gel 1Dose(s) Oral once PRN Blood Glucose LESS THAN 70 milliGRAM(s)/deciliter  glucagon  Injectable 1milliGRAM(s) IntraMuscular once PRN Glucose LESS THAN 70 milligrams/deciliter  acetaminophen   Tablet 650milliGRAM(s) Oral every 6 hours PRN For Temp greater than 38 C (100.4 F)  oxyCODONE  5 mG/acetaminophen 325 mG 1Tablet(s) Oral every 4 hours PRN Severe Pain (7 - 10)        REVIEW OF SYSTEMS:  CONSTITUTIONAL: No fever, weight loss, or fatigue  EYES: No eye pain, visual disturbances, or discharge  ENMT:  No difficulty hearing, tinnitus, vertigo; No sinus or throat pain  NECK: No pain or stiffness  RESPIRATORY: No cough, wheezing, chills or hemoptysis; No shortness of breath  CARDIOVASCULAR: No chest pain, palpitations, dizziness, or leg swelling  GASTROINTESTINAL: No abdominal or epigastric pain. No nausea, vomiting, or hematemesis; No diarrhea or constipation. No melena or hematochezia.  GENITOURINARY: No dysuria, frequency, hematuria, or incontinence  NEUROLOGICAL: No headaches, memory loss, loss of strength, numbness, or tremors  SKIN: No itching, burning, rashes, or lesions      Vital Signs Last 24 Hrs  T(C): 36.7, Max: 37.9 (01-15 @ 00:08)  T(F): 98, Max: 100.2 (01-15 @ 00:08)  HR: 74 (63 - 77)  BP: 187/69 (155/77 - 187/69)  BP(mean): --  RR: 20 (16 - 20)  SpO2: 97% (95% - 97%)    PHYSICAL EXAM:  GENERAL: NAD, well-groomed, well-developed  HEAD:  Atraumatic, Normocephalic  EYES: EOMI, PERRLA, conjunctiva and sclera clear  ENMT: No tonsillar erythema, exudates, or enlargement; Moist mucous membranes   NECK: Supple, No JVD   NERVOUS SYSTEM:  Alert & Oriented X3, Good concentration; Motor Strength 5/5 B/L upper and lower extremities; DTRs 2+ intact and symmetric  CHEST/LUNG: Clear to percussion bilaterally; No rales, rhonchi, wheezing, or rubs  HEART: Regular rate and rhythm; No murmurs, rubs, or gallops  ABDOMEN: Soft, Nontender, Nondistended; Bowel sounds present  EXTREMITIES:  2+ Peripheral Pulses, No clubbing, cyanosis, or edema  LYMPH: No lymphadenopathy noted  SKIN: No rashes or lesions    LABS:                        10.4   10.3  )-----------( 386      ( 15 Bo 2017 07:39 )             31.0     15 Bo 2017 07:39    143    |  103    |  19     ----------------------------<  145    3.7     |  34     |  1.17     Ca    8.2        15 Bo 2017 07:39          CAPILLARY BLOOD GLUCOSE  150 (14 Jan 2017 22:00)      RADIOLOGY & ADDITIONAL TESTS:    Imaging Personally Reviewed:  [ ] YES  [ ] NO    Consultant(s) Notes Reviewed:  [ ] YES  [ ] NO    Care Discussed with Consultants/Other Providers [ ] YES  [ ] NO

## 2017-01-18 NOTE — PROGRESS NOTE ADULT - SUBJECTIVE AND OBJECTIVE BOX
INTERVAL HPI/OVERNIGHT EVENTS:  S/P Left lateral CW and left hand wound debridement     ANTIBIOTICS/RELEVANT:    MEDICATIONS  (STANDING):  influenza   Vaccine 0.5milliLiter(s) IntraMuscular once  famotidine    Tablet 20milliGRAM(s) Oral every 12 hours  meropenem IVPB 500milliGRAM(s) IV Intermittent every 8 hours  fluconAZOLE   Tablet 100milliGRAM(s) Oral daily  vancomycin    Solution 125milliGRAM(s) Oral every 6 hours  insulin lispro (HumaLOG) corrective regimen sliding scale  SubCutaneous three times a day before meals  dextrose 50% Injectable 12.5Gram(s) IV Push once  insulin glargine Injectable (LANTUS) 10Unit(s) SubCutaneous at bedtime  triamterene 37.5 mG/hydrochlorothiazide 25 mG Capsule 1Capsule(s) Oral daily  metoprolol 50milliGRAM(s) Oral two times a day  linezolid    Tablet 600milliGRAM(s) Oral every 12 hours    MEDICATIONS  (PRN):  acetaminophen   Tablet 650milliGRAM(s) Oral every 6 hours PRN For Temp greater than 38 C (100.4 F) and headache  oxyCODONE IR 10milliGRAM(s) Oral every 4 hours PRN Moderate Pain  oxyCODONE IR 5milliGRAM(s) Oral every 4 hours PRN Mild Pain  morphine  - Injectable 4milliGRAM(s) IV Push every 4 hours PRN Severe Pain  aluminum hydroxide/magnesium hydroxide/simethicone Suspension 30milliLiter(s) Oral four times a day PRN Indigestion  ondansetron Injectable 4milliGRAM(s) IV Push every 6 hours PRN Nausea and/or Vomiting  magnesium hydroxide Suspension 30milliLiter(s) Oral daily PRN Constipation  diphenhydrAMINE   Capsule 25milliGRAM(s) Oral at bedtime PRN Insomnia  zolpidem 5milliGRAM(s) Oral at bedtime PRN Insomnia  dextrose Gel 1Dose(s) Oral once PRN Blood Glucose LESS THAN 70 milliGRAM(s)/deciliter  glucagon  Injectable 1milliGRAM(s) IntraMuscular once PRN Glucose LESS THAN 70 milligrams/deciliter      Allergies    No Known Allergies    Intolerances        Vital Signs Last 24 Hrs  T(C): 36.9, Max: 36.9 (01-18 @ 20:05)  T(F): 98.4, Max: 98.4 (01-18 @ 20:05)  HR: 82 (69 - 82)  BP: 142/78 (96/76 - 181/81)  BP(mean): --  RR: 17 (14 - 17)  SpO2: 98% (96% - 98%)    PHYSICAL EXAM:    General: not in any distress    HEENT: no pallor, moist oral cavity    Respiratory: clear to auscultation bilaterally    Cardiovascular: S1, S2, RRR    Gastrointestinal: +BS, soft, NT, NT    Extremities: left hand is covered with bandage, left breast area wound has CONRAD in place  LLE thigh and leg has bandage     LABS:                        10.4   10.3  )-----------( 400      ( 17 Jan 2017 11:56 )             29.5     17 Jan 2017 08:29    139    |  98     |  26     ----------------------------<  134    3.9     |  34     |  1.28     Ca    8.4        17 Jan 2017 08:29    TPro  7.6    /  Alb  2.2    /  TBili  0.4    /  DBili  x      /  AST  47     /  ALT  32     /  AlkPhos  79     17 Jan 2017 08:29      MICROBIOLOGY:  Culture - Surgical Swab (01.14.17 @ 03:53)    -  Ciprofloxacin: R >2    -  Tetra/Doxy: R >8    -  Vancomycin: R >16    -  Linezolid: S 2    -  Ampicillin: R >8    Specimen Source: .Surgical Swab lower left leg wound    Culture Results:   Numerous Enterococcus faecium (vancomycin resistant)  Few Yeast    Organism Identification: Enterococcus faecium (vancomycin resistant)    Organism: Enterococcus faecium (vancomycin resistant)    Method Type: NEHEMIAS    RADIOLOGY & ADDITIONAL STUDIES:

## 2017-01-18 NOTE — PROGRESS NOTE ADULT - PROBLEM SELECTOR PLAN 2
Plastics noted  S/P  I&D  left hand, thigh and leg  new culture E faecium  ; vancomycin resistant Enterococcus  On Meropenem Zyvox and Diflucan   Left CW CONRAD X 1  Local care as per Plastics

## 2017-01-18 NOTE — PROGRESS NOTE ADULT - PROBLEM SELECTOR PLAN 1
-monitor urine output  -renal consult - resolved acidosis, hyperkalemia  Improving creatinine  D/C planning

## 2017-01-19 NOTE — OCCUPATIONAL THERAPY INITIAL EVALUATION ADULT - SOCIAL CONCERNS
Complex psychosocial needs/coping issues
Pt concerned about when she goes home that she wont have any assistance with ADL's/IADLS when needed.

## 2017-01-19 NOTE — PROGRESS NOTE ADULT - PROBLEM SELECTOR PLAN 4
vascular notes seen noted  S/P  I&D  left hand, thigh and leg  sp  redebridement friday  On Meropenem, Vancomycin and Diflucan   Local care for now vascular notes seen noted  S/P  I&D  left hand, thigh and leg  sp  redebridement   On Meropenem, zyvox  and Diflucan   Local care for now

## 2017-01-19 NOTE — PROGRESS NOTE ADULT - SUBJECTIVE AND OBJECTIVE BOX
Postoperative Day #: 2  Patient seen and examined bedside resting comfortably, with Dr Glass.  No complaints offered. No pain. Denies nausea and vomiting. Tolerating diet.  Denies chest pain, dyspnea, cough.    T(F): 100.2, Max: 100.2 (01-19 @ 05:12)  HR: 73 (72 - 85)  BP: 120/71 (120/71 - 181/81)  RR: 15 (15 - 17)  SpO2: 97% (96% - 98%)    CAPILLARY BLOOD GLUCOSE  149 (19 Jan 2017 07:37)  144 (18 Jan 2017 17:02)  162 (18 Jan 2017 12:27)      PHYSICAL EXAM:  General: NAD, WDWN  Neuro:  Alert & oriented  HEENT: NCAT, EOMI, conjunctiva clear  Chest: nonlabored respirations  Abdomen: soft, NTND  Extremities: no pedal edema or calf tenderness noted.  Left lower extremity lateral wound dressing changed. Large skin opening with tracking inferiorly. No gross purulence or bleeding. Sloughing and necrotic tissue debrided by Dr Glass, patient tolerated well. Pt unable to flex foot. +sensation in foot  Left chest dressing changed. Upper lateral breast incision with running suture, healing well and clean. CONRAD drain intact laterally, with sanguineous drainage, output 15cc recorded. New dry dressing applied.  Left hand is dressed with no staining. Dressing changed by RN.    LABS:                        9.6    8.7   )-----------( 423      ( 19 Jan 2017 07:40 )             28.0     19 Jan 2017 07:40    139    |  99     |  29     ----------------------------<  126    3.7     |  35     |  1.36     Ca    7.8        19 Jan 2017 07:40    Culture Results:   Numerous Enterococcus faecium (vancomycin resistant)  Few Yeast (01-14 @ 03:53)    Impression: 66 year old female admitted s/p fall to floor with deep tissue injury to left hand, left chest and left lower leg, rhabdomyolysis, s/p left hand debridement on 1/19, POD#6 s/p left lower leg and left breast debridement, POD#2 s/p left hand/wrist debridement and L breast debridement     Plan:  -Per Dr Glass, scheduled for redebridement left leg wound tomorrow. Discussed with patient, consent pending. Preop labs ordered, NPO p MN.   -plastics f/u for possible OR planning for left hand vs. wound vac. Continue local wound care and drain monitoring left chest, Left hand dressing changes with hydrogel by RNs.   -continue abx per ID  -resume DVT ppx c heparin

## 2017-01-19 NOTE — OCCUPATIONAL THERAPY INITIAL EVALUATION ADULT - MODIFIED CLINICAL TEST OF SENSORY INTEGRATION IN BALANCE TEST
Barthel Index: Feeding Score___5___, Bathing Score___0___, Grooming Score__0___, Dressing Score__5___, Bowel Score__5___, Bladder Score___5___, Toilet Score__5___, Transfer Score__5____, Mobility Score___0__, Stairs Score__0___, Total Score___30__.

## 2017-01-19 NOTE — OCCUPATIONAL THERAPY INITIAL EVALUATION ADULT - GENERAL OBSERVATIONS, REHAB EVAL
Pt is POD#2 s/p left hand/wrist debridement and L breast debridement. Pt encountered supine in bed; NAD, L chest wall J dubon drain, LUE splint ace wrapped C/D/I, LLE dressing C/D/I, L hip foam dressing, NWB LUE, AXOX3, cooperative, followed 1 step commands, required verbal cues for hand/foot placement during functional tasks secondary to decreased safety awareness. Patient has limited ROM LUE digit and is unable to oppose finger to thumb.

## 2017-01-19 NOTE — PROGRESS NOTE ADULT - ASSESSMENT
Patient is a 66 year old female with unknown PMHx, per ED, patient found unresponsive, admitted to critical care for Metabolic Encephalopathy, Acute Renal Failure, Hyperkalemia, Thrombocytopenia, Rhabdomyolysis. ; all resolving nicely   issues that remain are essentially with large areas of skin necroses that had dev all over and now have been debrided  issues with hand noted  Scheduled for further debridement of left leg wound/necrotic leg muscles  Tenative plan thereafter is for LIS dc early next week

## 2017-01-19 NOTE — OCCUPATIONAL THERAPY INITIAL EVALUATION ADULT - RANGE OF MOTION EXAMINATION, UPPER EXTREMITY
Right UE Active ROM was WFL (within functional limits)/LUE shoulder flexion AROM WFL, LUE elbow flexion AROM WFL, LUE wrist flexion/extension AROM WFL, LUE digits 2.3.4.5 PIP flexion/extension AROM limited due to edema, LUE digits 2,3,4,5 DIP flexion/extension limited AROM due to edema
Right UE Active ROM was WFL (within functional limits)/Left UE Passive ROM was WFL  (within functional limits)/LUE shoulder flexion WFL, LUE elbow flexion WFL, LUE wrist flexion/extension unable due to arm in splint, LUE digits of hand limited ROM due to hand in splint and pain

## 2017-01-19 NOTE — PROGRESS NOTE ADULT - SUBJECTIVE AND OBJECTIVE BOX
HPI:  Patient is a 66 year old female with unknown PMHx, per ED, patient found unresponsive . Patient was not seen by neighbors in two days, they became concerned and entered her home and found patient on the floor covered with feces.  In ED patient found CK >14,000, in renal failure, hyperkalemic, cocktail was given, including kayexelate. Then patient had melena. stool occult blood was positive, low plts and metabolic acidosis. Patient was initially admitted to Telemetry, however this AM, found to be hypotensive, 94/56 was given fluids. ICU consulted, initially   now several days on regular floor with multiple planned debridements by vascular and plastics   complicated by extensive areas of pressure necrosis and major issues female left hand sp multiple debridements by plastics     Allergies    No Known Allergies    Intolerances        MEDICATIONS  (STANDING):  influenza   Vaccine 0.5milliLiter(s) IntraMuscular once  famotidine    Tablet 20milliGRAM(s) Oral every 12 hours  meropenem IVPB 500milliGRAM(s) IV Intermittent every 8 hours  fluconAZOLE   Tablet 100milliGRAM(s) Oral daily  vancomycin    Solution 125milliGRAM(s) Oral every 6 hours  insulin lispro (HumaLOG) corrective regimen sliding scale  SubCutaneous three times a day before meals  dextrose 50% Injectable 12.5Gram(s) IV Push once  insulin glargine Injectable (LANTUS) 10Unit(s) SubCutaneous at bedtime  triamterene 37.5 mG/hydrochlorothiazide 25 mG Capsule 1Capsule(s) Oral daily  metoprolol 50milliGRAM(s) Oral two times a day  linezolid    Tablet 600milliGRAM(s) Oral every 12 hours  heparin  Injectable 5000Unit(s) SubCutaneous every 12 hours    MEDICATIONS  (PRN):  acetaminophen   Tablet 650milliGRAM(s) Oral every 6 hours PRN For Temp greater than 38 C (100.4 F) and headache  oxyCODONE IR 10milliGRAM(s) Oral every 4 hours PRN Moderate Pain  oxyCODONE IR 5milliGRAM(s) Oral every 4 hours PRN Mild Pain  morphine  - Injectable 4milliGRAM(s) IV Push every 4 hours PRN Severe Pain  aluminum hydroxide/magnesium hydroxide/simethicone Suspension 30milliLiter(s) Oral four times a day PRN Indigestion  ondansetron Injectable 4milliGRAM(s) IV Push every 6 hours PRN Nausea and/or Vomiting  magnesium hydroxide Suspension 30milliLiter(s) Oral daily PRN Constipation  bisacodyl Suppository 10milliGRAM(s) Rectal daily PRN If no bowel movement  diphenhydrAMINE   Capsule 25milliGRAM(s) Oral at bedtime PRN Insomnia  zolpidem 5milliGRAM(s) Oral at bedtime PRN Insomnia  dextrose Gel 1Dose(s) Oral once PRN Blood Glucose LESS THAN 70 milliGRAM(s)/deciliter  glucagon  Injectable 1milliGRAM(s) IntraMuscular once PRN Glucose LESS THAN 70 milligrams/deciliter      REVIEW OF SYSTEMS:  in pain  SKIN: multiple contractures    VITAL SIGNS:  T(C): 36.2, Max: 37.9 (01-19 @ 05:12)  T(F): 97.2, Max: 100.2 (01-19 @ 05:12)  HR: 82 (73 - 85)  BP: 148/84 (120/71 - 165/68)  RR: 16 (15 - 18)  SpO2: 95% (95% - 99%)  Wt(kg): --    PHYSICAL EXAM:    GENERAL: not in any distress  HEENT: Neck is supple, normocephalic, atraumatic   CHEST/LUNG: Clear to percussion bilaterally; No rales, rhonchi, wheezing  HEART: Regular rate and rhythm; No murmurs, rubs, or gallops  ABDOMEN: Soft, Nontender, Nondistended; Bowel sounds present, no rebound   EXTREMITIES:  2+ Peripheral Pulses, No clubbing, cyanosis, or edema  GENITOURINARY:   SKIN: Left lower extremity lateral wound . Large skin opening with tracking inferiorly. No gross purulence or bleeding. Sloughing and necrotic tissue debrided by Dr Glass, . Pt unable to flex foot. +sensation in foot  left hand status quo ; contracted ; thumb is warm other fingers status quo   Left chest dressing changed. Upper lateral breast incision with running suture, healing well and clean. CONRAD drain intact laterally, with sanguineous drainage, output 15cc recorded. New dry dressing applied.  BACK: no pressor sore   NERVOUS SYSTEM:  Alert & Oriented X3, Good concentration  PSYCH: normal affect     LABS:                         9.6    8.7   )-----------( 423      ( 19 Jan 2017 07:40 )             28.0     19 Jan 2017 07:40    139    |  99     |  29     ----------------------------<  126    3.7     |  35     |  1.36     Ca    7.8        19 Jan 2017 07:40      Culture Results:   Numerous Enterococcus faecium (vancomycin resistant)  Few Yeast (01-14 @ 03:53)                Radiology:    MPRESSION:    Left   Wrist  radiographs negative for fracture or dislocation. No   radiopaque foreign body identified. HPI:  Patient is a 66 year old female with unknown PMHx, per ED, patient found unresponsive . Patient was not seen by neighbors in two days, they became concerned and entered her home and found patient on the floor covered with feces.  In ED patient found CK >14,000, in renal failure, hyperkalemic, cocktail was given, including kayexelate. Then patient had melena. stool occult blood was positive, low plts and metabolic acidosis. Patient was initially admitted to Telemetry, however this AM, found to be hypotensive, 94/56 was given fluids. ICU consulted, initially   now several days on regular floor with multiple planned debridements by vascular and plastics   complicated by extensive areas of pressure necrosis and major issues with  left hand sp multiple debridements by plastics     Allergies    No Known Allergies    Intolerances        MEDICATIONS  (STANDING):  influenza   Vaccine 0.5milliLiter(s) IntraMuscular once  famotidine    Tablet 20milliGRAM(s) Oral every 12 hours  meropenem IVPB 500milliGRAM(s) IV Intermittent every 8 hours  fluconAZOLE   Tablet 100milliGRAM(s) Oral daily  vancomycin    Solution 125milliGRAM(s) Oral every 6 hours  insulin lispro (HumaLOG) corrective regimen sliding scale  SubCutaneous three times a day before meals  dextrose 50% Injectable 12.5Gram(s) IV Push once  insulin glargine Injectable (LANTUS) 10Unit(s) SubCutaneous at bedtime  triamterene 37.5 mG/hydrochlorothiazide 25 mG Capsule 1Capsule(s) Oral daily  metoprolol 50milliGRAM(s) Oral two times a day  linezolid    Tablet 600milliGRAM(s) Oral every 12 hours  heparin  Injectable 5000Unit(s) SubCutaneous every 12 hours    MEDICATIONS  (PRN):  acetaminophen   Tablet 650milliGRAM(s) Oral every 6 hours PRN For Temp greater than 38 C (100.4 F) and headache  oxyCODONE IR 10milliGRAM(s) Oral every 4 hours PRN Moderate Pain  oxyCODONE IR 5milliGRAM(s) Oral every 4 hours PRN Mild Pain  morphine  - Injectable 4milliGRAM(s) IV Push every 4 hours PRN Severe Pain  aluminum hydroxide/magnesium hydroxide/simethicone Suspension 30milliLiter(s) Oral four times a day PRN Indigestion  ondansetron Injectable 4milliGRAM(s) IV Push every 6 hours PRN Nausea and/or Vomiting  magnesium hydroxide Suspension 30milliLiter(s) Oral daily PRN Constipation  bisacodyl Suppository 10milliGRAM(s) Rectal daily PRN If no bowel movement  diphenhydrAMINE   Capsule 25milliGRAM(s) Oral at bedtime PRN Insomnia  zolpidem 5milliGRAM(s) Oral at bedtime PRN Insomnia  dextrose Gel 1Dose(s) Oral once PRN Blood Glucose LESS THAN 70 milliGRAM(s)/deciliter  glucagon  Injectable 1milliGRAM(s) IntraMuscular once PRN Glucose LESS THAN 70 milligrams/deciliter      REVIEW OF SYSTEMS:  in pain  SKIN: multiple contractures    VITAL SIGNS:  T(C): 36.2, Max: 37.9 (01-19 @ 05:12)  T(F): 97.2, Max: 100.2 (01-19 @ 05:12)  HR: 82 (73 - 85)  BP: 148/84 (120/71 - 165/68)  RR: 16 (15 - 18)  SpO2: 95% (95% - 99%)  Wt(kg): --    PHYSICAL EXAM:    GENERAL: not in any distress  HEENT: Neck is supple, normocephalic, atraumatic   CHEST/LUNG: Clear to percussion bilaterally; No rales, rhonchi, wheezing  HEART: Regular rate and rhythm; No murmurs, rubs, or gallops  ABDOMEN: Soft, Nontender, Nondistended; Bowel sounds present, no rebound   EXTREMITIES:  2+ Peripheral Pulses, No clubbing, cyanosis, or edema  GENITOURINARY:   SKIN: Left lower extremity lateral wound . Large skin opening with tracking inferiorly. No gross purulence or bleeding. Sloughing and necrotic tissue debrided by Dr Glass, . Pt unable to flex foot. +sensation in foot  left hand status quo ; contracted ; thumb is warm other fingers status quo   Left chest dressing changed. Upper lateral breast incision with running suture, healing well and clean. CONRAD drain intact laterally, with sanguineous drainage, output 15cc recorded. New dry dressing applied.  BACK: no pressor sore   NERVOUS SYSTEM:  Alert & Oriented X3, Good concentration  PSYCH: normal affect     LABS:                         9.6    8.7   )-----------( 423      ( 19 Jan 2017 07:40 )             28.0     19 Jan 2017 07:40    139    |  99     |  29     ----------------------------<  126    3.7     |  35     |  1.36     Ca    7.8        19 Jan 2017 07:40      Culture Results:   Numerous Enterococcus faecium (vancomycin resistant)  Few Yeast (01-14 @ 03:53)                Radiology:    MPRESSION:    Left   Wrist  radiographs negative for fracture or dislocation. No   radiopaque foreign body identified.

## 2017-01-19 NOTE — PROGRESS NOTE ADULT - SUBJECTIVE AND OBJECTIVE BOX
Patient is a 66y old  Female who presents with a chief complaint of unresponsiveness (19 Dec 2016 08:49)      INTERVAL HPI/OVERNIGHT EVENTS:    MEDICATIONS  (STANDING):  influenza   Vaccine 0.5milliLiter(s) IntraMuscular once  famotidine    Tablet 20milliGRAM(s) Oral every 12 hours  meropenem IVPB 500milliGRAM(s) IV Intermittent every 8 hours  fluconAZOLE   Tablet 100milliGRAM(s) Oral daily  vancomycin    Solution 125milliGRAM(s) Oral every 6 hours  insulin lispro (HumaLOG) corrective regimen sliding scale  SubCutaneous three times a day before meals  dextrose 50% Injectable 12.5Gram(s) IV Push once  insulin glargine Injectable (LANTUS) 10Unit(s) SubCutaneous at bedtime  triamterene 37.5 mG/hydrochlorothiazide 25 mG Capsule 1Capsule(s) Oral daily  metoprolol 50milliGRAM(s) Oral two times a day  linezolid    Tablet 600milliGRAM(s) Oral every 12 hours  heparin  Injectable 5000Unit(s) SubCutaneous every 12 hours    MEDICATIONS  (PRN):  acetaminophen   Tablet 650milliGRAM(s) Oral every 6 hours PRN For Temp greater than 38 C (100.4 F) and headache  oxyCODONE IR 10milliGRAM(s) Oral every 4 hours PRN Moderate Pain  oxyCODONE IR 5milliGRAM(s) Oral every 4 hours PRN Mild Pain  morphine  - Injectable 4milliGRAM(s) IV Push every 4 hours PRN Severe Pain  aluminum hydroxide/magnesium hydroxide/simethicone Suspension 30milliLiter(s) Oral four times a day PRN Indigestion  ondansetron Injectable 4milliGRAM(s) IV Push every 6 hours PRN Nausea and/or Vomiting  magnesium hydroxide Suspension 30milliLiter(s) Oral daily PRN Constipation  bisacodyl Suppository 10milliGRAM(s) Rectal daily PRN If no bowel movement  diphenhydrAMINE   Capsule 25milliGRAM(s) Oral at bedtime PRN Insomnia  zolpidem 5milliGRAM(s) Oral at bedtime PRN Insomnia  dextrose Gel 1Dose(s) Oral once PRN Blood Glucose LESS THAN 70 milliGRAM(s)/deciliter  glucagon  Injectable 1milliGRAM(s) IntraMuscular once PRN Glucose LESS THAN 70 milligrams/deciliter        REVIEW OF SYSTEMS:  CONSTITUTIONAL: No fever, weight loss, or fatigue  EYES: No eye pain, visual disturbances, or discharge  ENMT:  No difficulty hearing, tinnitus, vertigo; No sinus or throat pain  NECK: No pain or stiffness  RESPIRATORY: No cough, wheezing, chills or hemoptysis; No shortness of breath  CARDIOVASCULAR: No chest pain, palpitations, dizziness, or leg swelling  GASTROINTESTINAL: No abdominal or epigastric pain. No nausea, vomiting, or hematemesis; No diarrhea or constipation. No melena or hematochezia.  GENITOURINARY: No dysuria, frequency, hematuria, or incontinence  NEUROLOGICAL: No headaches, memory loss, loss of strength, numbness, or tremors  SKIN: No itching, burning, rashes, or lesions      Vital Signs Last 24 Hrs  T(C): 36.2, Max: 37.9 (01-19 @ 05:12)  T(F): 97.2, Max: 100.2 (01-19 @ 05:12)  HR: 82 (73 - 85)  BP: 148/84 (120/71 - 165/68)  BP(mean): --  RR: 16 (15 - 18)  SpO2: 95% (95% - 99%)    PHYSICAL EXAM:  GENERAL: NAD, well-groomed, well-developed  HEAD:  Atraumatic, Normocephalic  EYES: EOMI, PERRLA, conjunctiva and sclera clear  ENMT: No tonsillar erythema, exudates, or enlargement; Moist mucous membranes   NECK: Supple, No JVD   NERVOUS SYSTEM:  Alert & Oriented X3, Good concentration; Motor Strength 5/5 B/L upper and lower extremities; DTRs 2+ intact and symmetric  CHEST/LUNG: Clear to percussion bilaterally; No rales, rhonchi, wheezing, or rubs  HEART: Regular rate and rhythm; No murmurs, rubs, or gallops  ABDOMEN: Soft, Nontender, Nondistended; Bowel sounds present  EXTREMITIES:  2+ Peripheral Pulses, No clubbing, cyanosis, or edema  LYMPH: No lymphadenopathy noted  SKIN: No rashes or lesions    LABS:                        9.6    8.7   )-----------( 423      ( 19 Jan 2017 07:40 )             28.0     19 Jan 2017 07:40    139    |  99     |  29     ----------------------------<  126    3.7     |  35     |  1.36     Ca    7.8        19 Jan 2017 07:40          CAPILLARY BLOOD GLUCOSE  141 (19 Jan 2017 17:05)  189 (19 Jan 2017 11:50)  149 (19 Jan 2017 07:37)      RADIOLOGY & ADDITIONAL TESTS:    Imaging Personally Reviewed:  [ ] YES  [ ] NO    Consultant(s) Notes Reviewed:  [ ] YES  [ ] NO    Care Discussed with Consultants/Other Providers [ ] YES  [ ] NO

## 2017-01-19 NOTE — PROGRESS NOTE ADULT - PROBLEM SELECTOR PLAN 1
blood culture and urine culture grew E coli  Renal sonogram=no hydro  new culture E fecium  ; vancomycin resistant enterococcus  on t zyvox

## 2017-01-19 NOTE — PROGRESS NOTE ADULT - PROBLEM SELECTOR PLAN 4
vascular notes seen noted  S/P  I&D  left hand, thigh and leg  sp  redebridement friday  On Meropenem, Vancomycin and Diflucan   Local care for now

## 2017-01-19 NOTE — OCCUPATIONAL THERAPY INITIAL EVALUATION ADULT - MUSCLE TONE ASSESSMENT, REHAB EVAL
right-side extremities/normal/left-side extremities
normal/right-side extremities/left-side extremities

## 2017-01-19 NOTE — OCCUPATIONAL THERAPY INITIAL EVALUATION ADULT - TRANSFER SAFETY CONCERNS NOTED: SIT/STAND, REHAB EVAL
decreased safety awareness/inability to maintain weight-bearing restrictions w/o assist/decreased step length/losing balance/decreased balance during turns/decreased weight-shifting ability

## 2017-01-19 NOTE — OCCUPATIONAL THERAPY INITIAL EVALUATION ADULT - PLANNED THERAPY INTERVENTIONS, OT EVAL
bed mobility training/ROM/ADL retraining/strengthening/balance training/fine motor coordination training/prosthetic fitting/training/transfer training
ADL retraining/transfer training/bed mobility training/strengthening/ROM/balance training/fine motor coordination training

## 2017-01-19 NOTE — OCCUPATIONAL THERAPY INITIAL EVALUATION ADULT - LUE MMT, REHAB EVAL
LUE shoulder flexion 4/5, elbow flexion 3+/5, wrist flexion/extension 3/5, pip/dip flexion extension 2/5 due to edema.
LUE shoulder flexion 3+/5 LUE elbow flexion DNT due to arm in splint, LUE wrist and hand DNT due to arm in splint

## 2017-01-19 NOTE — OCCUPATIONAL THERAPY INITIAL EVALUATION ADULT - ADDITIONAL COMMENTS
As per pt and EMR. Pt lives in a house with 4 steps to enter with no railings. Once inside, the patient main bedroom and bathroom is on that level. The bathroom has a tub/ combination with no equipment or devices inside. Prior to hospitalization, the patient was ambulating with no device and was using public transportation to do food shopping and go to stores.
Pt lives in a private house with 2 steps to enter with no rails. Once inside, the patients main bedroom and bathroom is on that main level. The patient does not have any equipment or devices inside of the bathroom. Prior to hospitalization, the patient was ambulating without a device and was using public transportation to get to/from places of interest. The patient is able to state wants and needs.

## 2017-01-20 ENCOUNTER — TRANSCRIPTION ENCOUNTER (OUTPATIENT)
Age: 67
End: 2017-01-20

## 2017-01-20 NOTE — PRE-OP CHECKLIST - SELECT TESTS ORDERED
CXR/CBC/CMP/PT/PTT/EKG/Urinalysis/BMP/INR
EKG/CMP/CXR/CBC/Results in MD note/PT/PTT/INR/BMP
Results in MD note/EKG/CMP/CBC/CXR/PT/PTT/INR/BMP

## 2017-01-20 NOTE — PROGRESS NOTE ADULT - ASSESSMENT
Patient is a 66 year old female with unknown PMHx, per ED, patient found unresponsive, admitted to critical care for Metabolic Encephalopathy, Acute Renal Failure, Hyperkalemia, Thrombocytopenia, Rhabdomyolysis. ; all resolving nicely   issues that remain are essentially with large areas of skin necroses that had dev all over and now have been debrided  issues with hand noted  Scheduled for further debridement of left leg wound/necrotic leg muscles performed on 1/20/2017  Tentative plan thereafter is for [facility] dc early next week

## 2017-01-20 NOTE — PROGRESS NOTE ADULT - SUBJECTIVE AND OBJECTIVE BOX
Patient is a 66 year old female with unknown PMHx, per ED, patient found unresponsive . Patient was not seen by neighbors in two days, they became concerned and entered her home and found patient on the floor covered with feces.  In ED patient found CK >14,000, in renal failure, hyperkalemic, cocktail was given, including kayexelate. Then patient had melena. stool occult blood was positive, low plts and metabolic acidosis. Patient was initially admitted to Telemetry, however this AM, found to be hypotensive, 94/56 was given fluids. ICU consulted, initially   now several days on regular floor with multiple planned debridements by vascular and plastics   complicated by extensive areas of pressure necrosis and major issues with  left hand sp multiple debridements by plastics   and now vascular   Allergies    No Known Allergies    Intolerances        MEDICATIONS  (STANDING):  influenza   Vaccine 0.5milliLiter(s) IntraMuscular once  lactated ringers. 1000milliLiter(s) IV Continuous <Continuous>  famotidine    Tablet 20milliGRAM(s) Oral every 12 hours  meropenem IVPB 500milliGRAM(s) IV Intermittent every 8 hours  fluconAZOLE   Tablet 100milliGRAM(s) Oral daily  vancomycin    Solution 125milliGRAM(s) Oral every 6 hours  insulin lispro (HumaLOG) corrective regimen sliding scale  SubCutaneous three times a day before meals  dextrose 50% Injectable 12.5Gram(s) IV Push once  insulin glargine Injectable (LANTUS) 10Unit(s) SubCutaneous at bedtime  triamterene 37.5 mG/hydrochlorothiazide 25 mG Capsule 1Capsule(s) Oral daily  metoprolol 50milliGRAM(s) Oral two times a day  linezolid    Tablet 600milliGRAM(s) Oral every 12 hours  heparin  Injectable 5000Unit(s) SubCutaneous every 12 hours    MEDICATIONS  (PRN):  acetaminophen   Tablet 650milliGRAM(s) Oral every 6 hours PRN For Temp greater than 38 C (100.4 F) and headache  oxyCODONE IR 10milliGRAM(s) Oral every 4 hours PRN Moderate Pain  oxyCODONE IR 5milliGRAM(s) Oral every 4 hours PRN Mild Pain  morphine  - Injectable 4milliGRAM(s) IV Push every 4 hours PRN Severe Pain  aluminum hydroxide/magnesium hydroxide/simethicone Suspension 30milliLiter(s) Oral four times a day PRN Indigestion  ondansetron Injectable 4milliGRAM(s) IV Push every 6 hours PRN Nausea and/or Vomiting  magnesium hydroxide Suspension 30milliLiter(s) Oral daily PRN Constipation  diphenhydrAMINE   Capsule 25milliGRAM(s) Oral at bedtime PRN Insomnia  zolpidem 5milliGRAM(s) Oral at bedtime PRN Insomnia  dextrose Gel 1Dose(s) Oral once PRN Blood Glucose LESS THAN 70 milliGRAM(s)/deciliter  glucagon  Injectable 1milliGRAM(s) IntraMuscular once PRN Glucose LESS THAN 70 milligrams/deciliter      REVIEW OF SYSTEMS:  feels fine   just tired of being here    VITAL SIGNS:  T(C): 36.2, Max: 37.7 (01-20 @ 16:30)  T(F): 97.2, Max: 99.9 (01-20 @ 16:30)  HR: 76 (70 - 82)  BP: 158/75 (132/76 - 170/84)  RR: 16 (15 - 22)  SpO2: 96% (95% - 100%)  Wt(kg): --    PHYSICAL EXAM:    GENERAL: not in any distress  HEENT: Neck is supple, normocephalic, atraumatic   CHEST/LUNG: Clear to percussion bilaterally; No rales, rhonchi, wheezing  HEART: Regular rate and rhythm; No murmurs, rubs, or gallops  ABDOMEN: Soft, Nontender, Nondistended; Bowel sounds present, no rebound   EXTREMITIES:  2+ Peripheral Pulses, No clubbing, cyanosis, or edema  contracture left hand   weak left leg  GENITOURINARY: NEGATIVE   SKIN: extensive areas of necroses breast left leg thigh and hand   BACK: no pressor sore   NERVOUS SYSTEM:  Alert & Oriented X3, Good concentration  PSYCH: normal affect     LABS:                         10.5   7.5   )-----------( 425      ( 20 Jan 2017 07:05 )             31.1     20 Jan 2017 07:05    141    |  101    |  28     ----------------------------<  167    4.0     |  32     |  1.30     Ca    8.2        20 Jan 2017 07:05        PT/INR - ( 20 Jan 2017 07:05 )   PT: 12.1 sec;   INR: 1.08 ratio         PTT - ( 20 Jan 2017 07:05 )  PTT:29.3 sec                        Culture Results:   Numerous Enterococcus faecium (vancomycin resistant)  Few Yeast (01-14 @ 03:53)                Radiology:

## 2017-01-20 NOTE — PROGRESS NOTE ADULT - SUBJECTIVE AND OBJECTIVE BOX
Patient is a 66y old  Female who presents with a chief complaint of unresponsiveness (19 Dec 2016 08:49)      INTERVAL HPI/OVERNIGHT EVENTS:  s/p debridement of left leg wound  10cc sanguineous drainage in CONRAD drain to left chest    MEDICATIONS  (STANDING):  influenza   Vaccine 0.5milliLiter(s) IntraMuscular once  famotidine    Tablet 20milliGRAM(s) Oral every 12 hours  meropenem IVPB 500milliGRAM(s) IV Intermittent every 8 hours  fluconAZOLE   Tablet 100milliGRAM(s) Oral daily  vancomycin    Solution 125milliGRAM(s) Oral every 6 hours  insulin lispro (HumaLOG) corrective regimen sliding scale  SubCutaneous three times a day before meals  dextrose 50% Injectable 12.5Gram(s) IV Push once  insulin glargine Injectable (LANTUS) 10Unit(s) SubCutaneous at bedtime  triamterene 37.5 mG/hydrochlorothiazide 25 mG Capsule 1Capsule(s) Oral daily  metoprolol 50milliGRAM(s) Oral two times a day  linezolid    Tablet 600milliGRAM(s) Oral every 12 hours  heparin  Injectable 5000Unit(s) SubCutaneous every 12 hours    MEDICATIONS  (PRN):  acetaminophen   Tablet 650milliGRAM(s) Oral every 6 hours PRN For Temp greater than 38 C (100.4 F) and headache  oxyCODONE IR 10milliGRAM(s) Oral every 4 hours PRN Moderate Pain  oxyCODONE IR 5milliGRAM(s) Oral every 4 hours PRN Mild Pain  morphine  - Injectable 4milliGRAM(s) IV Push every 4 hours PRN Severe Pain  aluminum hydroxide/magnesium hydroxide/simethicone Suspension 30milliLiter(s) Oral four times a day PRN Indigestion  ondansetron Injectable 4milliGRAM(s) IV Push every 6 hours PRN Nausea and/or Vomiting  magnesium hydroxide Suspension 30milliLiter(s) Oral daily PRN Constipation  bisacodyl Suppository 10milliGRAM(s) Rectal daily PRN If no bowel movement  diphenhydrAMINE   Capsule 25milliGRAM(s) Oral at bedtime PRN Insomnia  zolpidem 5milliGRAM(s) Oral at bedtime PRN Insomnia  dextrose Gel 1Dose(s) Oral once PRN Blood Glucose LESS THAN 70 milliGRAM(s)/deciliter  glucagon  Injectable 1milliGRAM(s) IntraMuscular once PRN Glucose LESS THAN 70 milligrams/deciliter        REVIEW OF SYSTEMS:  CONSTITUTIONAL: No fever, weight loss, or fatigue  EYES: No eye pain, visual disturbances, or discharge  ENMT:  No difficulty hearing, tinnitus, vertigo; No sinus or throat pain  NECK: No pain or stiffness  RESPIRATORY: No cough, wheezing, chills or hemoptysis; No shortness of breath  CARDIOVASCULAR: No chest pain, palpitations, dizziness, or leg swelling  GASTROINTESTINAL: No abdominal or epigastric pain. No nausea, vomiting, or hematemesis; No diarrhea or constipation. No melena or hematochezia.  GENITOURINARY: No dysuria, frequency, hematuria, or incontinence  NEUROLOGICAL: No headaches, memory loss, loss of strength, numbness, or tremors  SKIN: No itching, burning, rashes, or lesions      Vital Signs Last 24 Hrs  T(C): 36.2, Max: 37.9 (01-19 @ 05:12)  T(F): 97.2, Max: 100.2 (01-19 @ 05:12)  HR: 82 (73 - 85)  BP: 148/84 (120/71 - 165/68)  BP(mean): --  RR: 16 (15 - 18)  SpO2: 95% (95% - 99%)    PHYSICAL EXAM:  GENERAL: NAD, well-groomed, well-developed  HEAD:  Atraumatic, Normocephalic  EYES: EOMI, PERRLA, conjunctiva and sclera clear  ENMT: No tonsillar erythema, exudates, or enlargement; Moist mucous membranes   NECK: Supple, No JVD   NERVOUS SYSTEM:  Alert & Oriented X3, Good concentration; Motor Strength 5/5 B/L upper and lower extremities; DTRs 2+ intact and symmetric  CHEST/LUNG: Clear to percussion bilaterally; No rales, rhonchi, wheezing, or rubs  HEART: Regular rate and rhythm; No murmurs, rubs, or gallops  ABDOMEN: Soft, Nontender, Nondistended; Bowel sounds present  EXTREMITIES:  2+ Peripheral Pulses, No clubbing, cyanosis, or edema  LYMPH: No lymphadenopathy noted  SKIN: No rashes or lesions    LABS:                        9.6    8.7   )-----------( 423      ( 19 Jan 2017 07:40 )             28.0     19 Jan 2017 07:40    139    |  99     |  29     ----------------------------<  126    3.7     |  35     |  1.36     Ca    7.8        19 Jan 2017 07:40          CAPILLARY BLOOD GLUCOSE  141 (19 Jan 2017 17:05)  189 (19 Jan 2017 11:50)  149 (19 Jan 2017 07:37)      RADIOLOGY & ADDITIONAL TESTS:    Imaging Personally Reviewed:  [ ] YES  [ ] NO    Consultant(s) Notes Reviewed:  [ ] YES  [ ] NO    Care Discussed with Consultants/Other Providers [ ] YES  [ ] NO

## 2017-01-20 NOTE — BRIEF OPERATIVE NOTE - OPERATION/FINDINGS
debridement and drainage of left hand, wrist, forearm and NICHOLE
debridement gangrenous tissue of left hand, wrist and forearm
debridement of gangrenous tissues of left upper and lower exts
Finding: Necrotic tissue in Left axilla wound over pectoralis muscles, necrotic tissue over volar left wrist and hand.  Debridement of necrotic tissue left axilla and left volar wrist. Removal of necrotic pectoralis tissue and closure with CONRAD drain placed.  Dressing and splint placed over left wrist.
necrotic muscle/tendon

## 2017-01-20 NOTE — PROGRESS NOTE ADULT - SUBJECTIVE AND OBJECTIVE BOX
66y year old Female POD#0 s/p incision and drainage of left lower extremity deep tissue injury wound.    Patient is seen and examined at bedside without complaint.  Pain is well controlled. Tolerating diet.     Vital Signs Last 24 Hrs  T(F): 97.2, Max: 99.9 (01-20 @ 16:30)  HR: 76  BP: 158/75  RR: 16  SpO2: 96%    CAPILLARY BLOOD GLUCOSE  171 (20 Jan 2017 16:13)    GENERAL: Alert, NAD  CHEST: respirations nonlabored  ABDOMEN: soft, nondistended.   EXTREMITIES:  left lower leg dressing CDI no staining      Impression: 66 year old female admitted s/p fall to floor with deep tissue injury to left hand, left chest and left lower leg, rhabdomyolysis, s/p multiple debridements now POD#0 s/p I&D left lower leg    - local wound care - for PT wound care assessment tomorrow for wound vac. Explained to pt  - continue present medical management   -dressing changes to left chest and hand daily per RN. Drain care.  -f/u AM labs 66y year old Female POD#0 s/p debridement of necrotic left lower extremity wound.    Patient is seen and examined at bedside without complaint.  Pain is well controlled. Tolerating diet.     Vital Signs Last 24 Hrs  T(F): 97.2, Max: 99.9 (01-20 @ 16:30)  HR: 76  BP: 158/75  RR: 16  SpO2: 96%    CAPILLARY BLOOD GLUCOSE  171 (20 Jan 2017 16:13)    GENERAL: Alert, NAD  CHEST: respirations nonlabored  ABDOMEN: soft, nondistended.   EXTREMITIES:  left lower leg dressing CDI no staining      Impression: 66 year old female admitted s/p fall to floor with deep tissue injury to left hand, left chest and left lower leg, rhabdomyolysis, s/p multiple debridements now POD#0 s/p debridement of necrotic left lower extremity wound.    - local wound care - for PT wound care assessment tomorrow for wound vac. Explained to pt  - continue present medical management   -dressing changes to left chest and hand daily per RN. Drain care.  -f/u AM labs

## 2017-01-20 NOTE — PROGRESS NOTE ADULT - PROBLEM SELECTOR PLAN 4
vascular notes seen noted  S/P  I&D  left hand, thigh and leg  sp  redebridement   On Meropenem, zyvox  and Diflucan   Local care for now  discharge plan on antibiotics to finish 2-4 weeks more once sx wise stable

## 2017-01-20 NOTE — BRIEF OPERATIVE NOTE - PRE-OP DX
Pressure injury of deep tissue  01/17/2017    Active  Yannick Calvert
Pressure injury of deep tissue  01/17/2017    Active  Yannick Calvert

## 2017-01-20 NOTE — PRE-OP CHECKLIST - NOTHING BY MOUTH SINCE
03-Jan-2017 00:00
12-Jan-2017 11:59
16-Jan-2017 23:59
19-Jan-2017 23:59
28-Dec-2016 23:59
29-Dec-2016 23:59

## 2017-01-21 NOTE — PHYSICAL THERAPY INITIAL EVALUATION ADULT - GENERAL OBSERVATIONS, REHAB EVAL
Pt encountered in supine on room air, + freitas, +IV line, dressing L lower leg, L hip, and L forearm/wrist region intact, LUE on foam pad suspended by IV pole to maintain shoulder flexion, cardiac monitor intact, A&Ox4 but lethargic and with periods of short attention span and confusion
Found supine, alert, cooperative, not in distress, dressing and splint LUE, on room air.
Patient seen supine in bed with wound dressings clean, dry, and intact with no apparent distress.

## 2017-01-21 NOTE — PHYSICAL THERAPY INITIAL EVALUATION ADULT - LIVES WITH, PROFILE
Pt. stated she lives alone in apt. has steps to enter  with rail/alone
alone/Per pt report, lives alone in private home with no steps to enter and no steps inside except to go to basement. Pt unsure of what side railing is on.
alone

## 2017-01-21 NOTE — PHYSICAL THERAPY INITIAL EVALUATION ADULT - CRITERIA FOR SKILLED THERAPEUTIC INTERVENTIONS
predicted duration of therapy intervention/functional limitations in following categories/rehab potential/risk reduction/prevention/anticipated discharge recommendation/impairments found
anticipated discharge recommendation/impairments found/risk reduction/prevention/functional limitations in following categories
predicted duration of therapy intervention/Subacute Rehab/therapy frequency/risk reduction/prevention/rehab potential/anticipated discharge recommendation/functional limitations in following categories/impairments found

## 2017-01-21 NOTE — PHYSICAL THERAPY INITIAL EVALUATION ADULT - REHAB POTENTIAL, PT EVAL
good, to achieve stated therapy goals

## 2017-01-21 NOTE — PHYSICAL THERAPY INITIAL EVALUATION ADULT - LEVEL OF INDEPENDENCE: SUPINE/SIT, REHAB EVAL
minimum assist (75% patients effort)
minimum assist (75% patients effort)
moderate assist (50% patients effort)

## 2017-01-21 NOTE — PHYSICAL THERAPY INITIAL EVALUATION ADULT - GAIT DEVIATIONS NOTED, PT EVAL
decreased step length/decreased brian
decreased brian/decreased stride length/increased stride width/decreased weight-shifting ability/decreased step length

## 2017-01-21 NOTE — PHYSICAL THERAPY INITIAL EVALUATION ADULT - PHYSICAL ASSIST/NONPHYSICAL ASSIST: SUPINE/SIT, REHAB EVAL
verbal cues/set-up required/1 person assist
1 person assist/for LE management and line management/verbal cues/nonverbal cues (demo/gestures)/set-up required
verbal cues/nonverbal cues (demo/gestures)/1 person assist

## 2017-01-21 NOTE — PHYSICAL THERAPY INITIAL EVALUATION ADULT - MUSCLE TONE ASSESSMENT, REHAB EVAL
left-side extremities/normal/right-side extremities
bilateral lower extremities/Right UE/normal
left-side extremities/mildly decreased tone

## 2017-01-21 NOTE — PHYSICAL THERAPY INITIAL EVALUATION ADULT - PHYSICAL ASSIST/NONPHYSICAL ASSIST: SIT/STAND, REHAB EVAL
1 person assist/verbal cues/set-up required
nonverbal cues (demo/gestures)/verbal cues/1 person assist

## 2017-01-21 NOTE — PHYSICAL THERAPY INITIAL EVALUATION ADULT - PLANNED THERAPY INTERVENTIONS, PT EVAL
balance training/transfer training/bed mobility training/gait training/strengthening/ROM/postural re-education
bed mobility training/transfer training/balance training/strengthening/postural re-education/gait training
balance training/postural re-education/transfer training/bed mobility training/ROM/strengthening/gait training

## 2017-01-21 NOTE — PROGRESS NOTE ADULT - PROBLEM SELECTOR PLAN 6
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

## 2017-01-21 NOTE — PHYSICAL THERAPY INITIAL EVALUATION ADULT - FUNCTIONAL LIMITATIONS, PT EVAL
home management/community/leisure/self-care
self-care/community/leisure/home management
community/leisure/home management/self-care

## 2017-01-21 NOTE — PHYSICAL THERAPY INITIAL EVALUATION ADULT - SOCIAL CONCERNS
Pt. is concerned that she will not have any help at home since she lives alone
None
Complex psychosocial needs/coping issues

## 2017-01-21 NOTE — PHYSICAL THERAPY INITIAL EVALUATION ADULT - IMPAIRMENTS FOUND, PT EVAL
posture/ergonomics and body mechanics/gait, locomotion, and balance/ROM/arousal, attention, and cognition/ventilation and respiration/gas exchange/muscle strength/aerobic capacity/endurance
posture/muscle strength/ergonomics and body mechanics/gait, locomotion, and balance/aerobic capacity/endurance
ROM/muscle strength/gait, locomotion, and balance/posture

## 2017-01-21 NOTE — PHYSICAL THERAPY INITIAL EVALUATION ADULT - TRANSFER SAFETY CONCERNS NOTED: BED/CHAIR, REHAB EVAL
decreased safety awareness/decreased step length/decreased balance during turns
decreased step length/decreased balance during turns/decreased sequencing ability/decreased weight-shifting ability

## 2017-01-21 NOTE — PHYSICAL THERAPY INITIAL EVALUATION ADULT - FOLLOWS COMMANDS/ANSWERS QUESTIONS, REHAB EVAL
75% of the time
needs increased time to follow/able to follow single-step instructions/75% of the time
75% of the time

## 2017-01-21 NOTE — PHYSICAL THERAPY INITIAL EVALUATION ADULT - PHYSICAL ASSIST/NONPHYSICAL ASSIST: GAIT, REHAB EVAL
set-up required/1 person assist/verbal cues
verbal cues/nonverbal cues (demo/gestures)/1 person assist

## 2017-01-21 NOTE — PROGRESS NOTE ADULT - PROBLEM SELECTOR PLAN 4
vascular notes seen noted  S/P  I&D  left hand, thigh and leg  sp  redebridement friday  On Meropenem, Vancomycin and Diflucan   Local care for now  Continue wound vac therapy to left upper and lower extremities

## 2017-01-21 NOTE — PHYSICAL THERAPY INITIAL EVALUATION ADULT - LEVEL OF INDEPENDENCE: CHAIR TO BED, REHAB EVAL
minimum assist (75% patients effort)
TBA due to pt c/o fatigue
moderate assist (50% patients effort)

## 2017-01-21 NOTE — PHYSICAL THERAPY INITIAL EVALUATION ADULT - SKIN COLOR/CHARACTERISTICS
LUE surgical site not visible covered with dressing and ace bandage
bruised (ecchymotic)/L forearm/hand, L hip, L lower leg
mottled/redness blanchable

## 2017-01-21 NOTE — PHYSICAL THERAPY INITIAL EVALUATION ADULT - PHYSICAL ASSIST/NONPHYSICAL ASSIST: SIT/SUPINE, REHAB EVAL
verbal cues/set-up required/1 person assist
for LE management and line management/set-up required/verbal cues/1 person assist
nonverbal cues (demo/gestures)/1 person assist/verbal cues

## 2017-01-21 NOTE — PHYSICAL THERAPY INITIAL EVALUATION ADULT - IMPAIRMENTS CONTRIBUTING IMPAIRED BED MOBILITY, REHAB EVAL
decreased strength/impaired balance
decreased strength/pain/impaired balance/decreased ROM/impaired postural control

## 2017-01-21 NOTE — PHYSICAL THERAPY INITIAL EVALUATION ADULT - MODIFIED CLINICAL TEST OF SENSORY INTEGRATION IN BALANCE TEST
Barthel Index: Feeding 10, Bathing 0, Grooming 5, Dressing 5, Bowels 5, Bladder 0, Toilet Use TBA, Transfers TBA, Mobility TBA, Stairs TBA  =  25/100. Unable to complete full assessment due to pt c/o fatigue
Barthels score: 40/100
Barthel Index: Feeding Score _5__, Bathing Score _0__, Grooming Score _0__, Dressing Score _5__, Bowels Score _10__, Bladder Score _10__, Toilet Score _5__, Transfers Score _5__, Mobility Score _0__, Stairs Score _0__,     Total Score _40__

## 2017-01-21 NOTE — PROGRESS NOTE ADULT - SUBJECTIVE AND OBJECTIVE BOX
Patient is a 66y old  Female who presents with a chief complaint of unresponsiveness (19 Dec 2016 08:49)      INTERVAL HPI/OVERNIGHT EVENTS:  Afebrile today  Fever 101.8F on 1/20/2017  Wound vac noted to wounds of left upper and lower extremities    s/p debridement of left leg wound on 1/20/2017  10cc sanguineous drainage in CONRAD drain to left chest    MEDICATIONS  (STANDING):  influenza   Vaccine 0.5milliLiter(s) IntraMuscular once  famotidine    Tablet 20milliGRAM(s) Oral every 12 hours  meropenem IVPB 500milliGRAM(s) IV Intermittent every 8 hours  fluconAZOLE   Tablet 100milliGRAM(s) Oral daily  vancomycin    Solution 125milliGRAM(s) Oral every 6 hours  insulin lispro (HumaLOG) corrective regimen sliding scale  SubCutaneous three times a day before meals  dextrose 50% Injectable 12.5Gram(s) IV Push once  insulin glargine Injectable (LANTUS) 10Unit(s) SubCutaneous at bedtime  triamterene 37.5 mG/hydrochlorothiazide 25 mG Capsule 1Capsule(s) Oral daily  metoprolol 50milliGRAM(s) Oral two times a day  linezolid    Tablet 600milliGRAM(s) Oral every 12 hours  heparin  Injectable 5000Unit(s) SubCutaneous every 12 hours    MEDICATIONS  (PRN):  acetaminophen   Tablet 650milliGRAM(s) Oral every 6 hours PRN For Temp greater than 38 C (100.4 F) and headache  oxyCODONE IR 10milliGRAM(s) Oral every 4 hours PRN Moderate Pain  oxyCODONE IR 5milliGRAM(s) Oral every 4 hours PRN Mild Pain  morphine  - Injectable 4milliGRAM(s) IV Push every 4 hours PRN Severe Pain  aluminum hydroxide/magnesium hydroxide/simethicone Suspension 30milliLiter(s) Oral four times a day PRN Indigestion  ondansetron Injectable 4milliGRAM(s) IV Push every 6 hours PRN Nausea and/or Vomiting  magnesium hydroxide Suspension 30milliLiter(s) Oral daily PRN Constipation  bisacodyl Suppository 10milliGRAM(s) Rectal daily PRN If no bowel movement  diphenhydrAMINE   Capsule 25milliGRAM(s) Oral at bedtime PRN Insomnia  zolpidem 5milliGRAM(s) Oral at bedtime PRN Insomnia  dextrose Gel 1Dose(s) Oral once PRN Blood Glucose LESS THAN 70 milliGRAM(s)/deciliter  glucagon  Injectable 1milliGRAM(s) IntraMuscular once PRN Glucose LESS THAN 70 milligrams/deciliter        REVIEW OF SYSTEMS:  CONSTITUTIONAL: No fever, weight loss, or fatigue  EYES: No eye pain, visual disturbances, or discharge  ENMT:  No difficulty hearing, tinnitus, vertigo; No sinus or throat pain  NECK: No pain or stiffness  RESPIRATORY: No cough, wheezing, chills or hemoptysis; No shortness of breath  CARDIOVASCULAR: No chest pain, palpitations, dizziness, or leg swelling  GASTROINTESTINAL: No abdominal or epigastric pain. No nausea, vomiting, or hematemesis; No diarrhea or constipation. No melena or hematochezia.  GENITOURINARY: No dysuria, frequency, hematuria, or incontinence  NEUROLOGICAL: No headaches, memory loss, loss of strength, numbness, or tremors  SKIN: No itching, burning, rashes, or lesions      Vital Signs Last 24 Hrs  T(C): 36.2, Max: 37.9 (01-19 @ 05:12)  T(F): 97.2, Max: 100.2 (01-19 @ 05:12)  HR: 82 (73 - 85)  BP: 148/84 (120/71 - 165/68)  BP(mean): --  RR: 16 (15 - 18)  SpO2: 95% (95% - 99%)    PHYSICAL EXAM:  GENERAL: NAD, well-groomed, well-developed  HEAD:  Atraumatic, Normocephalic  EYES: EOMI, PERRLA, conjunctiva and sclera clear  ENMT: No tonsillar erythema, exudates, or enlargement; Moist mucous membranes   NECK: Supple, No JVD   NERVOUS SYSTEM:  Alert & Oriented X3, Good concentration; Motor Strength 5/5 B/L upper and lower extremities; DTRs 2+ intact and symmetric  CHEST/LUNG: Clear to percussion bilaterally; No rales, rhonchi, wheezing, or rubs  HEART: Regular rate and rhythm; No murmurs, rubs, or gallops  ABDOMEN: Soft, Nontender, Nondistended; Bowel sounds present  EXTREMITIES: +WOUND VAC  LYMPH: No lymphadenopathy noted  SKIN: No rashes or lesions    LABS:                        9.6    8.7   )-----------( 423      ( 19 Jan 2017 07:40 )             28.0     19 Jan 2017 07:40    139    |  99     |  29     ----------------------------<  126    3.7     |  35     |  1.36     Ca    7.8        19 Jan 2017 07:40          CAPILLARY BLOOD GLUCOSE  141 (19 Jan 2017 17:05)  189 (19 Jan 2017 11:50)  149 (19 Jan 2017 07:37)      RADIOLOGY & ADDITIONAL TESTS:    Imaging Personally Reviewed:  [ ] YES  [ ] NO    Consultant(s) Notes Reviewed:  [ ] YES  [ ] NO    Care Discussed with Consultants/Other Providers [ ] YES  [ ] NO

## 2017-01-21 NOTE — PHYSICAL THERAPY INITIAL EVALUATION ADULT - PHYSICAL ASSIST/NONPHYSICAL ASSIST: SCOOT/BRIDGE, REHAB EVAL
set-up required/verbal cues/1 person assist
supervision/1 person assist/verbal cues
verbal cues/1 person assist/nonverbal cues (demo/gestures)

## 2017-01-21 NOTE — PHYSICAL THERAPY INITIAL EVALUATION ADULT - GROSSLY INTACT, SENSORY
Mildly impaired, may be due to pt demonstrating decreased attention span with sensory assessment
Grossly Intact
Grossly diminished

## 2017-01-21 NOTE — PHYSICAL THERAPY INITIAL EVALUATION ADULT - TRANSFER SAFETY CONCERNS NOTED: SIT/STAND, REHAB EVAL
losing balance/decreased balance during turns/decreased step length
decreased sequencing ability/decreased balance during turns/decreased weight-shifting ability/decreased step length

## 2017-01-21 NOTE — PHYSICAL THERAPY INITIAL EVALUATION ADULT - LEVEL OF INDEPENDENCE: STAND/SIT, REHAB EVAL
TBA due to pt c/o fatigue
minimum assist (75% patients effort)
moderate assist (50% patients effort)

## 2017-01-21 NOTE — PHYSICAL THERAPY INITIAL EVALUATION ADULT - PERTINENT HX OF CURRENT PROBLEM, REHAB EVAL
Pt found down on floor of home by neighbors and was brought to ED, diagnosed with altered mental status, acute renal failure, and acute rhabdomyolysis. Radiographs show negative result for fractures L wrist.
Patient re-evaluated secondary to wound vac assessment and application request by Dr.'s Forbes and Quan s/p debridement of LUE and LLE pressure ulcers.

## 2017-01-21 NOTE — PHYSICAL THERAPY INITIAL EVALUATION ADULT - LEVEL OF INDEPENDENCE: SIT/SUPINE, REHAB EVAL
minimum assist (75% patients effort)

## 2017-01-21 NOTE — PHYSICAL THERAPY INITIAL EVALUATION ADULT - ORIENTATION, REHAB EVAL
oriented to person, place, time and situation
oriented to person, place, time and situation
place/person

## 2017-01-21 NOTE — PHYSICAL THERAPY INITIAL EVALUATION ADULT - IMPAIRED TRANSFERS: BED/CHAIR, REHAB EVAL
decreased strength/impaired balance
impaired balance/decreased strength/impaired postural control/decreased ROM/pain

## 2017-01-21 NOTE — PHYSICAL THERAPY INITIAL EVALUATION ADULT - ADDITIONAL COMMENTS
Pt. stated that she used to be able to do ADLs independently without use of any walking device.
Pt states she was independent prior to admission and did not use/does not own any assistive devices.
Pt. stated that she used to be able to do ADLs independently without use of any walking device.

## 2017-01-21 NOTE — PHYSICAL THERAPY INITIAL EVALUATION ADULT - PHYSICAL ASSIST/NONPHYSICAL ASSIST: STAND/SIT, REHAB EVAL
verbal cues/1 person assist/set-up required
verbal cues/1 person assist/nonverbal cues (demo/gestures)

## 2017-01-21 NOTE — PHYSICAL THERAPY INITIAL EVALUATION ADULT - PHYSICAL ASSIST/NONPHYSICAL ASSIST: CHAIR TO BED, REHAB EVAL
verbal cues/1 person assist/set-up required
1 person assist/nonverbal cues (demo/gestures)/verbal cues

## 2017-01-21 NOTE — PHYSICAL THERAPY INITIAL EVALUATION ADULT - MANUAL MUSCLE TESTING RESULTS, REHAB EVAL
RUE and both LE grossly  4-/5
Functional assessment: RLE 4/5, LUE  3+/5, LLE 3-/5, RLE 4/5
Grossly 3+/5, L Hand: 1/5

## 2017-01-21 NOTE — PHYSICAL THERAPY INITIAL EVALUATION ADULT - PHYSICAL ASSIST/NONPHYSICAL ASSIST: BED TO CHAIR, REHAB EVAL
set-up required/verbal cues/1 person assist
verbal cues/1 person assist/nonverbal cues (demo/gestures)

## 2017-01-21 NOTE — PHYSICAL THERAPY INITIAL EVALUATION ADULT - IMPAIRMENTS CONTRIBUTING TO GAIT DEVIATIONS, PT EVAL
decreased strength/impaired balance
impaired postural control/pain/impaired balance/decreased ROM/decreased strength

## 2017-01-21 NOTE — PHYSICAL THERAPY INITIAL EVALUATION ADULT - DIAGNOSIS, PT EVAL
Decreased strength LE, general endurance, ambulation balance.
decreased strength LLE, decreased strength LUE, decreased activity tolerance, decreased general balance
Decreased functional mobility and integumentary integrity loss

## 2017-01-21 NOTE — PHYSICAL THERAPY INITIAL EVALUATION ADULT - LEVEL OF INDEPENDENCE: SCOOT/BRIDGE, REHAB EVAL
minimum assist (75% patients effort)
moderate assist (50% patients effort)
minimum assist (75% patients effort)

## 2017-01-21 NOTE — PHYSICAL THERAPY INITIAL EVALUATION ADULT - PATIENT/FAMILY/SIGNIFICANT OTHER GOALS STATEMENT, PT EVAL
Pt. stated "I want to be able to walk again independently".
Pt thinks she needs rehab
"I want these wounds to close."

## 2017-01-21 NOTE — PHYSICAL THERAPY INITIAL EVALUATION ADULT - DISCHARGE DISPOSITION, PT EVAL
rehabilitation facility/short term rehab
short term rehab/rehabilitation facility
Subacute Rehab/rehabilitation facility

## 2017-01-21 NOTE — PROGRESS NOTE ADULT - PROBLEM SELECTOR PLAN 1
blood culture and urine culture grew E coli  Renal sonogram=no hydro  new culture E fecium  ; vancomycin resistant enterococcus  on t zyvox  Repeat blood culture 1/21/2017:

## 2017-01-21 NOTE — PHYSICAL THERAPY INITIAL EVALUATION ADULT - PHYSICAL ASSIST/NONPHYSICAL ASSIST, REHAB EVAL
supervision/nonverbal cues (demo/gestures)/verbal cues
verbal cues/1 person assist/set-up required
set-up required/supervision/1 person assist/verbal cues

## 2017-01-21 NOTE — PHYSICAL THERAPY INITIAL EVALUATION ADULT - BED MOBILITY LIMITATIONS, REHAB EVAL
impaired ability to control trunk for mobility/decreased ability to use arms for pushing/pulling/decreased ability to use legs for bridging/pushing
impaired ability to control trunk for mobility/decreased ability to use arms for pushing/pulling/decreased ability to use legs for bridging/pushing

## 2017-01-22 NOTE — PROGRESS NOTE ADULT - ASSESSMENT
Patient is a 66 year old female with unknown PMHx, per ED, patient found unresponsive, admitted to critical care for Metabolic Encephalopathy, Acute Renal Failure, Hyperkalemia, Thrombocytopenia, Rhabdomyolysis. ; all resolving nicely   issues that remain are essentially with large areas of skin necroses that had dev all over and now have been debrided  issues with hand noted  sp  debridement of breast and leg   diarrhea is resolved but had classic foul odour and mucus

## 2017-01-22 NOTE — PROVIDER CONTACT NOTE (CRITICAL VALUE NOTIFICATION) - ASSESSMENT
Pt is currently on Zyvox po.  Pt placed on isolation
Pt is currently on Zyvox po.  Pt placed on isolation

## 2017-01-22 NOTE — PROGRESS NOTE ADULT - PROBLEM SELECTOR PLAN 4
vascular notes seen noted  S/P  I&D  left hand, thigh and leg  sp  redebridement   On Meropenem, zyvox  and Diflucan   Local care for now  discharge plan on antibiotics to finish 2 weeks   need to see leg wound

## 2017-01-22 NOTE — PROGRESS NOTE ADULT - SUBJECTIVE AND OBJECTIVE BOX
Patient is a 66y old  Female who presents with a chief complaint of unresponsiveness (19 Dec 2016 08:49)      INTERVAL HPI/OVERNIGHT EVENTS:  Remains afebrile  Repeat blood cultures pending  surgical swab: rare streptococcus species    Fever 101.8F on 1/20/2017  Wound vac noted to wounds of left upper and lower extremities    s/p debridement of left leg wound on 1/20/2017  10cc sanguineous drainage in CONRAD drain to left chest    MEDICATIONS  (STANDING):  influenza   Vaccine 0.5milliLiter(s) IntraMuscular once  famotidine    Tablet 20milliGRAM(s) Oral every 12 hours  meropenem IVPB 500milliGRAM(s) IV Intermittent every 8 hours  fluconAZOLE   Tablet 100milliGRAM(s) Oral daily  vancomycin    Solution 125milliGRAM(s) Oral every 6 hours  insulin lispro (HumaLOG) corrective regimen sliding scale  SubCutaneous three times a day before meals  dextrose 50% Injectable 12.5Gram(s) IV Push once  insulin glargine Injectable (LANTUS) 10Unit(s) SubCutaneous at bedtime  triamterene 37.5 mG/hydrochlorothiazide 25 mG Capsule 1Capsule(s) Oral daily  metoprolol 50milliGRAM(s) Oral two times a day  linezolid    Tablet 600milliGRAM(s) Oral every 12 hours  heparin  Injectable 5000Unit(s) SubCutaneous every 12 hours    MEDICATIONS  (PRN):  acetaminophen   Tablet 650milliGRAM(s) Oral every 6 hours PRN For Temp greater than 38 C (100.4 F) and headache  oxyCODONE IR 10milliGRAM(s) Oral every 4 hours PRN Moderate Pain  oxyCODONE IR 5milliGRAM(s) Oral every 4 hours PRN Mild Pain  morphine  - Injectable 4milliGRAM(s) IV Push every 4 hours PRN Severe Pain  aluminum hydroxide/magnesium hydroxide/simethicone Suspension 30milliLiter(s) Oral four times a day PRN Indigestion  ondansetron Injectable 4milliGRAM(s) IV Push every 6 hours PRN Nausea and/or Vomiting  magnesium hydroxide Suspension 30milliLiter(s) Oral daily PRN Constipation  bisacodyl Suppository 10milliGRAM(s) Rectal daily PRN If no bowel movement  diphenhydrAMINE   Capsule 25milliGRAM(s) Oral at bedtime PRN Insomnia  zolpidem 5milliGRAM(s) Oral at bedtime PRN Insomnia  dextrose Gel 1Dose(s) Oral once PRN Blood Glucose LESS THAN 70 milliGRAM(s)/deciliter  glucagon  Injectable 1milliGRAM(s) IntraMuscular once PRN Glucose LESS THAN 70 milligrams/deciliter        REVIEW OF SYSTEMS:  CONSTITUTIONAL: No fever, weight loss, or fatigue  EYES: No eye pain, visual disturbances, or discharge  ENMT:  No difficulty hearing, tinnitus, vertigo; No sinus or throat pain  NECK: No pain or stiffness  RESPIRATORY: No cough, wheezing, chills or hemoptysis; No shortness of breath  CARDIOVASCULAR: No chest pain, palpitations, dizziness, or leg swelling  GASTROINTESTINAL: No abdominal or epigastric pain. No nausea, vomiting, or hematemesis; No diarrhea or constipation. No melena or hematochezia.  GENITOURINARY: No dysuria, frequency, hematuria, or incontinence  NEUROLOGICAL: No headaches, memory loss, loss of strength, numbness, or tremors  SKIN: No itching, burning, rashes, or lesions      Vital Signs Last 24 Hrs  T(C): 36.2, Max: 37.9 (01-19 @ 05:12)  T(F): 97.2, Max: 100.2 (01-19 @ 05:12)  HR: 82 (73 - 85)  BP: 148/84 (120/71 - 165/68)  BP(mean): --  RR: 16 (15 - 18)  SpO2: 95% (95% - 99%)    PHYSICAL EXAM:  GENERAL: NAD, well-groomed, well-developed  HEAD:  Atraumatic, Normocephalic  EYES: EOMI, PERRLA, conjunctiva and sclera clear  ENMT: No tonsillar erythema, exudates, or enlargement; Moist mucous membranes   NECK: Supple, No JVD   NERVOUS SYSTEM:  Alert & Oriented X3, Good concentration; Motor Strength 5/5 B/L upper and lower extremities; DTRs 2+ intact and symmetric  CHEST/LUNG: Clear to percussion bilaterally; No rales, rhonchi, wheezing, or rubs  HEART: Regular rate and rhythm; No murmurs, rubs, or gallops  ABDOMEN: Soft, Nontender, Nondistended; Bowel sounds present  EXTREMITIES: +WOUND VAC  LYMPH: No lymphadenopathy noted  SKIN: No rashes or lesions    LABS:                        9.6    8.7   )-----------( 423      ( 19 Jan 2017 07:40 )             28.0     19 Jan 2017 07:40    139    |  99     |  29     ----------------------------<  126    3.7     |  35     |  1.36     Ca    7.8        19 Jan 2017 07:40          CAPILLARY BLOOD GLUCOSE  141 (19 Jan 2017 17:05)  189 (19 Jan 2017 11:50)  149 (19 Jan 2017 07:37)      RADIOLOGY & ADDITIONAL TESTS:    Imaging Personally Reviewed:  [ ] YES  [ ] NO    Consultant(s) Notes Reviewed:  [ ] YES  [ ] NO    Care Discussed with Consultants/Other Providers [ ] YES  [ ] NO

## 2017-01-22 NOTE — PROVIDER CONTACT NOTE (CRITICAL VALUE NOTIFICATION) - BACKGROUND
s/p fall, Rhadomyolysis, multiple debridement of wounds
s/p fall, Rhadomyolysis, multiple debridement of wounds

## 2017-01-22 NOTE — PROGRESS NOTE ADULT - PROBLEM SELECTOR PLAN 1
blood culture and urine culture grew E coli  Renal sonogram=no hydro  new culture E fecium  ; vancomycin resistant enterococcus  on t zyvox  Repeat blood culture 1/21/2017 pending:

## 2017-01-22 NOTE — PROGRESS NOTE ADULT - SUBJECTIVE AND OBJECTIVE BOX
HPI:  Patient is a 66 year old female with unknown PMHx, per ED, patient found unresponsive . Patient was not seen by neighbors in two days, they became concerned and entered her home and found patient on the floor covered with feces.  In ED patient found CK >14,000, in renal failure, hyperkalemic, cocktail was given, including kayexelate.  ICU consulted for severe sepsis ; multiple areas of necrosis and rhabdomyolysis and complicated icu stay   now several days on regular floor with multiple planned debridements by vascular and plastics   complicated by extensive areas of pressure necrosis and major issues with  left hand sp multiple debridements by plastics   and now vascular   feels great         Allergies    No Known Allergies    Intolerances        MEDICATIONS  (STANDING):  influenza   Vaccine 0.5milliLiter(s) IntraMuscular once  lactated ringers. 1000milliLiter(s) IV Continuous <Continuous>  famotidine    Tablet 20milliGRAM(s) Oral every 12 hours  meropenem IVPB 500milliGRAM(s) IV Intermittent every 8 hours  fluconAZOLE   Tablet 100milliGRAM(s) Oral daily  vancomycin    Solution 125milliGRAM(s) Oral every 6 hours  insulin lispro (HumaLOG) corrective regimen sliding scale  SubCutaneous three times a day before meals  dextrose 50% Injectable 12.5Gram(s) IV Push once  insulin glargine Injectable (LANTUS) 10Unit(s) SubCutaneous at bedtime  triamterene 37.5 mG/hydrochlorothiazide 25 mG Capsule 1Capsule(s) Oral daily  metoprolol 50milliGRAM(s) Oral two times a day  linezolid    Tablet 600milliGRAM(s) Oral every 12 hours  heparin  Injectable 5000Unit(s) SubCutaneous every 12 hours    MEDICATIONS  (PRN):  acetaminophen   Tablet 650milliGRAM(s) Oral every 6 hours PRN For Temp greater than 38 C (100.4 F) and headache  oxyCODONE IR 10milliGRAM(s) Oral every 4 hours PRN Moderate Pain  oxyCODONE IR 5milliGRAM(s) Oral every 4 hours PRN Mild Pain  morphine  - Injectable 4milliGRAM(s) IV Push every 4 hours PRN Severe Pain  aluminum hydroxide/magnesium hydroxide/simethicone Suspension 30milliLiter(s) Oral four times a day PRN Indigestion  ondansetron Injectable 4milliGRAM(s) IV Push every 6 hours PRN Nausea and/or Vomiting  magnesium hydroxide Suspension 30milliLiter(s) Oral daily PRN Constipation  diphenhydrAMINE   Capsule 25milliGRAM(s) Oral at bedtime PRN Insomnia  zolpidem 5milliGRAM(s) Oral at bedtime PRN Insomnia  dextrose Gel 1Dose(s) Oral once PRN Blood Glucose LESS THAN 70 milliGRAM(s)/deciliter  glucagon  Injectable 1milliGRAM(s) IntraMuscular once PRN Glucose LESS THAN 70 milligrams/deciliter      REVIEW OF SYSTEMS:  feels fine   no nausea vomiting diarrhea any more    VITAL SIGNS:  T(C): 36.6, Max: 37.7 (01-21 @ 23:39)  T(F): 97.8, Max: 99.8 (01-21 @ 23:39)  HR: 72 (72 - 90)  BP: 167/63 (167/63 - 172/68)  RR: 18 (15 - 18)  SpO2: 98% (97% - 98%)  Wt(kg): --    PHYSICAL EXAM:    GENERAL: not in any distress  HEENT: Neck is supple, normocephalic, atraumatic   CHEST/LUNG: Clear to percussion bilaterally; No rales, rhonchi, wheezing  HEART: Regular rate and rhythm; No murmurs, rubs, or gallops  ABDOMEN: Soft, Nontender, Nondistended; Bowel sounds present, no rebound   EXTREMITIES:  2+ Peripheral Pulses, No clubbing, cyanosis, or edema  GENITOURINARY: NEGATIVE   SKIN: breast appears beautiful   wound vac to arm and left lower extremity   BACK: no pressor sore   NERVOUS SYSTEM:  Alert & Oriented X3, Good concentration  PSYCH: normal affect     LABS:                         10.3   9.5   )-----------( 422      ( 21 Jan 2017 08:51 )             30.4     21 Jan 2017 09:08    139    |  102    |  28     ----------------------------<  167    4.4     |  28     |  1.61     Ca    8.2        21 Jan 2017 09:08                                Culture Results:   Rare Streptococcus species (01-20 @ 17:19)                Radiology:

## 2017-01-22 NOTE — PROGRESS NOTE ADULT - SUBJECTIVE AND OBJECTIVE BOX
HPI:  Patient is a 66 year old female with unknown PMHx, per ED, patient found unresponsive . Patient was not seen by neighbors in two days, they became concerned and entered her home and found patient on the floor covered with feces.  In ED patient found CK >14,000, in renal failure, hyperkalemic, cocktail was given, including kayexelate. Then patient had melena. stool occult blood was positive, low plts and metabolic acidosis. Patient was initially admitted to Telemetry, however this AM, found to be hypotensive, 94/56 was given fluids. ICU consulted, seen at bedside, alert, but confused. Patient is being admitted for AMS, Acute renal failure and rhabdomyolysis. (19 Dec 2016 08:49). Pt resting in bed. No active bleeding. ID evaluation noted      REVIEW OF SYSTEMS      General:	    Skin/Breast:  	  Ophthalmologic:  	  ENMT:	    Respiratory and Thorax: normal  	  Cardiovascular:	normal    Gastrointestinal:	 Normal    Genitourinary:	    Musculoskeletal:	    Neurological:	    Psychiatric:	    Hematology/Lymphatics:	    Endocrine:	    Allergic/Immunologic:	    MEDICATIONS  (STANDING):  influenza   Vaccine 0.5milliLiter(s) IntraMuscular once  lactated ringers. 1000milliLiter(s) IV Continuous <Continuous>  famotidine    Tablet 20milliGRAM(s) Oral every 12 hours  meropenem IVPB 500milliGRAM(s) IV Intermittent every 8 hours  fluconAZOLE   Tablet 100milliGRAM(s) Oral daily  vancomycin    Solution 125milliGRAM(s) Oral every 6 hours  insulin lispro (HumaLOG) corrective regimen sliding scale  SubCutaneous three times a day before meals  dextrose 50% Injectable 12.5Gram(s) IV Push once  insulin glargine Injectable (LANTUS) 10Unit(s) SubCutaneous at bedtime  triamterene 37.5 mG/hydrochlorothiazide 25 mG Capsule 1Capsule(s) Oral daily  metoprolol 50milliGRAM(s) Oral two times a day  linezolid    Tablet 600milliGRAM(s) Oral every 12 hours  heparin  Injectable 5000Unit(s) SubCutaneous every 12 hours    MEDICATIONS  (PRN):  acetaminophen   Tablet 650milliGRAM(s) Oral every 6 hours PRN For Temp greater than 38 C (100.4 F) and headache  oxyCODONE IR 10milliGRAM(s) Oral every 4 hours PRN Moderate Pain  oxyCODONE IR 5milliGRAM(s) Oral every 4 hours PRN Mild Pain  morphine  - Injectable 4milliGRAM(s) IV Push every 4 hours PRN Severe Pain  aluminum hydroxide/magnesium hydroxide/simethicone Suspension 30milliLiter(s) Oral four times a day PRN Indigestion  ondansetron Injectable 4milliGRAM(s) IV Push every 6 hours PRN Nausea and/or Vomiting  magnesium hydroxide Suspension 30milliLiter(s) Oral daily PRN Constipation  diphenhydrAMINE   Capsule 25milliGRAM(s) Oral at bedtime PRN Insomnia  zolpidem 5milliGRAM(s) Oral at bedtime PRN Insomnia  dextrose Gel 1Dose(s) Oral once PRN Blood Glucose LESS THAN 70 milliGRAM(s)/deciliter  glucagon  Injectable 1milliGRAM(s) IntraMuscular once PRN Glucose LESS THAN 70 milligrams/deciliter      Vital Signs Last 24 Hrs  T(C): 36.8, Max: 37.7 (01-21 @ 23:39)  T(F): 98.2, Max: 99.8 (01-21 @ 23:39)  HR: 77 (72 - 90)  BP: 145/72 (145/72 - 172/68)  BP(mean): --  RR: 18 (16 - 18)  SpO2: 98% (97% - 98%)    PHYSICAL EXAM:      Constitutional:    Eyes:    ENMT:    Neck: Supple    Breasts:    Back:    Respiratory: No crackles    Cardiovascular: wnl    Gastrointestinal: no masses    Genitourinary:    Rectal:    Extremities:    Vascular:    Neurological:    Skin:    Lymph Nodes:    Musculoskeletal:    Psychiatric:            HEALTH ISSUES - PROBLEM Dx:  Fever: Fever  Urinary tract infection with hematuria, site unspecified: Urinary tract infection with hematuria, site unspecified  Anemia due to blood loss: Anemia due to blood loss  Essential hypertension: Essential hypertension  Wound infection: Wound infection  Skin sloughing: Skin sloughing  GI bleed: GI bleed  Hyperphosphatemia: Hyperphosphatemia  Hypernatremia: Hypernatremia  Hypokalemia: Hypokalemia  Altered mental status: Altered mental status  Neurogenic orthostatic hypotension: Neurogenic orthostatic hypotension  Sepsis due to Escherichia coli: Sepsis due to Escherichia coli  Sepsis: Sepsis  Hypotension: Hypotension  Metabolic acidosis: Metabolic acidosis  Thrombocytopenia: Thrombocytopenia  Hyperkalemia: Hyperkalemia  Melena: Melena  Rhabdomyolysis: Rhabdomyolysis  ARF (acute renal failure): ARF (acute renal failure)  Metabolic encephalopathy: Metabolic encephalopathy            Assesment & Plan: GI bleeding. Elective endoscopic evaluation when stable

## 2017-01-23 NOTE — PROVIDER CONTACT NOTE (CRITICAL VALUE NOTIFICATION) - TEST AND RESULT REPORTED:
blood culture collected 12/18/16 with e-coli in anerobic bottle and gram neg in aerobic bottle
CPK- >14,000
Positive C Diff
Surgical culture
Surgical culture
Surgical culture of lower left leg wound, positive for VRE
blood culture 12/19 prelim, pos grown anerobic bottle gram neg rods
hemotocrit=21.0
lactate 4.7
numerous enterococus facecium vancomycin resistant few yeast
poatssium 6.3, troponin 0.225, lactate 6.5
positve blood cultur from 12/18/16 specimen preliminary growth in anaaerobic bottle / gram negative rods

## 2017-01-23 NOTE — PROGRESS NOTE ADULT - SUBJECTIVE AND OBJECTIVE BOX
HPI:  Patient is a 66 year old female with unknown PMHx, per ED, patient found unresponsive . Patient was not seen by neighbors in two days, they became concerned and entered her home and found patient on the floor covered with feces.  In ED patient found CK >14,000, in renal failure, hyperkalemic, cocktail was given, including kayexelate. Then patient had melena. stool occult blood was positive, low plts and metabolic acidosis. Patient was initially admitted to Telemetry, however this AM, found to be hypotensive, 94/56 was given fluids. ICU consulted, seen at bedside, alert, but confused. Patient is being admitted for AMS, Acute renal failure and rhabdomyolysis. (19 Dec 2016 08:49)      Allergies    No Known Allergies    Intolerances        MEDICATIONS  (STANDING):  influenza   Vaccine 0.5milliLiter(s) IntraMuscular once  famotidine    Tablet 20milliGRAM(s) Oral every 12 hours  meropenem IVPB 500milliGRAM(s) IV Intermittent every 8 hours  fluconAZOLE   Tablet 100milliGRAM(s) Oral daily  vancomycin    Solution 125milliGRAM(s) Oral every 6 hours  insulin lispro (HumaLOG) corrective regimen sliding scale  SubCutaneous three times a day before meals  dextrose 50% Injectable 12.5Gram(s) IV Push once  insulin glargine Injectable (LANTUS) 10Unit(s) SubCutaneous at bedtime  triamterene 37.5 mG/hydrochlorothiazide 25 mG Capsule 1Capsule(s) Oral daily  metoprolol 50milliGRAM(s) Oral two times a day  linezolid    Tablet 600milliGRAM(s) Oral every 12 hours  heparin  Injectable 5000Unit(s) SubCutaneous every 12 hours    MEDICATIONS  (PRN):  acetaminophen   Tablet 650milliGRAM(s) Oral every 6 hours PRN For Temp greater than 38 C (100.4 F) and headache  oxyCODONE IR 10milliGRAM(s) Oral every 4 hours PRN Moderate Pain  oxyCODONE IR 5milliGRAM(s) Oral every 4 hours PRN Mild Pain  morphine  - Injectable 4milliGRAM(s) IV Push every 4 hours PRN Severe Pain  aluminum hydroxide/magnesium hydroxide/simethicone Suspension 30milliLiter(s) Oral four times a day PRN Indigestion  ondansetron Injectable 4milliGRAM(s) IV Push every 6 hours PRN Nausea and/or Vomiting  magnesium hydroxide Suspension 30milliLiter(s) Oral daily PRN Constipation  diphenhydrAMINE   Capsule 25milliGRAM(s) Oral at bedtime PRN Insomnia  zolpidem 5milliGRAM(s) Oral at bedtime PRN Insomnia  dextrose Gel 1Dose(s) Oral once PRN Blood Glucose LESS THAN 70 milliGRAM(s)/deciliter  glucagon  Injectable 1milliGRAM(s) IntraMuscular once PRN Glucose LESS THAN 70 milligrams/deciliter      REVIEW OF SYSTEMS:    CONSTITUTIONAL: No fever, chills, weight loss, or fatigue  HEENT: No sore throat, runny nose, ear ache  RESPIRATORY: No cough, wheezing, No shortness of breath  CARDIOVASCULAR: No chest pain, palpitations, dizziness  GASTROINTESTINAL: No abdominal pain. No nausea, vomiting, diarrhea  GENITOURINARY: No dysuria, increase frequency, hematuria, or incontinence  NEUROLOGICAL: No headaches, memory loss, loss of strength, numbness, or tremors, no weakness  EXTREMITY: No pedal edema BLE  SKIN: No itching, burning, rashes, or lesions     VITAL SIGNS:  T(C): 36.7, Max: 37.7 (01-22 @ 17:11)  T(F): 98, Max: 99.8 (01-22 @ 17:11)  HR: 76 (69 - 84)  BP: 188/86 (154/68 - 191/69)  RR: 20 (16 - 20)  SpO2: 99% (97% - 99%)  Wt(kg): --    PHYSICAL EXAM:    GENERAL: not in any distress  HEENT: Neck is supple, normocephalic, atraumatic   CHEST/LUNG: Clear to percussion bilaterally; No rales, rhonchi, wheezing  HEART: Regular rate and rhythm; No murmurs, rubs, or gallops  ABDOMEN: Soft, Nontender, Nondistended; Bowel sounds present, no rebound   EXTREMITIES:  2+ Peripheral Pulses, No clubbing, cyanosis, or edema  GENITOURINARY:   SKIN: No rashes or lesions  BACK: no pressor sore   NERVOUS SYSTEM:  Alert & Oriented X3, Good concentration  PSYCH: normal affect     LABS:                         10.5   7.0   )-----------( 406      ( 23 Jan 2017 11:54 )             31.5     23 Jan 2017 11:54    140    |  103    |  24     ----------------------------<  164    4.3     |  29     |  1.12     Ca    8.3        23 Jan 2017 11:54                                Culture Results:   No growth (01-22 @ 00:15)  Culture Results:   No growth to date. (01-21 @ 23:51)  Culture Results:   No growth to date. (01-21 @ 23:51)  Culture Results:   Rare Enterococcus faecium (vancomycin resistant) (01-20 @ 17:19)                Radiology: Patient is a 66 year old female with unknown PMHx, per ED, patient found unresponsive . Patient was not seen by neighbors in two days, they became concerned and entered her home and found patient on the floor covered with feces.  In ED patient found CK >14,000, in renal failure, hyperkalemic, cocktail was given, including kayexelate.  ICU consulted for severe sepsis ; multiple areas of necrosis and rhabdomyolysis and complicated icu stay   now several days on regular floor with multiple planned debridements by vascular and plastics   complicated by extensive areas of pressure necrosis and major issues with  left hand sp multiple debridements by plastics   and now vascular   feels great     Allergies    No Known Allergies    Intolerances        MEDICATIONS  (STANDING):  influenza   Vaccine 0.5milliLiter(s) IntraMuscular once  famotidine    Tablet 20milliGRAM(s) Oral every 12 hours  meropenem IVPB 500milliGRAM(s) IV Intermittent every 8 hours  fluconAZOLE   Tablet 100milliGRAM(s) Oral daily  vancomycin    Solution 125milliGRAM(s) Oral every 6 hours  insulin lispro (HumaLOG) corrective regimen sliding scale  SubCutaneous three times a day before meals  dextrose 50% Injectable 12.5Gram(s) IV Push once  insulin glargine Injectable (LANTUS) 10Unit(s) SubCutaneous at bedtime  triamterene 37.5 mG/hydrochlorothiazide 25 mG Capsule 1Capsule(s) Oral daily  metoprolol 50milliGRAM(s) Oral two times a day  linezolid    Tablet 600milliGRAM(s) Oral every 12 hours  heparin  Injectable 5000Unit(s) SubCutaneous every 12 hours    MEDICATIONS  (PRN):  acetaminophen   Tablet 650milliGRAM(s) Oral every 6 hours PRN For Temp greater than 38 C (100.4 F) and headache  oxyCODONE IR 10milliGRAM(s) Oral every 4 hours PRN Moderate Pain  oxyCODONE IR 5milliGRAM(s) Oral every 4 hours PRN Mild Pain  morphine  - Injectable 4milliGRAM(s) IV Push every 4 hours PRN Severe Pain  aluminum hydroxide/magnesium hydroxide/simethicone Suspension 30milliLiter(s) Oral four times a day PRN Indigestion  ondansetron Injectable 4milliGRAM(s) IV Push every 6 hours PRN Nausea and/or Vomiting  magnesium hydroxide Suspension 30milliLiter(s) Oral daily PRN Constipation  diphenhydrAMINE   Capsule 25milliGRAM(s) Oral at bedtime PRN Insomnia  zolpidem 5milliGRAM(s) Oral at bedtime PRN Insomnia  dextrose Gel 1Dose(s) Oral once PRN Blood Glucose LESS THAN 70 milliGRAM(s)/deciliter  glucagon  Injectable 1milliGRAM(s) IntraMuscular once PRN Glucose LESS THAN 70 milligrams/deciliter      REVIEW OF SYSTEMS:    CONSTITUTIONAL: No fever, chills, weight loss, or fatigue  HEENT: No sore throat, runny nose, ear ache  RESPIRATORY: No cough, wheezing, No shortness of breath  CARDIOVASCULAR: No chest pain, palpitations, dizziness  GASTROINTESTINAL: No abdominal pain. No nausea, vomiting, diarrhea  GENITOURINARY: No dysuria, increase frequency, hematuria, or incontinence  NEUROLOGICAL: No headaches, memory loss, loss of strength, numbness, or tremors, no weakness  left foot drop ;   left hand contractures  EXTREMITY: No pedal edema BLE  SKIN: multiple ulcers    VITAL SIGNS:  T(C): 36.7, Max: 37.7 (01-22 @ 17:11)  T(F): 98, Max: 99.8 (01-22 @ 17:11)  HR: 76 (69 - 84)  BP: 188/86 (154/68 - 191/69)  RR: 20 (16 - 20)  SpO2: 99% (97% - 99%)  Wt(kg): --    PHYSICAL EXAM:    GENERAL: not in any distress  HEENT: Neck is supple, normocephalic, atraumatic   CHEST/LUNG: Clear to percussion bilaterally; No rales, rhonchi, wheezing  breas wound is fine   large open wound left leg and left hand with contractures  HEART: Regular rate and rhythm; No murmurs, rubs, or gallops  ABDOMEN: Soft, Nontender, Nondistended; Bowel sounds present, no rebound   EXTREMITIES:  2+ Peripheral Pulses, No clubbing, cyanosis, or edema  left arm large wound   left lower extremities large wound to vac  BACK: no pressor sore   NERVOUS SYSTEM:  Alert & Oriented X3, Good concentration   left foot drop left arm weak but from extensive ulcer and tendon debridement   PSYCH: normal affect     LABS:                         10.5   7.0   )-----------( 406      ( 23 Jan 2017 11:54 )             31.5     23 Jan 2017 11:54    140    |  103    |  24     ----------------------------<  164    4.3     |  29     |  1.12     Ca    8.3        23 Jan 2017 11:54                                Culture Results:   No growth (01-22 @ 00:15)  Culture Results:   No growth to date. (01-21 @ 23:51)  Culture Results:   No growth to date. (01-21 @ 23:51)  Culture Results:   Rare Enterococcus faecium (vancomycin resistant) (01-20 @ 17:19)                Radiology:

## 2017-01-23 NOTE — DISCHARGE NOTE ADULT - MEDICATION SUMMARY - MEDICATIONS TO TAKE
I will START or STAY ON the medications listed below when I get home from the hospital:    acetaminophen 325 mg oral tablet  -- 2 tab(s) by mouth every 6 hours, As needed, For Temp greater than 38 C (100.4 F) and headache  -- Indication: For Fever    oxyCODONE 10 mg oral tablet  -- 1 tab(s) by mouth every 4 hours, As needed, Moderate Pain  -- Indication: For Moderate pian    oxyCODONE 5 mg oral tablet  -- 1 tab(s) by mouth every 4 hours, As needed, Mild Pain  -- Indication: For Mild pain    aluminum hydroxide-magnesium hydroxide 200 mg-200 mg/5 mL oral suspension  -- 30 milliliter(s) by mouth 4 times a day, As needed, Indigestion  -- Indication: For Indigestion    magnesium hydroxide 8% oral suspension  -- 30 milliliter(s) by mouth once a day, As needed, Constipation  -- Indication: For Constipation    insulin glargine  -- 10 unit(s) subcutaneously once a day (at bedtime)  -- Indication: For Diabetes nellitus type 2    insulin lispro 100 units/mL subcutaneous solution  --  subcutaneous 3 times a day (before meals); 2 Unit(s) if Glucose 151 - 200  4 Unit(s) if Glucose 201 - 250  6 Unit(s) if Glucose 251 - 300  8 Unit(s) if Glucose 301 - 350  10 Unit(s) if Glucose 351 - 400  12 Unit(s) if Glucose Greater Than 400  -- Indication: For Diabetes mellitus type 2    metFORMIN  --  by mouth   -- Indication: For Diabetes mellitus type 2    Januvia  --  by mouth   -- Indication: For Diabetes mellitus type 2    glimepiride  --  by mouth   -- Indication: For Diabetes mellitus type 2    diphenhydrAMINE 25 mg oral capsule  -- 1 cap(s) by mouth once a day (at bedtime), As needed, Insomnia  -- Indication: For Insomnia    hydroCHLOROthiazide-triamterene 25 mg-37.5 mg oral capsule  -- 1 cap(s) by mouth once a day  -- Indication: For Hypertension    metoprolol tartrate 50 mg oral tablet  -- 1 tab(s) by mouth 2 times a day  -- Indication: For Hypertension    vancomycin 250 mg/5 mL oral solution  -- 2.5 milliliter(s) by mouth every 6 hours until 2/6/2017  -- Indication: For Diarrhea    famotidine 20 mg oral tablet  -- 1 tab(s) by mouth every 12 hours  -- Indication: For GERD    linezolid 600 mg oral tablet  -- 1 tab(s) by mouth every 12 hours until 2/6/2017  -- Indication: For VRE infection left lower leg wound    Cipro 500 mg oral tablet  -- 1 tab(s) by mouth every 12 hours until 2/6/2017  -- Indication: For E.coli Sepsis/UTI I will START or STAY ON the medications listed below when I get home from the hospital:    acetaminophen 325 mg oral tablet  -- 2 tab(s) by mouth every 6 hours, As needed, For Temp greater than 38 C (100.4 F) and headache  -- Indication: For Fever    oxyCODONE 10 mg oral tablet  -- 1 tab(s) by mouth every 4 hours, As needed, Moderate Pain  -- Indication: For Moderate pian    oxyCODONE 5 mg oral tablet  -- 1 tab(s) by mouth every 4 hours, As needed, Mild Pain  -- Indication: For Mild pain    aluminum hydroxide-magnesium hydroxide 200 mg-200 mg/5 mL oral suspension  -- 30 milliliter(s) by mouth 4 times a day, As needed, Indigestion  -- Indication: For Indigestion    magnesium hydroxide 8% oral suspension  -- 30 milliliter(s) by mouth once a day, As needed, Constipation  -- Indication: For Constipation    insulin glargine  -- 10 unit(s) subcutaneously once a day (at bedtime)  -- Indication: For Diabetes nellitus type 2    insulin lispro 100 units/mL subcutaneous solution  --  subcutaneous 3 times a day (before meals); 2 Unit(s) if Glucose 151 - 200  4 Unit(s) if Glucose 201 - 250  6 Unit(s) if Glucose 251 - 300  8 Unit(s) if Glucose 301 - 350  10 Unit(s) if Glucose 351 - 400  12 Unit(s) if Glucose Greater Than 400  -- Indication: For Diabetes mellitus type 2    metFORMIN  --  by mouth   -- Indication: For Diabetes mellitus type 2    Januvia  --  by mouth   -- Indication: For Diabetes mellitus type 2    glimepiride  --  by mouth   -- Indication: For Diabetes mellitus type 2    diphenhydrAMINE 25 mg oral capsule  -- 1 cap(s) by mouth once a day (at bedtime), As needed, Insomnia  -- Indication: For Insomnia    hydroCHLOROthiazide-triamterene 25 mg-37.5 mg oral capsule  -- 1 cap(s) by mouth once a day  -- Indication: For Hypertension    metoprolol tartrate 50 mg oral tablet  -- 1 tab(s) by mouth 2 times a day  -- Indication: For Hypertension    amLODIPine 5 mg oral tablet  -- 1 tab(s) by mouth once a day  -- Indication: For Essential hypertension    vancomycin 250 mg/5 mL oral solution  -- 2.5 milliliter(s) by mouth every 6 hours until 2/6/2017  -- Indication: For Diarrhea    famotidine 20 mg oral tablet  -- 1 tab(s) by mouth every 12 hours  -- Indication: For GERD    linezolid 600 mg oral tablet  -- 1 tab(s) by mouth every 12 hours until 2/6/2017  -- Indication: For VRE infection left lower leg wound    Cipro 500 mg oral tablet  -- 1 tab(s) by mouth every 12 hours until 2/6/2017  -- Indication: For E.coli Sepsis/UTI

## 2017-01-23 NOTE — PROGRESS NOTE ADULT - PROBLEM SELECTOR PLAN 4
vascular notes seen noted  S/P  I&D  left hand, thigh and leg  sp  redebridement   On Meropenem, zyvox  and Diflucan   Local care for now  discharge plan on antibiotics to finish 2 weeks   need to see leg wound vascular notes seen noted  S/P  I&D  left hand, thigh and leg  sp  redebridement   discussed with dr pineda yesterday   On Meropenem, zyvox  and Diflucan   Local care for now  discharge plan on antibiotics to finish 2 weeks   need to see leg wound vascular notes seen noted  S/P  I&D  left hand, thigh and leg  sp  redebridement   discussed with dr pineda yesterday   On Meropenem, zyvox  and Diflucan now ; discharge plan on po cipro zyvox and po vanco x 2 weeks   follow [facility] closely   Local care for now  discharge plan on po  antibiotics to finish 2 weeks   need to see leg wound

## 2017-01-23 NOTE — DISCHARGE NOTE ADULT - CARE PLAN
Principal Discharge DX:	Altered mental status  Goal:	Resolved  Instructions for follow-up, activity and diet:	Follow up with PCP  Secondary Diagnosis:	Rhabdomyolysis  Goal:	Resolved  Instructions for follow-up, activity and diet:	Improved with hydration  D/C to subacute rehab to minimize fall risk  Secondary Diagnosis:	Sepsis due to Escherichia coli  Goal:	Resolving  Instructions for follow-up, activity and diet:	Initial blood/urine culture: + e.coli  Repeat cultures no growth to date  Continue Cipro 500mg twice a day x 14 days (until 2/6/2017)  Secondary Diagnosis:	Urinary tract infection with hematuria, site unspecified  Goal:	Resolving  Instructions for follow-up, activity and diet:	Initial blood/urine culture: + e.coli  Repeat cultures no growth to date  Continue Cipro 500mg twice a day x 14 days (until 2/6/2017)  Secondary Diagnosis:	Enterococcus, vancomycin-resistant  Goal:	Resolution of infection  Instructions for follow-up, activity and diet:	Left lower leg wound: + vancomycin resistant enterococcus which is sensitive to Zyvox  Continue Linezolid 600mg every 12 hours until 2/6/2017  Continue local wound care  Secondary Diagnosis:	ARF (acute renal failure)  Goal:	Resolved  Instructions for follow-up, activity and diet:	2nd to rhabdomyolysis  Improved with treatment of same  Secondary Diagnosis:	Gastrointestinal hemorrhage with melena  Instructions for follow-up, activity and diet:	Follow up with gastroenterologist for elective colonoscopy

## 2017-01-23 NOTE — DISCHARGE NOTE ADULT - CARE PROVIDER_API CALL
Rudy Molina (DAVON), Internal Medicine  [address] [place] [id number]  Phone: [phone]  Fax: [phone]    Lashonda Henriquez), Infectious Disease; Internal Medicine  [address] [place] [address]  Phone: [phone]  Fax: [phone]    Swetha Forbes), Plastic Surgery  [address] [place] [id number]  Phone: [phone]  Fax: [phone]    Theo Gutierrez), Gastroenterology; Internal Medicine  [address] [place] [address]  Phone: [phone]  Fax: [phone]

## 2017-01-23 NOTE — PROVIDER CONTACT NOTE (CRITICAL VALUE NOTIFICATION) - PERSON GIVING RESULT:
Aiden Mares from lab
Cindy Fischeranthony
[facility]/Kellee Shanks
[facility]/Kellee Shanks
Giovanna Valentin
[facility]
Piero Munson from lab 4.7
Rita Loomis
Wassir Walli
core lab armani rooney
nj crockett
Melvin Mcguire

## 2017-01-23 NOTE — DISCHARGE NOTE ADULT - PATIENT PORTAL LINK FT
“You can access the [facility] Patient Portal, offered by [facility], by registering with the following website: [email or web address]”

## 2017-01-23 NOTE — DISCHARGE NOTE ADULT - SECONDARY DIAGNOSIS.
Rhabdomyolysis Sepsis due to Escherichia coli Urinary tract infection with hematuria, site unspecified Enterococcus, vancomycin-resistant ARF (acute renal failure) Gastrointestinal hemorrhage with melena

## 2017-01-23 NOTE — PROVIDER CONTACT NOTE (CRITICAL VALUE NOTIFICATION) - NAME OF MD/NP/PA/DO NOTIFIED:
Dr Post
DEVAN Forte
DEVAN guerra
DEVAN rushing
Dr. Mullen
Fernandez Putnam NP
Jennifer Presley NP
MACHO Ledesma
MACHO Ledesma
DEVAN Kang

## 2017-01-23 NOTE — DISCHARGE NOTE ADULT - HOSPITAL COURSE
HPI:  Patient is a 66 year old female with unknown PMHx, per ED, patient found unresponsive . Patient was not seen by neighbors in two days, they became concerned and entered her home and found patient on the floor covered with feces.  In ED patient found CK >14,000, in renal failure, hyperkalemic, cocktail was given, including kayexelate. Then patient had melena. stool occult blood was positive, low plts and metabolic acidosis. Patient was initially admitted to Telemetry, however this AM, found to be hypotensive, 94/56 was given fluids. ICU consulted, seen at bedside, alert, but confused. Patient is being admitted for AMS, Acute renal failure and rhabdomyolysis. (19 Dec 2016 08:49). Pt resting in bed. Denies active bleeding    renal failure and rhabdomyolysis resolved with IV fluid administration  Pt was seen by plastic surgery consult on numerous occasion for debridement of necrotic tissue to left hand, left lower extremity and left breast  also treated with wound vac therapy  surgical culture of left lower leg shows vancomycin resistant enterococcus which is sensitive to Zyvox  initial blood cultures in beginning of hospitalization shows e.coli  initial urine culture: + e. coli sensitive to cipro  repeat blood/urine cultures no growth to date  Treated with oral vancomycin solution for suspicion of clostridium difficile  Received units of pack red blood cells for symptomatic anemia second to GI bleed  Now hemoglobin/hematocrit stable  Pt is for elective colonoscopy  Seen by GI consult during course of hospital stay

## 2017-01-23 NOTE — DISCHARGE NOTE ADULT - MEDICATION SUMMARY - MEDICATIONS TO STOP TAKING
I will STOP taking the medications listed below when I get home from the hospital:    amLODIPine  --  by mouth    ramipril  --  by mouth    Coreg  --  by mouth

## 2017-01-23 NOTE — PROGRESS NOTE ADULT - SUBJECTIVE AND OBJECTIVE BOX
HPI:  Patient is a 66 year old female with unknown PMHx, per ED, patient found unresponsive . Patient was not seen by neighbors in two days, they became concerned and entered her home and found patient on the floor covered with feces.  In ED patient found CK >14,000, in renal failure, hyperkalemic, cocktail was given, including kayexelate. Then patient had melena. stool occult blood was positive, low plts and metabolic acidosis. Patient was initially admitted to Telemetry, however this AM, found to be hypotensive, 94/56 was given fluids. ICU consulted, seen at bedside, alert, but confused. Patient is being admitted for AMS, Acute renal failure and rhabdomyolysis. (19 Dec 2016 08:49). No active bleeding. Denies abdominal pain.      REVIEW OF SYSTEMS      General:	    Skin/Breast:  	  Ophthalmologic:  	  ENMT:	    Respiratory and Thorax: normal  	  Cardiovascular:	normal    Gastrointestinal:	 wnl    Genitourinary:	    Musculoskeletal:	    Neurological:	    Psychiatric:	    Hematology/Lymphatics:	    Endocrine:	    Allergic/Immunologic:	    MEDICATIONS  (STANDING):  influenza   Vaccine 0.5milliLiter(s) IntraMuscular once  famotidine    Tablet 20milliGRAM(s) Oral every 12 hours  meropenem IVPB 500milliGRAM(s) IV Intermittent every 8 hours  fluconAZOLE   Tablet 100milliGRAM(s) Oral daily  vancomycin    Solution 125milliGRAM(s) Oral every 6 hours  insulin lispro (HumaLOG) corrective regimen sliding scale  SubCutaneous three times a day before meals  dextrose 50% Injectable 12.5Gram(s) IV Push once  insulin glargine Injectable (LANTUS) 10Unit(s) SubCutaneous at bedtime  triamterene 37.5 mG/hydrochlorothiazide 25 mG Capsule 1Capsule(s) Oral daily  metoprolol 50milliGRAM(s) Oral two times a day  linezolid    Tablet 600milliGRAM(s) Oral every 12 hours  heparin  Injectable 5000Unit(s) SubCutaneous every 12 hours    MEDICATIONS  (PRN):  acetaminophen   Tablet 650milliGRAM(s) Oral every 6 hours PRN For Temp greater than 38 C (100.4 F) and headache  oxyCODONE IR 10milliGRAM(s) Oral every 4 hours PRN Moderate Pain  oxyCODONE IR 5milliGRAM(s) Oral every 4 hours PRN Mild Pain  morphine  - Injectable 4milliGRAM(s) IV Push every 4 hours PRN Severe Pain  aluminum hydroxide/magnesium hydroxide/simethicone Suspension 30milliLiter(s) Oral four times a day PRN Indigestion  ondansetron Injectable 4milliGRAM(s) IV Push every 6 hours PRN Nausea and/or Vomiting  magnesium hydroxide Suspension 30milliLiter(s) Oral daily PRN Constipation  diphenhydrAMINE   Capsule 25milliGRAM(s) Oral at bedtime PRN Insomnia  zolpidem 5milliGRAM(s) Oral at bedtime PRN Insomnia  dextrose Gel 1Dose(s) Oral once PRN Blood Glucose LESS THAN 70 milliGRAM(s)/deciliter  glucagon  Injectable 1milliGRAM(s) IntraMuscular once PRN Glucose LESS THAN 70 milligrams/deciliter      Vital Signs Last 24 Hrs  T(C): 36.8, Max: 37.1 (01-23 @ 00:40)  T(F): 98.2, Max: 98.8 (01-23 @ 00:40)  HR: 77 (69 - 79)  BP: 154/86 (154/86 - 191/69)  BP(mean): --  RR: 15 (15 - 20)  SpO2: 98% (97% - 99%)    PHYSICAL EXAM:      Constitutional:    Eyes:    ENMT:    Neck: supple    Breasts:    Back:    Respiratory: normal    Cardiovascular:    Gastrointestinal: no masses    Genitourinary:    Rectal:    Extremities:    Vascular:    Neurological:    Skin:    Lymph Nodes:    Musculoskeletal:    Psychiatric:            HEALTH ISSUES - PROBLEM Dx:  Fever: Fever  Urinary tract infection with hematuria, site unspecified: Urinary tract infection with hematuria, site unspecified  Anemia due to blood loss: Anemia due to blood loss  Essential hypertension: Essential hypertension  Wound infection: Wound infection  Skin sloughing: Skin sloughing  GI bleed: GI bleed  Hyperphosphatemia: Hyperphosphatemia  Hypernatremia: Hypernatremia  Hypokalemia: Hypokalemia  Altered mental status: Altered mental status  Neurogenic orthostatic hypotension: Neurogenic orthostatic hypotension  Sepsis due to Escherichia coli: Sepsis due to Escherichia coli  Sepsis: Sepsis  Hypotension: Hypotension  Metabolic acidosis: Metabolic acidosis  Thrombocytopenia: Thrombocytopenia  Hyperkalemia: Hyperkalemia  Melena: Melena  Rhabdomyolysis: Rhabdomyolysis  ARF (acute renal failure): ARF (acute renal failure)  Metabolic encephalopathy: Metabolic encephalopathy            Assesment & Plan: GI bleeding. Elective colonoscopy/ EGD

## 2017-01-23 NOTE — DISCHARGE NOTE ADULT - CARE PROVIDERS DIRECT ADDRESSES
,[email or web address],DirectAddress_Unknown,DirectAddress_Unknown,DirectAddress_Unknown,DirectAddress_Unknown

## 2017-01-23 NOTE — DISCHARGE NOTE ADULT - PLAN OF CARE
Resolved Follow up with PCP Improved with hydration  D/C to subacute rehab to minimize fall risk Resolving Initial blood/urine culture: + e.coli  Repeat cultures no growth to date  Continue Cipro 500mg twice a day x 14 days (until 2/6/2017) Resolution of infection Left lower leg wound: + vancomycin resistant enterococcus which is sensitive to Zyvox  Continue Linezolid 600mg every 12 hours until 2/6/2017  Continue local wound care 2nd to rhabdomyolysis  Improved with treatment of same Follow up with gastroenterologist for elective colonoscopy

## 2017-01-24 RX ORDER — AMLODIPINE BESYLATE 2.5 MG/1
2 TABLET ORAL
Qty: 0 | Refills: 0 | COMMUNITY
Start: 2017-01-24

## 2017-01-24 NOTE — PROGRESS NOTE ADULT - PROBLEM SELECTOR PLAN 4
vascular notes seen noted  S/P  I&D  left hand, thigh and leg    On Meropenem, Vancomycin and Diflucan     Continue wound vac therapy to left  lower extremity

## 2017-01-24 NOTE — PROGRESS NOTE ADULT - PROBLEM SELECTOR PLAN 1
blood culture and urine culture grew E coli  Renal sonogram=no hydro  On Meropenem, will change to Cipro

## 2017-01-24 NOTE — PROGRESS NOTE ADULT - SUBJECTIVE AND OBJECTIVE BOX
Patient is a 66y old  Female who presents with a chief complaint of Altered mental status/Rhabdomyolysis (23 Jan 2017 11:54)      INTERVAL HPI/OVERNIGHT EVENTS:  clinically stable  awaiting dc to rehab    MEDICATIONS  (STANDING):  influenza   Vaccine 0.5milliLiter(s) IntraMuscular once  famotidine    Tablet 20milliGRAM(s) Oral every 12 hours  meropenem IVPB 500milliGRAM(s) IV Intermittent every 8 hours  fluconAZOLE   Tablet 100milliGRAM(s) Oral daily  vancomycin    Solution 125milliGRAM(s) Oral every 6 hours  insulin lispro (HumaLOG) corrective regimen sliding scale  SubCutaneous three times a day before meals  dextrose 50% Injectable 12.5Gram(s) IV Push once  insulin glargine Injectable (LANTUS) 10Unit(s) SubCutaneous at bedtime  triamterene 37.5 mG/hydrochlorothiazide 25 mG Capsule 1Capsule(s) Oral daily  metoprolol 50milliGRAM(s) Oral two times a day  linezolid    Tablet 600milliGRAM(s) Oral every 12 hours  heparin  Injectable 5000Unit(s) SubCutaneous every 12 hours  amLODIPine   Tablet 5milliGRAM(s) Oral daily    MEDICATIONS  (PRN):  acetaminophen   Tablet 650milliGRAM(s) Oral every 6 hours PRN For Temp greater than 38 C (100.4 F) and headache  oxyCODONE IR 10milliGRAM(s) Oral every 4 hours PRN Moderate Pain  oxyCODONE IR 5milliGRAM(s) Oral every 4 hours PRN Mild Pain  morphine  - Injectable 4milliGRAM(s) IV Push every 4 hours PRN Severe Pain  aluminum hydroxide/magnesium hydroxide/simethicone Suspension 30milliLiter(s) Oral four times a day PRN Indigestion  ondansetron Injectable 4milliGRAM(s) IV Push every 6 hours PRN Nausea and/or Vomiting  magnesium hydroxide Suspension 30milliLiter(s) Oral daily PRN Constipation  diphenhydrAMINE   Capsule 25milliGRAM(s) Oral at bedtime PRN Insomnia  zolpidem 5milliGRAM(s) Oral at bedtime PRN Insomnia  dextrose Gel 1Dose(s) Oral once PRN Blood Glucose LESS THAN 70 milliGRAM(s)/deciliter  glucagon  Injectable 1milliGRAM(s) IntraMuscular once PRN Glucose LESS THAN 70 milligrams/deciliter        REVIEW OF SYSTEMS:  CONSTITUTIONAL: No fever, weight loss, or fatigue  EYES: No eye pain, visual disturbances, or discharge  ENMT:  No difficulty hearing, tinnitus, vertigo; No sinus or throat pain  NECK: No pain or stiffness  RESPIRATORY: No cough, wheezing, chills or hemoptysis; No shortness of breath  CARDIOVASCULAR: No chest pain, palpitations, dizziness, or leg swelling  GASTROINTESTINAL: No abdominal or epigastric pain. No nausea, vomiting, or hematemesis; No diarrhea or constipation. No melena or hematochezia.  GENITOURINARY: No dysuria, frequency, hematuria, or incontinence  NEUROLOGICAL: No headaches, memory loss, loss of strength, numbness, or tremors  SKIN: No itching, burning, rashes, or lesions      Vital Signs Last 24 Hrs  T(C): 36.7, Max: 36.8 (01-23 @ 18:00)  T(F): 98, Max: 98.2 (01-23 @ 18:00)  HR: 74 (73 - 84)  BP: 160/71 (154/86 - 180/94)  BP(mean): --  RR: 18 (14 - 18)  SpO2: 99% (97% - 99%)    PHYSICAL EXAM:  GENERAL: NAD, well-groomed, well-developed  HEAD:  Atraumatic, Normocephalic  EYES: EOMI, PERRLA, conjunctiva and sclera clear  ENMT: No tonsillar erythema, exudates, or enlargement; Moist mucous membranes   NECK: Supple, No JVD   NERVOUS SYSTEM:  Alert & Oriented X3, Good concentration; Motor Strength 5/5 B/L upper and lower extremities; DTRs 2+ intact and symmetric  CHEST/LUNG: Clear to percussion bilaterally; No rales, rhonchi, wheezing, or rubs  HEART: Regular rate and rhythm; No murmurs, rubs, or gallops  ABDOMEN: Soft, Nontender, Nondistended; Bowel sounds present  EXTREMITIES:  2+ Peripheral Pulses, No clubbing, cyanosis, or edema  LYMPH: No lymphadenopathy noted  SKIN: No rashes or lesions    LABS:                        10.5   7.0   )-----------( 406      ( 23 Jan 2017 11:54 )             31.5     23 Jan 2017 11:54    140    |  103    |  24     ----------------------------<  164    4.3     |  29     |  1.12     Ca    8.3        23 Jan 2017 11:54          CAPILLARY BLOOD GLUCOSE  155 (24 Jan 2017 08:24)  141 (23 Jan 2017 22:00)  130 (23 Jan 2017 15:50)      RADIOLOGY & ADDITIONAL TESTS:    Imaging Personally Reviewed:  [ ] YES  [ ] NO    Consultant(s) Notes Reviewed:  [ ] YES  [ ] NO    Care Discussed with Consultants/Other Providers [ ] YES  [ ] NO

## 2017-01-24 NOTE — PROGRESS NOTE ADULT - PROBLEM SELECTOR PLAN 2
Plastics surgery noted  S/P  I&D  left hand, thigh and leg, left breast  LLE wound vac   Wound culture VRE and Yeast   On Meropenem, zyvox  and Diflucan now ; discharge plan on po Cipro and  Zyvox and po Vancomycin x 2 weeks   LLE wound vac   Local wound care   follow cbc closely   Local care for now  discharge plan on po  antibiotics to finish 2 weeks   need to see leg wound

## 2017-01-24 NOTE — PROGRESS NOTE ADULT - ASSESSMENT
Patient is a 66 year old female with unknown PMHx, per ED, patient found unresponsive, admitted to critical care for Metabolic Encephalopathy, Acute Renal Failure, Hyperkalemia, Thrombocytopenia, Rhabdomyolysis. ; all resolving nicely   issues that remain are essentially with large areas of skin necroses that had dev all over and now have been debrided  issues with hand noted  s/p post further debridement of left leg wound/necrotic leg muscles - vacuum therapy in place  Tentative plan thereafter is for [facility] dc asap

## 2017-01-24 NOTE — PROGRESS NOTE ADULT - PROBLEM/PLAN-3
DISPLAY PLAN FREE TEXT

## 2017-01-24 NOTE — PROGRESS NOTE ADULT - PROBLEM/PLAN-1
DISPLAY PLAN FREE TEXT

## 2017-01-24 NOTE — PROGRESS NOTE ADULT - PROVIDER SPECIALTY LIST ADULT
Critical Care
Gastroenterology
Heme/Onc
Infectious Disease
Internal Medicine
Nephrology
Plastic Surgery
Vascular Surgery
Infectious Disease
Infectious Disease
Internal Medicine
Infectious Disease

## 2017-01-24 NOTE — PROGRESS NOTE ADULT - SUBJECTIVE AND OBJECTIVE BOX
INTERVAL HPI/OVERNIGHT EVENTS:  No complaints     ANTIBIOTICS/RELEVANT:    MEDICATIONS  (STANDING):  influenza   Vaccine 0.5milliLiter(s) IntraMuscular once  famotidine    Tablet 20milliGRAM(s) Oral every 12 hours  meropenem IVPB 500milliGRAM(s) IV Intermittent every 8 hours  fluconAZOLE   Tablet 100milliGRAM(s) Oral daily  vancomycin    Solution 125milliGRAM(s) Oral every 6 hours  insulin lispro (HumaLOG) corrective regimen sliding scale  SubCutaneous three times a day before meals  dextrose 50% Injectable 12.5Gram(s) IV Push once  insulin glargine Injectable (LANTUS) 10Unit(s) SubCutaneous at bedtime  triamterene 37.5 mG/hydrochlorothiazide 25 mG Capsule 1Capsule(s) Oral daily  metoprolol 50milliGRAM(s) Oral two times a day  linezolid    Tablet 600milliGRAM(s) Oral every 12 hours  heparin  Injectable 5000Unit(s) SubCutaneous every 12 hours  amLODIPine   Tablet 5milliGRAM(s) Oral daily    MEDICATIONS  (PRN):  acetaminophen   Tablet 650milliGRAM(s) Oral every 6 hours PRN For Temp greater than 38 C (100.4 F) and headache  oxyCODONE IR 10milliGRAM(s) Oral every 4 hours PRN Moderate Pain  oxyCODONE IR 5milliGRAM(s) Oral every 4 hours PRN Mild Pain  morphine  - Injectable 4milliGRAM(s) IV Push every 4 hours PRN Severe Pain  aluminum hydroxide/magnesium hydroxide/simethicone Suspension 30milliLiter(s) Oral four times a day PRN Indigestion  ondansetron Injectable 4milliGRAM(s) IV Push every 6 hours PRN Nausea and/or Vomiting  magnesium hydroxide Suspension 30milliLiter(s) Oral daily PRN Constipation  diphenhydrAMINE   Capsule 25milliGRAM(s) Oral at bedtime PRN Insomnia  zolpidem 5milliGRAM(s) Oral at bedtime PRN Insomnia  dextrose Gel 1Dose(s) Oral once PRN Blood Glucose LESS THAN 70 milliGRAM(s)/deciliter  glucagon  Injectable 1milliGRAM(s) IntraMuscular once PRN Glucose LESS THAN 70 milligrams/deciliter      Allergies    No Known Allergies    Intolerances      Vital Signs Last 24 Hrs  T(C): 36.7, Max: 36.8 (01-23 @ 18:00)  T(F): 98, Max: 98.2 (01-23 @ 18:00)  HR: 74 (73 - 84)  BP: 160/71 (154/86 - 180/94)  BP(mean): --  RR: 18 (14 - 18)  SpO2: 99% (97% - 99%)    PHYSICAL EXAM:    General: not in any distress    HEENT: no pallor, moist oral cavity    Respiratory: clear to auscultation bilaterally    Cardiovascular: S1, S2, RRR    Gastrointestinal: +BS, soft, NT, NT    Extremities: No pedal edema BLE     LABS:                        10.5   7.0   )-----------( 406      ( 23 Jan 2017 11:54 )             31.5     23 Jan 2017 11:54    140    |  103    |  24     ----------------------------<  164    4.3     |  29     |  1.12     Ca    8.3        23 Jan 2017 11:54      MICROBIOLOGY:  Clostridium difficile Toxin by PCR (01.22.17 @ 23:28)    C Diff by PCR Result: NotDetec    Clostridium difficile Toxin by PCR: The results of this test should be interpreted with consideration of all  clinical and laboratory findings. This test determines the presence of  the C. difficile tcdB gene at a given time and is not intended to  identify antibiotic associated disease or C. difficile infection without  clinical context. Successful treatment is based on the resolution of  clinical symptoms. This test should not be used as a "test of cure"  because C. difficile DNA will persist after successful treatment. Repeat  testing will not be permitted.    This test is performed on the BD MAX system using Real-Time PCR and  fluorogenic target-specific hybridization.    RADIOLOGY & ADDITIONAL STUDIES:  CXR  Stable chest.  No focal airspace opacities are noted.

## 2017-01-24 NOTE — PROGRESS NOTE ADULT - PROBLEM/PLAN-2
DISPLAY PLAN FREE TEXT

## 2017-01-24 NOTE — PROGRESS NOTE ADULT - PROBLEM SELECTOR PLAN 1
blood culture and urine culture grew E coli  Renal sonogram=no hydro  new culture E fecium  ; vancomycin resistant enterococcus  on t zyvox  stable

## 2017-01-24 NOTE — PROGRESS NOTE ADULT - PROBLEM/PLAN-4
DISPLAY PLAN FREE TEXT

## 2017-02-21 ENCOUNTER — OUTPATIENT (OUTPATIENT)
Dept: OUTPATIENT SERVICES | Facility: HOSPITAL | Age: 67
LOS: 1 days | Discharge: HOME | End: 2017-02-21

## 2017-02-22 ENCOUNTER — OUTPATIENT (OUTPATIENT)
Dept: OUTPATIENT SERVICES | Facility: HOSPITAL | Age: 67
LOS: 1 days | Discharge: HOME | End: 2017-02-22
Payer: COMMERCIAL

## 2017-02-22 VITALS
DIASTOLIC BLOOD PRESSURE: 48 MMHG | SYSTOLIC BLOOD PRESSURE: 117 MMHG | HEIGHT: 66 IN | OXYGEN SATURATION: 100 % | HEART RATE: 83 BPM | WEIGHT: 164.24 LBS | TEMPERATURE: 98 F | RESPIRATION RATE: 18 BRPM

## 2017-02-22 VITALS
SYSTOLIC BLOOD PRESSURE: 118 MMHG | HEART RATE: 74 BPM | RESPIRATION RATE: 16 BRPM | DIASTOLIC BLOOD PRESSURE: 64 MMHG | OXYGEN SATURATION: 100 % | TEMPERATURE: 98 F

## 2017-02-22 DIAGNOSIS — Z98.890 OTHER SPECIFIED POSTPROCEDURAL STATES: Chronic | ICD-10-CM

## 2017-02-22 DIAGNOSIS — T14.90 INJURY, UNSPECIFIED: ICD-10-CM

## 2017-02-22 PROCEDURE — 88304 TISSUE EXAM BY PATHOLOGIST: CPT | Mod: 26

## 2017-02-22 PROCEDURE — 88311 DECALCIFY TISSUE: CPT | Mod: 26

## 2017-02-22 RX ORDER — ACETAMINOPHEN 500 MG
1000 TABLET ORAL ONCE
Qty: 0 | Refills: 0 | Status: COMPLETED | OUTPATIENT
Start: 2017-02-22 | End: 2017-02-22

## 2017-02-22 RX ORDER — SODIUM CHLORIDE 9 MG/ML
1000 INJECTION, SOLUTION INTRAVENOUS
Qty: 0 | Refills: 0 | Status: DISCONTINUED | OUTPATIENT
Start: 2017-02-22 | End: 2017-02-22

## 2017-02-22 RX ORDER — FENTANYL CITRATE 50 UG/ML
50 INJECTION INTRAVENOUS
Qty: 0 | Refills: 0 | Status: DISCONTINUED | OUTPATIENT
Start: 2017-02-22 | End: 2017-02-22

## 2017-02-22 RX ORDER — FENTANYL CITRATE 50 UG/ML
25 INJECTION INTRAVENOUS
Qty: 0 | Refills: 0 | Status: DISCONTINUED | OUTPATIENT
Start: 2017-02-22 | End: 2017-02-22

## 2017-02-22 RX ADMIN — Medication 1000 MILLIGRAM(S): at 17:15

## 2017-02-22 RX ADMIN — Medication 400 MILLIGRAM(S): at 17:00

## 2017-02-22 RX ADMIN — SODIUM CHLORIDE 75 MILLILITER(S): 9 INJECTION, SOLUTION INTRAVENOUS at 16:55

## 2017-02-22 NOTE — ASU PATIENT PROFILE, ADULT - PMH
No pertinent past medical history Diabetes mellitus    GERD (gastroesophageal reflux disease)    Hypertension    Metabolic encephalopathy

## 2017-02-22 NOTE — H&P PST ADULT - SKIN COMMENTS
open wounds to left forearm with gauze dressing applied. Dry sterile dressing over previous surgical site of LLE

## 2017-02-22 NOTE — H&P PST ADULT - ASSESSMENT
66 year old female presenting for ambulatory surgery for skin graft of left hand and forearm s/p debridement secondary to skin necrosis after syncopal episode and prolonged period on the ground.

## 2017-02-22 NOTE — BRIEF OPERATIVE NOTE - OPERATION/FINDINGS
Debriedement of devitalized tissue of left forearm including skin subcutaneous, tendon, other soft tissue and bone tissue  split thickness skin graft of left forearm using left thigh as donor site

## 2017-02-22 NOTE — ASU DISCHARGE PLAN (ADULT/PEDIATRIC). - MEDICATION SUMMARY - MEDICATIONS TO TAKE
I will START or STAY ON the medications listed below when I get home from the hospital:    acetaminophen 325 mg oral tablet  -- 2 tab(s) by mouth every 6 hours, As needed, For Temp greater than 38 C (100.4 F) and headache  -- Indication: For per pmd    oxyCODONE 10 mg oral tablet  -- 1 tab(s) by mouth every 4 hours, As needed, Moderate Pain  -- Indication: For per pmd    oxyCODONE 5 mg oral tablet  -- 1 tab(s) by mouth every 4 hours, As needed, Mild Pain  -- Indication: For per pmd    aluminum hydroxide-magnesium hydroxide 200 mg-200 mg/5 mL oral suspension  -- 30 milliliter(s) by mouth 4 times a day, As needed, Indigestion  -- Indication: For per pmd    magnesium hydroxide 8% oral suspension  -- 30 milliliter(s) by mouth once a day, As needed, Constipation  -- Indication: For per pmd    insulin glargine  -- 10 unit(s) subcutaneously once a day (at bedtime)  -- Indication: For per pmd    insulin lispro 100 units/mL subcutaneous solution  --  subcutaneous 3 times a day (before meals); 2 Unit(s) if Glucose 151 - 200  4 Unit(s) if Glucose 201 - 250  6 Unit(s) if Glucose 251 - 300  8 Unit(s) if Glucose 301 - 350  10 Unit(s) if Glucose 351 - 400  12 Unit(s) if Glucose Greater Than 400  -- Indication: For per pmd    diphenhydrAMINE 25 mg oral capsule  -- 1 cap(s) by mouth once a day (at bedtime), As needed, Insomnia  -- Indication: For per pmd    hydroCHLOROthiazide-triamterene 25 mg-37.5 mg oral capsule  -- 1 cap(s) by mouth once a day  -- Indication: For per pmd    metoprolol tartrate 50 mg oral tablet  -- 1 tab(s) by mouth 2 times a day  -- Indication: For per pmd    amLODIPine 5 mg oral tablet  -- 2 tab(s) by mouth once a day  -- Indication: For per pmd    famotidine 20 mg oral tablet  -- 1 tab(s) by mouth every 12 hours  -- Indication: For per pmd

## 2017-02-22 NOTE — H&P PST ADULT - PMH
Diabetes mellitus    GERD (gastroesophageal reflux disease)    Hypertension    Metabolic encephalopathy

## 2017-02-22 NOTE — H&P PST ADULT - FAMILY HISTORY
Father  Still living? No  Family history of diabetes mellitus, Age at diagnosis: Age Unknown     Mother  Still living? Unknown  Family history of hypertension, Age at diagnosis: Age Unknown

## 2017-02-22 NOTE — H&P PST ADULT - RS GEN PE MLT RESP DETAILS PC
breath sounds equal/no intercostal retractions/good air movement/airway patent/no chest wall tenderness/clear to auscultation bilaterally/respirations non-labored

## 2017-02-22 NOTE — ASU DISCHARGE PLAN (ADULT/PEDIATRIC). - NOTIFY
Bleeding that does not stop/Numbness, color, or temperature change to extremity/Pain not relieved by Medications/Fever greater than 101

## 2017-02-23 ENCOUNTER — TRANSCRIPTION ENCOUNTER (OUTPATIENT)
Age: 67
End: 2017-02-23

## 2017-02-27 DIAGNOSIS — I10 ESSENTIAL (PRIMARY) HYPERTENSION: ICD-10-CM

## 2017-02-27 DIAGNOSIS — E11.9 TYPE 2 DIABETES MELLITUS WITHOUT COMPLICATIONS: ICD-10-CM

## 2017-02-27 DIAGNOSIS — K21.9 GASTRO-ESOPHAGEAL REFLUX DISEASE WITHOUT ESOPHAGITIS: ICD-10-CM

## 2017-02-27 DIAGNOSIS — T81.89XA OTHER COMPLICATIONS OF PROCEDURES, NOT ELSEWHERE CLASSIFIED, INITIAL ENCOUNTER: ICD-10-CM
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
## 2017-02-27 LAB — SURGICAL PATHOLOGY FINAL REPORT - CH: SIGNIFICANT CHANGE UP

## 2017-04-12 ENCOUNTER — OUTPATIENT (OUTPATIENT)
Dept: OUTPATIENT SERVICES | Facility: HOSPITAL | Age: 67
LOS: 1 days | Discharge: HOME | End: 2017-04-12

## 2017-04-12 VITALS
RESPIRATION RATE: 18 BRPM | WEIGHT: 160.28 LBS | TEMPERATURE: 98 F | SYSTOLIC BLOOD PRESSURE: 141 MMHG | OXYGEN SATURATION: 100 % | HEIGHT: 67 IN | DIASTOLIC BLOOD PRESSURE: 73 MMHG | HEART RATE: 99 BPM

## 2017-04-12 DIAGNOSIS — L03.116 CELLULITIS OF LEFT LOWER LIMB: ICD-10-CM

## 2017-04-12 DIAGNOSIS — Z01.818 ENCOUNTER FOR OTHER PREPROCEDURAL EXAMINATION: ICD-10-CM

## 2017-04-12 DIAGNOSIS — I10 ESSENTIAL (PRIMARY) HYPERTENSION: ICD-10-CM

## 2017-04-12 DIAGNOSIS — K21.9 GASTRO-ESOPHAGEAL REFLUX DISEASE WITHOUT ESOPHAGITIS: ICD-10-CM

## 2017-04-12 DIAGNOSIS — Z98.890 OTHER SPECIFIED POSTPROCEDURAL STATES: Chronic | ICD-10-CM

## 2017-04-12 DIAGNOSIS — L03.90 CELLULITIS, UNSPECIFIED: ICD-10-CM

## 2017-04-12 DIAGNOSIS — E11.9 TYPE 2 DIABETES MELLITUS WITHOUT COMPLICATIONS: ICD-10-CM

## 2017-04-12 LAB
ANION GAP SERPL CALC-SCNC: 10 MMOL/L — SIGNIFICANT CHANGE UP (ref 5–17)
ANISOCYTOSIS BLD QL: SLIGHT — SIGNIFICANT CHANGE UP
BUN SERPL-MCNC: 24 MG/DL — HIGH (ref 7–23)
CALCIUM SERPL-MCNC: 9.2 MG/DL — SIGNIFICANT CHANGE UP (ref 8.5–10.1)
CHLORIDE SERPL-SCNC: 108 MMOL/L — SIGNIFICANT CHANGE UP (ref 96–108)
CO2 SERPL-SCNC: 26 MMOL/L — SIGNIFICANT CHANGE UP (ref 22–31)
CREAT SERPL-MCNC: 1.04 MG/DL — SIGNIFICANT CHANGE UP (ref 0.5–1.3)
EOSINOPHIL NFR BLD AUTO: 2 % — SIGNIFICANT CHANGE UP (ref 0–6)
GLUCOSE SERPL-MCNC: 133 MG/DL — HIGH (ref 70–99)
HCT VFR BLD CALC: 29.6 % — LOW (ref 34.5–45)
HGB BLD-MCNC: 9.5 G/DL — LOW (ref 11.5–15.5)
HYPOCHROMIA BLD QL: SLIGHT — SIGNIFICANT CHANGE UP
LYMPHOCYTES # BLD AUTO: 30 % — SIGNIFICANT CHANGE UP (ref 13–44)
MCHC RBC-ENTMCNC: 28.6 PG — SIGNIFICANT CHANGE UP (ref 27–34)
MCHC RBC-ENTMCNC: 32.1 GM/DL — SIGNIFICANT CHANGE UP (ref 32–36)
MCV RBC AUTO: 89.1 FL — SIGNIFICANT CHANGE UP (ref 80–100)
NEUTROPHILS NFR BLD AUTO: 68 % — SIGNIFICANT CHANGE UP (ref 43–77)
PLAT MORPH BLD: NORMAL — SIGNIFICANT CHANGE UP
PLATELET # BLD AUTO: 424 K/UL — HIGH (ref 150–400)
POLYCHROMASIA BLD QL SMEAR: SLIGHT — SIGNIFICANT CHANGE UP
POTASSIUM SERPL-MCNC: 3.8 MMOL/L — SIGNIFICANT CHANGE UP (ref 3.5–5.3)
POTASSIUM SERPL-SCNC: 3.8 MMOL/L — SIGNIFICANT CHANGE UP (ref 3.5–5.3)
RBC # BLD: 3.32 M/UL — LOW (ref 3.8–5.2)
RBC # FLD: 15.6 % — HIGH (ref 11–15)
RBC BLD AUTO: ABNORMAL
SODIUM SERPL-SCNC: 144 MMOL/L — SIGNIFICANT CHANGE UP (ref 135–145)
WBC # BLD: 9 K/UL — SIGNIFICANT CHANGE UP (ref 3.8–10.5)
WBC # FLD AUTO: 9 K/UL — SIGNIFICANT CHANGE UP (ref 3.8–10.5)

## 2017-04-12 NOTE — H&P PST ADULT - PMH
Diabetes mellitus    GERD (gastroesophageal reflux disease)    Hypertension    Metabolic encephalopathy    Thrombocytopenia

## 2017-04-12 NOTE — H&P PST ADULT - NSANTHOSAYNRD_GEN_A_CORE
No. KESHA screening performed.  STOP BANG Legend: 0-2 = LOW Risk; 3-4 = INTERMEDIATE Risk; 5-8 = HIGH Risk

## 2017-04-18 ENCOUNTER — OUTPATIENT (OUTPATIENT)
Dept: OUTPATIENT SERVICES | Facility: HOSPITAL | Age: 67
LOS: 1 days | Discharge: HOME | End: 2017-04-18
Payer: COMMERCIAL

## 2017-04-18 VITALS
SYSTOLIC BLOOD PRESSURE: 140 MMHG | HEART RATE: 65 BPM | DIASTOLIC BLOOD PRESSURE: 76 MMHG | TEMPERATURE: 97 F | OXYGEN SATURATION: 100 % | RESPIRATION RATE: 15 BRPM

## 2017-04-18 VITALS
TEMPERATURE: 98 F | OXYGEN SATURATION: 98 % | HEART RATE: 66 BPM | WEIGHT: 279.99 LBS | SYSTOLIC BLOOD PRESSURE: 129 MMHG | DIASTOLIC BLOOD PRESSURE: 69 MMHG | HEIGHT: 67 IN | RESPIRATION RATE: 18 BRPM

## 2017-04-18 DIAGNOSIS — Z98.890 OTHER SPECIFIED POSTPROCEDURAL STATES: Chronic | ICD-10-CM

## 2017-04-18 PROCEDURE — 88305 TISSUE EXAM BY PATHOLOGIST: CPT | Mod: 26

## 2017-04-18 NOTE — ASU DISCHARGE PLAN (ADULT/PEDIATRIC). - NOTIFY
Unable to Urinate/Excessive Diarrhea/Swelling that continues/Persistent Nausea and Vomiting/Numbness, tingling/Inability to Tolerate Liquids or Foods/Fever greater than 101/Numbness, color, or temperature change to extremity/Increased Irritability or Sluggishness/Bleeding that does not stop/Pain not relieved by Medications

## 2017-04-18 NOTE — ASU DISCHARGE PLAN (ADULT/PEDIATRIC). - INSTRUCTIONS
please ask visiting nurse call Dr Forbes at [phone] for drain instructions, to be emptied daily and record output.  take antibiotics as prescribed. tylenol for pain

## 2017-04-19 LAB — SURGICAL PATHOLOGY FINAL REPORT - CH: SIGNIFICANT CHANGE UP

## 2017-04-22 LAB
-  AMPICILLIN: SIGNIFICANT CHANGE UP
-  CIPROFLOXACIN: SIGNIFICANT CHANGE UP
-  ERYTHROMYCIN: SIGNIFICANT CHANGE UP
-  TETRACYCLINE: SIGNIFICANT CHANGE UP
-  VANCOMYCIN: SIGNIFICANT CHANGE UP
METHOD TYPE: SIGNIFICANT CHANGE UP

## 2017-04-23 LAB
CULTURE RESULTS: SIGNIFICANT CHANGE UP
ORGANISM # SPEC MICROSCOPIC CNT: SIGNIFICANT CHANGE UP
ORGANISM # SPEC MICROSCOPIC CNT: SIGNIFICANT CHANGE UP
SPECIMEN SOURCE: SIGNIFICANT CHANGE UP

## 2017-04-25 ENCOUNTER — TRANSCRIPTION ENCOUNTER (OUTPATIENT)
Age: 67
End: 2017-04-25

## 2017-04-28 DIAGNOSIS — Z79.84 LONG TERM (CURRENT) USE OF ORAL HYPOGLYCEMIC DRUGS: ICD-10-CM

## 2017-04-28 DIAGNOSIS — M79.89 OTHER SPECIFIED SOFT TISSUE DISORDERS: ICD-10-CM

## 2017-04-28 DIAGNOSIS — E11.9 TYPE 2 DIABETES MELLITUS WITHOUT COMPLICATIONS: ICD-10-CM

## 2017-04-28 DIAGNOSIS — I10 ESSENTIAL (PRIMARY) HYPERTENSION: ICD-10-CM

## 2017-04-28 DIAGNOSIS — L92.8 OTHER GRANULOMATOUS DISORDERS OF THE SKIN AND SUBCUTANEOUS TISSUE: ICD-10-CM

## 2017-04-28 DIAGNOSIS — D69.6 THROMBOCYTOPENIA, UNSPECIFIED: ICD-10-CM

## 2017-04-28 DIAGNOSIS — K21.9 GASTRO-ESOPHAGEAL REFLUX DISEASE WITHOUT ESOPHAGITIS: ICD-10-CM

## 2017-06-13 ENCOUNTER — OUTPATIENT (OUTPATIENT)
Dept: OUTPATIENT SERVICES | Facility: HOSPITAL | Age: 67
LOS: 1 days | Discharge: HOME | End: 2017-06-13

## 2017-06-13 DIAGNOSIS — Z98.890 OTHER SPECIFIED POSTPROCEDURAL STATES: Chronic | ICD-10-CM

## 2017-06-13 DIAGNOSIS — L89.90 PRESSURE ULCER OF UNSPECIFIED SITE, UNSPECIFIED STAGE: ICD-10-CM

## 2017-06-13 LAB
GRAM STN FLD: SIGNIFICANT CHANGE UP
SPECIMEN SOURCE: SIGNIFICANT CHANGE UP

## 2017-06-15 DIAGNOSIS — I10 ESSENTIAL (PRIMARY) HYPERTENSION: ICD-10-CM

## 2017-06-15 DIAGNOSIS — Z82.49 FAMILY HISTORY OF ISCHEMIC HEART DISEASE AND OTHER DISEASES OF THE CIRCULATORY SYSTEM: ICD-10-CM
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
## 2017-06-15 DIAGNOSIS — R26.81 UNSTEADINESS ON FEET: ICD-10-CM

## 2017-06-15 DIAGNOSIS — T81.31XA DISRUPTION OF EXTERNAL OPERATION (SURGICAL) WOUND, NOT ELSEWHERE CLASSIFIED, INITIAL ENCOUNTER: ICD-10-CM

## 2017-06-15 DIAGNOSIS — Z83.3 FAMILY HISTORY OF DIABETES MELLITUS: ICD-10-CM

## 2017-06-15 DIAGNOSIS — Z91.81 HISTORY OF FALLING: ICD-10-CM

## 2017-06-15 DIAGNOSIS — E78.5 HYPERLIPIDEMIA, UNSPECIFIED: ICD-10-CM

## 2017-06-15 DIAGNOSIS — Z79.899 OTHER LONG TERM (CURRENT) DRUG THERAPY: ICD-10-CM

## 2017-06-15 DIAGNOSIS — Z74.1 NEED FOR ASSISTANCE WITH PERSONAL CARE: ICD-10-CM

## 2017-06-15 DIAGNOSIS — Z79.84 LONG TERM (CURRENT) USE OF ORAL HYPOGLYCEMIC DRUGS: ICD-10-CM

## 2017-06-15 DIAGNOSIS — Z98.42 CATARACT EXTRACTION STATUS, LEFT EYE: ICD-10-CM

## 2017-06-15 DIAGNOSIS — Z87.39 PERSONAL HISTORY OF OTHER DISEASES OF THE MUSCULOSKELETAL SYSTEM AND CONNECTIVE TISSUE: ICD-10-CM

## 2017-06-15 DIAGNOSIS — T79.2XXA TRAUMATIC SECONDARY AND RECURRENT HEMORRHAGE AND SEROMA, INITIAL ENCOUNTER: ICD-10-CM

## 2017-06-15 DIAGNOSIS — Z87.891 PERSONAL HISTORY OF NICOTINE DEPENDENCE: ICD-10-CM

## 2017-06-15 DIAGNOSIS — T79.6XXA TRAUMATIC ISCHEMIA OF MUSCLE, INITIAL ENCOUNTER: ICD-10-CM

## 2017-06-15 DIAGNOSIS — H26.9 UNSPECIFIED CATARACT: ICD-10-CM

## 2017-06-15 DIAGNOSIS — E11.9 TYPE 2 DIABETES MELLITUS WITHOUT COMPLICATIONS: ICD-10-CM

## 2017-06-15 DIAGNOSIS — Z94.5 SKIN TRANSPLANT STATUS: ICD-10-CM

## 2017-06-17 LAB
-  AMPICILLIN/SULBACTAM: SIGNIFICANT CHANGE UP
-  CEFAZOLIN: SIGNIFICANT CHANGE UP
-  CIPROFLOXACIN: SIGNIFICANT CHANGE UP
-  CLINDAMYCIN: SIGNIFICANT CHANGE UP
-  ERYTHROMYCIN: SIGNIFICANT CHANGE UP
-  GENTAMICIN: SIGNIFICANT CHANGE UP
-  LEVOFLOXACIN: SIGNIFICANT CHANGE UP
-  MOXIFLOXACIN(AEROBIC): SIGNIFICANT CHANGE UP
-  OXACILLIN: SIGNIFICANT CHANGE UP
-  RIFAMPIN: SIGNIFICANT CHANGE UP
-  TETRACYCLINE: SIGNIFICANT CHANGE UP
-  TRIMETHOPRIM/SULFAMETHOXAZOLE: SIGNIFICANT CHANGE UP
-  VANCOMYCIN: SIGNIFICANT CHANGE UP
METHOD TYPE: SIGNIFICANT CHANGE UP

## 2017-06-18 LAB
CULTURE RESULTS: SIGNIFICANT CHANGE UP
GRAM STN FLD: SIGNIFICANT CHANGE UP
ORGANISM # SPEC MICROSCOPIC CNT: SIGNIFICANT CHANGE UP
ORGANISM # SPEC MICROSCOPIC CNT: SIGNIFICANT CHANGE UP
SPECIMEN SOURCE: SIGNIFICANT CHANGE UP

## 2017-06-20 ENCOUNTER — OUTPATIENT (OUTPATIENT)
Dept: OUTPATIENT SERVICES | Facility: HOSPITAL | Age: 67
LOS: 1 days | Discharge: HOME | End: 2017-06-20

## 2017-06-20 DIAGNOSIS — Z98.890 OTHER SPECIFIED POSTPROCEDURAL STATES: Chronic | ICD-10-CM

## 2017-06-20 DIAGNOSIS — L89.90 PRESSURE ULCER OF UNSPECIFIED SITE, UNSPECIFIED STAGE: ICD-10-CM
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
## 2017-06-21 DIAGNOSIS — Z79.899 OTHER LONG TERM (CURRENT) DRUG THERAPY: ICD-10-CM

## 2017-06-21 DIAGNOSIS — T81.31XA DISRUPTION OF EXTERNAL OPERATION (SURGICAL) WOUND, NOT ELSEWHERE CLASSIFIED, INITIAL ENCOUNTER: ICD-10-CM
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
## 2017-06-21 DIAGNOSIS — E11.9 TYPE 2 DIABETES MELLITUS WITHOUT COMPLICATIONS: ICD-10-CM

## 2017-06-21 DIAGNOSIS — T79.6XXA TRAUMATIC ISCHEMIA OF MUSCLE, INITIAL ENCOUNTER: ICD-10-CM

## 2017-06-21 DIAGNOSIS — H26.9 UNSPECIFIED CATARACT: ICD-10-CM

## 2017-06-21 DIAGNOSIS — T79.2XXA TRAUMATIC SECONDARY AND RECURRENT HEMORRHAGE AND SEROMA, INITIAL ENCOUNTER: ICD-10-CM

## 2017-06-21 DIAGNOSIS — Z79.84 LONG TERM (CURRENT) USE OF ORAL HYPOGLYCEMIC DRUGS: ICD-10-CM

## 2017-06-21 DIAGNOSIS — I10 ESSENTIAL (PRIMARY) HYPERTENSION: ICD-10-CM

## 2017-06-27 ENCOUNTER — OUTPATIENT (OUTPATIENT)
Dept: OUTPATIENT SERVICES | Facility: HOSPITAL | Age: 67
LOS: 1 days | Discharge: HOME | End: 2017-06-27
Payer: COMMERCIAL

## 2017-06-27 ENCOUNTER — APPOINTMENT (OUTPATIENT)
Dept: RADIOLOGY | Facility: HOSPITAL | Age: 67
End: 2017-06-27

## 2017-06-27 DIAGNOSIS — Z98.890 OTHER SPECIFIED POSTPROCEDURAL STATES: Chronic | ICD-10-CM

## 2017-06-27 DIAGNOSIS — M25.552 PAIN IN LEFT HIP: ICD-10-CM

## 2017-06-27 PROBLEM — Z00.00 ENCOUNTER FOR PREVENTIVE HEALTH EXAMINATION: Status: ACTIVE | Noted: 2017-06-27

## 2017-06-27 PROCEDURE — 73590 X-RAY EXAM OF LOWER LEG: CPT | Mod: 26,LT

## 2017-06-27 PROCEDURE — 73502 X-RAY EXAM HIP UNI 2-3 VIEWS: CPT | Mod: 26,LT

## 2017-06-30 DIAGNOSIS — T79.2XXA TRAUMATIC SECONDARY AND RECURRENT HEMORRHAGE AND SEROMA, INITIAL ENCOUNTER: ICD-10-CM

## 2017-06-30 DIAGNOSIS — H26.9 UNSPECIFIED CATARACT: ICD-10-CM

## 2017-06-30 DIAGNOSIS — Z79.84 LONG TERM (CURRENT) USE OF ORAL HYPOGLYCEMIC DRUGS: ICD-10-CM

## 2017-06-30 DIAGNOSIS — T81.31XA DISRUPTION OF EXTERNAL OPERATION (SURGICAL) WOUND, NOT ELSEWHERE CLASSIFIED, INITIAL ENCOUNTER: ICD-10-CM

## 2017-06-30 DIAGNOSIS — L02.416 CUTANEOUS ABSCESS OF LEFT LOWER LIMB: ICD-10-CM

## 2017-06-30 DIAGNOSIS — I10 ESSENTIAL (PRIMARY) HYPERTENSION: ICD-10-CM
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
## 2017-06-30 DIAGNOSIS — E11.9 TYPE 2 DIABETES MELLITUS WITHOUT COMPLICATIONS: ICD-10-CM

## 2017-06-30 DIAGNOSIS — Z79.899 OTHER LONG TERM (CURRENT) DRUG THERAPY: ICD-10-CM

## 2017-06-30 DIAGNOSIS — L92.9 GRANULOMATOUS DISORDER OF THE SKIN AND SUBCUTANEOUS TISSUE, UNSPECIFIED: ICD-10-CM
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
## 2017-06-30 DIAGNOSIS — T79.6XXA TRAUMATIC ISCHEMIA OF MUSCLE, INITIAL ENCOUNTER: ICD-10-CM

## 2017-07-05 ENCOUNTER — OUTPATIENT (OUTPATIENT)
Dept: OUTPATIENT SERVICES | Facility: HOSPITAL | Age: 67
LOS: 1 days | Discharge: HOME | End: 2017-07-05
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
## 2017-07-05 DIAGNOSIS — Z79.84 LONG TERM (CURRENT) USE OF ORAL HYPOGLYCEMIC DRUGS: ICD-10-CM

## 2017-07-05 DIAGNOSIS — L89.90 PRESSURE ULCER OF UNSPECIFIED SITE, UNSPECIFIED STAGE: ICD-10-CM

## 2017-07-05 DIAGNOSIS — H26.9 UNSPECIFIED CATARACT: ICD-10-CM

## 2017-07-05 DIAGNOSIS — I10 ESSENTIAL (PRIMARY) HYPERTENSION: ICD-10-CM

## 2017-07-05 DIAGNOSIS — T81.31XA DISRUPTION OF EXTERNAL OPERATION (SURGICAL) WOUND, NOT ELSEWHERE CLASSIFIED, INITIAL ENCOUNTER: ICD-10-CM

## 2017-07-05 DIAGNOSIS — E11.9 TYPE 2 DIABETES MELLITUS WITHOUT COMPLICATIONS: ICD-10-CM

## 2017-07-05 DIAGNOSIS — Z98.890 OTHER SPECIFIED POSTPROCEDURAL STATES: Chronic | ICD-10-CM

## 2017-07-05 DIAGNOSIS — T79.2XXA TRAUMATIC SECONDARY AND RECURRENT HEMORRHAGE AND SEROMA, INITIAL ENCOUNTER: ICD-10-CM

## 2017-07-05 DIAGNOSIS — L92.9 GRANULOMATOUS DISORDER OF THE SKIN AND SUBCUTANEOUS TISSUE, UNSPECIFIED: ICD-10-CM

## 2017-07-05 DIAGNOSIS — T79.6XXA TRAUMATIC ISCHEMIA OF MUSCLE, INITIAL ENCOUNTER: ICD-10-CM
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
## 2017-07-05 DIAGNOSIS — Z79.899 OTHER LONG TERM (CURRENT) DRUG THERAPY: ICD-10-CM

## 2017-07-11 ENCOUNTER — OUTPATIENT (OUTPATIENT)
Dept: OUTPATIENT SERVICES | Facility: HOSPITAL | Age: 67
LOS: 1 days | Discharge: HOME | End: 2017-07-11

## 2017-07-11 DIAGNOSIS — Z98.890 OTHER SPECIFIED POSTPROCEDURAL STATES: Chronic | ICD-10-CM

## 2017-07-11 DIAGNOSIS — L89.90 PRESSURE ULCER OF UNSPECIFIED SITE, UNSPECIFIED STAGE: ICD-10-CM

## 2017-07-17 DIAGNOSIS — Z79.899 OTHER LONG TERM (CURRENT) DRUG THERAPY: ICD-10-CM

## 2017-07-17 DIAGNOSIS — T79.2XXA TRAUMATIC SECONDARY AND RECURRENT HEMORRHAGE AND SEROMA, INITIAL ENCOUNTER: ICD-10-CM

## 2017-07-17 DIAGNOSIS — T81.31XA DISRUPTION OF EXTERNAL OPERATION (SURGICAL) WOUND, NOT ELSEWHERE CLASSIFIED, INITIAL ENCOUNTER: ICD-10-CM
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
## 2017-07-17 DIAGNOSIS — Z79.84 LONG TERM (CURRENT) USE OF ORAL HYPOGLYCEMIC DRUGS: ICD-10-CM

## 2017-07-17 DIAGNOSIS — L92.9 GRANULOMATOUS DISORDER OF THE SKIN AND SUBCUTANEOUS TISSUE, UNSPECIFIED: ICD-10-CM

## 2017-07-17 DIAGNOSIS — L02.416 CUTANEOUS ABSCESS OF LEFT LOWER LIMB: ICD-10-CM

## 2017-07-17 DIAGNOSIS — E11.9 TYPE 2 DIABETES MELLITUS WITHOUT COMPLICATIONS: ICD-10-CM

## 2017-07-17 DIAGNOSIS — I10 ESSENTIAL (PRIMARY) HYPERTENSION: ICD-10-CM

## 2017-07-17 DIAGNOSIS — H26.9 UNSPECIFIED CATARACT: ICD-10-CM

## 2017-07-18 ENCOUNTER — RESULT REVIEW (OUTPATIENT)
Age: 67
End: 2017-07-18

## 2017-07-18 ENCOUNTER — OUTPATIENT (OUTPATIENT)
Dept: OUTPATIENT SERVICES | Facility: HOSPITAL | Age: 67
LOS: 1 days | Discharge: HOME | End: 2017-07-18
Payer: COMMERCIAL

## 2017-07-18 DIAGNOSIS — L89.90 PRESSURE ULCER OF UNSPECIFIED SITE, UNSPECIFIED STAGE: ICD-10-CM

## 2017-07-18 DIAGNOSIS — Z98.890 OTHER SPECIFIED POSTPROCEDURAL STATES: Chronic | ICD-10-CM

## 2017-07-18 PROCEDURE — 88304 TISSUE EXAM BY PATHOLOGIST: CPT | Mod: 26

## 2017-07-19 LAB — SURGICAL PATHOLOGY FINAL REPORT - CH: SIGNIFICANT CHANGE UP

## 2017-07-21 LAB
CULTURE RESULTS: SIGNIFICANT CHANGE UP
SPECIMEN SOURCE: SIGNIFICANT CHANGE UP

## 2017-08-02 ENCOUNTER — OUTPATIENT (OUTPATIENT)
Dept: OUTPATIENT SERVICES | Facility: HOSPITAL | Age: 67
LOS: 1 days | Discharge: HOME | End: 2017-08-02
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
## 2017-08-02 DIAGNOSIS — T81.31XA DISRUPTION OF EXTERNAL OPERATION (SURGICAL) WOUND, NOT ELSEWHERE CLASSIFIED, INITIAL ENCOUNTER: ICD-10-CM

## 2017-08-02 DIAGNOSIS — T79.6XXA TRAUMATIC ISCHEMIA OF MUSCLE, INITIAL ENCOUNTER: ICD-10-CM

## 2017-08-02 DIAGNOSIS — H26.9 UNSPECIFIED CATARACT: ICD-10-CM

## 2017-08-02 DIAGNOSIS — L02.416 CUTANEOUS ABSCESS OF LEFT LOWER LIMB: ICD-10-CM

## 2017-08-02 DIAGNOSIS — Z79.84 LONG TERM (CURRENT) USE OF ORAL HYPOGLYCEMIC DRUGS: ICD-10-CM

## 2017-08-02 DIAGNOSIS — I10 ESSENTIAL (PRIMARY) HYPERTENSION: ICD-10-CM

## 2017-08-02 DIAGNOSIS — T79.2XXA TRAUMATIC SECONDARY AND RECURRENT HEMORRHAGE AND SEROMA, INITIAL ENCOUNTER: ICD-10-CM

## 2017-08-02 DIAGNOSIS — Z98.890 OTHER SPECIFIED POSTPROCEDURAL STATES: Chronic | ICD-10-CM

## 2017-08-02 DIAGNOSIS — L92.9 GRANULOMATOUS DISORDER OF THE SKIN AND SUBCUTANEOUS TISSUE, UNSPECIFIED: ICD-10-CM

## 2017-08-02 DIAGNOSIS — L89.90 PRESSURE ULCER OF UNSPECIFIED SITE, UNSPECIFIED STAGE: ICD-10-CM

## 2017-08-02 DIAGNOSIS — E11.9 TYPE 2 DIABETES MELLITUS WITHOUT COMPLICATIONS: ICD-10-CM

## 2017-08-02 DIAGNOSIS — Z79.899 OTHER LONG TERM (CURRENT) DRUG THERAPY: ICD-10-CM
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
## 2017-08-07 DIAGNOSIS — Z79.899 OTHER LONG TERM (CURRENT) DRUG THERAPY: ICD-10-CM

## 2017-08-07 DIAGNOSIS — E11.9 TYPE 2 DIABETES MELLITUS WITHOUT COMPLICATIONS: ICD-10-CM
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
## 2017-08-07 DIAGNOSIS — H26.9 UNSPECIFIED CATARACT: ICD-10-CM

## 2017-08-07 DIAGNOSIS — T79.2XXA TRAUMATIC SECONDARY AND RECURRENT HEMORRHAGE AND SEROMA, INITIAL ENCOUNTER: ICD-10-CM

## 2017-08-07 DIAGNOSIS — I10 ESSENTIAL (PRIMARY) HYPERTENSION: ICD-10-CM

## 2017-08-07 DIAGNOSIS — Z79.84 LONG TERM (CURRENT) USE OF ORAL HYPOGLYCEMIC DRUGS: ICD-10-CM

## 2017-08-07 DIAGNOSIS — T79.6XXA TRAUMATIC ISCHEMIA OF MUSCLE, INITIAL ENCOUNTER: ICD-10-CM

## 2017-08-07 DIAGNOSIS — L92.9 GRANULOMATOUS DISORDER OF THE SKIN AND SUBCUTANEOUS TISSUE, UNSPECIFIED: ICD-10-CM

## 2017-08-07 DIAGNOSIS — Z91.81 HISTORY OF FALLING: ICD-10-CM

## 2017-08-07 DIAGNOSIS — L02.416 CUTANEOUS ABSCESS OF LEFT LOWER LIMB: ICD-10-CM

## 2017-08-08 ENCOUNTER — OUTPATIENT (OUTPATIENT)
Dept: OUTPATIENT SERVICES | Facility: HOSPITAL | Age: 67
LOS: 1 days | Discharge: HOME | End: 2017-08-08

## 2017-08-08 DIAGNOSIS — Z98.890 OTHER SPECIFIED POSTPROCEDURAL STATES: Chronic | ICD-10-CM

## 2017-08-08 DIAGNOSIS — L89.90 PRESSURE ULCER OF UNSPECIFIED SITE, UNSPECIFIED STAGE: ICD-10-CM

## 2017-08-08 LAB
BUN SERPL-MCNC: 27 MG/DL — HIGH (ref 7–23)
CREAT SERPL-MCNC: 0.88 MG/DL — SIGNIFICANT CHANGE UP (ref 0.5–1.3)
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
## 2017-08-10 DIAGNOSIS — T79.2XXA TRAUMATIC SECONDARY AND RECURRENT HEMORRHAGE AND SEROMA, INITIAL ENCOUNTER: ICD-10-CM

## 2017-08-10 DIAGNOSIS — E11.9 TYPE 2 DIABETES MELLITUS WITHOUT COMPLICATIONS: ICD-10-CM

## 2017-08-10 DIAGNOSIS — I10 ESSENTIAL (PRIMARY) HYPERTENSION: ICD-10-CM

## 2017-08-10 DIAGNOSIS — Z79.84 LONG TERM (CURRENT) USE OF ORAL HYPOGLYCEMIC DRUGS: ICD-10-CM

## 2017-08-10 DIAGNOSIS — T81.31XA DISRUPTION OF EXTERNAL OPERATION (SURGICAL) WOUND, NOT ELSEWHERE CLASSIFIED, INITIAL ENCOUNTER: ICD-10-CM

## 2017-08-10 DIAGNOSIS — Z79.899 OTHER LONG TERM (CURRENT) DRUG THERAPY: ICD-10-CM

## 2017-08-10 DIAGNOSIS — T79.6XXA TRAUMATIC ISCHEMIA OF MUSCLE, INITIAL ENCOUNTER: ICD-10-CM

## 2017-08-10 DIAGNOSIS — Z91.81 HISTORY OF FALLING: ICD-10-CM

## 2017-08-10 DIAGNOSIS — H26.9 UNSPECIFIED CATARACT: ICD-10-CM

## 2017-08-11 ENCOUNTER — APPOINTMENT (OUTPATIENT)
Dept: MRI IMAGING | Facility: HOSPITAL | Age: 67
End: 2017-08-11

## 2017-08-11 ENCOUNTER — OUTPATIENT (OUTPATIENT)
Dept: OUTPATIENT SERVICES | Facility: HOSPITAL | Age: 67
LOS: 1 days | Discharge: HOME | End: 2017-08-11
Payer: COMMERCIAL

## 2017-08-11 DIAGNOSIS — R10.2 PELVIC AND PERINEAL PAIN: ICD-10-CM

## 2017-08-11 DIAGNOSIS — Z98.890 OTHER SPECIFIED POSTPROCEDURAL STATES: Chronic | ICD-10-CM

## 2017-08-11 PROCEDURE — 72197 MRI PELVIS W/O & W/DYE: CPT | Mod: 26

## 2017-08-17 ENCOUNTER — OUTPATIENT (OUTPATIENT)
Dept: OUTPATIENT SERVICES | Facility: HOSPITAL | Age: 67
LOS: 1 days | Discharge: HOME | End: 2017-08-17

## 2017-08-17 DIAGNOSIS — L89.90 PRESSURE ULCER OF UNSPECIFIED SITE, UNSPECIFIED STAGE: ICD-10-CM

## 2017-08-17 DIAGNOSIS — Z98.890 OTHER SPECIFIED POSTPROCEDURAL STATES: Chronic | ICD-10-CM

## 2017-08-21 ENCOUNTER — INPATIENT (INPATIENT)
Facility: HOSPITAL | Age: 67
LOS: 6 days | Discharge: NURSING, REHAB OR LONG-TERM CARE | End: 2017-08-28
Attending: INTERNAL MEDICINE | Admitting: INTERNAL MEDICINE
Payer: COMMERCIAL

## 2017-08-21 VITALS
SYSTOLIC BLOOD PRESSURE: 143 MMHG | WEIGHT: 167.99 LBS | HEART RATE: 77 BPM | OXYGEN SATURATION: 100 % | HEIGHT: 66 IN | DIASTOLIC BLOOD PRESSURE: 91 MMHG | RESPIRATION RATE: 18 BRPM | TEMPERATURE: 98 F

## 2017-08-21 DIAGNOSIS — Z98.890 OTHER SPECIFIED POSTPROCEDURAL STATES: Chronic | ICD-10-CM

## 2017-08-21 LAB
ALBUMIN SERPL ELPH-MCNC: 3.5 G/DL — SIGNIFICANT CHANGE UP (ref 3.3–5)
ALP SERPL-CCNC: 84 U/L — SIGNIFICANT CHANGE UP (ref 40–120)
ALT FLD-CCNC: 41 U/L — SIGNIFICANT CHANGE UP (ref 12–78)
ANION GAP SERPL CALC-SCNC: 9 MMOL/L — SIGNIFICANT CHANGE UP (ref 5–17)
APPEARANCE UR: CLEAR — SIGNIFICANT CHANGE UP
AST SERPL-CCNC: 36 U/L — SIGNIFICANT CHANGE UP (ref 15–37)
BACTERIA # UR AUTO: ABNORMAL
BASOPHILS # BLD AUTO: 0.1 K/UL — SIGNIFICANT CHANGE UP (ref 0–0.2)
BASOPHILS NFR BLD AUTO: 1.5 % — SIGNIFICANT CHANGE UP (ref 0–2)
BILIRUB SERPL-MCNC: 0.2 MG/DL — SIGNIFICANT CHANGE UP (ref 0.2–1.2)
BILIRUB UR-MCNC: NEGATIVE — SIGNIFICANT CHANGE UP
BUN SERPL-MCNC: 19 MG/DL — SIGNIFICANT CHANGE UP (ref 7–23)
CALCIUM SERPL-MCNC: 8.9 MG/DL — SIGNIFICANT CHANGE UP (ref 8.5–10.1)
CHLORIDE SERPL-SCNC: 111 MMOL/L — HIGH (ref 96–108)
CO2 SERPL-SCNC: 25 MMOL/L — SIGNIFICANT CHANGE UP (ref 22–31)
COD CRY URNS QL: ABNORMAL
COLOR SPEC: YELLOW — SIGNIFICANT CHANGE UP
CREAT SERPL-MCNC: 0.82 MG/DL — SIGNIFICANT CHANGE UP (ref 0.5–1.3)
DIFF PNL FLD: NEGATIVE — SIGNIFICANT CHANGE UP
EOSINOPHIL # BLD AUTO: 0.3 K/UL — SIGNIFICANT CHANGE UP (ref 0–0.5)
EOSINOPHIL NFR BLD AUTO: 3.3 % — SIGNIFICANT CHANGE UP (ref 0–6)
EPI CELLS # UR: SIGNIFICANT CHANGE UP
ERYTHROCYTE [SEDIMENTATION RATE] IN BLOOD: 40 MM/HR — HIGH (ref 0–20)
GLUCOSE SERPL-MCNC: 64 MG/DL — LOW (ref 70–99)
GLUCOSE UR QL: NEGATIVE MG/DL — SIGNIFICANT CHANGE UP
HCT VFR BLD CALC: 37.2 % — SIGNIFICANT CHANGE UP (ref 34.5–45)
HGB BLD-MCNC: 12.1 G/DL — SIGNIFICANT CHANGE UP (ref 11.5–15.5)
HYALINE CASTS # UR AUTO: ABNORMAL /LPF
KETONES UR-MCNC: NEGATIVE — SIGNIFICANT CHANGE UP
LACTATE SERPL-SCNC: 1 MMOL/L — SIGNIFICANT CHANGE UP (ref 0.7–2)
LEUKOCYTE ESTERASE UR-ACNC: NEGATIVE — SIGNIFICANT CHANGE UP
LYMPHOCYTES # BLD AUTO: 4.2 K/UL — HIGH (ref 1–3.3)
LYMPHOCYTES # BLD AUTO: 51.7 % — HIGH (ref 13–44)
MAGNESIUM SERPL-MCNC: 2.3 MG/DL — SIGNIFICANT CHANGE UP (ref 1.6–2.6)
MCHC RBC-ENTMCNC: 28.7 PG — SIGNIFICANT CHANGE UP (ref 27–34)
MCHC RBC-ENTMCNC: 32.4 GM/DL — SIGNIFICANT CHANGE UP (ref 32–36)
MCV RBC AUTO: 88.5 FL — SIGNIFICANT CHANGE UP (ref 80–100)
MONOCYTES # BLD AUTO: 0.3 K/UL — SIGNIFICANT CHANGE UP (ref 0–0.9)
MONOCYTES NFR BLD AUTO: 4.3 % — SIGNIFICANT CHANGE UP (ref 2–14)
NEUTROPHILS # BLD AUTO: 3.2 K/UL — SIGNIFICANT CHANGE UP (ref 1.8–7.4)
NEUTROPHILS NFR BLD AUTO: 39.2 % — LOW (ref 43–77)
NITRITE UR-MCNC: NEGATIVE — SIGNIFICANT CHANGE UP
PH UR: 5 — SIGNIFICANT CHANGE UP (ref 5–8)
PLATELET # BLD AUTO: 327 K/UL — SIGNIFICANT CHANGE UP (ref 150–400)
POTASSIUM SERPL-MCNC: 3.4 MMOL/L — LOW (ref 3.5–5.3)
POTASSIUM SERPL-SCNC: 3.4 MMOL/L — LOW (ref 3.5–5.3)
PROT SERPL-MCNC: 8.9 GM/DL — HIGH (ref 6–8.3)
PROT UR-MCNC: 15 MG/DL
RBC # BLD: 4.2 M/UL — SIGNIFICANT CHANGE UP (ref 3.8–5.2)
RBC # FLD: 13.4 % — SIGNIFICANT CHANGE UP (ref 11–15)
SODIUM SERPL-SCNC: 145 MMOL/L — SIGNIFICANT CHANGE UP (ref 135–145)
SP GR SPEC: 1.01 — SIGNIFICANT CHANGE UP (ref 1.01–1.02)
UROBILINOGEN FLD QL: NEGATIVE MG/DL — SIGNIFICANT CHANGE UP
WBC # BLD: 8.1 K/UL — SIGNIFICANT CHANGE UP (ref 3.8–10.5)
WBC # FLD AUTO: 8.1 K/UL — SIGNIFICANT CHANGE UP (ref 3.8–10.5)

## 2017-08-21 PROCEDURE — 99285 EMERGENCY DEPT VISIT HI MDM: CPT

## 2017-08-21 PROCEDURE — 73552 X-RAY EXAM OF FEMUR 2/>: CPT | Mod: 26,LT

## 2017-08-21 RX ORDER — PIPERACILLIN AND TAZOBACTAM 4; .5 G/20ML; G/20ML
3.38 INJECTION, POWDER, LYOPHILIZED, FOR SOLUTION INTRAVENOUS ONCE
Qty: 0 | Refills: 0 | Status: COMPLETED | OUTPATIENT
Start: 2017-08-21 | End: 2017-08-21

## 2017-08-21 RX ORDER — AMLODIPINE BESYLATE 2.5 MG/1
10 TABLET ORAL DAILY
Qty: 0 | Refills: 0 | Status: DISCONTINUED | OUTPATIENT
Start: 2017-08-21 | End: 2017-08-28

## 2017-08-21 RX ORDER — VANCOMYCIN HCL 1 G
1000 VIAL (EA) INTRAVENOUS ONCE
Qty: 0 | Refills: 0 | Status: COMPLETED | OUTPATIENT
Start: 2017-08-21 | End: 2017-08-21

## 2017-08-21 RX ORDER — DEXTROSE 50 % IN WATER 50 %
12.5 SYRINGE (ML) INTRAVENOUS ONCE
Qty: 0 | Refills: 0 | Status: DISCONTINUED | OUTPATIENT
Start: 2017-08-21 | End: 2017-08-28

## 2017-08-21 RX ORDER — GLUCAGON INJECTION, SOLUTION 0.5 MG/.1ML
1 INJECTION, SOLUTION SUBCUTANEOUS ONCE
Qty: 0 | Refills: 0 | Status: DISCONTINUED | OUTPATIENT
Start: 2017-08-21 | End: 2017-08-28

## 2017-08-21 RX ORDER — ACETAMINOPHEN 500 MG
650 TABLET ORAL EVERY 6 HOURS
Qty: 0 | Refills: 0 | Status: DISCONTINUED | OUTPATIENT
Start: 2017-08-21 | End: 2017-08-28

## 2017-08-21 RX ORDER — DEXTROSE 50 % IN WATER 50 %
1 SYRINGE (ML) INTRAVENOUS ONCE
Qty: 0 | Refills: 0 | Status: DISCONTINUED | OUTPATIENT
Start: 2017-08-21 | End: 2017-08-28

## 2017-08-21 RX ORDER — SODIUM CHLORIDE 9 MG/ML
1000 INJECTION, SOLUTION INTRAVENOUS
Qty: 0 | Refills: 0 | Status: DISCONTINUED | OUTPATIENT
Start: 2017-08-21 | End: 2017-08-28

## 2017-08-21 RX ORDER — ENOXAPARIN SODIUM 100 MG/ML
40 INJECTION SUBCUTANEOUS EVERY 24 HOURS
Qty: 0 | Refills: 0 | Status: DISCONTINUED | OUTPATIENT
Start: 2017-08-21 | End: 2017-08-28

## 2017-08-21 RX ORDER — POTASSIUM CHLORIDE 20 MEQ
40 PACKET (EA) ORAL ONCE
Qty: 0 | Refills: 0 | Status: COMPLETED | OUTPATIENT
Start: 2017-08-21 | End: 2017-08-21

## 2017-08-21 RX ORDER — INSULIN LISPRO 100/ML
VIAL (ML) SUBCUTANEOUS
Qty: 0 | Refills: 0 | Status: DISCONTINUED | OUTPATIENT
Start: 2017-08-21 | End: 2017-08-28

## 2017-08-21 RX ORDER — METOPROLOL TARTRATE 50 MG
50 TABLET ORAL
Qty: 0 | Refills: 0 | Status: DISCONTINUED | OUTPATIENT
Start: 2017-08-21 | End: 2017-08-28

## 2017-08-21 RX ORDER — DEXTROSE 50 % IN WATER 50 %
25 SYRINGE (ML) INTRAVENOUS ONCE
Qty: 0 | Refills: 0 | Status: DISCONTINUED | OUTPATIENT
Start: 2017-08-21 | End: 2017-08-28

## 2017-08-21 RX ORDER — PIPERACILLIN AND TAZOBACTAM 4; .5 G/20ML; G/20ML
3.38 INJECTION, POWDER, LYOPHILIZED, FOR SOLUTION INTRAVENOUS EVERY 8 HOURS
Qty: 0 | Refills: 0 | Status: DISCONTINUED | OUTPATIENT
Start: 2017-08-21 | End: 2017-08-22

## 2017-08-21 RX ADMIN — PIPERACILLIN AND TAZOBACTAM 25 GRAM(S): 4; .5 INJECTION, POWDER, LYOPHILIZED, FOR SOLUTION INTRAVENOUS at 22:02

## 2017-08-21 RX ADMIN — Medication 40 MILLIEQUIVALENT(S): at 17:54

## 2017-08-21 RX ADMIN — Medication 50 MILLIGRAM(S): at 17:54

## 2017-08-21 RX ADMIN — ENOXAPARIN SODIUM 40 MILLIGRAM(S): 100 INJECTION SUBCUTANEOUS at 17:54

## 2017-08-21 RX ADMIN — Medication 250 MILLIGRAM(S): at 18:00

## 2017-08-21 RX ADMIN — PIPERACILLIN AND TAZOBACTAM 200 GRAM(S): 4; .5 INJECTION, POWDER, LYOPHILIZED, FOR SOLUTION INTRAVENOUS at 16:08

## 2017-08-21 NOTE — H&P ADULT - NSHPLABSRESULTS_GEN_ALL_CORE
12.1   8.1   )-----------( 327      ( 21 Aug 2017 15:54 )             37.2     08-21    145  |  111<H>  |  19  ----------------------------<  64<L>  3.4<L>   |  25  |  0.82    Ca    8.9      21 Aug 2017 15:54  Mg     2.3     08-21    TPro  8.9<H>  /  Alb  3.5  /  TBili  0.2  /  DBili  x   /  AST  36  /  ALT  41  /  AlkPhos  84  08-21        CAPILLARY BLOOD GLUCOSE                Urine Culture:      Blood Culture:

## 2017-08-21 NOTE — H&P ADULT - HISTORY OF PRESENT ILLNESS
· HPI   67F hx of dm, htn and left skin granuloma s/p excision in april pw with continued redness and drainage of the left thigh. no fever, chills, nausea, vomiting. Dr Glass advised patient to present to ED Fh and Sh not otherwise contributory

## 2017-08-21 NOTE — ED ADULT TRIAGE NOTE - CHIEF COMPLAINT QUOTE
Sent in by Dr Dai for a wound to the left hip now spreading to the left abdomen, pt to be admitted to the hospital

## 2017-08-21 NOTE — ED PROVIDER NOTE - OBJECTIVE STATEMENT
Pertinent PMH/PSH/FHx/SHx and Review of Systems contained within:  67F hx of dm, htn and left skin granuloma s/p excision in april pw with continued redness and drainage of the left thigh. no fever, chills, nausea, vomiting. dr pierre told patient to present to ed  Fh and Sh not otherwise contributory  ROS otherwise negative

## 2017-08-21 NOTE — ED PROVIDER NOTE - PHYSICAL EXAMINATION
Gen: Alert, NAD  Head: NC, AT   Eyes: PERRL, EOMI, normal lids/conjunctiva  ENT: normal hearing, patent oropharynx without erythema/exudate, uvula midline  Neck: supple, no tenderness, Trachea midline  Pulm: Bilateral BS, normal resp effort, no wheeze/stridor/retractions  CV: RRR, no M/R/G, 2+ radial and dp pulses bl, no edema  Abd: soft, NT/ND, +BS, no hepatosplenomegaly  Mskel: extremities x4 with normal ROM and no joint effusions. no ctl spine ttp.   Skin: left thigh warmth and erythema that is tendedr to palpation with hole in the thigh with packing gauze coming from it  Neuro: AAOx3, no sensory/motor deficits, CN 2-12 intact Gen: Alert, NAD  Head: NC, AT   Eyes: PERRL, EOMI, normal lids/conjunctiva  ENT: normal hearing, patent oropharynx without erythema/exudate, uvula midline  Neck: supple, no tenderness, Trachea midline  Pulm: Bilateral BS, normal resp effort, no wheeze/stridor/retractions  CV: RRR, no M/R/G, 2+ radial and dp pulses bl, no edema  Abd: soft, NT/ND, +BS, no hepatosplenomegaly  Mskel: extremities x4 with normal ROM and no joint effusions. no ctl spine ttp.   Skin: left thigh warmth and erythema that is tender to palpation with hole in the thigh with packing gauze coming from it. left leg with surgical incision and surrounding erythema.   Neuro: AAOx3, no sensory/motor deficits, CN 2-12 intact

## 2017-08-21 NOTE — CONSULT NOTE ADULT - SUBJECTIVE AND OBJECTIVE BOX
67F with L hip abscess. Pt has been seen by Dr. Glass for skin graft s/p excision in april. Pt has been having continued redness/drainage of L thigh. Denies hip pain, fever/chills, numbness/tingling. No other complaints at this time.    PAST MEDICAL & SURGICAL HISTORY:  Thrombocytopenia  Metabolic encephalopathy  GERD (gastroesophageal reflux disease)  Hypertension  Diabetes mellitus  H/O skin graft: left hand  H/O hand surgery  S/P debridement    MEDICATIONS  (STANDING):  metoprolol 50 milliGRAM(s) Oral two times a day  amLODIPine   Tablet 10 milliGRAM(s) Oral daily  enoxaparin Injectable 40 milliGRAM(s) SubCutaneous every 24 hours  insulin lispro (HumaLOG) corrective regimen sliding scale   SubCutaneous three times a day before meals  dextrose 5%. 1000 milliLiter(s) (50 mL/Hr) IV Continuous <Continuous>  dextrose 50% Injectable 12.5 Gram(s) IV Push once  dextrose 50% Injectable 25 Gram(s) IV Push once  piperacillin/tazobactam IVPB. 3.375 Gram(s) IV Intermittent every 8 hours    Allergies    No Known Allergies    Intolerances    Vital Signs Last 24 Hrs  T(C): 36.2 (21 Aug 2017 20:38), Max: 37.1 (21 Aug 2017 19:39)  T(F): 97.2 (21 Aug 2017 20:38), Max: 98.8 (21 Aug 2017 19:39)  HR: 68 (21 Aug 2017 20:38) (68 - 79)  BP: 148/72 (21 Aug 2017 20:38) (143/91 - 154/75)  BP(mean): --  RR: 16 (21 Aug 2017 20:38) (16 - 18)  SpO2: 98% (21 Aug 2017 20:38) (97% - 100%)    Imaging  MRI Pelvis: Large fluid collection around anterior lateral L hip with possible extension to anterior hip joint    PE: Gen: NAD  LLE: + draining wound with packing over L hip, mild warmth/erythema, + TTP  5/5 hip flexors/quads/hamstrings/EHL/FHL/TA/GS  SILT L3-S1  Full painless ROM at hip, knee and ankle  +dp/pt intact  compartments soft  no calf ttp B/L

## 2017-08-21 NOTE — H&P ADULT - NSHPPHYSICALEXAM_GEN_ALL_CORE
GENERAL: NAD, well-groomed, well-developed  HEAD:  Atraumatic, Normocephalic  EYES: EOMI, AMAYA, conjunctiva and sclera clear  ENMT:  Moist mucous membranes, Good dentition, No lesions  NECK: Supple, No JVD, Normal thyroid  NERVOUS SYSTEM:  Alert & Oriented X3, Good concentration; Motor Strength 5/5 Rt upper and lower extremities;   lt. upper and lower ext. power 3/5  CHEST/LUNG: Clear to percussion bilaterally; No rales, rhonchi, wheezing, or rubs  HEART: Regular rate and rhythm; No murmurs, rubs, or gallops  ABDOMEN: Soft, Nontender, Nondistended; Bowel sounds present  EXTREMITIES:  2+ Peripheral Pulses, No clubbing, cyanosis, or edema  LYMPH: No lymphadenopathy noted  SKIN: No rashes or lesions

## 2017-08-21 NOTE — ED PROVIDER NOTE - MEDICAL DECISION MAKING DETAILS
Patient pw thigh cellulitis. Given DM, would be concerned for nec fasc. will Xray and obtain cultures and empirically antibiose. Patient pw thigh cellulitis. Given DM, would be concerned for nec fasc. will Xray and obtain cultures and empirically antibiose. Xray does show free air. dr. pineda has seen patient and is aware. prefers to go to the or in the next few days.

## 2017-08-21 NOTE — ED ADULT NURSE NOTE - OBJECTIVE STATEMENT
Received patient on stretcher. Patient states that wound on her left side is getting worse, " full of pus and getting bigger". Received patient on stretcher. Patient states that wound on her left side is getting worse, " full of pus and getting bigger". wound on left side looks clean, with dressing packing . area surrounding wound feels hard, small amount of serosanguinous drainage noted.

## 2017-08-21 NOTE — CONSULT NOTE ADULT - ATTENDING COMMENTS
Good ROM of hip. No sign of clinical septic hip.  Will discuss need for aspiration with IR today.  DW dr Patino earlier and he will have Dr Moore evaluate for possible I and D

## 2017-08-21 NOTE — CONSULT NOTE ADULT - ASSESSMENT
A/P: 67F with L hip abscess  Pain control  WBAT LLE  DVT ppx per medicine team  IV antibiotics per ID  Will discuss with attending and advise if plan changes

## 2017-08-21 NOTE — H&P ADULT - ASSESSMENT
67F hx of dm, htn and left skin granuloma s/p excision in april pw with continued redness and drainage of the left thigh. no fever, chills, nausea, vomiting. Dr Glass advised patient to present to ED  Started on Vancomycin and Zosyn. Will get ID consult. Surgery f/u.

## 2017-08-22 DIAGNOSIS — Z79.899 OTHER LONG TERM (CURRENT) DRUG THERAPY: ICD-10-CM

## 2017-08-22 DIAGNOSIS — Z79.84 LONG TERM (CURRENT) USE OF ORAL HYPOGLYCEMIC DRUGS: ICD-10-CM

## 2017-08-22 DIAGNOSIS — E11.9 TYPE 2 DIABETES MELLITUS WITHOUT COMPLICATIONS: ICD-10-CM

## 2017-08-22 DIAGNOSIS — I10 ESSENTIAL (PRIMARY) HYPERTENSION: ICD-10-CM

## 2017-08-22 DIAGNOSIS — T79.6XXA TRAUMATIC ISCHEMIA OF MUSCLE, INITIAL ENCOUNTER: ICD-10-CM

## 2017-08-22 DIAGNOSIS — Z91.81 HISTORY OF FALLING: ICD-10-CM

## 2017-08-22 DIAGNOSIS — T79.2XXA TRAUMATIC SECONDARY AND RECURRENT HEMORRHAGE AND SEROMA, INITIAL ENCOUNTER: ICD-10-CM

## 2017-08-22 DIAGNOSIS — T81.31XA DISRUPTION OF EXTERNAL OPERATION (SURGICAL) WOUND, NOT ELSEWHERE CLASSIFIED, INITIAL ENCOUNTER: ICD-10-CM

## 2017-08-22 DIAGNOSIS — E11.59 TYPE 2 DIABETES MELLITUS WITH OTHER CIRCULATORY COMPLICATIONS: ICD-10-CM

## 2017-08-22 DIAGNOSIS — L02.416 CUTANEOUS ABSCESS OF LEFT LOWER LIMB: ICD-10-CM
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
## 2017-08-22 DIAGNOSIS — H26.9 UNSPECIFIED CATARACT: ICD-10-CM

## 2017-08-22 LAB
ANION GAP SERPL CALC-SCNC: 10 MMOL/L — SIGNIFICANT CHANGE UP (ref 5–17)
BUN SERPL-MCNC: 16 MG/DL — SIGNIFICANT CHANGE UP (ref 7–23)
CALCIUM SERPL-MCNC: 9 MG/DL — SIGNIFICANT CHANGE UP (ref 8.5–10.1)
CHLORIDE SERPL-SCNC: 111 MMOL/L — HIGH (ref 96–108)
CO2 SERPL-SCNC: 24 MMOL/L — SIGNIFICANT CHANGE UP (ref 22–31)
CREAT SERPL-MCNC: 0.78 MG/DL — SIGNIFICANT CHANGE UP (ref 0.5–1.3)
CRP SERPL-MCNC: 0.4 MG/DL — SIGNIFICANT CHANGE UP (ref 0–0.4)
CULTURE RESULTS: SIGNIFICANT CHANGE UP
GLUCOSE SERPL-MCNC: 64 MG/DL — LOW (ref 70–99)
HBA1C BLD-MCNC: 6.7 % — HIGH (ref 4–5.6)
HCT VFR BLD CALC: 37.5 % — SIGNIFICANT CHANGE UP (ref 34.5–45)
HGB BLD-MCNC: 12.3 G/DL — SIGNIFICANT CHANGE UP (ref 11.5–15.5)
MCHC RBC-ENTMCNC: 29.5 PG — SIGNIFICANT CHANGE UP (ref 27–34)
MCHC RBC-ENTMCNC: 32.7 GM/DL — SIGNIFICANT CHANGE UP (ref 32–36)
MCV RBC AUTO: 90.1 FL — SIGNIFICANT CHANGE UP (ref 80–100)
PLATELET # BLD AUTO: 314 K/UL — SIGNIFICANT CHANGE UP (ref 150–400)
POTASSIUM SERPL-MCNC: 3.7 MMOL/L — SIGNIFICANT CHANGE UP (ref 3.5–5.3)
POTASSIUM SERPL-SCNC: 3.7 MMOL/L — SIGNIFICANT CHANGE UP (ref 3.5–5.3)
RBC # BLD: 4.16 M/UL — SIGNIFICANT CHANGE UP (ref 3.8–5.2)
RBC # FLD: 13.4 % — SIGNIFICANT CHANGE UP (ref 11–15)
SODIUM SERPL-SCNC: 145 MMOL/L — SIGNIFICANT CHANGE UP (ref 135–145)
SPECIMEN SOURCE: SIGNIFICANT CHANGE UP
WBC # BLD: 6.2 K/UL — SIGNIFICANT CHANGE UP (ref 3.8–10.5)
WBC # FLD AUTO: 6.2 K/UL — SIGNIFICANT CHANGE UP (ref 3.8–10.5)

## 2017-08-22 PROCEDURE — 93010 ELECTROCARDIOGRAM REPORT: CPT

## 2017-08-22 RX ORDER — NAFCILLIN 10 G/100ML
2 INJECTION, POWDER, FOR SOLUTION INTRAVENOUS EVERY 4 HOURS
Qty: 0 | Refills: 0 | Status: DISCONTINUED | OUTPATIENT
Start: 2017-08-23 | End: 2017-08-28

## 2017-08-22 RX ORDER — NAFCILLIN 10 G/100ML
2 INJECTION, POWDER, FOR SOLUTION INTRAVENOUS ONCE
Qty: 0 | Refills: 0 | Status: COMPLETED | OUTPATIENT
Start: 2017-08-22 | End: 2017-08-22

## 2017-08-22 RX ORDER — NAFCILLIN 10 G/100ML
INJECTION, POWDER, FOR SOLUTION INTRAVENOUS
Qty: 0 | Refills: 0 | Status: DISCONTINUED | OUTPATIENT
Start: 2017-08-22 | End: 2017-08-28

## 2017-08-22 RX ADMIN — PIPERACILLIN AND TAZOBACTAM 25 GRAM(S): 4; .5 INJECTION, POWDER, LYOPHILIZED, FOR SOLUTION INTRAVENOUS at 13:09

## 2017-08-22 RX ADMIN — NAFCILLIN 200 GRAM(S): 10 INJECTION, POWDER, FOR SOLUTION INTRAVENOUS at 23:15

## 2017-08-22 RX ADMIN — Medication 2: at 11:12

## 2017-08-22 RX ADMIN — AMLODIPINE BESYLATE 10 MILLIGRAM(S): 2.5 TABLET ORAL at 05:40

## 2017-08-22 RX ADMIN — PIPERACILLIN AND TAZOBACTAM 25 GRAM(S): 4; .5 INJECTION, POWDER, LYOPHILIZED, FOR SOLUTION INTRAVENOUS at 05:40

## 2017-08-22 RX ADMIN — Medication 50 MILLIGRAM(S): at 05:40

## 2017-08-22 RX ADMIN — Medication 50 MILLIGRAM(S): at 17:26

## 2017-08-22 RX ADMIN — ENOXAPARIN SODIUM 40 MILLIGRAM(S): 100 INJECTION SUBCUTANEOUS at 17:25

## 2017-08-22 NOTE — PHYSICAL THERAPY INITIAL EVALUATION ADULT - RANGE OF MOTION EXAMINATION, REHAB EVAL
bilateral lower extremity ROM was WFL (within functional limits)/left distal upper limb c impaired MCP ROM/deficits as listed below/bilateral upper extremity ROM was WFL (within functional limits)

## 2017-08-22 NOTE — PHYSICAL THERAPY INITIAL EVALUATION ADULT - ADDITIONAL COMMENTS
AS per patient, lives in private dwelling c 3 stair steps to enter. Ambulated independently c cane. Has part-time [employer] services. Reports still attempts to cook, although presents now c impaired sensation to left hand following skin grafting.

## 2017-08-22 NOTE — PHYSICAL THERAPY INITIAL EVALUATION ADULT - CRITERIA FOR SKILLED THERAPEUTIC INTERVENTIONS
No skilled PT anticipated post-acute care discharge. Anticipated to progress back to baseline functions./predicted duration of therapy intervention/impairments found/functional limitations in following categories/risk reduction/prevention/therapy frequency/rehab potential

## 2017-08-22 NOTE — PHYSICAL THERAPY INITIAL EVALUATION ADULT - GENERAL OBSERVATIONS, REHAB EVAL
Chart reviewed and noted MRI/CT and XRays show soft tissue findings consistent c abscess on left anterolateral left hip; raised ESR.

## 2017-08-22 NOTE — CONSULT NOTE ADULT - SUBJECTIVE AND OBJECTIVE BOX
HPI:  · HPI   67F hx of dm, htn and left skin granuloma s/p excision in  with continued redness and drainage of the left thigh. no fever, chills, nausea, vomiting. Dr Glass advised patient to present to ED   ortho eval noted    PAST MEDICAL & SURGICAL HISTORY:  Thrombocytopenia  Metabolic encephalopathy  GERD (gastroesophageal reflux disease)  Hypertension  Diabetes mellitus  H/O skin graft: left hand  H/O hand surgery  S/P debridement      SOCHX:  no tobacco,  -no  alcohol    FMHX: FA/MO  non- contributory       Recent Travel:    Immunizations:    Allergies    No Known Allergies    Intolerances        MEDICATIONS  (STANDING):  metoprolol 50 milliGRAM(s) Oral two times a day  amLODIPine   Tablet 10 milliGRAM(s) Oral daily  enoxaparin Injectable 40 milliGRAM(s) SubCutaneous every 24 hours  insulin lispro (HumaLOG) corrective regimen sliding scale   SubCutaneous three times a day before meals  dextrose 5%. 1000 milliLiter(s) (50 mL/Hr) IV Continuous <Continuous>  dextrose 50% Injectable 12.5 Gram(s) IV Push once  dextrose 50% Injectable 25 Gram(s) IV Push once  piperacillin/tazobactam IVPB. 3.375 Gram(s) IV Intermittent every 8 hours    MEDICATIONS  (PRN):  acetaminophen   Tablet 650 milliGRAM(s) Oral every 6 hours PRN For Temp greater than 38 C (100.4 F) and headache  dextrose Gel 1 Dose(s) Oral once PRN Blood Glucose LESS THAN 70 milliGRAM(s)/deciliter  glucagon  Injectable 1 milliGRAM(s) IntraMuscular once PRN Glucose LESS THAN 70 milligrams/deciliter      REVIEW OF SYSTEMS:  CONSTITUTIONAL: No fever, weight loss, or fatigue  EYES: No eye pain, visual disturbances, or discharge  ENMT:  No difficulty hearing, tinnitus, vertigo; No sinus or throat pain  NECK: No pain or stiffness  BREASTS: No pain, masses, or nipple discharge  RESPIRATORY: No cough, wheezing, chills or hemoptysis; No shortness of breath  CARDIOVASCULAR: No chest pain, palpitations, dizziness, or leg swelling  GASTROINTESTINAL: No abdominal or epigastric pain. No nausea, vomiting, or hematemesis; No diarrhea or constipation. No melena or hematochezia.  GENITOURINARY: No dysuria, frequency, hematuria, or incontinence  NEUROLOGICAL: No headaches, memory loss, loss of strength, numbness, or tremors  SKIN: No itching, burning, rashes, or lesions   LYMPH NODES: No enlarged glands  ENDOCRINE: No heat or cold intolerance; No hair loss  MUSCULOSKELETAL: No joint pain or swelling; No muscle, back, or extremity pain  PSYCHIATRIC: No depression, anxiety, mood swings, or difficulty sleeping  HEME/LYMPH: No easy bruising, or bleeding gums  ALLERGY AND IMMUNOLOGIC: No hives or eczema    VITAL SIGNS:    T(C): 36.7 (17 @ 17:08), Max: 36.7 (17 @ 17:08)  T(F): 98 (17 @ 17:08), Max: 98 (17 @ 17:08)  HR: 70 (17 @ 17:08) (66 - 72)  BP: 148/90 (17 @ 17:08) (131/74 - 164/83)  RR: 18 (17 @ 17:08) (16 - 18)  SpO2: 99% (17 @ 17:08) (96% - 100%)    PHYSICAL EXAM:    GENERAL: NAD, well-groomed, well-developed  HEAD:  Atraumatic, Normocephalic  EYES: EOMI, PERRLA, conjunctiva and sclera clear  ENMT: No tonsillar erythema, exudates, or enlargement; Moist mucous membranes, Good dentition, No lesions  NECK: Supple, No JVD, Normal thyroid  NERVOUS SYSTEM:  Alert & Oriented X3, Good concentration; Motor Strength 5/5 B/L upper and lower extremities; DTRs 2+ intact and symmetric  CHEST/LUNG: Clear to percussion bilaterally; No rales, rhonchi, wheezing bilaterally  HEART: Regular rate and rhythm; No murmurs, rubs, or gallops  ABDOMEN: Soft, Nontender, Nondistended; Bowel sounds present  EXTREMITIES:  2+ Peripheral Pulses, No clubbing, cyanosis, or edema  LYMPH: No lymphadenopathy noted  SKIN: No rashes or lesions  BACK: no pressor sore     LABS:                         12.3   6.2   )-----------( 314      ( 22 Aug 2017 08:45 )             37.5     08-22    145  |  111<H>  |  16  ----------------------------<  64<L>  3.7   |  24  |  0.78    Ca    9.0      22 Aug 2017 08:45  Mg     2.3         TPro  8.9<H>  /  Alb  3.5  /  TBili  0.2  /  DBili  x   /  AST  36  /  ALT  41  /  AlkPhos  84      LIVER FUNCTIONS - ( 21 Aug 2017 15:54 )  Alb: 3.5 g/dL / Pro: 8.9 gm/dL / ALK PHOS: 84 U/L / ALT: 41 U/L / AST: 36 U/L / GGT: x             Urinalysis Basic - ( 21 Aug 2017 17:48 )    Color: Yellow / Appearance: Clear / S.015 / pH: x  Gluc: x / Ketone: Negative  / Bili: Negative / Urobili: Negative mg/dL   Blood: x / Protein: 15 mg/dL / Nitrite: Negative   Leuk Esterase: Negative / RBC: x / WBC x   Sq Epi: x / Non Sq Epi: Few / Bacteria: Few                Sedimentation Rate, Erythrocyte: 40 mm/hr ( @ 15:54)                          Radiology: HPI:  · HPI   67F hx of dm, htn and left skin granuloma s/p excision in  with continued redness and drainage of the left thigh. no fever, chills, nausea, vomiting. Dr Glass advised patient to present to ED   ortho eval noted  patient is well known to me from prev admssion   PAST MEDICAL & SURGICAL HISTORY:  Thrombocytopenia  Metabolic encephalopathy  GERD (gastroesophageal reflux disease)  Hypertension  Diabetes mellitus  H/O skin graft: left hand  H/O hand surgery  S/P debridement      SOCHX:  no tobacco,  -no  alcohol    FMHX: FA/MO  non- contributory       Recent Travel:    Immunizations:    Allergies    No Known Allergies    Intolerances        MEDICATIONS  (STANDING):  metoprolol 50 milliGRAM(s) Oral two times a day  amLODIPine   Tablet 10 milliGRAM(s) Oral daily  enoxaparin Injectable 40 milliGRAM(s) SubCutaneous every 24 hours  insulin lispro (HumaLOG) corrective regimen sliding scale   SubCutaneous three times a day before meals  dextrose 5%. 1000 milliLiter(s) (50 mL/Hr) IV Continuous <Continuous>  dextrose 50% Injectable 12.5 Gram(s) IV Push once  dextrose 50% Injectable 25 Gram(s) IV Push once  piperacillin/tazobactam IVPB. 3.375 Gram(s) IV Intermittent every 8 hours    MEDICATIONS  (PRN):  acetaminophen   Tablet 650 milliGRAM(s) Oral every 6 hours PRN For Temp greater than 38 C (100.4 F) and headache  dextrose Gel 1 Dose(s) Oral once PRN Blood Glucose LESS THAN 70 milliGRAM(s)/deciliter  glucagon  Injectable 1 milliGRAM(s) IntraMuscular once PRN Glucose LESS THAN 70 milligrams/deciliter      REVIEW OF SYSTEMS:  CONSTITUTIONAL: No fever, weight loss, or fatigue  EYES: No eye pain, visual disturbances, or discharge  ENMT:  No difficulty hearing, tinnitus, vertigo; No sinus or throat pain  NECK: No pain or stiffness  BREASTS: No pain, masses, or nipple discharge  RESPIRATORY: No cough, wheezing, chills or hemoptysis; No shortness of breath  CARDIOVASCULAR: No chest pain, palpitations, dizziness, or leg swelling  GASTROINTESTINAL: No abdominal or epigastric pain. No nausea, vomiting, or hematemesis; No diarrhea or constipation. No melena or hematochezia.  GENITOURINARY: No dysuria, frequency, hematuria, or incontinence  NEUROLOGICAL: No headaches, memory loss, loss of strength, numbness, or tremors  SKIN: chronic infection ; saw maged april_  with dr pineda   LYMPH NODES: No enlarged glands  ENDOCRINE: No heat or cold intolerance; No hair loss  MUSCULOSKELETAL: No joint pain or swelling; No muscle, back, or extremity pain  has hip pain   PSYCHIATRIC: No depression, anxiety, mood swings, or difficulty sleeping  HEME/LYMPH: No easy bruising, or bleeding gums  ALLERGY AND IMMUNOLOGIC: No hives or eczema    VITAL SIGNS:    T(C): 36.7 (17 @ 17:08), Max: 36.7 (17 @ 17:08)  T(F): 98 (17 @ 17:08), Max: 98 (17 @ 17:08)  HR: 70 (17 @ 17:08) (66 - 72)  BP: 148/90 (17 @ 17:08) (131/74 - 164/83)  RR: 18 (17 @ 17:08) (16 - 18)  SpO2: 99% (17 @ 17:08) (96% - 100%)    PHYSICAL EXAM:    GENERAL: NAD, well-groomed, well-developed  HEAD:  Atraumatic, Normocephalic  EYES: EOMI, PERRLA, conjunctiva and sclera clear  ENMT: No tonsillar erythema, exudates, or enlargement; Moist mucous membranes,  few teeth left  NECK: Supple, No JVD, Normal thyroid  NERVOUS SYSTEM:  Alert & Oriented X3, Good concentration; Motor Strength 5/5 B/L upper and lower extremities; DTRs 2+ intact and symmetric  CHEST/LUNG: Clear  bilaterally; No rales, rhonchi, wheezing bilaterally  HEART: Regular rate and rhythm; No murmurs, rubs, or gallops  ABDOMEN: Soft, Nontender, Nondistended; Bowel sounds present  EXTREMITIES:  2+ Peripheral Pulses, No clubbing, cyanosis, or edema  LYMPH: No lymphadenopathy noted  SKIN: left hip swelling ; drainage   old scars running down left leg   BACK: no pressor sore     LABS:                         12.3   6.2   )-----------( 314      ( 22 Aug 2017 08:45 )             37.5     -    145  |  111<H>  |  16  ----------------------------<  64<L>  3.7   |  24  |  0.78    Ca    9.0      22 Aug 2017 08:45  Mg     2.3         TPro  8.9<H>  /  Alb  3.5  /  TBili  0.2  /  DBili  x   /  AST  36  /  ALT  41  /  AlkPhos  84      LIVER FUNCTIONS - ( 21 Aug 2017 15:54 )  Alb: 3.5 g/dL / Pro: 8.9 gm/dL / ALK PHOS: 84 U/L / ALT: 41 U/L / AST: 36 U/L / GGT: x             Urinalysis Basic - ( 21 Aug 2017 17:48 )    Color: Yellow / Appearance: Clear / S.015 / pH: x  Gluc: x / Ketone: Negative  / Bili: Negative / Urobili: Negative mg/dL   Blood: x / Protein: 15 mg/dL / Nitrite: Negative   Leuk Esterase: Negative / RBC: x / WBC x   Sq Epi: x / Non Sq Epi: Few / Bacteria: Few                Sedimentation Rate, Erythrocyte: 40 mm/hr ( @ 15:54)                          Radiology:    < from: MRI Pelvis w/wo Cont (17 @ 11:18) >  MPRESSION:    Large soft tissue fluid collection about the anterior lateral aspects of   the left hip with a thick rind of peripheral and rim enhancement and   surrounding reactive inflammatory change. This is highly suspicious for   abscess formation and/or infected left greater trochanteric bursitis. The   collection extends to the skin surface via a thin sinus tract and also   appears to communicate with the anterior aspect of the right hip joint.    Large peripherally enhancing cystic structure in the pelvis which may   reflect a fibroid with cystic degeneration or ovarian cyst. Dedicated   pelvic ultrasound can be performed as clinically directed.                CHARLI CHRISTOPHER M.D., ATTENDING RADIOLOGIST  This document has been electronically signed. Aug 14 2017  9:01AM        < end of copied text >

## 2017-08-22 NOTE — PHYSICAL THERAPY INITIAL EVALUATION ADULT - PERTINENT HX OF CURRENT PROBLEM, REHAB EVAL
Patient sent in from wound care by Dr. Glass due to draining left hip abscess c histories of I&D. Chart reviewed and noted MRI/CT and XRays show soft tissue findings consistent c abscess on left anterolateral left hip; raised ESR.

## 2017-08-22 NOTE — PHYSICAL THERAPY INITIAL EVALUATION ADULT - PLANNED THERAPY INTERVENTIONS, PT EVAL
ROM/gait training/strengthening/balance training/stretching/manual therapy techniques/neuromuscular re-education/transfer training

## 2017-08-22 NOTE — CONSULT NOTE ADULT - ASSESSMENT
culture greww methicillin sensitive staph aureus   nafcillin started   needs ortho evaluation   needs picc and prolonged antibiotics   will follow with you thanks

## 2017-08-22 NOTE — PHYSICAL THERAPY INITIAL EVALUATION ADULT - MODIFIED CLINICAL TEST OF SENSORY INTEGRATION IN BALANCE TEST
Barthel Index: Feeding Score _10__, Bathing Score _0__, Grooming Score _0__, Dressing Score _5__, Bowels Score _5__, Bladder Score _5__, Toilet Score _0__, Transfers Score _10__, Mobility Score _0__, Stairs Score _0__,     Total Score _35__

## 2017-08-22 NOTE — PROGRESS NOTE ADULT - SUBJECTIVE AND OBJECTIVE BOX
INTERVAL HPI/OVERNIGHT EVENTS:  Pt. seen and examined at bedside. Patient resting comfortably. States she had an I&D of left hip with Dr. Glass in april and has had a history of multiple abscesses over left hip. Now with continued drainage from wound  Denies fever/chills, left hip pain. Offers no complaints at this time.    Vital Signs Last 24 Hrs  T(C): 36.6 (22 Aug 2017 05:03), Max: 37.1 (21 Aug 2017 19:39)  T(F): 97.8 (22 Aug 2017 05:03), Max: 98.8 (21 Aug 2017 19:39)  HR: 72 (22 Aug 2017 05:03) (68 - 79)  BP: 162/87 (22 Aug 2017 05:03) (143/91 - 164/83)  BP(mean): --  RR: 16 (22 Aug 2017 05:03) (16 - 18)  SpO2: 97% (22 Aug 2017 05:03) (96% - 100%)    MEDICATIONS  (STANDING):  metoprolol 50 milliGRAM(s) Oral two times a day  amLODIPine   Tablet 10 milliGRAM(s) Oral daily  enoxaparin Injectable 40 milliGRAM(s) SubCutaneous every 24 hours  insulin lispro (HumaLOG) corrective regimen sliding scale   SubCutaneous three times a day before meals  dextrose 5%. 1000 milliLiter(s) (50 mL/Hr) IV Continuous <Continuous>  dextrose 50% Injectable 12.5 Gram(s) IV Push once  dextrose 50% Injectable 25 Gram(s) IV Push once  piperacillin/tazobactam IVPB. 3.375 Gram(s) IV Intermittent every 8 hours    MEDICATIONS  (PRN):  acetaminophen   Tablet 650 milliGRAM(s) Oral every 6 hours PRN For Temp greater than 38 C (100.4 F) and headache  dextrose Gel 1 Dose(s) Oral once PRN Blood Glucose LESS THAN 70 milliGRAM(s)/deciliter  glucagon  Injectable 1 milliGRAM(s) IntraMuscular once PRN Glucose LESS THAN 70 milligrams/deciliter      PHYSICAL EXAM:    GENERAL: NAD  HEAD:  Atraumatic, Normocephalic  CHEST/LUNG: Clear to ausculation, bilaterally   HEART: +S1S2  ABDOMEN: ND, soft, non tender.  EXTREMITIES: LEFT hip dressing C/D/I; removed to find a +draining wound with thin purulence expressed. Erythema, warmth and mild tenderness to palpation noted.    I&O's Detail    21 Aug 2017 07:01  -  22 Aug 2017 07:00  --------------------------------------------------------  IN:    IV PiggyBack: 200 mL  Total IN: 200 mL    OUT:  Total OUT: 0 mL    Total NET: 200 mL      LABS:                        12.1   8.1   )-----------( 327      ( 21 Aug 2017 15:54 )             37.2         145  |  111<H>  |  19  ----------------------------<  64<L>  3.4<L>   |  25  |  0.82    Ca    8.9      21 Aug 2017 15:54  Mg     2.3         TPro  8.9<H>  /  Alb  3.5  /  TBili  0.2  /  DBili  x   /  AST  36  /  ALT  41  /  AlkPhos  84        Urinalysis Basic - ( 21 Aug 2017 17:48 )    Color: Yellow / Appearance: Clear / S.015 / pH: x  Gluc: x / Ketone: Negative  / Bili: Negative / Urobili: Negative mg/dL   Blood: x / Protein: 15 mg/dL / Nitrite: Negative   Leuk Esterase: Negative / RBC: x / WBC x   Sq Epi: x / Non Sq Epi: Few / Bacteria: Few      RADIOLOGY & ADDITIONAL STUDIES:    < from: MRI Pelvis w/wo Cont (17 @ 11:18) >    IMPRESSION:    Large soft tissue fluid collection about the anterior lateral aspects of   the left hip with a thick rind of peripheral and rim enhancement and   surrounding reactive inflammatory change. This is highly suspicious for   abscess formation and/or infected left greater trochanteric bursitis. The   collection extends to the skin surface via a thin sinus tract and also   appears to communicate with the anterior aspect of the right hip joint.    Large peripherally enhancing cystic structure in the pelvis which may   reflect a fibroid with cystic degeneration or ovarian cyst. Dedicated   pelvic ultrasound can be performed as clinically directed.    < end of copied text >    < from: Xray Femur 2 Views, Left (17 @ 15:22) >  EXAM:  XR FEMUR 2 VIEWS LT                            PROCEDURE DATE:  2017      INTERPRETATION:      Radiographs of the left femur        CLINICAL INFORMATION: Skin graft and diabetes, evaluate for air     TECHNIQUE:  Frontal and lateralviews of the femur were obtained.    FINDINGS:   2017 available for review.    The femur is intact without fracture. No destructive bone lesion is   found. The soft tissues are remarkable for presence of air in the soft   tissues of the pelvis superior to level of the greater trochanter.   Vascular calcifications are also noted. Popcorn like calcifications in   the right aspect of the pelvis may represent calcified fibroid.    IMPRESSION: Air within the soft tissues of the left pelvis superior to   the greater trochanter may represent soft tissue infection without   evidence of bony destruction.    < end of copied text >      Impression: 67F hx of dm, htn and left skin granuloma s/p excision in april a/w continued redness and drainage of the left thigh. MRI reveals suspicion for abscess and/or infected left greater trochanteric bursitis with a collection that extends to skin surface via sinus tract and appears to communicate with hip joint.     Plan:  -Will f/u with ortho regarding debridement  -pain management PRN  -Continue antibiotics per ID  -DVT prophylaxis: Heparin and IPCs  -OOB, Ambulating  -continue local wound care  -Continue medical management/supportive care  -f/u AM labs  -Discussed patrient with Dr. Glass

## 2017-08-22 NOTE — PROGRESS NOTE ADULT - SUBJECTIVE AND OBJECTIVE BOX
Patient is a 67y old  Female who presents with a chief complaint of Abscess left hip. (21 Aug 2017 17:20)      INTERVAL HPI/OVERNIGHT EVENTS:    MEDICATIONS  (STANDING):  metoprolol 50 milliGRAM(s) Oral two times a day  amLODIPine   Tablet 10 milliGRAM(s) Oral daily  enoxaparin Injectable 40 milliGRAM(s) SubCutaneous every 24 hours  insulin lispro (HumaLOG) corrective regimen sliding scale   SubCutaneous three times a day before meals  dextrose 5%. 1000 milliLiter(s) (50 mL/Hr) IV Continuous <Continuous>  dextrose 50% Injectable 12.5 Gram(s) IV Push once  dextrose 50% Injectable 25 Gram(s) IV Push once  piperacillin/tazobactam IVPB. 3.375 Gram(s) IV Intermittent every 8 hours    MEDICATIONS  (PRN):  acetaminophen   Tablet 650 milliGRAM(s) Oral every 6 hours PRN For Temp greater than 38 C (100.4 F) and headache  dextrose Gel 1 Dose(s) Oral once PRN Blood Glucose LESS THAN 70 milliGRAM(s)/deciliter  glucagon  Injectable 1 milliGRAM(s) IntraMuscular once PRN Glucose LESS THAN 70 milligrams/deciliter      Allergies    No Known Allergies    Intolerances        REVIEW OF SYSTEMS:  CONSTITUTIONAL: No fever, weight loss, or fatigue  EYES: No eye pain, visual disturbances, or discharge  ENMT:  No difficulty hearing, tinnitus, vertigo; No sinus or throat pain  NECK: No pain or stiffness  BREASTS: No pain, masses, or nipple discharge  RESPIRATORY: No cough, wheezing, chills or hemoptysis; No shortness of breath  CARDIOVASCULAR: No chest pain, palpitations, dizziness, or leg swelling  GASTROINTESTINAL: No abdominal or epigastric pain. No nausea, vomiting, or hematemesis; No diarrhea or constipation. No melena or hematochezia.  GENITOURINARY: No dysuria, frequency, hematuria, or incontinence  NEUROLOGICAL: No headaches, memory loss, loss of strength, numbness, or tremors  SKIN: No itching, burning, rashes, or lesions   LYMPH NODES: No enlarged glands  ENDOCRINE: No heat or cold intolerance; No hair loss  MUSCULOSKELETAL: No joint pain or swelling; No muscle, back, or extremity pain  PSYCHIATRIC: No depression, anxiety, mood swings, or difficulty sleeping  HEME/LYMPH: No easy bruising, or bleeding gums  ALLERGY AND IMMUNOLOGIC: No hives or eczema    Vital Signs Last 24 Hrs  T(C): 36.6 (22 Aug 2017 05:03), Max: 37.1 (21 Aug 2017 19:39)  T(F): 97.8 (22 Aug 2017 05:03), Max: 98.8 (21 Aug 2017 19:39)  HR: 72 (22 Aug 2017 05:03) (68 - 79)  BP: 162/87 (22 Aug 2017 05:03) (143/91 - 164/83)  BP(mean): --  RR: 16 (22 Aug 2017 05:03) (16 - 18)  SpO2: 97% (22 Aug 2017 05:03) (96% - 100%)    PHYSICAL EXAM:  GENERAL: NAD, well-groomed, well-developed  HEAD:  Atraumatic, Normocephalic  EYES: EOMI, PERRLA, conjunctiva and sclera clear  ENMT:  Moist mucous membranes, Good dentition, No lesions  NECK: Supple, No JVD, Normal thyroid  NERVOUS SYSTEM:  Alert & Oriented X3, Good concentration; Motor Strength 5/5 B/L upper and lower extremities; DTRs 2+ intact and symmetric  CHEST/LUNG: Clear to percussion bilaterally; No rales, rhonchi, wheezing, or rubs  HEART: Regular rate and rhythm; No murmurs, rubs, or gallops  ABDOMEN: Soft, Nontender, Nondistended; Bowel sounds present  EXTREMITIES:  2+ Peripheral Pulses, No clubbing, cyanosis, or edema  LYMPH: No lymphadenopathy noted  SKIN: No rashes or lesions    LABS:                        12.3   6.2   )-----------( 314      ( 22 Aug 2017 08:45 )             37.5     08-22    145  |  111<H>  |  16  ----------------------------<  64<L>  3.7   |  24  |  0.78    Ca    9.0      22 Aug 2017 08:45  Mg     2.3     08-21    TPro  8.9<H>  /  Alb  3.5  /  TBili  0.2  /  DBili  x   /  AST  36  /  ALT  41  /  AlkPhos  84  08-21      Urinalysis Basic - ( 21 Aug 2017 17:48 )    Color: Yellow / Appearance: Clear / S.015 / pH: x  Gluc: x / Ketone: Negative  / Bili: Negative / Urobili: Negative mg/dL   Blood: x / Protein: 15 mg/dL / Nitrite: Negative   Leuk Esterase: Negative / RBC: x / WBC x   Sq Epi: x / Non Sq Epi: Few / Bacteria: Few      CAPILLARY BLOOD GLUCOSE (21 Aug 2017 22:00)              Hemoglobin A1C, Whole Blood: 6.7 % ( @ 08:02)  Sedimentation Rate, Erythrocyte: 40 mm/hr ( @ 15:54)Urine Culture:      Blood Culture:      RADIOLOGY & ADDITIONAL TESTS:    Imaging Personally Reviewed:  [ ] YES  [ ] NO    Consultant(s) Notes Reviewed:  [ ] YES  [ ] NO    Care Discussed with Consultants/Other Providers [ ] YES  [ ] NO    PROBLEMS:  CELLULITIS OF LEFT LOWER EXTREMITY  DRAINAGE ON LEFT SIDE SENT BY PMD  Cellulitis of left lower extremity      Care discussed with family,         [  ]   yes  [  ]  No    imp:    stable[ ]    unstable[  ]     improving [   ]       unchanged  [  ]                Plans:  Continue present plans  [  ]               New consult [  ]   specialty  .......               order joshua[  ]    test name.                  Discharge Planning  [  ]

## 2017-08-22 NOTE — PROGRESS NOTE ADULT - SUBJECTIVE AND OBJECTIVE BOX
Pt S/E at bedside, no acute events overnight, pain controlled.    AVSS  Gen: NAD, AAOx3    Left Lower Extremity:  + draining wound with packing over L hip, mild warmth/erythema, + TTP  5/5 hip flexors/quads/hamstrings/EHL/FHL/TA/GS  SILT L3-S1  Full painless ROM at hip, knee and ankle  +dp/pt intact  compartments soft  no calf ttp B/L

## 2017-08-22 NOTE — PHYSICAL THERAPY INITIAL EVALUATION ADULT - IMPAIRMENTS FOUND, PT EVAL
integumentary integrity/muscle strength/gait, locomotion, and balance/ROM/aerobic capacity/endurance

## 2017-08-23 DIAGNOSIS — L03.116 CELLULITIS OF LEFT LOWER LIMB: ICD-10-CM

## 2017-08-23 LAB
ANION GAP SERPL CALC-SCNC: 9 MMOL/L — SIGNIFICANT CHANGE UP (ref 5–17)
BUN SERPL-MCNC: 12 MG/DL — SIGNIFICANT CHANGE UP (ref 7–23)
CALCIUM SERPL-MCNC: 8.7 MG/DL — SIGNIFICANT CHANGE UP (ref 8.5–10.1)
CHLORIDE SERPL-SCNC: 108 MMOL/L — SIGNIFICANT CHANGE UP (ref 96–108)
CO2 SERPL-SCNC: 27 MMOL/L — SIGNIFICANT CHANGE UP (ref 22–31)
CREAT SERPL-MCNC: 0.74 MG/DL — SIGNIFICANT CHANGE UP (ref 0.5–1.3)
GLUCOSE SERPL-MCNC: 118 MG/DL — HIGH (ref 70–99)
HCT VFR BLD CALC: 35 % — SIGNIFICANT CHANGE UP (ref 34.5–45)
HGB BLD-MCNC: 11.4 G/DL — LOW (ref 11.5–15.5)
MCHC RBC-ENTMCNC: 29.3 PG — SIGNIFICANT CHANGE UP (ref 27–34)
MCHC RBC-ENTMCNC: 32.6 GM/DL — SIGNIFICANT CHANGE UP (ref 32–36)
MCV RBC AUTO: 89.8 FL — SIGNIFICANT CHANGE UP (ref 80–100)
PLATELET # BLD AUTO: 288 K/UL — SIGNIFICANT CHANGE UP (ref 150–400)
POTASSIUM SERPL-MCNC: 3.4 MMOL/L — LOW (ref 3.5–5.3)
POTASSIUM SERPL-SCNC: 3.4 MMOL/L — LOW (ref 3.5–5.3)
RBC # BLD: 3.9 M/UL — SIGNIFICANT CHANGE UP (ref 3.8–5.2)
RBC # FLD: 13.4 % — SIGNIFICANT CHANGE UP (ref 11–15)
SODIUM SERPL-SCNC: 144 MMOL/L — SIGNIFICANT CHANGE UP (ref 135–145)
WBC # BLD: 5.1 K/UL — SIGNIFICANT CHANGE UP (ref 3.8–10.5)
WBC # FLD AUTO: 5.1 K/UL — SIGNIFICANT CHANGE UP (ref 3.8–10.5)

## 2017-08-23 RX ORDER — POTASSIUM CHLORIDE 20 MEQ
20 PACKET (EA) ORAL
Qty: 0 | Refills: 0 | Status: COMPLETED | OUTPATIENT
Start: 2017-08-23 | End: 2017-08-23

## 2017-08-23 RX ORDER — ACETAMINOPHEN 500 MG
650 TABLET ORAL ONCE
Qty: 0 | Refills: 0 | Status: COMPLETED | OUTPATIENT
Start: 2017-08-23 | End: 2017-08-23

## 2017-08-23 RX ADMIN — Medication 20 MILLIEQUIVALENT(S): at 11:37

## 2017-08-23 RX ADMIN — Medication 650 MILLIGRAM(S): at 06:03

## 2017-08-23 RX ADMIN — NAFCILLIN 200 GRAM(S): 10 INJECTION, POWDER, FOR SOLUTION INTRAVENOUS at 11:50

## 2017-08-23 RX ADMIN — Medication 50 MILLIGRAM(S): at 05:43

## 2017-08-23 RX ADMIN — NAFCILLIN 200 GRAM(S): 10 INJECTION, POWDER, FOR SOLUTION INTRAVENOUS at 05:43

## 2017-08-23 RX ADMIN — NAFCILLIN 200 GRAM(S): 10 INJECTION, POWDER, FOR SOLUTION INTRAVENOUS at 14:00

## 2017-08-23 RX ADMIN — NAFCILLIN 200 GRAM(S): 10 INJECTION, POWDER, FOR SOLUTION INTRAVENOUS at 22:10

## 2017-08-23 RX ADMIN — NAFCILLIN 200 GRAM(S): 10 INJECTION, POWDER, FOR SOLUTION INTRAVENOUS at 01:46

## 2017-08-23 RX ADMIN — Medication 2: at 11:41

## 2017-08-23 RX ADMIN — ENOXAPARIN SODIUM 40 MILLIGRAM(S): 100 INJECTION SUBCUTANEOUS at 17:58

## 2017-08-23 RX ADMIN — AMLODIPINE BESYLATE 10 MILLIGRAM(S): 2.5 TABLET ORAL at 05:43

## 2017-08-23 RX ADMIN — Medication 20 MILLIEQUIVALENT(S): at 16:19

## 2017-08-23 RX ADMIN — NAFCILLIN 200 GRAM(S): 10 INJECTION, POWDER, FOR SOLUTION INTRAVENOUS at 17:58

## 2017-08-23 RX ADMIN — Medication 50 MILLIGRAM(S): at 17:58

## 2017-08-23 NOTE — PROGRESS NOTE ADULT - SUBJECTIVE AND OBJECTIVE BOX
67F hx of dm, htn and left skin granuloma s/p excision in april pw with continued redness and drainage of the left thigh. no fever, chills, nausea, vomiting. Dr Glass advised patient to present to ED   ortho eval noted  patient is well known to me from prev admssion     Allergies    No Known Allergies    Intolerances        MEDICATIONS  (STANDING):  metoprolol 50 milliGRAM(s) Oral two times a day  amLODIPine   Tablet 10 milliGRAM(s) Oral daily  enoxaparin Injectable 40 milliGRAM(s) SubCutaneous every 24 hours  insulin lispro (HumaLOG) corrective regimen sliding scale   SubCutaneous three times a day before meals  dextrose 5%. 1000 milliLiter(s) (50 mL/Hr) IV Continuous <Continuous>  dextrose 50% Injectable 12.5 Gram(s) IV Push once  dextrose 50% Injectable 25 Gram(s) IV Push once  nafcillin  IVPB   IV Intermittent   nafcillin  IVPB 2 Gram(s) IV Intermittent every 4 hours    MEDICATIONS  (PRN):  acetaminophen   Tablet 650 milliGRAM(s) Oral every 6 hours PRN For Temp greater than 38 C (100.4 F) and headache  dextrose Gel 1 Dose(s) Oral once PRN Blood Glucose LESS THAN 70 milliGRAM(s)/deciliter  glucagon  Injectable 1 milliGRAM(s) IntraMuscular once PRN Glucose LESS THAN 70 milligrams/deciliter      REVIEW OF SYSTEMS:    no change in status   ortho note seen   VITAL SIGNS:  T(C): 36.6 (08-23-17 @ 17:19), Max: 36.7 (08-23-17 @ 00:06)  T(F): 97.9 (08-23-17 @ 17:19), Max: 98.1 (08-23-17 @ 00:06)  HR: 74 (08-23-17 @ 17:19) (61 - 74)  BP: 146/70 (08-23-17 @ 17:19) (130/73 - 157/65)  RR: 16 (08-23-17 @ 17:19) (16 - 18)  SpO2: 98% (08-23-17 @ 17:19) (98% - 100%)  Wt(kg): --    PHYSICAL EXAM:    GENERAL: not in any distress  HEENT: Neck is supple, normocephalic, atraumatic   CHEST/LUNG: Clear to percussion bilaterally; No rales, rhonchi, wheezing  HEART: Regular rate and rhythm; No murmurs, rubs, or gallops  ABDOMEN: Soft, Nontender, Nondistended; Bowel sounds present, no rebound   EXTREMITIES:  2+ Peripheral Pulses, No clubbing, cyanosis, or edema  hip status quo   SKIN: no change in status   BACK: no pressor sore   NERVOUS SYSTEM:  Alert & Oriented X3, Good concentration  PSYCH: normal affect     LABS:                         11.4   5.1   )-----------( 288      ( 23 Aug 2017 07:19 )             35.0     08-23    144  |  108  |  12  ----------------------------<  118<H>  3.4<L>   |  27  |  0.74    Ca    8.7      23 Aug 2017 07:19                      Sedimentation Rate, Erythrocyte: 40 mm/hr (08-21 @ 15:54)                          Radiology:  < from: MRI Pelvis w/wo Cont (08.11.17 @ 11:18) >    IMPRESSION:    Large soft tissue fluid collection about the anterior lateral aspects of   the left hip with a thick rind of peripheral and rim enhancement and   surrounding reactive inflammatory change. This is highly suspicious for   abscess formation and/or infected left greater trochanteric bursitis. The   collection extends to the skin surface via a thin sinus tract and also   appears to communicate with the anterior aspect of the right hip joint.    Large peripherally enhancing cystic structure in the pelvis which may   reflect a fibroid with cystic degeneration or ovarian cyst. Dedicated   pelvic ultrasound can be performed as clinically directed.                CHARLI CHRISTOPHER M.D., ATTENDING RADIOLOGIST  This document has been electronically signed. Aug 14 2017  9:01AM                < end of copied text >

## 2017-08-23 NOTE — PROGRESS NOTE ADULT - ASSESSMENT
infected large collection around hip   continue antibiotics   ortho noted   await culture   will follow with you thanks

## 2017-08-23 NOTE — PROGRESS NOTE ADULT - ASSESSMENT
67F hx of dm, htn and left skin granuloma s/p excision in april pw with continued redness and drainage of the left thigh. no fever, chills, nausea, vomiting. Dr Glass advised patient to present to ED  Started on Vancomycin and Zosyn. Will get ID consult. Surgery f/u. MRI noted, ID and Orthopedic consults noted. Pt. on nafcillin now.

## 2017-08-23 NOTE — PROGRESS NOTE ADULT - SUBJECTIVE AND OBJECTIVE BOX
Pt S/E at bedside, no acute events today, pain controlled. Ambulating well. Discussed with Pt that clinically there is no sign of infection being in L hip joint and will not recommend IR aspiration of L hip at this time.    AVSS  Gen: NAD, AAOx3    Left Lower Extremity:  Dressing clean dry intact  +EHL/FHL/TA/GS  SILT L3-S1  +DP/PT Pulses  Compartments soft  No calf TTP B/L   Pt has painless active ROM at hip

## 2017-08-23 NOTE — PROGRESS NOTE ADULT - ASSESSMENT
A/P: 67F with L hip subcutaneous abscess and draining sinus tract  Discussed with IR (Dr. Iverson) about possible IR aspiration L hip, he states he does not think abscess communicates with hip joint and aspiration likely not recommended at this time due to risk of seeding joint  Pt clinically has no signs of septic hip  Discussed with Dr. Glass no recommendation to aspirate hip and surgery will perform I+D of abscess later this week  Pain control  WBAT  Cont medical management  Cont IV antibiotics per ID  No acute orthopedic surgery intervention at this time  Follow up with Dr. William as needed upon discharge from hospital/rehab  Ortho stable for d/c

## 2017-08-23 NOTE — PROGRESS NOTE ADULT - SUBJECTIVE AND OBJECTIVE BOX
Patient is a 67y old  Female who presents with a chief complaint of Abscess left hip. (21 Aug 2017 17:20)      INTERVAL HPI/OVERNIGHT EVENTS: None    MEDICATIONS  (STANDING):  metoprolol 50 milliGRAM(s) Oral two times a day  amLODIPine   Tablet 10 milliGRAM(s) Oral daily  enoxaparin Injectable 40 milliGRAM(s) SubCutaneous every 24 hours  insulin lispro (HumaLOG) corrective regimen sliding scale   SubCutaneous three times a day before meals  dextrose 5%. 1000 milliLiter(s) (50 mL/Hr) IV Continuous <Continuous>  dextrose 50% Injectable 12.5 Gram(s) IV Push once  dextrose 50% Injectable 25 Gram(s) IV Push once  nafcillin  IVPB   IV Intermittent   nafcillin  IVPB 2 Gram(s) IV Intermittent every 4 hours  potassium chloride    Tablet ER 20 milliEquivalent(s) Oral every 2 hours    MEDICATIONS  (PRN):  acetaminophen   Tablet 650 milliGRAM(s) Oral every 6 hours PRN For Temp greater than 38 C (100.4 F) and headache  dextrose Gel 1 Dose(s) Oral once PRN Blood Glucose LESS THAN 70 milliGRAM(s)/deciliter  glucagon  Injectable 1 milliGRAM(s) IntraMuscular once PRN Glucose LESS THAN 70 milligrams/deciliter      Allergies    No Known Allergies    Intolerances        REVIEW OF SYSTEMS:  CONSTITUTIONAL: No fever, weight loss, or fatigue  EYES: No eye pain, visual disturbances, or discharge  ENMT:  No difficulty hearing, tinnitus, vertigo; No sinus or throat pain  NECK: No pain or stiffness  BREASTS: No pain, masses, or nipple discharge  RESPIRATORY: No cough, wheezing, chills or hemoptysis; No shortness of breath  CARDIOVASCULAR: No chest pain, palpitations, dizziness, or leg swelling  GASTROINTESTINAL: No abdominal or epigastric pain. No nausea, vomiting, or hematemesis; No diarrhea or constipation. No melena or hematochezia.  GENITOURINARY: No dysuria, frequency, hematuria, or incontinence  NEUROLOGICAL: No headaches, memory loss, loss of strength, numbness, or tremors  SKIN: No itching, burning, rashes, or lesions   LYMPH NODES: No enlarged glands  ENDOCRINE: No heat or cold intolerance; No hair loss  MUSCULOSKELETAL: No joint pain or swelling; No muscle, back, or extremity pain  PSYCHIATRIC: No depression, anxiety, mood swings, or difficulty sleeping  HEME/LYMPH: No easy bruising, or bleeding gums  ALLERGY AND IMMUNOLOGIC: No hives or eczema    Vital Signs Last 24 Hrs  T(C): 36.5 (23 Aug 2017 05:33), Max: 36.7 (22 Aug 2017 17:08)  T(F): 97.7 (23 Aug 2017 05:33), Max: 98.1 (23 Aug 2017 00:06)  HR: 61 (23 Aug 2017 05:33) (61 - 70)  BP: 157/65 (23 Aug 2017 05:33) (131/74 - 157/65)  BP(mean): --  RR: 16 (23 Aug 2017 05:33) (16 - 18)  SpO2: 100% (23 Aug 2017 05:33) (99% - 100%)    PHYSICAL EXAM:  GENERAL: NAD, well-groomed, well-developed  HEAD:  Atraumatic, Normocephalic  EYES: EOMI, PERRLA, conjunctiva and sclera clear  ENMT:  Moist mucous membranes, Good dentition, No lesions  NECK: Supple, No JVD, Normal thyroid  NERVOUS SYSTEM:  Alert & Oriented X3, Good concentration; Motor Strength 5/5 B/L upper and lower extremities; DTRs 2+ intact and symmetric  CHEST/LUNG: Clear to percussion bilaterally; No rales, rhonchi, wheezing, or rubs  HEART: Regular rate and rhythm; No murmurs, rubs, or gallops  ABDOMEN: Soft, Nontender, Nondistended; Bowel sounds present  EXTREMITIES:  2+ Peripheral Pulses, No clubbing, cyanosis, or edema. Contracture letr UE  LYMPH: No lymphadenopathy noted  SKIN: No rashes or lesions    LABS:                        11.4   5.1   )-----------( 288      ( 23 Aug 2017 07:19 )             35.0     08-23    144  |  108  |  12  ----------------------------<  118<H>  3.4<L>   |  27  |  0.74    Ca    8.7      23 Aug 2017 07:19  Mg     2.3     08-    TPro  8.9<H>  /  Alb  3.5  /  TBili  0.2  /  DBili  x   /  AST  36  /  ALT  41  /  AlkPhos  84  08-21      Urinalysis Basic - ( 21 Aug 2017 17:48 )    Color: Yellow / Appearance: Clear / S.015 / pH: x  Gluc: x / Ketone: Negative  / Bili: Negative / Urobili: Negative mg/dL   Blood: x / Protein: 15 mg/dL / Nitrite: Negative   Leuk Esterase: Negative / RBC: x / WBC x   Sq Epi: x / Non Sq Epi: Few / Bacteria: Few      CAPILLARY BLOOD GLUCOSE  127 (23 Aug 2017 08:23)  138 (22 Aug 2017 22:24)  135 (22 Aug 2017 15:30)  178 (22 Aug 2017 11:30)              Hemoglobin A1C, Whole Blood: 6.7 % ( @ 08:02)  Sedimentation Rate, Erythrocyte: 40 mm/hr ( @ 15:54)        Urine Culture:      Blood Culture:      RADIOLOGY & ADDITIONAL TESTS:  < from: MRI Pelvis w/wo Cont (17 @ 11:18) >    EXAM:  MRI PELVIS W WO CONT                            PROCEDURE DATE:  2017          INTERPRETATION:  MRI of the bony pelvis.    CLINICAL INDICATION: Draining sinus tract.    TECHNIQUE: Multiplanar, multi sequential MRI of the bony pelvis was   performed both before and after the uncomplicated intravenous   administration of 10 cc gadolinium based contrast material.    FINDINGS:    There is a large thick walled fluid collection collection along the   anterolateral lateral soft tissues about the left hip, surrounding the   left greater trochanter with a thick rind of peripheral enhancement and   surrounding inflammatory change. This measures approximately 7.5 x 11.7 x   9.3 cm in maximal transverse by craniocaudal by anteroposterior   dimension. This appears to extend to the skin surface where there is a   thin draining sinus tract. This is highly suspicious for an area of   abscess formation and/or infected greater trochanteric bursitis end of   fluid also appears to communicate with the anterior aspect of the right   hip joint through a defect in the anterior joint capsule. There are also   several droplets of gas within the fluid collection.    The left gluteus minimus tendon appears stripped and eroded from its   expected attachment of the anterior facet of the left greater trochanter.   The left gluteus medius medius tendon is also partially detached.     There is minimal bone marrow edema pattern in the posterior aspect of the   left greater trochanter without evidence of T1 signal alteration or   osseous enhancement likely reflecting a mild reactive osteitis. There is   no definite evidence for osteomyelitis in the left hip, right hip and   throughout the bony pelvis. There is no fracture or osteonecrosis   throughout the pelvis.    Within the pelvis there is a small amount of free fluid. The uterus is   enlarged leiomyomatous there is a large peripherally enhancing cystic   structure in the central/left hemipelvis measuring 5 cm which may reflect   cystic degeneration of a fibroid versus a left sided ovarian cyst. If   clinically warranted consider ultrasound for further evaluation.    IMPRESSION:    Large soft tissue fluid collection about the anterior lateral aspects of   the left hip with a thick rind of peripheral and rim enhancement and   surrounding reactive inflammatory change. This is highly suspicious for   abscess formation and/or infected left greater trochanteric bursitis. The   collection extends to the skin surface via a thin sinus tract and also   appears to communicate with the anterior aspect of the right hip joint.    Large peripherally enhancing cystic structure in the pelvis which may   reflect a fibroid with cystic degeneration or ovarian cyst. Dedicated   pelvic ultrasound can be performed as clinically directed.      CHARLI CHRISTOPHER M.D., ATTENDING RADIOLOGIST  This document has been electronically signed. Aug 14 2017  9:01AM    < end of copied text >    Imaging Personally Reviewed:  [x ] YES  [ ] NO    Consultant(s) Notes Reviewed:  [x ] YES  [ ] NO    Care Discussed with Consultants/Other Providers [ ] YES  [ ] NO    PROBLEMS:  CELLULITIS OF LEFT LOWER EXTREMITY  DRAINAGE ON LEFT SIDE SENT BY PMD  Cellulitis of left lower extremity  Cellulitis of left lower extremity  Type 2 diabetes mellitus with other circulatory complication  Hypertension  Abscess of hip, left  Essential hypertension      Care discussed with family,         [  ]   yes  [  ]  No    imp:    stable[ ]    unstable[ xx ]     improving [   ]       unchanged  [  ]                Plans:  Continue present plans  [  ]               New consult [  ]   specialty  .......               order joshua[  ]    test name.                  Discharge Planning  [  ]

## 2017-08-23 NOTE — CHART NOTE - NSCHARTNOTEFT_GEN_A_CORE
Dr. Iverson discussed aspiration with ortho and both agreed not to place a needle into joint space for fear of seeding.    -Management as per ortho and surgery

## 2017-08-23 NOTE — PROGRESS NOTE ADULT - ASSESSMENT
A/P: 67F with L hip abscess and draining sinus tract  Discussed with radiology PA about IR CT guided hip aspiration, will Follow up IR for possible CT guided L hip aspiration today 8/23  Follow up with surgery regarding possible I+D  Cont abx per ID  WBAT LLE  Pain control  Cont medical management  Discussed plan with attending and will advise if plan changes A/P: 67F with L hip abscess and draining sinus tract  Discussed with radiology PA about IR CT guided hip aspiration, will Follow up IR for possible CT guided L hip aspiration today 8/23  Follow up with surgery regarding possible I+D  Patient clinically has no pain with ROM, no clinical signs of septic hip joint  Cont abx per ID  WBAT LLE  Pain control  Cont medical management  Discussed plan with attending and will advise if plan changes

## 2017-08-24 LAB
ANION GAP SERPL CALC-SCNC: 8 MMOL/L — SIGNIFICANT CHANGE UP (ref 5–17)
BUN SERPL-MCNC: 15 MG/DL — SIGNIFICANT CHANGE UP (ref 7–23)
CALCIUM SERPL-MCNC: 8.9 MG/DL — SIGNIFICANT CHANGE UP (ref 8.5–10.1)
CHLORIDE SERPL-SCNC: 107 MMOL/L — SIGNIFICANT CHANGE UP (ref 96–108)
CO2 SERPL-SCNC: 28 MMOL/L — SIGNIFICANT CHANGE UP (ref 22–31)
CREAT SERPL-MCNC: 0.92 MG/DL — SIGNIFICANT CHANGE UP (ref 0.5–1.3)
GLUCOSE SERPL-MCNC: 210 MG/DL — HIGH (ref 70–99)
HCT VFR BLD CALC: 40.6 % — SIGNIFICANT CHANGE UP (ref 34.5–45)
HGB BLD-MCNC: 12.7 G/DL — SIGNIFICANT CHANGE UP (ref 11.5–15.5)
MCHC RBC-ENTMCNC: 28.9 PG — SIGNIFICANT CHANGE UP (ref 27–34)
MCHC RBC-ENTMCNC: 31.2 GM/DL — LOW (ref 32–36)
MCV RBC AUTO: 92.6 FL — SIGNIFICANT CHANGE UP (ref 80–100)
PLATELET # BLD AUTO: 181 K/UL — SIGNIFICANT CHANGE UP (ref 150–400)
POTASSIUM SERPL-MCNC: 3.6 MMOL/L — SIGNIFICANT CHANGE UP (ref 3.5–5.3)
POTASSIUM SERPL-SCNC: 3.6 MMOL/L — SIGNIFICANT CHANGE UP (ref 3.5–5.3)
RBC # BLD: 4.39 M/UL — SIGNIFICANT CHANGE UP (ref 3.8–5.2)
RBC # FLD: 13.4 % — SIGNIFICANT CHANGE UP (ref 11–15)
SODIUM SERPL-SCNC: 143 MMOL/L — SIGNIFICANT CHANGE UP (ref 135–145)
WBC # BLD: 5.5 K/UL — SIGNIFICANT CHANGE UP (ref 3.8–10.5)
WBC # FLD AUTO: 5.5 K/UL — SIGNIFICANT CHANGE UP (ref 3.8–10.5)

## 2017-08-24 PROCEDURE — 36569 INSJ PICC 5 YR+ W/O IMAGING: CPT

## 2017-08-24 PROCEDURE — 71010: CPT | Mod: 26

## 2017-08-24 PROCEDURE — 76937 US GUIDE VASCULAR ACCESS: CPT | Mod: 26,76

## 2017-08-24 PROCEDURE — 77001 FLUOROGUIDE FOR VEIN DEVICE: CPT | Mod: 26

## 2017-08-24 RX ORDER — ACETAMINOPHEN 500 MG
650 TABLET ORAL EVERY 6 HOURS
Qty: 0 | Refills: 0 | Status: DISCONTINUED | OUTPATIENT
Start: 2017-08-24 | End: 2017-08-28

## 2017-08-24 RX ADMIN — Medication 50 MILLIGRAM(S): at 19:20

## 2017-08-24 RX ADMIN — NAFCILLIN 200 GRAM(S): 10 INJECTION, POWDER, FOR SOLUTION INTRAVENOUS at 10:03

## 2017-08-24 RX ADMIN — ENOXAPARIN SODIUM 40 MILLIGRAM(S): 100 INJECTION SUBCUTANEOUS at 19:20

## 2017-08-24 RX ADMIN — NAFCILLIN 200 GRAM(S): 10 INJECTION, POWDER, FOR SOLUTION INTRAVENOUS at 06:08

## 2017-08-24 RX ADMIN — Medication 50 MILLIGRAM(S): at 06:08

## 2017-08-24 RX ADMIN — NAFCILLIN 200 GRAM(S): 10 INJECTION, POWDER, FOR SOLUTION INTRAVENOUS at 01:42

## 2017-08-24 RX ADMIN — Medication 2: at 16:23

## 2017-08-24 RX ADMIN — NAFCILLIN 200 GRAM(S): 10 INJECTION, POWDER, FOR SOLUTION INTRAVENOUS at 19:20

## 2017-08-24 RX ADMIN — AMLODIPINE BESYLATE 10 MILLIGRAM(S): 2.5 TABLET ORAL at 06:09

## 2017-08-24 RX ADMIN — NAFCILLIN 200 GRAM(S): 10 INJECTION, POWDER, FOR SOLUTION INTRAVENOUS at 14:01

## 2017-08-24 RX ADMIN — NAFCILLIN 200 GRAM(S): 10 INJECTION, POWDER, FOR SOLUTION INTRAVENOUS at 23:09

## 2017-08-24 RX ADMIN — Medication 650 MILLIGRAM(S): at 20:21

## 2017-08-24 RX ADMIN — Medication 650 MILLIGRAM(S): at 19:21

## 2017-08-24 NOTE — PROGRESS NOTE ADULT - ASSESSMENT
67F hx of dm, htn and left skin granuloma s/p excision in april pw with continued redness and drainage of the left thigh. no fever, chills, nausea, vomiting. Dr Glass advised patient to present to ED  Started on Vancomycin and Zosyn. Will get ID consult. Surgery f/u. MRI noted, ID and Orthopedic consults noted. Pt. on nafcillin now. Wound culture pos. for Staph. Pt. for debridement in OR tomorrow.

## 2017-08-24 NOTE — PROCEDURE NOTE - ADDITIONAL PROCEDURE DETAILS
pt s/p picc line placement inserted via right basilic vein.  Length 44cm. Pt tolerated procedure well. VSS

## 2017-08-24 NOTE — PROGRESS NOTE ADULT - SUBJECTIVE AND OBJECTIVE BOX
Patient is a 67y old  Female who presents with a chief complaint of Abscess left hip. (21 Aug 2017 17:20)      INTERVAL HPI/OVERNIGHT EVENTS: Asymptomatic    MEDICATIONS  (STANDING):  metoprolol 50 milliGRAM(s) Oral two times a day  amLODIPine   Tablet 10 milliGRAM(s) Oral daily  enoxaparin Injectable 40 milliGRAM(s) SubCutaneous every 24 hours  insulin lispro (HumaLOG) corrective regimen sliding scale   SubCutaneous three times a day before meals  dextrose 5%. 1000 milliLiter(s) (50 mL/Hr) IV Continuous <Continuous>  dextrose 50% Injectable 12.5 Gram(s) IV Push once  dextrose 50% Injectable 25 Gram(s) IV Push once  nafcillin  IVPB   IV Intermittent   nafcillin  IVPB 2 Gram(s) IV Intermittent every 4 hours    MEDICATIONS  (PRN):  acetaminophen   Tablet 650 milliGRAM(s) Oral every 6 hours PRN For Temp greater than 38 C (100.4 F) and headache  dextrose Gel 1 Dose(s) Oral once PRN Blood Glucose LESS THAN 70 milliGRAM(s)/deciliter  glucagon  Injectable 1 milliGRAM(s) IntraMuscular once PRN Glucose LESS THAN 70 milligrams/deciliter      Allergies    No Known Allergies    Intolerances        REVIEW OF SYSTEMS:  CONSTITUTIONAL: No fever, weight loss, or fatigue  EYES: No eye pain, visual disturbances, or discharge  ENMT:  No difficulty hearing, tinnitus, vertigo; No sinus or throat pain  NECK: No pain or stiffness  BREASTS: No pain, masses, or nipple discharge  RESPIRATORY: No cough, wheezing, chills or hemoptysis; No shortness of breath  CARDIOVASCULAR: No chest pain, palpitations, dizziness, or leg swelling  GASTROINTESTINAL: No abdominal or epigastric pain. No nausea, vomiting, or hematemesis; No diarrhea or constipation. No melena or hematochezia.  GENITOURINARY: No dysuria, frequency, hematuria, or incontinence  NEUROLOGICAL: No headaches, memory loss, loss of strength, numbness, or tremors  SKIN: No itching, burning, rashes, or lesions   LYMPH NODES: No enlarged glands  ENDOCRINE: No heat or cold intolerance; No hair loss  MUSCULOSKELETAL: No joint pain or swelling; No muscle, back, or extremity pain  PSYCHIATRIC: No depression, anxiety, mood swings, or difficulty sleeping  HEME/LYMPH: No easy bruising, or bleeding gums  ALLERGY AND IMMUNOLOGIC: No hives or eczema    Vital Signs Last 24 Hrs  T(C): 36.4 (24 Aug 2017 05:24), Max: 36.6 (23 Aug 2017 12:02)  T(F): 97.5 (24 Aug 2017 05:24), Max: 97.9 (23 Aug 2017 12:02)  HR: 58 (24 Aug 2017 05:24) (58 - 74)  BP: 152/70 (24 Aug 2017 05:24) (130/73 - 152/70)  BP(mean): --  RR: 16 (24 Aug 2017 05:24) (16 - 18)  SpO2: 99% (24 Aug 2017 05:24) (98% - 99%)    PHYSICAL EXAM:  GENERAL: NAD, well-groomed, well-developed  HEAD:  Atraumatic, Normocephalic  EYES: EOMI, LEIGH, conjunctiva and sclera clear  ENMT:  Moist mucous membranes, Good dentition, No lesions  NECK: Supple, No JVD, Normal thyroid  NERVOUS SYSTEM:  Alert & Oriented X3, Good concentration; Motor Strength 5/5 B/L upper and lower extremities;  CHEST/LUNG: Clear to percussion bilaterally; No rales, rhonchi, wheezing, or rubs  HEART: Regular rate and rhythm; No murmurs, rubs, or gallops  ABDOMEN: Soft, Nontender, Nondistended; Bowel sounds present  EXTREMITIES:  2+ Peripheral Pulses, No clubbing, cyanosis, or edema. Left hand contracture.  LYMPH: No lymphadenopathy noted  SKIN: No rashes or lesions    LABS:                        12.7   5.5   )-----------( 181      ( 24 Aug 2017 09:14 )             40.6     08-24    143  |  107  |  15  ----------------------------<  210<H>  3.6   |  28  |  0.92    Ca    8.9      24 Aug 2017 10:48          CAPILLARY BLOOD GLUCOSE  129 (24 Aug 2017 07:36)  137 (23 Aug 2017 22:08)  136 (23 Aug 2017 16:21)  158 (23 Aug 2017 11:40)              Hemoglobin A1C, Whole Blood: 6.7 % (08-22 @ 08:02)  Sedimentation Rate, Erythrocyte: 40 mm/hr (08-21 @ 15:54)        Urine Culture:      Blood Culture:      RADIOLOGY & ADDITIONAL TESTS:    Imaging Personally Reviewed:  [ ] YES  [ ] NO    Consultant(s) Notes Reviewed:  [ ] YES  [ ] NO    Care Discussed with Consultants/Other Providers [ ] YES  [ ] NO    PROBLEMS:  CELLULITIS OF LEFT LOWER EXTREMITY  DRAINAGE ON LEFT SIDE SENT BY PMD  Cellulitis of left lower extremity  Cellulitis of left lower extremity  Type 2 diabetes mellitus with other circulatory complication  Hypertension  Abscess of hip, left  Essential hypertension      Care discussed with family,         [  ]   yes  [  ]  No    imp:    stable[ ]    unstable[  ]     improving [   ]       unchanged  [  ]                Plans:  Continue present plans  [  ]               New consult [  ]   specialty  .......               order joshua[  ]    test name.                  Discharge Planning  [  ]

## 2017-08-24 NOTE — CHART NOTE - NSCHARTNOTEFT_GEN_A_CORE
Spoke with Dr. Henriquez regarding patient's treatment. Per Dr. Henriquez patient will need PICC line for 6 weeks IV ABX.

## 2017-08-25 ENCOUNTER — TRANSCRIPTION ENCOUNTER (OUTPATIENT)
Age: 67
End: 2017-08-25

## 2017-08-25 LAB
ANION GAP SERPL CALC-SCNC: 7 MMOL/L — SIGNIFICANT CHANGE UP (ref 5–17)
BUN SERPL-MCNC: 18 MG/DL — SIGNIFICANT CHANGE UP (ref 7–23)
CALCIUM SERPL-MCNC: 8.3 MG/DL — LOW (ref 8.5–10.1)
CHLORIDE SERPL-SCNC: 111 MMOL/L — HIGH (ref 96–108)
CO2 SERPL-SCNC: 27 MMOL/L — SIGNIFICANT CHANGE UP (ref 22–31)
CREAT SERPL-MCNC: 0.8 MG/DL — SIGNIFICANT CHANGE UP (ref 0.5–1.3)
GLUCOSE SERPL-MCNC: 126 MG/DL — HIGH (ref 70–99)
HCT VFR BLD CALC: 36.2 % — SIGNIFICANT CHANGE UP (ref 34.5–45)
HGB BLD-MCNC: 11.2 G/DL — LOW (ref 11.5–15.5)
MCHC RBC-ENTMCNC: 28.6 PG — SIGNIFICANT CHANGE UP (ref 27–34)
MCHC RBC-ENTMCNC: 30.9 GM/DL — LOW (ref 32–36)
MCV RBC AUTO: 92.6 FL — SIGNIFICANT CHANGE UP (ref 80–100)
PLATELET # BLD AUTO: 305 K/UL — SIGNIFICANT CHANGE UP (ref 150–400)
POTASSIUM SERPL-MCNC: 3.6 MMOL/L — SIGNIFICANT CHANGE UP (ref 3.5–5.3)
POTASSIUM SERPL-SCNC: 3.6 MMOL/L — SIGNIFICANT CHANGE UP (ref 3.5–5.3)
RBC # BLD: 3.92 M/UL — SIGNIFICANT CHANGE UP (ref 3.8–5.2)
RBC # FLD: 13.8 % — SIGNIFICANT CHANGE UP (ref 11–15)
SODIUM SERPL-SCNC: 145 MMOL/L — SIGNIFICANT CHANGE UP (ref 135–145)
WBC # BLD: 5.9 K/UL — SIGNIFICANT CHANGE UP (ref 3.8–10.5)
WBC # FLD AUTO: 5.9 K/UL — SIGNIFICANT CHANGE UP (ref 3.8–10.5)

## 2017-08-25 RX ORDER — NAFCILLIN 10 G/100ML
2 INJECTION, POWDER, FOR SOLUTION INTRAVENOUS
Qty: 480 | Refills: 0 | OUTPATIENT
Start: 2017-08-25 | End: 2017-10-04

## 2017-08-25 RX ADMIN — AMLODIPINE BESYLATE 10 MILLIGRAM(S): 2.5 TABLET ORAL at 06:13

## 2017-08-25 RX ADMIN — Medication 4: at 11:03

## 2017-08-25 RX ADMIN — Medication 650 MILLIGRAM(S): at 12:16

## 2017-08-25 RX ADMIN — NAFCILLIN 200 GRAM(S): 10 INJECTION, POWDER, FOR SOLUTION INTRAVENOUS at 22:07

## 2017-08-25 RX ADMIN — Medication 50 MILLIGRAM(S): at 17:32

## 2017-08-25 RX ADMIN — Medication 2: at 17:30

## 2017-08-25 RX ADMIN — NAFCILLIN 200 GRAM(S): 10 INJECTION, POWDER, FOR SOLUTION INTRAVENOUS at 06:13

## 2017-08-25 RX ADMIN — NAFCILLIN 200 GRAM(S): 10 INJECTION, POWDER, FOR SOLUTION INTRAVENOUS at 17:32

## 2017-08-25 RX ADMIN — NAFCILLIN 200 GRAM(S): 10 INJECTION, POWDER, FOR SOLUTION INTRAVENOUS at 11:03

## 2017-08-25 RX ADMIN — NAFCILLIN 200 GRAM(S): 10 INJECTION, POWDER, FOR SOLUTION INTRAVENOUS at 13:47

## 2017-08-25 RX ADMIN — Medication 50 MILLIGRAM(S): at 06:13

## 2017-08-25 RX ADMIN — Medication 650 MILLIGRAM(S): at 13:15

## 2017-08-25 RX ADMIN — ENOXAPARIN SODIUM 40 MILLIGRAM(S): 100 INJECTION SUBCUTANEOUS at 17:32

## 2017-08-25 RX ADMIN — NAFCILLIN 200 GRAM(S): 10 INJECTION, POWDER, FOR SOLUTION INTRAVENOUS at 02:54
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
## 2017-08-25 NOTE — DISCHARGE NOTE ADULT - CARE PROVIDER_API CALL
Jesenia Johnson (DAVON), Internal Medicine  [address] [place] [address]  Phone: [phone]  Fax: [phone]    Lashonda Henriquez (MD), Infectious Disease; Internal Medicine  [address] [place] [address]  Phone: [phone]  Fax: [phone]    Brenarda Moore), Surgery  [address] [facility] [address] [place] [address]  Phone: [phone]  Fax: [phone] Lashonda Henriquez), Infectious Disease; Internal Medicine  [address] [place] [address]  Phone: [phone]  Fax: [phone]    Bernarda Moore), Surgery  [address] [place] [address]  Phone: [phone]  Fax: [phone]

## 2017-08-25 NOTE — DISCHARGE NOTE ADULT - CARE PLAN
Principal Discharge DX:	Cellulitis of left lower extremity  Goal:	Continue ABX for total 6 weeks  Instructions for follow-up, activity and diet:	Follow up with ID and PMD doctor  Secondary Diagnosis:	Abscess of hip, left  Instructions for follow-up, activity and diet:	Follow up with ID doctor  Secondary Diagnosis:	Essential hypertension  Instructions for follow-up, activity and diet:	Continue home blood pressure medications  Follow up with PCP  Low-sodium diet  AHA Recipes - [email or web address]  Secondary Diagnosis:	Type 2 diabetes mellitus with other circulatory complication  Instructions for follow-up, activity and diet:	Continue home diabetic medications  Follow up with PCP  Low-carbohydrate diet  ADA Recipes - [email or web address]  Secondary Diagnosis:	Gastroesophageal reflux disease without esophagitis  Instructions for follow-up, activity and diet:	Continue home meds Principal Discharge DX:	Cellulitis of left lower extremity  Goal:	Continue ABX for total 6 weeks  Instructions for follow-up, activity and diet:	Follow up with ID and PMD doctor  Secondary Diagnosis:	Abscess of hip, left  Goal:	IV antibiotics  Instructions for follow-up, activity and diet:	Follow up with ID doctor  Secondary Diagnosis:	Essential hypertension  Instructions for follow-up, activity and diet:	Continue home blood pressure medications  Follow up with PCP  Low-sodium diet  AHA Recipes - [email or web address]  Secondary Diagnosis:	Type 2 diabetes mellitus with other circulatory complication  Instructions for follow-up, activity and diet:	Continue home diabetic medications  Follow up with PCP  Low-carbohydrate diet  ADA Recipes - [email or web address]  Secondary Diagnosis:	Gastroesophageal reflux disease without esophagitis  Instructions for follow-up, activity and diet:	Continue home meds

## 2017-08-25 NOTE — DISCHARGE NOTE ADULT - MEDICATION SUMMARY - MEDICATIONS TO TAKE
I will START or STAY ON the medications listed below when I get home from the hospital:    acetaminophen 325 mg oral tablet  -- 2 tab(s) by mouth every 6 hours, As needed, For Temp greater than 38 C (100.4 F) and headache  -- Indication: For Pain    aluminum hydroxide-magnesium hydroxide 200 mg-200 mg/5 mL oral suspension  -- 30 milliliter(s) by mouth 4 times a day, As needed, Indigestion  -- Indication: For GERD    metFORMIN  -- 1 tab(s) by mouth once a day  -- Indication: For DM    glimepiride  -- 1 tab(s) by mouth once a day  -- Indication: For DM    hydroCHLOROthiazide-triamterene 25 mg-37.5 mg oral capsule  -- 1 cap(s) by mouth once a day  -- Indication: For HTN    metoprolol tartrate 50 mg oral tablet  -- 1 tab(s) by mouth 2 times a day  -- Indication: For HTN    amLODIPine 5 mg oral tablet  -- 2 tab(s) by mouth once a day  -- Indication: For HTN    nafcillin 2 g/100 mL intravenous solution  -- 2 gram(s) intravenous every 4 hours until 10/3/17  -- Indication: For Infection I will START or STAY ON the medications listed below when I get home from the hospital:    acetaminophen 325 mg oral tablet  -- 2 tab(s) by mouth every 6 hours, As needed, For Temp greater than 38 C (100.4 F) and headache  -- Indication: For Pain    aluminum hydroxide-magnesium hydroxide 200 mg-200 mg/5 mL oral suspension  -- 30 milliliter(s) by mouth 4 times a day, As needed, Indigestion  -- Indication: For GERD    metFORMIN  -- 1 tab(s) by mouth once a day  -- Indication: For DM    glimepiride  -- 1 tab(s) by mouth once a day  -- Indication: For DM    hydroCHLOROthiazide-triamterene 25 mg-37.5 mg oral capsule  -- 1 cap(s) by mouth once a day  -- Indication: For HTN    metoprolol tartrate 50 mg oral tablet  -- 1 tab(s) by mouth 2 times a day  -- Indication: For HTN    amLODIPine 5 mg oral tablet  -- 2 tab(s) by mouth once a day  -- Indication: For HTN    nafcillin 2 g/100 mL intravenous solution  -- 2 gram(s) intravenous every 4 hours until 10/3/17  -- Indication: For Infection    Cipro 500 mg oral tablet  -- 1 tab(s) by mouth 2 times a day until 10/3/17  -- Avoid prolonged or excessive exposure to direct and/or artificial sunlight while taking this medication.  Check with your doctor before becoming pregnant.  Do not take dairy products, antacids, or iron preparations within one hour of this medication.  Finish all this medication unless otherwise directed by prescriber.  Medication should be taken with plenty of water.    -- Indication: For Infection

## 2017-08-25 NOTE — OCCUPATIONAL THERAPY INITIAL EVALUATION ADULT - RANGE OF MOTION EXAMINATION, UPPER EXTREMITY
Right UE Active ROM was WFL (within functional limits)/Right UE Passive ROM was WFL  (within functional limits)/LUE AROM shoulder/elbow flexion WFL, LUE wrist flexion WFL, LUE wrist extension 0-10 degrees, LUE hand function limited by more then 90%

## 2017-08-25 NOTE — CONSULT NOTE ADULT - SUBJECTIVE AND OBJECTIVE BOX
Dr. Moore contact information: Office: [phone] Cell: [phone]  GENERAL SURGERY CONSULT NOTE    Patient is a 67y old  Female who presents with a chief complaint of Abscess left hip. (25 Aug 2017 13:48)      HPI:  67F hx of dm, htn and left skin granuloma s/p excision in april pw with continued redness and drainage of the left thigh. no fever, chills, nausea, vomiting. Dr Glass advised patient to present to ED Fh and Sh not otherwise contributory (21 Aug 2017 17:20)    Pt seen at bedside with Dr Moore  Pt is known to service  She is without complaint.   Previously had draining wound at left hip which has stopped draining.    PAST MEDICAL & SURGICAL HISTORY:  Thrombocytopenia  Metabolic encephalopathy  GERD (gastroesophageal reflux disease)  Hypertension  Diabetes mellitus  H/O skin graft: left hand  H/O hand surgery  S/P debridement      Review of Systems:  I have reviewed 9 systems with the patient and the only positive findings were left hip drainage, now resolved.    MEDICATIONS  (STANDING):  metoprolol 50 milliGRAM(s) Oral two times a day  amLODIPine   Tablet 10 milliGRAM(s) Oral daily  enoxaparin Injectable 40 milliGRAM(s) SubCutaneous every 24 hours  insulin lispro (HumaLOG) corrective regimen sliding scale   SubCutaneous three times a day before meals  dextrose 5%. 1000 milliLiter(s) (50 mL/Hr) IV Continuous <Continuous>  dextrose 50% Injectable 12.5 Gram(s) IV Push once  dextrose 50% Injectable 25 Gram(s) IV Push once  nafcillin  IVPB   IV Intermittent   nafcillin  IVPB 2 Gram(s) IV Intermittent every 4 hours    MEDICATIONS  (PRN):  acetaminophen   Tablet 650 milliGRAM(s) Oral every 6 hours PRN For Temp greater than 38 C (100.4 F) and headache  dextrose Gel 1 Dose(s) Oral once PRN Blood Glucose LESS THAN 70 milliGRAM(s)/deciliter  glucagon  Injectable 1 milliGRAM(s) IntraMuscular once PRN Glucose LESS THAN 70 milligrams/deciliter  acetaminophen   Tablet. 650 milliGRAM(s) Oral every 6 hours PRN Mild Pain (1 - 3)      Allergies  No Known Allergies      FAMILY HISTORY:  Family history of hypertension (Mother)  Family history of diabetes mellitus (Father)      Vital Signs Last 24 Hrs  T(C): 36.6 (25 Aug 2017 11:17), Max: 36.8 (24 Aug 2017 17:08)  T(F): 97.8 (25 Aug 2017 11:17), Max: 98.3 (24 Aug 2017 17:08)  HR: 64 (25 Aug 2017 11:17) (64 - 74)  BP: 186/88 (25 Aug 2017 11:17) (114/69 - 186/88)  BP(mean): --  RR: 18 (25 Aug 2017 11:17) (16 - 18)  SpO2: 100% (25 Aug 2017 11:17) (98% - 100%)    Physical Exam:  General: Appears stated age, well-groomed, well-nourished, no distress  Eyes: EOMI, conjunctiva clear  HENT:  WNL, no JVD  Chest:nonlabored respirations  Abdomen: soft NT  Extremities: Left wrist healed scar. no pedal edema. Left lower leg healed scar.  Left hip prior skin graft donor site well healed. Skin is intact. Fluid is palpable lateral to hip; no erythema, induration or discrete abscess. Nontender.   Site prepped with chlorhexidine 4%. 18 gauge needle used to aspirate yellow-straw colored fluid ~30cc. Dry gauze and foam dressing applied. Pt tolerated well. Sent for culture.  Skin: various healed wounds as above  Musculoskeletal: decreased ROM L hand  Neuro/Psych:  Alert, oriented to time, place and person     LABS:                        11.2   5.9   )-----------( 305      ( 25 Aug 2017 06:46 )             36.2     08-25    145  |  111<H>  |  18  ----------------------------<  126<H>  3.6   |  27  |  0.80    Ca    8.3<L>      25 Aug 2017 06:46          RADIOLOGY & ADDITIONAL STUDIES:  < from: MRI Pelvis w/wo Cont (08.11.17 @ 11:18) >    Large soft tissue fluid collection about the anterior lateral aspects of   the left hip with a thick rind of peripheral and rim enhancement and   surrounding reactive inflammatory change. This is highly suspicious for   abscess formation and/or infected left greater trochanteric bursitis. The   collection extends to the skin surface via a thin sinus tract and also   appears to communicate with the anterior aspect of the right hip joint.    Large peripherally enhancing cystic structure in the pelvis which may   reflect a fibroid with cystic degeneration or ovarian cyst. Dedicated   pelvic ultrasound can be performed as clinically directed.      < end of copied text >        Impression/Plan:  67F PMH DM, HTN with h/o deep tissue injury requiring skin graft of left leg with chronic drainage from left hip now with collection  -aspiration of collection performed bedside with DR Moore as above. F/u fluid culture  -cont present medical management and abx per ID  -local dry dressing to hip

## 2017-08-25 NOTE — PROGRESS NOTE ADULT - SUBJECTIVE AND OBJECTIVE BOX
Patient is a 67y old  Female who presents with a chief complaint of Abscess left hip. (25 Aug 2017 13:48)      INTERVAL HPI/OVERNIGHT EVENTS:    MEDICATIONS  (STANDING):  metoprolol 50 milliGRAM(s) Oral two times a day  amLODIPine   Tablet 10 milliGRAM(s) Oral daily  enoxaparin Injectable 40 milliGRAM(s) SubCutaneous every 24 hours  insulin lispro (HumaLOG) corrective regimen sliding scale   SubCutaneous three times a day before meals  dextrose 5%. 1000 milliLiter(s) (50 mL/Hr) IV Continuous <Continuous>  dextrose 50% Injectable 12.5 Gram(s) IV Push once  dextrose 50% Injectable 25 Gram(s) IV Push once  nafcillin  IVPB   IV Intermittent   nafcillin  IVPB 2 Gram(s) IV Intermittent every 4 hours    MEDICATIONS  (PRN):  acetaminophen   Tablet 650 milliGRAM(s) Oral every 6 hours PRN For Temp greater than 38 C (100.4 F) and headache  dextrose Gel 1 Dose(s) Oral once PRN Blood Glucose LESS THAN 70 milliGRAM(s)/deciliter  glucagon  Injectable 1 milliGRAM(s) IntraMuscular once PRN Glucose LESS THAN 70 milligrams/deciliter  acetaminophen   Tablet. 650 milliGRAM(s) Oral every 6 hours PRN Mild Pain (1 - 3)      Allergies    No Known Allergies    Intolerances        REVIEW OF SYSTEMS:  CONSTITUTIONAL: No fever, weight loss, or fatigue  EYES: No eye pain, visual disturbances, or discharge  ENMT:  No difficulty hearing, tinnitus, vertigo; No sinus or throat pain  NECK: No pain or stiffness  BREASTS: No pain, masses, or nipple discharge  RESPIRATORY: No cough, wheezing, chills or hemoptysis; No shortness of breath  CARDIOVASCULAR: No chest pain, palpitations, dizziness, or leg swelling  GASTROINTESTINAL: No abdominal or epigastric pain. No nausea, vomiting, or hematemesis; No diarrhea or constipation. No melena or hematochezia.  GENITOURINARY: No dysuria, frequency, hematuria, or incontinence  NEUROLOGICAL: No headaches, memory loss, loss of strength, numbness, or tremors  SKIN: No itching, burning, rashes, or lesions   LYMPH NODES: No enlarged glands  ENDOCRINE: No heat or cold intolerance; No hair loss  MUSCULOSKELETAL: No joint pain or swelling; No muscle, back, or extremity pain  PSYCHIATRIC: No depression, anxiety, mood swings, or difficulty sleeping  HEME/LYMPH: No easy bruising, or bleeding gums  ALLERGY AND IMMUNOLOGIC: No hives or eczema    Vital Signs Last 24 Hrs  T(C): 36.6 (25 Aug 2017 11:17), Max: 36.8 (24 Aug 2017 17:08)  T(F): 97.8 (25 Aug 2017 11:17), Max: 98.3 (24 Aug 2017 17:08)  HR: 64 (25 Aug 2017 11:17) (64 - 74)  BP: 186/88 (25 Aug 2017 11:17) (114/69 - 186/88)  BP(mean): --  RR: 18 (25 Aug 2017 11:17) (16 - 18)  SpO2: 100% (25 Aug 2017 11:17) (98% - 100%)    PHYSICAL EXAM:  GENERAL: NAD, well-groomed, well-developed  HEAD:  Atraumatic, Normocephalic  EYES: EOMI, PERRLA, conjunctiva and sclera clear  ENMT:  Moist mucous membranes, Good dentition, No lesions  NECK: Supple, No JVD, Normal thyroid  NERVOUS SYSTEM:  Alert & Oriented X3, Good concentration; Motor Strength 5/5 B/L upper and lower extremities; DTRs 2+ intact and symmetric  CHEST/LUNG: Clear to percussion bilaterally; No rales, rhonchi, wheezing, or rubs  HEART: Regular rate and rhythm; No murmurs, rubs, or gallops  ABDOMEN: Soft, Nontender, Nondistended; Bowel sounds present  EXTREMITIES:  2+ Peripheral Pulses, No clubbing, cyanosis, or edema  LYMPH: No lymphadenopathy noted  SKIN: No rashes or lesions    LABS:                        11.2   5.9   )-----------( 305      ( 25 Aug 2017 06:46 )             36.2     08-25    145  |  111<H>  |  18  ----------------------------<  126<H>  3.6   |  27  |  0.80    Ca    8.3<L>      25 Aug 2017 06:46          CAPILLARY BLOOD GLUCOSE  213 (25 Aug 2017 11:04)  126 (25 Aug 2017 08:01)  178 (24 Aug 2017 23:04)              Hemoglobin A1C, Whole Blood: 6.7 % (08-22 @ 08:02)  Sedimentation Rate, Erythrocyte: 40 mm/hr (08-21 @ 15:54)        Urine Culture:      Blood Culture:      RADIOLOGY & ADDITIONAL TESTS:    Imaging Personally Reviewed:  [ ] YES  [ ] NO    Consultant(s) Notes Reviewed:  [ ] YES  [ ] NO    Care Discussed with Consultants/Other Providers [ ] YES  [ ] NO    PROBLEMS:  CELLULITIS OF LEFT LOWER EXTREMITY  DRAINAGE ON LEFT SIDE SENT BY PMD  Cellulitis of left lower extremity  Cellulitis of left lower extremity  Type 2 diabetes mellitus with other circulatory complication  Hypertension  Abscess of hip, left  Essential hypertension      Care discussed with family,         [  ]   yes  [  ]  No    imp:    stable[ ]    unstable[  ]     improving [   ]       unchanged  [  ]                Plans:  Continue present plans  [  ]               New consult [  ]   specialty  .......               order joshua[  ]    test name.                  Discharge Planning  [  ]

## 2017-08-25 NOTE — OCCUPATIONAL THERAPY INITIAL EVALUATION ADULT - TRANSFER SAFETY CONCERNS NOTED: TOILET, REHAB EVAL
decreased sequencing ability/decreased step length/decreased safety awareness/decreased weight-shifting ability

## 2017-08-25 NOTE — DISCHARGE NOTE ADULT - PATIENT PORTAL LINK FT
“You can access the [facility] Patient Portal, offered by [facility], by registering with the following website: [email or web address]”

## 2017-08-25 NOTE — DISCHARGE NOTE ADULT - SECONDARY DIAGNOSIS.
Abscess of hip, left Essential hypertension Type 2 diabetes mellitus with other circulatory complication Gastroesophageal reflux disease without esophagitis

## 2017-08-25 NOTE — DISCHARGE NOTE ADULT - PROVIDER TOKENS
TOKEN:'[id number]',TOKEN:'[id number]:'[id number]' TOKEN:'[id number]',TOKEN:'[id number]'

## 2017-08-25 NOTE — OCCUPATIONAL THERAPY INITIAL EVALUATION ADULT - ADDITIONAL COMMENTS
Patient lives in ap private house with 3 steps to enter with rails on both sides. Once inside, the patient bedroom and bathroom is on that main level. The patient bathroom has tub/shower combination with no devices or equipment inside. The patient ambulates with a cane and does not drive. The patient uses accessory ride to go to/from places. The patient is able to state wants and needs at this time.

## 2017-08-25 NOTE — DISCHARGE NOTE ADULT - HOSPITAL COURSE
67F hx of dm, htn and left skin granuloma s/p excision in april pw with continued redness and drainage of the left thigh. no fever, chills, nausea, vomiting. Dr Glass advised patient to present to ED  Started on Vancomycin and Zosyn. Will get ID consult. Surgery f/u. MRI noted, ID and Orthopedic consults noted. Pt. on nafcillin now. Wound culture pos. for Staph. Surgery not recommending I&D of abscess. Pt to be discharged on 6 weeks total IV ABX and follow up outpatient with PMD and ID. Patient stable for discharge.    Problem/Plan - 1:  ·  Problem: Essential hypertension.  Plan: Monitor BP.     Problem/Plan - 2:  ·  Problem: Abscess of hip, left.  Plan: IV antibiotics.     Problem/Plan - 3:  ·  Problem: Hypertension.     Problem/Plan - 4:  ·  Problem: Type 2 diabetes mellitus with other circulatory complication.  Plan: Monitor Blood sugar.

## 2017-08-25 NOTE — OCCUPATIONAL THERAPY INITIAL EVALUATION ADULT - PERTINENT HX OF CURRENT PROBLEM, REHAB EVAL
Patient admitted to [facility] due to cellulitis of LLE. X-ray of the femur on 8/21/17 revealed Air within the soft tissues of the left pelvis superior to the greater trochanter may represent soft tissue infection without evidence of bony destruction. Patient had RUE picc line placement put in on 8/24/17

## 2017-08-25 NOTE — DISCHARGE NOTE ADULT - PLAN OF CARE
Continue ABX for total 6 weeks Follow up with ID and PMD doctor Follow up with ID doctor Continue home blood pressure medications  Follow up with PCP  Low-sodium diet  AHA Recipes - [email or web address] Continue home diabetic medications  Follow up with PCP  Low-carbohydrate diet  ADA Recipes - [email or web address] Continue home meds IV antibiotics

## 2017-08-25 NOTE — OCCUPATIONAL THERAPY INITIAL EVALUATION ADULT - TRANSFER SAFETY CONCERNS NOTED: SIT/STAND, REHAB EVAL
decreased sequencing ability/decreased weight-shifting ability/decreased safety awareness/decreased step length

## 2017-08-25 NOTE — OCCUPATIONAL THERAPY INITIAL EVALUATION ADULT - MODIFIED CLINICAL TEST OF SENSORY INTEGRATION IN BALANCE TEST
Barthel Index: Feeding Score___10___, Bathing Score___5___, Grooming Score__5___, Dressing Score__10___, Bowel Score__10___, Bladder Score___10___, Toilet Score__10___, Transfer Score___10___, Mobility Score_____, Stairs Score_____, Total Score___70__.

## 2017-08-25 NOTE — OCCUPATIONAL THERAPY INITIAL EVALUATION ADULT - GENERAL OBSERVATIONS, REHAB EVAL
Pt was encountered OOB in chair; NAD, RUE picc line, LUE ulnar drift noted, LUE pip/dip digits finger contractures noted, LUE hand scarring noted, AXOX4, cooperative, followed commands 75% of the time, required verbal cues for hand/foot/cane placement during tasks.

## 2017-08-26 LAB
GRAM STN FLD: SIGNIFICANT CHANGE UP
SPECIMEN SOURCE: SIGNIFICANT CHANGE UP

## 2017-08-26 RX ADMIN — NAFCILLIN 200 GRAM(S): 10 INJECTION, POWDER, FOR SOLUTION INTRAVENOUS at 10:09

## 2017-08-26 RX ADMIN — Medication 50 MILLIGRAM(S): at 05:49

## 2017-08-26 RX ADMIN — ENOXAPARIN SODIUM 40 MILLIGRAM(S): 100 INJECTION SUBCUTANEOUS at 17:10

## 2017-08-26 RX ADMIN — Medication 2: at 11:21

## 2017-08-26 RX ADMIN — NAFCILLIN 200 GRAM(S): 10 INJECTION, POWDER, FOR SOLUTION INTRAVENOUS at 22:12

## 2017-08-26 RX ADMIN — Medication 2: at 07:42

## 2017-08-26 RX ADMIN — NAFCILLIN 200 GRAM(S): 10 INJECTION, POWDER, FOR SOLUTION INTRAVENOUS at 02:00

## 2017-08-26 RX ADMIN — Medication 650 MILLIGRAM(S): at 17:11

## 2017-08-26 RX ADMIN — Medication 650 MILLIGRAM(S): at 16:11

## 2017-08-26 RX ADMIN — AMLODIPINE BESYLATE 10 MILLIGRAM(S): 2.5 TABLET ORAL at 05:49

## 2017-08-26 RX ADMIN — NAFCILLIN 200 GRAM(S): 10 INJECTION, POWDER, FOR SOLUTION INTRAVENOUS at 05:48

## 2017-08-26 RX ADMIN — Medication 50 MILLIGRAM(S): at 17:10

## 2017-08-26 RX ADMIN — NAFCILLIN 200 GRAM(S): 10 INJECTION, POWDER, FOR SOLUTION INTRAVENOUS at 17:10

## 2017-08-26 RX ADMIN — NAFCILLIN 200 GRAM(S): 10 INJECTION, POWDER, FOR SOLUTION INTRAVENOUS at 13:07

## 2017-08-26 NOTE — PROGRESS NOTE ADULT - ASSESSMENT
infected large collection around hip   continue antibiotics   ortho noted   await culture   will follow with you thanks infected large collection around hip   continue antibiotics   ortho noted   await culture done now   continue nafcillin

## 2017-08-26 NOTE — PROGRESS NOTE ADULT - SUBJECTIVE AND OBJECTIVE BOX
67F hx of dm, htn and left skin granuloma s/p excision in april pw with continued redness and drainage of the left thigh. no fever, chills, nausea, vomiting. Dr Glass advised patient to present to ED   ortho eval noted  patient is well known to me from prev admssion   HPI:  · HPI   67F hx of dm, htn and left skin granuloma s/p excision in april pw with continued redness and drainage of the left thigh. no fever, chills, nausea, vomiting. Dr Glass advised patient to present to ED Fh and Sh not otherwise contributory (21 Aug 2017 17:20)      Allergies    No Known Allergies    Intolerances        MEDICATIONS  (STANDING):  metoprolol 50 milliGRAM(s) Oral two times a day  amLODIPine   Tablet 10 milliGRAM(s) Oral daily  enoxaparin Injectable 40 milliGRAM(s) SubCutaneous every 24 hours  insulin lispro (HumaLOG) corrective regimen sliding scale   SubCutaneous three times a day before meals  dextrose 5%. 1000 milliLiter(s) (50 mL/Hr) IV Continuous <Continuous>  dextrose 50% Injectable 12.5 Gram(s) IV Push once  dextrose 50% Injectable 25 Gram(s) IV Push once  nafcillin  IVPB   IV Intermittent   nafcillin  IVPB 2 Gram(s) IV Intermittent every 4 hours    MEDICATIONS  (PRN):  acetaminophen   Tablet 650 milliGRAM(s) Oral every 6 hours PRN For Temp greater than 38 C (100.4 F) and headache  dextrose Gel 1 Dose(s) Oral once PRN Blood Glucose LESS THAN 70 milliGRAM(s)/deciliter  glucagon  Injectable 1 milliGRAM(s) IntraMuscular once PRN Glucose LESS THAN 70 milligrams/deciliter  acetaminophen   Tablet. 650 milliGRAM(s) Oral every 6 hours PRN Mild Pain (1 - 3)      REVIEW OF SYSTEMS:    CONSTITUTIONAL: No fever, chills, weight loss, or fatigue  HEENT: No sore throat, runny nose, ear ache  RESPIRATORY: No cough, wheezing, No shortness of breath  CARDIOVASCULAR: No chest pain, palpitations, dizziness  GASTROINTESTINAL: No abdominal pain. No nausea, vomiting, diarrhea  GENITOURINARY: No dysuria, increase frequency, hematuria, or incontinence  NEUROLOGICAL: No headaches, memory loss, loss of strength, numbness, or tremors, no weakness  EXTREMITY: No pedal edema BLE  SKIN: No itching, burning, rashes, or lesions     VITAL SIGNS:  T(C): 36.9 (08-26-17 @ 17:44), Max: 36.9 (08-26-17 @ 17:44)  T(F): 98.5 (08-26-17 @ 17:44), Max: 98.5 (08-26-17 @ 17:44)  HR: 79 (08-26-17 @ 17:44) (54 - 79)  BP: 167/74 (08-26-17 @ 17:44) (163/84 - 189/93)  RR: 17 (08-26-17 @ 17:44) (16 - 18)  SpO2: 98% (08-26-17 @ 17:44) (98% - 100%)  Wt(kg): --    PHYSICAL EXAM:    GENERAL: not in any distress  HEENT: Neck is supple, normocephalic, atraumatic   CHEST/LUNG: Clear to percussion bilaterally; No rales, rhonchi, wheezing  HEART: Regular rate and rhythm; No murmurs, rubs, or gallops  ABDOMEN: Soft, Nontender, Nondistended; Bowel sounds present, no rebound   EXTREMITIES:  2+ Peripheral Pulses, No clubbing, cyanosis, or edema  GENITOURINARY:   SKIN: No rashes or lesions  BACK: no pressor sore   NERVOUS SYSTEM:  Alert & Oriented X3, Good concentration  PSYCH: normal affect     LABS:                         11.2   5.9   )-----------( 305      ( 25 Aug 2017 06:46 )             36.2     08-25    145  |  111<H>  |  18  ----------------------------<  126<H>  3.6   |  27  |  0.80    Ca    8.3<L>      25 Aug 2017 06:46                                              Radiology: 67F hx of dm, htn and left skin granuloma s/p excision in april pw with continued redness and drainage of the left thigh. no fever, chills, nausea, vomiting. Dr Glass advised patient to present to ED   ortho eval noted  patient is well known to me from prev admssion   Allergies    No Known Allergies    Intolerances        MEDICATIONS  (STANDING):  metoprolol 50 milliGRAM(s) Oral two times a day  amLODIPine   Tablet 10 milliGRAM(s) Oral daily  enoxaparin Injectable 40 milliGRAM(s) SubCutaneous every 24 hours  insulin lispro (HumaLOG) corrective regimen sliding scale   SubCutaneous three times a day before meals  dextrose 5%. 1000 milliLiter(s) (50 mL/Hr) IV Continuous <Continuous>  dextrose 50% Injectable 12.5 Gram(s) IV Push once  dextrose 50% Injectable 25 Gram(s) IV Push once  nafcillin  IVPB   IV Intermittent   nafcillin  IVPB 2 Gram(s) IV Intermittent every 4 hours    MEDICATIONS  (PRN):  acetaminophen   Tablet 650 milliGRAM(s) Oral every 6 hours PRN For Temp greater than 38 C (100.4 F) and headache  dextrose Gel 1 Dose(s) Oral once PRN Blood Glucose LESS THAN 70 milliGRAM(s)/deciliter  glucagon  Injectable 1 milliGRAM(s) IntraMuscular once PRN Glucose LESS THAN 70 milligrams/deciliter  acetaminophen   Tablet. 650 milliGRAM(s) Oral every 6 hours PRN Mild Pain (1 - 3)      REVIEW OF SYSTEMS:  hip is not draining any more   feels fine     VITAL SIGNS:  T(C): 36.9 (08-26-17 @ 17:44), Max: 36.9 (08-26-17 @ 17:44)  T(F): 98.5 (08-26-17 @ 17:44), Max: 98.5 (08-26-17 @ 17:44)  HR: 79 (08-26-17 @ 17:44) (54 - 79)  BP: 167/74 (08-26-17 @ 17:44) (163/84 - 189/93)  RR: 17 (08-26-17 @ 17:44) (16 - 18)  SpO2: 98% (08-26-17 @ 17:44) (98% - 100%)  Wt(kg): --    PHYSICAL EXAM:    GENERAL: not in any distress  HEENT: Neck is supple, normocephalic, atraumatic   CHEST/LUNG: Clear bilaterally; No rales, rhonchi, wheezing  HEART: Regular rate and rhythm; No murmurs, rubs, or gallops  ABDOMEN: Soft, Nontender, Nondistended; Bowel sounds present, no rebound   EXTREMITIES:  2+ Peripheral Pulses, No clubbing, cyanosis, or edema  hip no drainage that may not be good  SKIN: No rashes or lesions  BACK: no pressor sore   NERVOUS SYSTEM:  Alert & Oriented X3, Good concentration  PSYCH: normal affect     LABS:                         11.2   5.9   )-----------( 305      ( 25 Aug 2017 06:46 )             36.2     08-25    145  |  111<H>  |  18  ----------------------------<  126<H>  3.6   |  27  |  0.80    Ca    8.3<L>      25 Aug 2017 06:46                                              Radiology:

## 2017-08-26 NOTE — PROGRESS NOTE ADULT - SUBJECTIVE AND OBJECTIVE BOX
Patient is a 67y old  Female who presents with a chief complaint of Abscess left hip. (25 Aug 2017 13:48)      INTERVAL HPI/OVERNIGHT EVENTS: asymptomatic    MEDICATIONS  (STANDING):  metoprolol 50 milliGRAM(s) Oral two times a day  amLODIPine   Tablet 10 milliGRAM(s) Oral daily  enoxaparin Injectable 40 milliGRAM(s) SubCutaneous every 24 hours  insulin lispro (HumaLOG) corrective regimen sliding scale   SubCutaneous three times a day before meals  dextrose 5%. 1000 milliLiter(s) (50 mL/Hr) IV Continuous <Continuous>  dextrose 50% Injectable 12.5 Gram(s) IV Push once  dextrose 50% Injectable 25 Gram(s) IV Push once  nafcillin  IVPB   IV Intermittent   nafcillin  IVPB 2 Gram(s) IV Intermittent every 4 hours    MEDICATIONS  (PRN):  acetaminophen   Tablet 650 milliGRAM(s) Oral every 6 hours PRN For Temp greater than 38 C (100.4 F) and headache  dextrose Gel 1 Dose(s) Oral once PRN Blood Glucose LESS THAN 70 milliGRAM(s)/deciliter  glucagon  Injectable 1 milliGRAM(s) IntraMuscular once PRN Glucose LESS THAN 70 milligrams/deciliter  acetaminophen   Tablet. 650 milliGRAM(s) Oral every 6 hours PRN Mild Pain (1 - 3)      Allergies    No Known Allergies    Intolerances        REVIEW OF SYSTEMS:  CONSTITUTIONAL: No fever, weight loss, or fatigue  EYES: No eye pain, visual disturbances, or discharge  ENMT:  No difficulty hearing, tinnitus, vertigo; No sinus or throat pain  NECK: No pain or stiffness  BREASTS: No pain, masses, or nipple discharge  RESPIRATORY: No cough, wheezing, chills or hemoptysis; No shortness of breath  CARDIOVASCULAR: No chest pain, palpitations, dizziness, or leg swelling  GASTROINTESTINAL: No abdominal or epigastric pain. No nausea, vomiting, or hematemesis; No diarrhea or constipation. No melena or hematochezia.  GENITOURINARY: No dysuria, frequency, hematuria, or incontinence  NEUROLOGICAL: No headaches, memory loss, loss of strength, numbness, or tremors  SKIN: No itching, burning, rashes, or lesions   LYMPH NODES: No enlarged glands  ENDOCRINE: No heat or cold intolerance; No hair loss  MUSCULOSKELETAL: No joint pain or swelling; No muscle, back, or extremity pain. Limited use of left hand.  PSYCHIATRIC: No depression, anxiety, mood swings, or difficulty sleeping  HEME/LYMPH: No easy bruising, or bleeding gums  ALLERGY AND IMMUNOLOGIC: No hives or eczema    Vital Signs Last 24 Hrs  T(C): 36.3 (26 Aug 2017 05:29), Max: 37.1 (25 Aug 2017 17:24)  T(F): 97.4 (26 Aug 2017 05:29), Max: 98.8 (25 Aug 2017 17:24)  HR: 62 (26 Aug 2017 05:29) (54 - 62)  BP: 189/93 (26 Aug 2017 05:29) (174/80 - 189/93)  BP(mean): --  RR: 17 (26 Aug 2017 05:29) (16 - 17)  SpO2: 100% (26 Aug 2017 05:29) (100% - 100%)    PHYSICAL EXAM:  GENERAL: NAD, well-groomed, well-developed  HEAD:  Atraumatic, Normocephalic  EYES: EOMI, LEIGH, conjunctiva and sclera clear  ENMT:  Moist mucous membranes, Good dentition, No lesions  NECK: Supple, No JVD, Normal thyroid  NERVOUS SYSTEM:  Alert & Oriented X3, Good concentration; Motor Strength 3/5 left hand.  CHEST/LUNG: Clear to percussion bilaterally; No rales, rhonchi, wheezing, or rubs  HEART: Regular rate and rhythm; No murmurs, rubs, or gallops  ABDOMEN: Soft, Nontender, Nondistended; Bowel sounds present  EXTREMITIES:  2+ Peripheral Pulses, No clubbing, cyanosis, or edema  LYMPH: No lymphadenopathy noted  SKIN: No rashes or lesions    LABS:                        11.2   5.9   )-----------( 305      ( 25 Aug 2017 06:46 )             36.2     08-25    145  |  111<H>  |  18  ----------------------------<  126<H>  3.6   |  27  |  0.80    Ca    8.3<L>      25 Aug 2017 06:46          CAPILLARY BLOOD GLUCOSE  198 (26 Aug 2017 11:20)  151 (26 Aug 2017 07:42)  160 (25 Aug 2017 16:42)              Hemoglobin A1C, Whole Blood: 6.7 % (08-22 @ 08:02)  Sedimentation Rate, Erythrocyte: 40 mm/hr (08-21 @ 15:54)        Urine Culture:      Blood Culture:      RADIOLOGY & ADDITIONAL TESTS:    Imaging Personally Reviewed:  [ ] YES  [ ] NO    Consultant(s) Notes Reviewed:  [ ] YES  [ ] NO    Care Discussed with Consultants/Other Providers [ ] YES  [ ] NO    PROBLEMS:  CELLULITIS OF LEFT LOWER EXTREMITY  DRAINAGE ON LEFT SIDE SENT BY PMD  Cellulitis of left lower extremity  Cellulitis of left lower extremity  Type 2 diabetes mellitus with other circulatory complication  Hypertension  Abscess of hip, left  Essential hypertension      Care discussed with family,         [  ]   yes  [  ]  No    imp:    stable[ ]    unstable[  ]     improving [   ]       unchanged  [  ]                Plans:  Continue present plans  [  ]               New consult [  ]   specialty  .......               order joshua[  ]    test name.                  Discharge Planning  [  ]

## 2017-08-26 NOTE — PROGRESS NOTE ADULT - ASSESSMENT
67F hx of dm, htn and left skin granuloma s/p excision in april pw with continued redness and drainage of the left thigh. no fever, chills, nausea, vomiting. Dr Glass advised patient to present to ED  Started on Vancomycin and Zosyn. Will get ID consult. Surgery f/u. MRI noted, ID and Orthopedic consults noted. Pt. on nafcillin now. Wound culture pos. for Staph. Surgery decided to aspirate the joint at bed side. Discharge planning with IV Nafcillin for 6 weeks. Pt. to be discharge home with home care.  Pt. states that she is unable to administer antibiotics 2/2 limited use of her left hand. She would prefer to go to a nursing home to complete the course of antibiotic treatment.. Discharge planner aware of the situation, will send AYSHA to area [facility].

## 2017-08-27 LAB
CULTURE RESULTS: SIGNIFICANT CHANGE UP
CULTURE RESULTS: SIGNIFICANT CHANGE UP
SPECIMEN SOURCE: SIGNIFICANT CHANGE UP
SPECIMEN SOURCE: SIGNIFICANT CHANGE UP

## 2017-08-27 RX ORDER — HYDROCHLOROTHIAZIDE 25 MG
25 TABLET ORAL DAILY
Qty: 0 | Refills: 0 | Status: DISCONTINUED | OUTPATIENT
Start: 2017-08-27 | End: 2017-08-28

## 2017-08-27 RX ORDER — CHLORHEXIDINE GLUCONATE 213 G/1000ML
1 SOLUTION TOPICAL DAILY
Qty: 0 | Refills: 0 | Status: DISCONTINUED | OUTPATIENT
Start: 2017-08-27 | End: 2017-08-28

## 2017-08-27 RX ADMIN — AMLODIPINE BESYLATE 10 MILLIGRAM(S): 2.5 TABLET ORAL at 05:48

## 2017-08-27 RX ADMIN — NAFCILLIN 200 GRAM(S): 10 INJECTION, POWDER, FOR SOLUTION INTRAVENOUS at 13:10

## 2017-08-27 RX ADMIN — Medication 2: at 15:46

## 2017-08-27 RX ADMIN — NAFCILLIN 200 GRAM(S): 10 INJECTION, POWDER, FOR SOLUTION INTRAVENOUS at 05:46

## 2017-08-27 RX ADMIN — Medication 50 MILLIGRAM(S): at 05:48

## 2017-08-27 RX ADMIN — Medication 4: at 11:12

## 2017-08-27 RX ADMIN — NAFCILLIN 200 GRAM(S): 10 INJECTION, POWDER, FOR SOLUTION INTRAVENOUS at 09:38

## 2017-08-27 RX ADMIN — Medication 50 MILLIGRAM(S): at 17:13

## 2017-08-27 RX ADMIN — NAFCILLIN 200 GRAM(S): 10 INJECTION, POWDER, FOR SOLUTION INTRAVENOUS at 21:50

## 2017-08-27 RX ADMIN — NAFCILLIN 200 GRAM(S): 10 INJECTION, POWDER, FOR SOLUTION INTRAVENOUS at 02:11

## 2017-08-27 RX ADMIN — NAFCILLIN 200 GRAM(S): 10 INJECTION, POWDER, FOR SOLUTION INTRAVENOUS at 17:13

## 2017-08-27 RX ADMIN — ENOXAPARIN SODIUM 40 MILLIGRAM(S): 100 INJECTION SUBCUTANEOUS at 17:13

## 2017-08-27 NOTE — PROGRESS NOTE ADULT - SUBJECTIVE AND OBJECTIVE BOX
Patient seen and examined bedside resting comfortably.  No complaints offered.  Reports left hip started draining again today. Also reports worsened discomfort at hip.  No F/c.    T(F): 98.5 (08-27-17 @ 17:32), Max: 98.5 (08-27-17 @ 17:32)  HR: 63 (08-27-17 @ 17:32) (61 - 67)  BP: 176/85 (08-27-17 @ 17:32) (143/71 - 176/85)  RR: 17 (08-27-17 @ 17:32) (16 - 17)  SpO2: 98% (08-27-17 @ 17:32) (98% - 100%)  Wt(kg): --  CAPILLARY BLOOD GLUCOSE  165 (27 Aug 2017 15:46)  226 (27 Aug 2017 11:11)  148 (27 Aug 2017 07:40)  143 (26 Aug 2017 21:33)      PHYSICAL EXAM:  General: NAD, WDWN  Neuro:  Alert & oriented x 3  HEENT: NCAT, EOMI, conjunctiva clear  CV: +S1+S2 regular rate and rhythm  Lung: clear to ausculation bilaterally, respirations nonlabored, good inspiratory effort  Abdomen: soft, NTND. Normoactive BS  Extremities: no pedal edema or calf tenderness noted   Left hip with mild tenderness and palpable fluid collection. Skin with no erythema, induration or discrete abscess.   Scant serous drainage noted from small point hyperpigmented area on skin which is the previous site of sinus tract  Dry dressing applied.    Culture - Body Fluid with Gram Stain (08.26.17 @ 02:42)    Gram Stain:   No polymorphonuclear cells seen  No organisms seen  by cytocentrifuge    Specimen Source: .Body Fluid wound left hip    Culture Results:   No growth      Impression/Plan:  67F PMH DM, HTN with h/o deep tissue injury requiring skin graft of left leg with chronic drainage from left hip now with collection   -f/u final culture results. Preliminary body fluid culture with no growth  -cont present medical management and abx per ID.  -local dry dressing to hip. Will discuss with Dr Moore for further recommendations

## 2017-08-27 NOTE — PROGRESS NOTE ADULT - ASSESSMENT
67F hx of dm, htn and left skin granuloma s/p excision in april pw with continued redness and drainage of the left thigh. no fever, chills, nausea, vomiting. Dr Glass advised patient to present to ED  Started on Vancomycin and Zosyn. Will get ID consult. Surgery f/u. MRI noted, ID and Orthopedic consults noted. Pt. on nafcillin now. Wound culture pos. for Staph. Surgery decided to aspirate the joint at bed side. Discharge planning with IV Nafcillin for 6 weeks. Pt. to be discharge home with home care.  Pt. states that she is unable to administer antibiotics 2/2 limited use of her left hand. She would prefer to go to a nursing home to complete the course of antibiotic treatment.. Discharge planner aware of the situation, will send AYSHA to area homes. discharge to [facility] when the bed is available.

## 2017-08-27 NOTE — PROGRESS NOTE ADULT - SUBJECTIVE AND OBJECTIVE BOX
Patient is a 67y old  Female who presents with a chief complaint of Abscess left hip. (25 Aug 2017 13:48)      INTERVAL HPI/OVERNIGHT EVENTS: asymptomatic    MEDICATIONS  (STANDING):  metoprolol 50 milliGRAM(s) Oral two times a day  amLODIPine   Tablet 10 milliGRAM(s) Oral daily  enoxaparin Injectable 40 milliGRAM(s) SubCutaneous every 24 hours  insulin lispro (HumaLOG) corrective regimen sliding scale   SubCutaneous three times a day before meals  dextrose 5%. 1000 milliLiter(s) (50 mL/Hr) IV Continuous <Continuous>  dextrose 50% Injectable 12.5 Gram(s) IV Push once  dextrose 50% Injectable 25 Gram(s) IV Push once  nafcillin  IVPB   IV Intermittent   nafcillin  IVPB 2 Gram(s) IV Intermittent every 4 hours    MEDICATIONS  (PRN):  acetaminophen   Tablet 650 milliGRAM(s) Oral every 6 hours PRN For Temp greater than 38 C (100.4 F) and headache  dextrose Gel 1 Dose(s) Oral once PRN Blood Glucose LESS THAN 70 milliGRAM(s)/deciliter  glucagon  Injectable 1 milliGRAM(s) IntraMuscular once PRN Glucose LESS THAN 70 milligrams/deciliter  acetaminophen   Tablet. 650 milliGRAM(s) Oral every 6 hours PRN Mild Pain (1 - 3)      Allergies    No Known Allergies    Intolerances        REVIEW OF SYSTEMS:  CONSTITUTIONAL: No fever, weight loss, or fatigue  EYES: No eye pain, visual disturbances, or discharge  ENMT:  No difficulty hearing, tinnitus, vertigo; No sinus or throat pain  NECK: No pain or stiffness  BREASTS: No pain, masses, or nipple discharge  RESPIRATORY: No cough, wheezing, chills or hemoptysis; No shortness of breath  CARDIOVASCULAR: No chest pain, palpitations, dizziness, or leg swelling  GASTROINTESTINAL: No abdominal or epigastric pain. No nausea, vomiting, or hematemesis; No diarrhea or constipation. No melena or hematochezia.  GENITOURINARY: No dysuria, frequency, hematuria, or incontinence  NEUROLOGICAL: No headaches, memory loss, loss of strength, numbness, or tremors  SKIN: No itching, burning, rashes, or lesions   LYMPH NODES: No enlarged glands  ENDOCRINE: No heat or cold intolerance; No hair loss  MUSCULOSKELETAL: No joint pain or swelling; No muscle, back, or extremity pain. limited use of left hand.  PSYCHIATRIC: No depression, anxiety, mood swings, or difficulty sleeping  HEME/LYMPH: No easy bruising, or bleeding gums  ALLERGY AND IMMUNOLOGIC: No hives or eczema    Vital Signs Last 24 Hrs  T(C): 36.2 (27 Aug 2017 06:37), Max: 36.9 (26 Aug 2017 17:44)  T(F): 97.2 (27 Aug 2017 06:37), Max: 98.5 (26 Aug 2017 17:44)  HR: 61 (27 Aug 2017 06:37) (61 - 79)  BP: 143/71 (27 Aug 2017 06:37) (143/71 - 167/74)  BP(mean): --  RR: 16 (27 Aug 2017 06:37) (16 - 18)  SpO2: 100% (27 Aug 2017 06:37) (98% - 100%)    PHYSICAL EXAM:  GENERAL: NAD,  well-developed  HEAD:  Atraumatic, Normocephalic  EYES: EOMI, LEIGH, conjunctiva and sclera clear  ENMT:  Moist mucous membranes, Good dentition, No lesions  NECK: Supple, No JVD, Normal thyroid  NERVOUS SYSTEM:  Alert & Oriented X3, Good concentration; Motor Strength 5/5 B/L upper and lower extremities; DTRs 2+ intact and symmetric  CHEST/LUNG: Clear to percussion bilaterally; No rales, rhonchi, wheezing, or rubs  HEART: Regular rate and rhythm; No murmurs, rubs, or gallops  ABDOMEN: Soft, Nontender, Nondistended; Bowel sounds present  EXTREMITIES:  2+ Peripheral Pulses, No clubbing, cyanosis, or edema. Left hand with old injury.  LYMPH: No lymphadenopathy noted  SKIN: No rashes or lesions    LABS:              CAPILLARY BLOOD GLUCOSE  148 (27 Aug 2017 07:40)  143 (26 Aug 2017 21:33)  129 (26 Aug 2017 15:56)  198 (26 Aug 2017 11:20)              Hemoglobin A1C, Whole Blood: 6.7 % (08-22 @ 08:02)  Sedimentation Rate, Erythrocyte: 40 mm/hr (08-21 @ 15:54)        Urine Culture:      Blood Culture:      RADIOLOGY & ADDITIONAL TESTS:  < from: MRI Pelvis w/wo Cont (08.11.17 @ 11:18) >    EXAM:  MRI PELVIS W WO CONT                            PROCEDURE DATE:  08/11/2017          INTERPRETATION:  MRI of the bony pelvis.    CLINICAL INDICATION: Draining sinus tract.    TECHNIQUE: Multiplanar, multi sequential MRI of the bony pelvis was   performed both before and after the uncomplicated intravenous   administration of 10 cc gadolinium based contrast material.    FINDINGS:    There is a large thick walled fluid collection collection along the   anterolateral lateral soft tissues about the left hip, surrounding the   left greater trochanter with a thick rind of peripheral enhancement and   surrounding inflammatory change. This measures approximately 7.5 x 11.7 x   9.3 cm in maximal transverse by craniocaudal by anteroposterior   dimension. This appears to extend to the skin surface where there is a   thin draining sinus tract. This is highly suspicious for an area of   abscess formation and/or infected greater trochanteric bursitis end of   fluid also appears to communicate with the anterior aspect of the right   hip joint through a defect in the anterior joint capsule. There are also   several droplets of gas within the fluid collection.    The left gluteus minimus tendon appears stripped and eroded from its   expected attachment of the anterior facet of the left greater trochanter.   The left gluteus medius medius tendon is also partially detached.     There is minimal bone marrow edema pattern in the posterior aspect of the   left greater trochanter without evidence of T1 signal alteration or   osseous enhancement likely reflecting a mild reactive osteitis. There is   no definite evidence for osteomyelitis in the left hip, right hip and   throughout the bony pelvis. There is no fracture or osteonecrosis   throughout the pelvis.    Within the pelvis there is a small amount of free fluid. The uterus is   enlarged leiomyomatous there is a large peripherally enhancing cystic   structure in the central/left hemipelvis measuring 5 cm which may reflect   cystic degeneration of a fibroid versus a left sided ovarian cyst. If   clinically warranted consider ultrasound for further evaluation.    IMPRESSION:    Large soft tissue fluid collection about the anterior lateral aspects of   the left hip with a thick rind of peripheral and rim enhancement and   surrounding reactive inflammatory change. This is highly suspicious for   abscess formation and/or infected left greater trochanteric bursitis. The   collection extends to the skin surface via a thin sinus tract and also   appears to communicate with the anterior aspect of the right hip joint.    Large peripherally enhancing cystic structure in the pelvis which may   reflect a fibroid with cystic degeneration or ovarian cyst. Dedicated   pelvic ultrasound can be performed as clinically directed.                CHARLI CHRISTOPHER M.D., ATTENDING RADIOLOGIST  This document has been electronically signed. Aug 14 2017  9:01AM                < end of copied text >    Imaging Personally Reviewed:  [ ] YES  [ ] NO    Consultant(s) Notes Reviewed:  [x ] YES  [ ] NO    Care Discussed with Consultants/Other Providers [ ] YES  [ ] NO    PROBLEMS:  CELLULITIS OF LEFT LOWER EXTREMITY  DRAINAGE ON LEFT SIDE SENT BY PMD  Cellulitis of left lower extremity  Cellulitis of left lower extremity  Type 2 diabetes mellitus with other circulatory complication  Hypertension  Abscess of hip, left  Essential hypertension      Care discussed with family,         [  ]   yes  [  ]  No    imp:    stable[ ]    unstable[  ]     improving [   ]       unchanged  [  ]                Plans:  Continue present plans  [  ]               New consult [  ]   specialty  .......               order joshua[  ]    test name.                  Discharge Planning  [  ]

## 2017-08-28 VITALS
TEMPERATURE: 98 F | RESPIRATION RATE: 18 BRPM | SYSTOLIC BLOOD PRESSURE: 153 MMHG | DIASTOLIC BLOOD PRESSURE: 80 MMHG | OXYGEN SATURATION: 98 % | HEART RATE: 65 BPM

## 2017-08-28 LAB
ANION GAP SERPL CALC-SCNC: 10 MMOL/L — SIGNIFICANT CHANGE UP (ref 5–17)
ANION GAP SERPL CALC-SCNC: 7 MMOL/L — SIGNIFICANT CHANGE UP (ref 5–17)
BUN SERPL-MCNC: 13 MG/DL — SIGNIFICANT CHANGE UP (ref 7–23)
BUN SERPL-MCNC: 16 MG/DL — SIGNIFICANT CHANGE UP (ref 7–23)
CALCIUM SERPL-MCNC: 8.7 MG/DL — SIGNIFICANT CHANGE UP (ref 8.5–10.1)
CALCIUM SERPL-MCNC: 8.8 MG/DL — SIGNIFICANT CHANGE UP (ref 8.5–10.1)
CHLORIDE SERPL-SCNC: 102 MMOL/L — SIGNIFICANT CHANGE UP (ref 96–108)
CHLORIDE SERPL-SCNC: 105 MMOL/L — SIGNIFICANT CHANGE UP (ref 96–108)
CO2 SERPL-SCNC: 29 MMOL/L — SIGNIFICANT CHANGE UP (ref 22–31)
CO2 SERPL-SCNC: 31 MMOL/L — SIGNIFICANT CHANGE UP (ref 22–31)
CREAT SERPL-MCNC: 0.83 MG/DL — SIGNIFICANT CHANGE UP (ref 0.5–1.3)
CREAT SERPL-MCNC: 1.11 MG/DL — SIGNIFICANT CHANGE UP (ref 0.5–1.3)
GLUCOSE SERPL-MCNC: 143 MG/DL — HIGH (ref 70–99)
GLUCOSE SERPL-MCNC: 225 MG/DL — HIGH (ref 70–99)
HCT VFR BLD CALC: 34.6 % — SIGNIFICANT CHANGE UP (ref 34.5–45)
HGB BLD-MCNC: 11 G/DL — LOW (ref 11.5–15.5)
MCHC RBC-ENTMCNC: 29.4 PG — SIGNIFICANT CHANGE UP (ref 27–34)
MCHC RBC-ENTMCNC: 31.7 GM/DL — LOW (ref 32–36)
MCV RBC AUTO: 92.7 FL — SIGNIFICANT CHANGE UP (ref 80–100)
PLATELET # BLD AUTO: 266 K/UL — SIGNIFICANT CHANGE UP (ref 150–400)
POTASSIUM SERPL-MCNC: 3.1 MMOL/L — LOW (ref 3.5–5.3)
POTASSIUM SERPL-MCNC: 3.4 MMOL/L — LOW (ref 3.5–5.3)
POTASSIUM SERPL-MCNC: 3.4 MMOL/L — LOW (ref 3.5–5.3)
POTASSIUM SERPL-SCNC: 3.1 MMOL/L — LOW (ref 3.5–5.3)
POTASSIUM SERPL-SCNC: 3.4 MMOL/L — LOW (ref 3.5–5.3)
POTASSIUM SERPL-SCNC: 3.4 MMOL/L — LOW (ref 3.5–5.3)
RBC # BLD: 3.73 M/UL — LOW (ref 3.8–5.2)
RBC # FLD: 13.6 % — SIGNIFICANT CHANGE UP (ref 11–15)
SODIUM SERPL-SCNC: 140 MMOL/L — SIGNIFICANT CHANGE UP (ref 135–145)
SODIUM SERPL-SCNC: 144 MMOL/L — SIGNIFICANT CHANGE UP (ref 135–145)
WBC # BLD: 5.7 K/UL — SIGNIFICANT CHANGE UP (ref 3.8–10.5)
WBC # FLD AUTO: 5.7 K/UL — SIGNIFICANT CHANGE UP (ref 3.8–10.5)

## 2017-08-28 RX ORDER — POTASSIUM CHLORIDE 20 MEQ
10 PACKET (EA) ORAL
Qty: 0 | Refills: 0 | Status: COMPLETED | OUTPATIENT
Start: 2017-08-28 | End: 2017-08-28

## 2017-08-28 RX ORDER — MOXIFLOXACIN HYDROCHLORIDE TABLETS, 400 MG 400 MG/1
1 TABLET, FILM COATED ORAL
Qty: 72 | Refills: 0 | OUTPATIENT
Start: 2017-08-28 | End: 2017-10-03

## 2017-08-28 RX ORDER — POTASSIUM CHLORIDE 20 MEQ
20 PACKET (EA) ORAL ONCE
Qty: 0 | Refills: 0 | Status: DISCONTINUED | OUTPATIENT
Start: 2017-08-28 | End: 2017-08-28

## 2017-08-28 RX ORDER — POTASSIUM CHLORIDE 20 MEQ
40 PACKET (EA) ORAL ONCE
Qty: 0 | Refills: 0 | Status: COMPLETED | OUTPATIENT
Start: 2017-08-28 | End: 2017-08-28

## 2017-08-28 RX ADMIN — Medication 100 MILLIEQUIVALENT(S): at 12:29

## 2017-08-28 RX ADMIN — Medication 2: at 08:38

## 2017-08-28 RX ADMIN — Medication 25 MILLIGRAM(S): at 00:07

## 2017-08-28 RX ADMIN — CHLORHEXIDINE GLUCONATE 1 APPLICATION(S): 213 SOLUTION TOPICAL at 11:30

## 2017-08-28 RX ADMIN — AMLODIPINE BESYLATE 10 MILLIGRAM(S): 2.5 TABLET ORAL at 06:06

## 2017-08-28 RX ADMIN — NAFCILLIN 200 GRAM(S): 10 INJECTION, POWDER, FOR SOLUTION INTRAVENOUS at 14:31

## 2017-08-28 RX ADMIN — Medication 40 MILLIEQUIVALENT(S): at 08:41

## 2017-08-28 RX ADMIN — Medication 25 MILLIGRAM(S): at 06:05

## 2017-08-28 RX ADMIN — Medication 50 MILLIGRAM(S): at 06:06

## 2017-08-28 RX ADMIN — NAFCILLIN 200 GRAM(S): 10 INJECTION, POWDER, FOR SOLUTION INTRAVENOUS at 11:42

## 2017-08-28 RX ADMIN — NAFCILLIN 200 GRAM(S): 10 INJECTION, POWDER, FOR SOLUTION INTRAVENOUS at 06:05

## 2017-08-28 RX ADMIN — NAFCILLIN 200 GRAM(S): 10 INJECTION, POWDER, FOR SOLUTION INTRAVENOUS at 02:36

## 2017-08-28 RX ADMIN — Medication 4: at 11:41

## 2017-08-28 RX ADMIN — Medication 100 MILLIEQUIVALENT(S): at 11:42

## 2017-08-28 NOTE — PROGRESS NOTE ADULT - PROBLEM SELECTOR PROBLEM 2
Abscess of hip, left

## 2017-08-28 NOTE — DIETITIAN INITIAL EVALUATION ADULT. - PERTINENT LABORATORY DATA
08-28 Na n/a   Glu n/a   K+ 3.4 mmol/L<L> Cr  n/a   BUN n/a   Phos n/a   Alb n/a   PAB n/a   Hgb 11  Hct 34.6(8/28), [id number]=5.7%(8/22), Alb=3.5(8/21)

## 2017-08-28 NOTE — PROGRESS NOTE ADULT - PROBLEM SELECTOR PLAN 2
IV antibiotics

## 2017-08-28 NOTE — DIETITIAN INITIAL EVALUATION ADULT. - OTHER INFO
Pt seen for LOS.  Pt does food shopping on line with [employer] & HHA helps with cooking PTA.  Pt manages DM type 2 with Metformin & Glimepiride &  RN  checks BG daily. Pt reports last BM x 1(8/28) & presents with good appetite & po intake 100% consumed @ meals.

## 2017-08-28 NOTE — PROGRESS NOTE ADULT - PROBLEM SELECTOR PROBLEM 4
Type 2 diabetes mellitus with other circulatory complication

## 2017-08-28 NOTE — PROGRESS NOTE ADULT - ASSESSMENT
infected large collection around hip   continue antibiotics   ortho noted   await culture done now   continue nafcillin for now   will add po cipro   all culture now ne   methicillin sensitive staph aureus in [place] infected large collection around hip   continue antibiotics   ortho noted   await culture done now   continue nafcillin for now   will add po cipro   all culture now ne   methicillin sensitive staph aureus in june from a sx culture   all diff organisms org since jan   spoke to [facility] to keep culture longer ; they will keep 21 days  repeat mri pelvis in 6 weeks i e sept 28 th ; last aug 14

## 2017-08-28 NOTE — PROGRESS NOTE ADULT - PROVIDER SPECIALTY LIST ADULT
Internal Medicine
Orthopedics
Surgery
Vascular Surgery
Infectious Disease

## 2017-08-28 NOTE — PROGRESS NOTE ADULT - ASSESSMENT
67F hx of dm, htn and left skin granuloma s/p excision in april pw with continued redness and drainage of the left thigh. no fever, chills, nausea, vomiting. Dr Glass advised patient to present to ED  Started on Vancomycin and Zosyn. Will get ID consult. Surgery f/u. MRI noted, ID and Orthopedic consults noted. Pt. on nafcillin now. Wound culture pos. for Staph. Surgery decided to aspirate the joint at bed side. Discharge planning with IV Nafcillin for 6 weeks. Pt. to be discharge home with home care.  Pt. states that she is unable to administer antibiotics 2/2 limited use of her left hand. She would prefer to go to a nursing home to complete the course of antibiotic treatment.. Discharge planner aware of the situation, will send AYSHA to area homes. For discharge to [facility] today.

## 2017-08-28 NOTE — PROGRESS NOTE ADULT - SUBJECTIVE AND OBJECTIVE BOX
asked to clear for discharge   67F hx of dm, htn and left skin granuloma s/p excision in april pw with continued redness and drainage of the left thigh. no fever, chills, nausea, vomiting. Dr Glass advised patient to present to ED   ortho eval noted  seen by sx and ortho   no intervention        Allergies    No Known Allergies    Intolerances        MEDICATIONS  (STANDING):  metoprolol 50 milliGRAM(s) Oral two times a day  amLODIPine   Tablet 10 milliGRAM(s) Oral daily  enoxaparin Injectable 40 milliGRAM(s) SubCutaneous every 24 hours  insulin lispro (HumaLOG) corrective regimen sliding scale   SubCutaneous three times a day before meals  dextrose 5%. 1000 milliLiter(s) (50 mL/Hr) IV Continuous <Continuous>  dextrose 50% Injectable 12.5 Gram(s) IV Push once  dextrose 50% Injectable 25 Gram(s) IV Push once  nafcillin  IVPB   IV Intermittent   nafcillin  IVPB 2 Gram(s) IV Intermittent every 4 hours  chlorhexidine 2% Cloths 1 Application(s) Topical daily  hydrochlorothiazide 25 milliGRAM(s) Oral daily    MEDICATIONS  (PRN):  acetaminophen   Tablet 650 milliGRAM(s) Oral every 6 hours PRN For Temp greater than 38 C (100.4 F) and headache  dextrose Gel 1 Dose(s) Oral once PRN Blood Glucose LESS THAN 70 milliGRAM(s)/deciliter  glucagon  Injectable 1 milliGRAM(s) IntraMuscular once PRN Glucose LESS THAN 70 milligrams/deciliter  acetaminophen   Tablet. 650 milliGRAM(s) Oral every 6 hours PRN Mild Pain (1 - 3)      REVIEW OF SYSTEMS:  feels 50% better vs march as far as the hip is concerned   CONSTITUTIONAL: No fever, chills, weight loss, or fatigue  HEENT: No sore throat, runny nose, ear ache  RESPIRATORY: No cough, wheezing, No shortness of breath  CARDIOVASCULAR: No chest pain, palpitations, dizziness  GASTROINTESTINAL: No abdominal pain. No nausea, vomiting, diarrhea  GENITOURINARY: No dysuria, increase frequency, hematuria, or incontinence  NEUROLOGICAL: No headaches, memory loss, loss of strength, numbness, or tremors, no weakness  EXTREMITY: little drainage from hip plus   SKIN: No itching, burning, rashes, or lesions     VITAL SIGNS:  T(C): 36.6 (08-28-17 @ 14:00), Max: 36.9 (08-27-17 @ 17:32)  T(F): 97.8 (08-28-17 @ 14:00), Max: 98.5 (08-27-17 @ 17:32)  HR: 65 (08-28-17 @ 14:00) (58 - 65)  BP: 153/80 (08-28-17 @ 14:00) (153/80 - 184/74)  RR: 18 (08-28-17 @ 14:00) (16 - 18)  SpO2: 98% (08-28-17 @ 14:00) (98% - 100%)  Wt(kg): --    PHYSICAL EXAM:    GENERAL: not in any distress  HEENT: Neck is supple, normocephalic, atraumatic   CHEST/LUNG: Clear to percussion bilaterally; No rales, rhonchi, wheezing  HEART: Regular rate and rhythm; No murmurs, rubs, or gallops  ABDOMEN: Soft, Nontender, Nondistended; Bowel sounds present, no rebound   EXTREMITIES:  2+ Peripheral Pulses, No clubbing, cyanosis,   left hip no change in status   SKIN: No rashes or lesions  BACK: no pressor sore   NERVOUS SYSTEM:  Alert & Oriented X3, Good concentration  PSYCH: normal affect     LABS:                         11.0   5.7   )-----------( 266      ( 28 Aug 2017 06:38 )             34.6     08-28    140  |  102  |  16  ----------------------------<  225<H>  3.4<L>   |  31  |  1.11    Ca    8.7      28 Aug 2017 12:34                                              Radiology:

## 2017-08-28 NOTE — PROGRESS NOTE ADULT - SUBJECTIVE AND OBJECTIVE BOX
Patient is a 67y old  Female who presents with a chief complaint of Abscess left hip. (25 Aug 2017 13:48)      INTERVAL HPI/OVERNIGHT EVENTS:    MEDICATIONS  (STANDING):  metoprolol 50 milliGRAM(s) Oral two times a day  amLODIPine   Tablet 10 milliGRAM(s) Oral daily  enoxaparin Injectable 40 milliGRAM(s) SubCutaneous every 24 hours  insulin lispro (HumaLOG) corrective regimen sliding scale   SubCutaneous three times a day before meals  dextrose 5%. 1000 milliLiter(s) (50 mL/Hr) IV Continuous <Continuous>  dextrose 50% Injectable 12.5 Gram(s) IV Push once  dextrose 50% Injectable 25 Gram(s) IV Push once  nafcillin  IVPB   IV Intermittent   nafcillin  IVPB 2 Gram(s) IV Intermittent every 4 hours  chlorhexidine 2% Cloths 1 Application(s) Topical daily  hydrochlorothiazide 25 milliGRAM(s) Oral daily    MEDICATIONS  (PRN):  acetaminophen   Tablet 650 milliGRAM(s) Oral every 6 hours PRN For Temp greater than 38 C (100.4 F) and headache  dextrose Gel 1 Dose(s) Oral once PRN Blood Glucose LESS THAN 70 milliGRAM(s)/deciliter  glucagon  Injectable 1 milliGRAM(s) IntraMuscular once PRN Glucose LESS THAN 70 milligrams/deciliter  acetaminophen   Tablet. 650 milliGRAM(s) Oral every 6 hours PRN Mild Pain (1 - 3)      Allergies    No Known Allergies    Intolerances        REVIEW OF SYSTEMS:  CONSTITUTIONAL: No fever, weight loss, or fatigue  EYES: No eye pain, visual disturbances, or discharge  ENMT:  No difficulty hearing, tinnitus, vertigo; No sinus or throat pain  NECK: No pain or stiffness  BREASTS: No pain, masses, or nipple discharge  RESPIRATORY: No cough, wheezing, chills or hemoptysis; No shortness of breath  CARDIOVASCULAR: No chest pain, palpitations, dizziness, or leg swelling  GASTROINTESTINAL: No abdominal or epigastric pain. No nausea, vomiting, or hematemesis; No diarrhea or constipation. No melena or hematochezia.  GENITOURINARY: No dysuria, frequency, hematuria, or incontinence  NEUROLOGICAL: No headaches, memory loss, loss of strength, numbness, or tremors  SKIN: No itching, burning, rashes, or lesions   LYMPH NODES: No enlarged glands  ENDOCRINE: No heat or cold intolerance; No hair loss  MUSCULOSKELETAL: No joint pain or swelling; No muscle, back, or extremity pain  PSYCHIATRIC: No depression, anxiety, mood swings, or difficulty sleeping  HEME/LYMPH: No easy bruising, or bleeding gums  ALLERGY AND IMMUNOLOGIC: No hives or eczema    Vital Signs Last 24 Hrs  T(C): 36.3 (28 Aug 2017 05:40), Max: 36.9 (27 Aug 2017 17:32)  T(F): 97.4 (28 Aug 2017 05:40), Max: 98.5 (27 Aug 2017 17:32)  HR: 59 (28 Aug 2017 05:40) (58 - 65)  BP: 163/74 (28 Aug 2017 05:40) (147/70 - 184/74)  BP(mean): --  RR: 16 (28 Aug 2017 05:40) (16 - 17)  SpO2: 100% (28 Aug 2017 05:40) (98% - 100%)    PHYSICAL EXAM:  GENERAL: NAD, well-groomed, well-developed  HEAD:  Atraumatic, Normocephalic  EYES: EOMI, PERRLA, conjunctiva and sclera clear  ENMT:  Moist mucous membranes, Good dentition, No lesions  NECK: Supple, No JVD, Normal thyroid  NERVOUS SYSTEM:  Alert & Oriented X3, Good concentration; Motor Strength 5/5 B/L upper and lower extremities; DTRs 2+ intact and symmetric  CHEST/LUNG: Clear to percussion bilaterally; No rales, rhonchi, wheezing, or rubs  HEART: Regular rate and rhythm; No murmurs, rubs, or gallops  ABDOMEN: Soft, Nontender, Nondistended; Bowel sounds present  EXTREMITIES:  2+ Peripheral Pulses, No clubbing, cyanosis, or edema  LYMPH: No lymphadenopathy noted  SKIN: No rashes or lesions    LABS:                        11.0   5.7   )-----------( 266      ( 28 Aug 2017 06:38 )             34.6     08-28    x   |  x   |  x   ----------------------------<  x   3.4<L>   |  x   |  x     Ca    8.8      28 Aug 2017 06:38          CAPILLARY BLOOD GLUCOSE  214 (28 Aug 2017 11:38)  167 (28 Aug 2017 08:35)  135 (27 Aug 2017 21:24)  165 (27 Aug 2017 15:46)              Hemoglobin A1C, Whole Blood: 6.7 % (08-22 @ 08:02)  Sedimentation Rate, Erythrocyte: 40 mm/hr (08-21 @ 15:54)        Urine Culture:      Blood Culture:      RADIOLOGY & ADDITIONAL TESTS:    Imaging Personally Reviewed:  [ ] YES  [ ] NO    Consultant(s) Notes Reviewed:  [ ] YES  [ ] NO    Care Discussed with Consultants/Other Providers [ ] YES  [ ] NO    PROBLEMS:  CELLULITIS OF LEFT LOWER EXTREMITY  DRAINAGE ON LEFT SIDE SENT BY PMD  Cellulitis of left lower extremity  Cellulitis of left lower extremity  Type 2 diabetes mellitus with other circulatory complication  Hypertension  Abscess of hip, left  Essential hypertension      Care discussed with family,         [  ]   yes  [  ]  No    imp:    stable[ ]    unstable[  ]     improving [   ]       unchanged  [  ]                Plans:  Continue present plans  [  ]               New consult [  ]   specialty  .......               order joshua[  ]    test name.                  Discharge Planning  [  ]

## 2017-08-28 NOTE — PROGRESS NOTE ADULT - PROBLEM SELECTOR PLAN 4
Monitor Blood sugar.

## 2017-08-30 DIAGNOSIS — L02.416 CUTANEOUS ABSCESS OF LEFT LOWER LIMB: ICD-10-CM

## 2017-08-30 DIAGNOSIS — K21.9 GASTRO-ESOPHAGEAL REFLUX DISEASE WITHOUT ESOPHAGITIS: ICD-10-CM

## 2017-08-30 DIAGNOSIS — E11.59 TYPE 2 DIABETES MELLITUS WITH OTHER CIRCULATORY COMPLICATIONS: ICD-10-CM

## 2017-08-30 DIAGNOSIS — B95.61 METHICILLIN SUSCEPTIBLE STAPHYLOCOCCUS AUREUS INFECTION AS THE CAUSE OF DISEASES CLASSIFIED ELSEWHERE: ICD-10-CM

## 2017-08-30 DIAGNOSIS — Z79.84 LONG TERM (CURRENT) USE OF ORAL HYPOGLYCEMIC DRUGS: ICD-10-CM

## 2017-08-30 DIAGNOSIS — L03.116 CELLULITIS OF LEFT LOWER LIMB: ICD-10-CM

## 2017-08-30 DIAGNOSIS — I10 ESSENTIAL (PRIMARY) HYPERTENSION: ICD-10-CM

## 2017-08-30 DIAGNOSIS — Z82.49 FAMILY HISTORY OF ISCHEMIC HEART DISEASE AND OTHER DISEASES OF THE CIRCULATORY SYSTEM: ICD-10-CM

## 2017-08-30 DIAGNOSIS — Z83.3 FAMILY HISTORY OF DIABETES MELLITUS: ICD-10-CM

## 2017-09-15 LAB
CULTURE RESULTS: SIGNIFICANT CHANGE UP
GRAM STN FLD: SIGNIFICANT CHANGE UP
SPECIMEN SOURCE: SIGNIFICANT CHANGE UP

## 2017-09-17 ENCOUNTER — OUTPATIENT (OUTPATIENT)
Dept: OUTPATIENT SERVICES | Facility: HOSPITAL | Age: 67
LOS: 1 days | Discharge: HOME | End: 2017-09-17
Payer: COMMERCIAL

## 2017-09-17 DIAGNOSIS — Z98.890 OTHER SPECIFIED POSTPROCEDURAL STATES: Chronic | ICD-10-CM

## 2017-09-17 DIAGNOSIS — L02.415 CUTANEOUS ABSCESS OF RIGHT LOWER LIMB: ICD-10-CM

## 2017-09-17 PROCEDURE — 73700 CT LOWER EXTREMITY W/O DYE: CPT | Mod: 26,LT

## 2017-09-18 ENCOUNTER — INPATIENT (INPATIENT)
Facility: HOSPITAL | Age: 67
LOS: 3 days | Discharge: NURSING, REHAB OR LONG-TERM CARE | End: 2017-09-22
Attending: INTERNAL MEDICINE | Admitting: INTERNAL MEDICINE
Payer: COMMERCIAL

## 2017-09-18 VITALS
TEMPERATURE: 98 F | HEIGHT: 65 IN | OXYGEN SATURATION: 97 % | WEIGHT: 167.99 LBS | SYSTOLIC BLOOD PRESSURE: 185 MMHG | DIASTOLIC BLOOD PRESSURE: 86 MMHG | HEART RATE: 71 BPM | RESPIRATION RATE: 16 BRPM

## 2017-09-18 DIAGNOSIS — Z98.890 OTHER SPECIFIED POSTPROCEDURAL STATES: Chronic | ICD-10-CM

## 2017-09-18 LAB
ALBUMIN SERPL ELPH-MCNC: 2.9 G/DL — LOW (ref 3.3–5)
ALP SERPL-CCNC: 58 U/L — SIGNIFICANT CHANGE UP (ref 40–120)
ALT FLD-CCNC: 22 U/L — SIGNIFICANT CHANGE UP (ref 12–78)
ANION GAP SERPL CALC-SCNC: 10 MMOL/L — SIGNIFICANT CHANGE UP (ref 5–17)
APTT BLD: 33.8 SEC — SIGNIFICANT CHANGE UP (ref 27.5–37.4)
AST SERPL-CCNC: 32 U/L — SIGNIFICANT CHANGE UP (ref 15–37)
BASOPHILS # BLD AUTO: 0.1 K/UL — SIGNIFICANT CHANGE UP (ref 0–0.2)
BASOPHILS NFR BLD AUTO: 0.9 % — SIGNIFICANT CHANGE UP (ref 0–2)
BILIRUB SERPL-MCNC: 0.5 MG/DL — SIGNIFICANT CHANGE UP (ref 0.2–1.2)
BUN SERPL-MCNC: 21 MG/DL — SIGNIFICANT CHANGE UP (ref 7–23)
CALCIUM SERPL-MCNC: 8.8 MG/DL — SIGNIFICANT CHANGE UP (ref 8.5–10.1)
CHLORIDE SERPL-SCNC: 105 MMOL/L — SIGNIFICANT CHANGE UP (ref 96–108)
CO2 SERPL-SCNC: 27 MMOL/L — SIGNIFICANT CHANGE UP (ref 22–31)
CREAT SERPL-MCNC: 0.94 MG/DL — SIGNIFICANT CHANGE UP (ref 0.5–1.3)
EOSINOPHIL # BLD AUTO: 0.4 K/UL — SIGNIFICANT CHANGE UP (ref 0–0.5)
EOSINOPHIL NFR BLD AUTO: 6 % — SIGNIFICANT CHANGE UP (ref 0–6)
GLUCOSE SERPL-MCNC: 73 MG/DL — SIGNIFICANT CHANGE UP (ref 70–99)
HCT VFR BLD CALC: 35.5 % — SIGNIFICANT CHANGE UP (ref 34.5–45)
HGB BLD-MCNC: 11.6 G/DL — SIGNIFICANT CHANGE UP (ref 11.5–15.5)
INR BLD: 1.28 RATIO — HIGH (ref 0.88–1.16)
LACTATE SERPL-SCNC: 1.7 MMOL/L — SIGNIFICANT CHANGE UP (ref 0.7–2)
LYMPHOCYTES # BLD AUTO: 2 K/UL — SIGNIFICANT CHANGE UP (ref 1–3.3)
LYMPHOCYTES # BLD AUTO: 32.2 % — SIGNIFICANT CHANGE UP (ref 13–44)
MCHC RBC-ENTMCNC: 29.8 PG — SIGNIFICANT CHANGE UP (ref 27–34)
MCHC RBC-ENTMCNC: 32.5 GM/DL — SIGNIFICANT CHANGE UP (ref 32–36)
MCV RBC AUTO: 91.4 FL — SIGNIFICANT CHANGE UP (ref 80–100)
MONOCYTES # BLD AUTO: 0.4 K/UL — SIGNIFICANT CHANGE UP (ref 0–0.9)
MONOCYTES NFR BLD AUTO: 6.6 % — SIGNIFICANT CHANGE UP (ref 2–14)
NEUTROPHILS # BLD AUTO: 3.5 K/UL — SIGNIFICANT CHANGE UP (ref 1.8–7.4)
NEUTROPHILS NFR BLD AUTO: 54.3 % — SIGNIFICANT CHANGE UP (ref 43–77)
PLATELET # BLD AUTO: 344 K/UL — SIGNIFICANT CHANGE UP (ref 150–400)
POTASSIUM SERPL-MCNC: 3 MMOL/L — LOW (ref 3.5–5.3)
POTASSIUM SERPL-SCNC: 3 MMOL/L — LOW (ref 3.5–5.3)
PROT SERPL-MCNC: 8 GM/DL — SIGNIFICANT CHANGE UP (ref 6–8.3)
PROTHROM AB SERPL-ACNC: 14 SEC — HIGH (ref 9.8–12.7)
RBC # BLD: 3.89 M/UL — SIGNIFICANT CHANGE UP (ref 3.8–5.2)
RBC # FLD: 14.6 % — SIGNIFICANT CHANGE UP (ref 11–15)
SODIUM SERPL-SCNC: 142 MMOL/L — SIGNIFICANT CHANGE UP (ref 135–145)
WBC # BLD: 6.5 K/UL — SIGNIFICANT CHANGE UP (ref 3.8–10.5)
WBC # FLD AUTO: 6.5 K/UL — SIGNIFICANT CHANGE UP (ref 3.8–10.5)

## 2017-09-18 PROCEDURE — 71010: CPT | Mod: 26

## 2017-09-18 PROCEDURE — 99285 EMERGENCY DEPT VISIT HI MDM: CPT

## 2017-09-18 RX ORDER — METFORMIN HYDROCHLORIDE 850 MG/1
500 TABLET ORAL DAILY
Qty: 0 | Refills: 0 | Status: DISCONTINUED | OUTPATIENT
Start: 2017-09-18 | End: 2017-09-20

## 2017-09-18 RX ORDER — SODIUM CHLORIDE 9 MG/ML
1000 INJECTION, SOLUTION INTRAVENOUS
Qty: 0 | Refills: 0 | Status: DISCONTINUED | OUTPATIENT
Start: 2017-09-18 | End: 2017-09-20

## 2017-09-18 RX ORDER — HYDRALAZINE HCL 50 MG
50 TABLET ORAL EVERY 8 HOURS
Qty: 0 | Refills: 0 | Status: DISCONTINUED | OUTPATIENT
Start: 2017-09-18 | End: 2017-09-20

## 2017-09-18 RX ORDER — DEXTROSE 50 % IN WATER 50 %
1 SYRINGE (ML) INTRAVENOUS ONCE
Qty: 0 | Refills: 0 | Status: DISCONTINUED | OUTPATIENT
Start: 2017-09-18 | End: 2017-09-20

## 2017-09-18 RX ORDER — DEXTROSE 50 % IN WATER 50 %
12.5 SYRINGE (ML) INTRAVENOUS ONCE
Qty: 0 | Refills: 0 | Status: DISCONTINUED | OUTPATIENT
Start: 2017-09-18 | End: 2017-09-20

## 2017-09-18 RX ORDER — CIPROFLOXACIN LACTATE 400MG/40ML
500 VIAL (ML) INTRAVENOUS
Qty: 0 | Refills: 0 | Status: DISCONTINUED | OUTPATIENT
Start: 2017-09-18 | End: 2017-09-20

## 2017-09-18 RX ORDER — DEXTROSE 50 % IN WATER 50 %
25 SYRINGE (ML) INTRAVENOUS ONCE
Qty: 0 | Refills: 0 | Status: DISCONTINUED | OUTPATIENT
Start: 2017-09-18 | End: 2017-09-20

## 2017-09-18 RX ORDER — INSULIN LISPRO 100/ML
VIAL (ML) SUBCUTANEOUS
Qty: 0 | Refills: 0 | Status: DISCONTINUED | OUTPATIENT
Start: 2017-09-18 | End: 2017-09-20

## 2017-09-18 RX ORDER — GLUCAGON INJECTION, SOLUTION 0.5 MG/.1ML
1 INJECTION, SOLUTION SUBCUTANEOUS ONCE
Qty: 0 | Refills: 0 | Status: DISCONTINUED | OUTPATIENT
Start: 2017-09-18 | End: 2017-09-20

## 2017-09-18 RX ORDER — ENOXAPARIN SODIUM 100 MG/ML
40 INJECTION SUBCUTANEOUS EVERY 24 HOURS
Qty: 0 | Refills: 0 | Status: DISCONTINUED | OUTPATIENT
Start: 2017-09-18 | End: 2017-09-20

## 2017-09-18 RX ORDER — AMLODIPINE BESYLATE 2.5 MG/1
10 TABLET ORAL DAILY
Qty: 0 | Refills: 0 | Status: DISCONTINUED | OUTPATIENT
Start: 2017-09-18 | End: 2017-09-20

## 2017-09-18 RX ORDER — NAFCILLIN 10 G/100ML
2 INJECTION, POWDER, FOR SOLUTION INTRAVENOUS EVERY 4 HOURS
Qty: 0 | Refills: 0 | Status: DISCONTINUED | OUTPATIENT
Start: 2017-09-18 | End: 2017-09-20

## 2017-09-18 RX ORDER — ACETAMINOPHEN 500 MG
650 TABLET ORAL EVERY 6 HOURS
Qty: 0 | Refills: 0 | Status: DISCONTINUED | OUTPATIENT
Start: 2017-09-18 | End: 2017-09-20

## 2017-09-18 RX ORDER — METOPROLOL TARTRATE 50 MG
50 TABLET ORAL
Qty: 0 | Refills: 0 | Status: DISCONTINUED | OUTPATIENT
Start: 2017-09-18 | End: 2017-09-20

## 2017-09-18 RX ADMIN — AMLODIPINE BESYLATE 10 MILLIGRAM(S): 2.5 TABLET ORAL at 20:44

## 2017-09-18 RX ADMIN — Medication 50 MILLIGRAM(S): at 22:33

## 2017-09-18 NOTE — ED ADULT NURSE NOTE - OBJECTIVE STATEMENT
Pt presented to the ed c/o of abcess on the left side of the hip x 1 month. denies any fever. Pt is on long term antibiotic. Pt has a PICC line on the right arm

## 2017-09-18 NOTE — ED PROVIDER NOTE - OBJECTIVE STATEMENT
68 yo female presents with abscess to left thigh area. Patient states that she has has more than one abscess to this site since receiving skin graft from this site. Patient presently on nafcillin and cipro. Patient presently in rehab for same.

## 2017-09-19 DIAGNOSIS — I10 ESSENTIAL (PRIMARY) HYPERTENSION: ICD-10-CM

## 2017-09-19 DIAGNOSIS — L02.91 CUTANEOUS ABSCESS, UNSPECIFIED: ICD-10-CM

## 2017-09-19 DIAGNOSIS — E11.9 TYPE 2 DIABETES MELLITUS WITHOUT COMPLICATIONS: ICD-10-CM

## 2017-09-19 DIAGNOSIS — K21.9 GASTRO-ESOPHAGEAL REFLUX DISEASE WITHOUT ESOPHAGITIS: ICD-10-CM

## 2017-09-19 DIAGNOSIS — E11.59 TYPE 2 DIABETES MELLITUS WITH OTHER CIRCULATORY COMPLICATIONS: ICD-10-CM

## 2017-09-19 LAB
ANION GAP SERPL CALC-SCNC: 9 MMOL/L — SIGNIFICANT CHANGE UP (ref 5–17)
BASOPHILS # BLD AUTO: 0.1 K/UL — SIGNIFICANT CHANGE UP (ref 0–0.2)
BASOPHILS NFR BLD AUTO: 1.6 % — SIGNIFICANT CHANGE UP (ref 0–2)
BLD GP AB SCN SERPL QL: SIGNIFICANT CHANGE UP
BUN SERPL-MCNC: 16 MG/DL — SIGNIFICANT CHANGE UP (ref 7–23)
CALCIUM SERPL-MCNC: 8 MG/DL — LOW (ref 8.5–10.1)
CHLORIDE SERPL-SCNC: 110 MMOL/L — HIGH (ref 96–108)
CO2 SERPL-SCNC: 25 MMOL/L — SIGNIFICANT CHANGE UP (ref 22–31)
CREAT SERPL-MCNC: 0.65 MG/DL — SIGNIFICANT CHANGE UP (ref 0.5–1.3)
EOSINOPHIL # BLD AUTO: 0.3 K/UL — SIGNIFICANT CHANGE UP (ref 0–0.5)
EOSINOPHIL NFR BLD AUTO: 7.2 % — HIGH (ref 0–6)
ERYTHROCYTE [SEDIMENTATION RATE] IN BLOOD: 38 MM/HR — HIGH (ref 0–20)
GLUCOSE SERPL-MCNC: 93 MG/DL — SIGNIFICANT CHANGE UP (ref 70–99)
HCT VFR BLD CALC: 31 % — LOW (ref 34.5–45)
HGB BLD-MCNC: 10.2 G/DL — LOW (ref 11.5–15.5)
LYMPHOCYTES # BLD AUTO: 2 K/UL — SIGNIFICANT CHANGE UP (ref 1–3.3)
LYMPHOCYTES # BLD AUTO: 40.7 % — SIGNIFICANT CHANGE UP (ref 13–44)
MAGNESIUM SERPL-MCNC: 1.9 MG/DL — SIGNIFICANT CHANGE UP (ref 1.6–2.6)
MCHC RBC-ENTMCNC: 29.3 PG — SIGNIFICANT CHANGE UP (ref 27–34)
MCHC RBC-ENTMCNC: 32.8 GM/DL — SIGNIFICANT CHANGE UP (ref 32–36)
MCV RBC AUTO: 89.2 FL — SIGNIFICANT CHANGE UP (ref 80–100)
MONOCYTES # BLD AUTO: 0.3 K/UL — SIGNIFICANT CHANGE UP (ref 0–0.9)
MONOCYTES NFR BLD AUTO: 6.3 % — SIGNIFICANT CHANGE UP (ref 2–14)
NEUTROPHILS # BLD AUTO: 2.1 K/UL — SIGNIFICANT CHANGE UP (ref 1.8–7.4)
NEUTROPHILS NFR BLD AUTO: 44.2 % — SIGNIFICANT CHANGE UP (ref 43–77)
PLATELET # BLD AUTO: 296 K/UL — SIGNIFICANT CHANGE UP (ref 150–400)
POTASSIUM SERPL-MCNC: 2.5 MMOL/L — CRITICAL LOW (ref 3.5–5.3)
POTASSIUM SERPL-SCNC: 2.5 MMOL/L — CRITICAL LOW (ref 3.5–5.3)
RBC # BLD: 3.47 M/UL — LOW (ref 3.8–5.2)
RBC # FLD: 14.9 % — SIGNIFICANT CHANGE UP (ref 11–15)
SODIUM SERPL-SCNC: 144 MMOL/L — SIGNIFICANT CHANGE UP (ref 135–145)
WBC # BLD: 4.9 K/UL — SIGNIFICANT CHANGE UP (ref 3.8–10.5)
WBC # FLD AUTO: 4.9 K/UL — SIGNIFICANT CHANGE UP (ref 3.8–10.5)

## 2017-09-19 PROCEDURE — 73721 MRI JNT OF LWR EXTRE W/O DYE: CPT | Mod: 26,LT

## 2017-09-19 PROCEDURE — 93010 ELECTROCARDIOGRAM REPORT: CPT

## 2017-09-19 RX ORDER — INFLUENZA VIRUS VACCINE 15; 15; 15; 15 UG/.5ML; UG/.5ML; UG/.5ML; UG/.5ML
0.5 SUSPENSION INTRAMUSCULAR ONCE
Qty: 0 | Refills: 0 | Status: DISCONTINUED | OUTPATIENT
Start: 2017-09-19 | End: 2017-09-22

## 2017-09-19 RX ORDER — POTASSIUM CHLORIDE 20 MEQ
40 PACKET (EA) ORAL EVERY 4 HOURS
Qty: 0 | Refills: 0 | Status: COMPLETED | OUTPATIENT
Start: 2017-09-19 | End: 2017-09-19

## 2017-09-19 RX ORDER — DEXTROSE MONOHYDRATE, SODIUM CHLORIDE, AND POTASSIUM CHLORIDE 50; .745; 4.5 G/1000ML; G/1000ML; G/1000ML
1000 INJECTION, SOLUTION INTRAVENOUS
Qty: 0 | Refills: 0 | Status: DISCONTINUED | OUTPATIENT
Start: 2017-09-19 | End: 2017-09-20

## 2017-09-19 RX ORDER — POTASSIUM CHLORIDE 20 MEQ
10 PACKET (EA) ORAL
Qty: 0 | Refills: 0 | Status: COMPLETED | OUTPATIENT
Start: 2017-09-19 | End: 2017-09-19

## 2017-09-19 RX ORDER — ACETAMINOPHEN 500 MG
650 TABLET ORAL EVERY 6 HOURS
Qty: 0 | Refills: 0 | Status: DISCONTINUED | OUTPATIENT
Start: 2017-09-19 | End: 2017-09-20

## 2017-09-19 RX ADMIN — NAFCILLIN 200 GRAM(S): 10 INJECTION, POWDER, FOR SOLUTION INTRAVENOUS at 13:32

## 2017-09-19 RX ADMIN — Medication 650 MILLIGRAM(S): at 18:45

## 2017-09-19 RX ADMIN — AMLODIPINE BESYLATE 10 MILLIGRAM(S): 2.5 TABLET ORAL at 05:54

## 2017-09-19 RX ADMIN — Medication 500 MILLIGRAM(S): at 18:05

## 2017-09-19 RX ADMIN — ENOXAPARIN SODIUM 40 MILLIGRAM(S): 100 INJECTION SUBCUTANEOUS at 05:54

## 2017-09-19 RX ADMIN — Medication 100 MILLIEQUIVALENT(S): at 10:14

## 2017-09-19 RX ADMIN — Medication 650 MILLIGRAM(S): at 18:00

## 2017-09-19 RX ADMIN — Medication 40 MILLIEQUIVALENT(S): at 13:34

## 2017-09-19 RX ADMIN — Medication 50 MILLIGRAM(S): at 18:01

## 2017-09-19 RX ADMIN — Medication 500 MILLIGRAM(S): at 05:54

## 2017-09-19 RX ADMIN — NAFCILLIN 200 GRAM(S): 10 INJECTION, POWDER, FOR SOLUTION INTRAVENOUS at 08:14

## 2017-09-19 RX ADMIN — NAFCILLIN 200 GRAM(S): 10 INJECTION, POWDER, FOR SOLUTION INTRAVENOUS at 00:59

## 2017-09-19 RX ADMIN — Medication 50 MILLIGRAM(S): at 22:05

## 2017-09-19 RX ADMIN — Medication 50 MILLIGRAM(S): at 13:34

## 2017-09-19 RX ADMIN — NAFCILLIN 200 GRAM(S): 10 INJECTION, POWDER, FOR SOLUTION INTRAVENOUS at 05:00

## 2017-09-19 RX ADMIN — Medication 2: at 11:16

## 2017-09-19 RX ADMIN — NAFCILLIN 200 GRAM(S): 10 INJECTION, POWDER, FOR SOLUTION INTRAVENOUS at 18:01

## 2017-09-19 RX ADMIN — Medication 100 MILLIEQUIVALENT(S): at 11:13

## 2017-09-19 RX ADMIN — Medication 40 MILLIEQUIVALENT(S): at 09:07

## 2017-09-19 RX ADMIN — Medication 40 MILLIEQUIVALENT(S): at 18:05

## 2017-09-19 RX ADMIN — Medication 100 MILLIEQUIVALENT(S): at 09:07

## 2017-09-19 RX ADMIN — NAFCILLIN 200 GRAM(S): 10 INJECTION, POWDER, FOR SOLUTION INTRAVENOUS at 22:04

## 2017-09-19 RX ADMIN — DEXTROSE MONOHYDRATE, SODIUM CHLORIDE, AND POTASSIUM CHLORIDE 75 MILLILITER(S): 50; .745; 4.5 INJECTION, SOLUTION INTRAVENOUS at 13:33

## 2017-09-19 RX ADMIN — METFORMIN HYDROCHLORIDE 500 MILLIGRAM(S): 850 TABLET ORAL at 11:14

## 2017-09-19 NOTE — PHYSICAL THERAPY INITIAL EVALUATION ADULT - ADDITIONAL COMMENTS
Pt reports she has no steps to negotiate in home environment. Pt reports  she has a HHA 3 days a week for 3 hours a day to assist with ADL's. (cooking, cleaning)

## 2017-09-19 NOTE — CONSULT NOTE ADULT - ATTENDING COMMENTS
D/W dr Buenrostro. No sign of communication in hip joint. General and vascular surgery follow up. No infection in hip joint.

## 2017-09-19 NOTE — H&P ADULT - NSHPPHYSICALEXAM_GEN_ALL_CORE
GENERAL: NAD, well-groomed, well-developed  HEAD:  Atraumatic, Normocephalic  EYES: EOMI, LEIGH, conjunctiva and sclera clear  ENMT:  Moist mucous membranes, Good dentition, No lesions  NECK: Supple, No JVD, Normal thyroid  NERVOUS SYSTEM:  Alert & Oriented X3, Good concentration; Motor Strength 5/5 B/L upper and lower extremities; DTRs 2+ intact and symmetric  CHEST/LUNG: Clear to percussion bilaterally; No rales, rhonchi, wheezing, or rubs  HEART: Regular rate and rhythm; No murmurs, rubs, or gallops  ABDOMEN: Soft, Nontender, Nondistended; Bowel sounds present  EXTREMITIES:  2+ Peripheral Pulses, No clubbing, cyanosis, or edema. Left sided weakness.  LYMPH: No lymphadenopathy noted  SKIN: No rashes or lesions

## 2017-09-19 NOTE — CONSULT NOTE ADULT - ASSESSMENT
persistent abscess collection  await sx debridemwnt as if not draining out will collect internally   prev antibiotics were based on prev culture   will follow with you thanks

## 2017-09-19 NOTE — CONSULT NOTE ADULT - SUBJECTIVE AND OBJECTIVE BOX
67y Female presents to the ED yesterday w/ swelling of left hip. This patient is well known to us. We were re-consulted by vascular to rule out a septic joint. She complains of continuos left hip abscesses. Denies trauma. Denies numbness/tingling. Denies fever/chills/recent illness. Denies pain/injury elsewhere. No other complaints.    HEALTH ISSUES - PROBLEM Dx:  Type 2 diabetes mellitus with other circulatory complication: Type 2 diabetes mellitus with other circulatory complication  Essential hypertension: Essential hypertension  Gastroesophageal reflux disease, esophagitis presence not specified: Gastroesophageal reflux disease, esophagitis presence not specified  Abscess: Abscess    MEDICATIONS  (STANDING):  metFORMIN 500 milliGRAM(s) Oral daily  metoprolol 50 milliGRAM(s) Oral two times a day  amLODIPine   Tablet 10 milliGRAM(s) Oral daily  nafcillin  IVPB 2 Gram(s) IV Intermittent every 4 hours  ciprofloxacin     Tablet 500 milliGRAM(s) Oral two times a day  enoxaparin Injectable 40 milliGRAM(s) SubCutaneous every 24 hours  insulin lispro (HumaLOG) corrective regimen sliding scale   SubCutaneous three times a day before meals  dextrose 5%. 1000 milliLiter(s) IV Continuous <Continuous>  dextrose 50% Injectable 12.5 Gram(s) IV Push once  dextrose 50% Injectable 25 Gram(s) IV Push once  dextrose 50% Injectable 25 Gram(s) IV Push once  hydrALAZINE 50 milliGRAM(s) Oral every 8 hours  influenza   Vaccine 0.5 milliLiter(s) IntraMuscular once  potassium chloride    Tablet ER 40 milliEquivalent(s) Oral every 4 hours  sodium chloride 0.45% with potassium chloride 20 mEq/L 1000 milliLiter(s) IV Continuous <Continuous>    Allergies    No Known Allergies      CT: left lateral hip abscess involving musculature.                         10.2   4.9   )-----------( 296      ( 19 Sep 2017 07:32 )             31.0     19 Sep 2017 07:32    144    |  110    |  16     ----------------------------<  93     2.5     |  25     |  0.65     Ca    8.0        19 Sep 2017 07:32  Mg     1.9       19 Sep 2017 07:32    TPro  8.0    /  Alb  2.9    /  TBili  0.5    /  DBili  x      /  AST  32     /  ALT  22     /  AlkPhos  58     18 Sep 2017 16:48    PT/INR - ( 18 Sep 2017 16:48 )   PT: 14.0 sec;   INR: 1.28 ratio         PTT - ( 18 Sep 2017 16:48 )  PTT:33.8 sec  Vital Signs Last 24 Hrs  T(C): 36.8 (09-19-17 @ 11:13), Max: 37.1 (09-18-17 @ 19:34)  T(F): 98.2 (09-19-17 @ 11:13), Max: 98.8 (09-18-17 @ 19:34)  HR: 64 (09-19-17 @ 11:13) (54 - 74)  BP: 184/91 (09-19-17 @ 11:13) (154/70 - 193/86)  BP(mean): --  RR: 18 (09-19-17 @ 11:13) (15 - 18)  SpO2: 98% (09-19-17 @ 11:13) (97% - 100%)    Physical Exam  Gen: NAD  LLE: gross effusion and fluctuance at left hip with healing graft site.    Skin intact at left hip, no open or draining wounds  +EHL FHL TA GS  SILT L2-S1  +DP  Calf Soft NT

## 2017-09-19 NOTE — H&P ADULT - HISTORY OF PRESENT ILLNESS
· HPI: 68 yo female presents with abscess to left thigh area. Patient states that she has has more than one abscess to this site since receiving skin graft from this site. Patient presently on nafcillin and cipro. Patient presently in rehab for same.  CT scan done shows increasing fluid collection. Admitted for I & D by surgery.

## 2017-09-19 NOTE — H&P ADULT - NSHPLABSRESULTS_GEN_ALL_CORE
10.2   4.9   )-----------( 296      ( 19 Sep 2017 07:32 )             31.0     09-19    144  |  110<H>  |  16  ----------------------------<  93  2.5<LL>   |  25  |  0.65    Ca    8.0<L>      19 Sep 2017 07:32    TPro  8.0  /  Alb  2.9<L>  /  TBili  0.5  /  DBili  x   /  AST  32  /  ALT  22  /  AlkPhos  58  09-18    PT/INR - ( 18 Sep 2017 16:48 )   PT: 14.0 sec;   INR: 1.28 ratio         PTT - ( 18 Sep 2017 16:48 )  PTT:33.8 sec    CAPILLARY BLOOD GLUCOSE  98 (19 Sep 2017 08:10)  71 (18 Sep 2017 19:34)                Urine Culture:      Blood Culture:

## 2017-09-19 NOTE — CONSULT NOTE ADULT - SUBJECTIVE AND OBJECTIVE BOX
HPI:  · HPI: 66 yo female presents with abscess to left thigh area. Patient states that she has has more than one abscess to this site since receiving skin graft from this site. Patient presently on nafcillin and cipro. Patient presently in rehab for same.  has been on iv antibiotics   used to have constant drainage    that stopped on iv antibiotics   CT scan done shows increasing fluid collection. Admitted for I & D by surgery. (19 Sep 2017 10:11)  walking much better since last seen       PAST MEDICAL & SURGICAL HISTORY:  Thrombocytopenia  Metabolic encephalopathy  GERD (gastroesophageal reflux disease)  Hypertension  Diabetes mellitus  H/O skin graft: left hand  H/O hand surgery  S/P debridement      SOCHX:   no tobacco,  no -  alcohol    FMHX: FA/MO  -non contributory       Recent Travel:    Immunizations:    Allergies    No Known Allergies    Intolerances        MEDICATIONS  (STANDING):  metFORMIN 500 milliGRAM(s) Oral daily  metoprolol 50 milliGRAM(s) Oral two times a day  amLODIPine   Tablet 10 milliGRAM(s) Oral daily  nafcillin  IVPB 2 Gram(s) IV Intermittent every 4 hours  ciprofloxacin     Tablet 500 milliGRAM(s) Oral two times a day  enoxaparin Injectable 40 milliGRAM(s) SubCutaneous every 24 hours  insulin lispro (HumaLOG) corrective regimen sliding scale   SubCutaneous three times a day before meals  dextrose 5%. 1000 milliLiter(s) (50 mL/Hr) IV Continuous <Continuous>  dextrose 50% Injectable 12.5 Gram(s) IV Push once  dextrose 50% Injectable 25 Gram(s) IV Push once  dextrose 50% Injectable 25 Gram(s) IV Push once  hydrALAZINE 50 milliGRAM(s) Oral every 8 hours  influenza   Vaccine 0.5 milliLiter(s) IntraMuscular once  sodium chloride 0.45% with potassium chloride 20 mEq/L 1000 milliLiter(s) (75 mL/Hr) IV Continuous <Continuous>    MEDICATIONS  (PRN):  acetaminophen   Tablet 650 milliGRAM(s) Oral every 6 hours PRN For Temp greater than 38 C (100.4 F) and headache  aluminum hydroxide/magnesium hydroxide/simethicone Suspension 30 milliLiter(s) Oral four times a day PRN Indigestion  dextrose Gel 1 Dose(s) Oral once PRN Blood Glucose LESS THAN 70 milliGRAM(s)/deciliter  glucagon  Injectable 1 milliGRAM(s) IntraMuscular once PRN Glucose LESS THAN 70 milligrams/deciliter  acetaminophen   Tablet. 650 milliGRAM(s) Oral every 6 hours PRN Moderate Pain (4 - 6)      REVIEW OF SYSTEMS:  CONSTITUTIONAL: No fever, weight loss, or fatigue  EYES: No eye pain, visual disturbances, or discharge  ENMT:  No difficulty hearing, tinnitus, vertigo; No sinus or throat pain  NECK: No pain or stiffness  BREASTS: No pain, masses, or nipple discharge  RESPIRATORY: No cough, wheezing, chills or hemoptysis; No shortness of breath  CARDIOVASCULAR: No chest pain, palpitations, dizziness, or leg swelling  GASTROINTESTINAL: No abdominal or epigastric pain. No nausea, vomiting, or hematemesis; No diarrhea or constipation. No melena or hematochezia.  GENITOURINARY: No dysuria, frequency, hematuria, or incontinence  NEUROLOGICAL: No headaches, memory loss, loss of strength, numbness, or tremors  SKIN: No itching, burning, rashes, or lesions   LYMPH NODES: No enlarged glands  ENDOCRINE: No heat or cold intolerance; No hair loss  MUSCULOSKELETAL: No joint pain or swelling; No muscle, back, or extremity pain  has no pain   just walks with a limp   PSYCHIATRIC: No depression, anxiety, mood swings, or difficulty sleeping  HEME/LYMPH: No easy bruising, or bleeding gums  ALLERGY AND IMMUNOLOGIC: No hives or eczema    VITAL SIGNS:    T(C): 37.7 (09-19-17 @ 17:58), Max: 37.7 (09-19-17 @ 17:58)  T(F): 99.8 (09-19-17 @ 17:58), Max: 99.8 (09-19-17 @ 17:58)  HR: 78 (09-19-17 @ 19:42) (54 - 89)  BP: 175/91 (09-19-17 @ 19:42) (147/73 - 193/86)  RR: 18 (09-19-17 @ 19:42) (15 - 18)  SpO2: 98% (09-19-17 @ 19:42) (96% - 100%)    PHYSICAL EXAM:    GENERAL: NAD, well-groomed, well-developed  HEAD:  Atraumatic, Normocephalic  EYES: EOMI, PERRLA, conjunctiva and sclera clear  ENMT: No tonsillar erythema, exudates, or enlargement; Moist mucous membranes, Good dentition, No lesions  NECK: Supple, No JVD, Normal thyroid  NERVOUS SYSTEM:  Alert & Oriented X3, Good concentration; Motor Strength 5/5 B/L upper and lower extremities; DTRs 2+ intact and symmetric  CHEST/LUNG: Clear  bilaterally; No rales, rhonchi, wheezing bilaterally  HEART: Regular rate and rhythm; No murmurs, rubs, or gallops  ABDOMEN: Soft, Nontender, Nondistended; Bowel sounds present  EXTREMITIES:  2+ Peripheral Pulses, No clubbing, cyanosis, or edema  hip actuallly is smaller VS last month  LYMPH: No lymphadenopathy noted  SKIN: No rashes or lesions  BACK: no pressor sore     LABS:                         10.2   4.9   )-----------( 296      ( 19 Sep 2017 07:32 )             31.0     09-19    144  |  110<H>  |  16  ----------------------------<  93  2.5<LL>   |  25  |  0.65    Ca    8.0<L>      19 Sep 2017 07:32  Mg     1.9     09-19    TPro  8.0  /  Alb  2.9<L>  /  TBili  0.5  /  DBili  x   /  AST  32  /  ALT  22  /  AlkPhos  58  09-18    LIVER FUNCTIONS - ( 18 Sep 2017 16:48 )  Alb: 2.9 g/dL / Pro: 8.0 gm/dL / ALK PHOS: 58 U/L / ALT: 22 U/L / AST: 32 U/L / GGT: x           PT/INR - ( 18 Sep 2017 16:48 )   PT: 14.0 sec;   INR: 1.28 ratio         PTT - ( 18 Sep 2017 16:48 )  PTT:33.8 sec                                      Radiology:    < from: CT Hip No Cont, Left (09.17.17 @ 16:37) >  mpression:    Large heterogeneous collection extending to the lateral left hip   involving the lateral hip musculature as above. Much of this collection   measures fluid by Hounsfield units and would be compatible with abscess.   If this is not surgically drained, its internal contents can be further   characterized with sonography to determine feasibility of attempted   percutaneous drainage.    Fibroid uterus. Abutting rounded cystic lesions may represent cystic   degenerated fibroids or cystic adnexal lesions. Recommend correlation   with pelvic sonography.                ION CH M.D. ATTENDING RADIOLOGIST  This document has been electronically signed. Sep 17 2017  5:58PM              < end of copied text >  < from: MRI Pelvis w/wo Cont (08.11.17 @ 11:18) >  MPRESSION:    Large soft tissue fluid collection about the anterior lateral aspects of   the left hip with a thick rind of peripheral and rim enhancement and   surrounding reactive inflammatory change. This is highly suspicious for   abscess formation and/or infected left greater trochanteric bursitis. The   collection extends to the skin surface via a thin sinus tract and also   appears to communicate with the anterior aspect of the right hip joint.    Large peripherally enhancing cystic structure in the pelvis which may   reflect a fibroid with cystic degeneration or ovarian cyst. Dedicated   pelvic ultrasound can be performed as clinically directed.      < end of copied text >  < from: MRI Hip w/o Cont, Left (09.19.17 @ 16:52) >  Findings:    There is redemonstration of a large fluid collection within the left   greater trochanteric bursa which extends from the level of the proximal   femur to the level of the iliac crest. This collection measures 6.4 x 4.2   x 8.4 cm and contains internal debris. Findings are consistent with   patient's stated history of abscess. This collection tracks along the   superficial myofascia along the periphery of the left gluteus minimus and   medius tendons and remains deep to the iliotibial tract. Mild   intramuscular edema is noted within the left gluteus minimus and medius  muscles suggestive of mild myositis. The collection is closely opposes   the lateral aspect of the greater trochanter. There is no subjacent   marrow signal abnormality to suggest osteomyelitis. Marrow signal within   the remainder of the pelvis ispreserved. There is no evidence of acute   fracture.    There is mild bilateral hip arthrosis. There is mild to moderate pubic   symphysis arthrosis. Sacroiliac joints are preserved. Lower lumbar spine   is grossly unremarkable.    There is no evidence of acute tendinous injury.    There is a fibroid uterus. Tubular cystic structures are noted within the   left adnexal region. The more rounded of these structures measures   approximately 5.2 x 5.1 x 4.1 cm. Further evaluation with pelvic   ultrasound is recommended.    Impression:    Large fluid collection within the left greater trochanteric bursa as   outlined above consistent with stated history of abscess. No evidence of   osteomyelitis. Mild adjacent myositis within the left gluteus minimus and   medius tendons.    Tubular cystic structures in the left adnexal region. Further evaluation   with pelvic ultrasound is recommended.                JEIMY GUTIERREZ M.D., ATTENDING RADIOLOGIST    < end of copied text >

## 2017-09-19 NOTE — CONSULT NOTE ADULT - ASSESSMENT
A/P: 67y Female with left soft tissue abscess. Based on history, lab results, and physical exam, very low likelihood of septic joint.   FU MRI of left hip  Dr. Iverson is aware and IR/ortho agree aspiration may lead to seeding of the joint.   Pain control  WBAT  Ice/elevation   no orthopedic intervention at this time  Will discuss with attending

## 2017-09-19 NOTE — H&P ADULT - ASSESSMENT
Pt. admitted with abscess left hip. To be drained by surgery. Continue IV Nafcillin and Po Cipro. Pot. supplemented for hypokalemia.

## 2017-09-20 ENCOUNTER — TRANSCRIPTION ENCOUNTER (OUTPATIENT)
Age: 67
End: 2017-09-20

## 2017-09-20 ENCOUNTER — RESULT REVIEW (OUTPATIENT)
Age: 67
End: 2017-09-20

## 2017-09-20 DIAGNOSIS — E87.6 HYPOKALEMIA: ICD-10-CM

## 2017-09-20 LAB
ALBUMIN SERPL ELPH-MCNC: 3.1 G/DL — LOW (ref 3.3–5)
ALP SERPL-CCNC: 64 U/L — SIGNIFICANT CHANGE UP (ref 40–120)
ALT FLD-CCNC: 24 U/L — SIGNIFICANT CHANGE UP (ref 12–78)
ANION GAP SERPL CALC-SCNC: 9 MMOL/L — SIGNIFICANT CHANGE UP (ref 5–17)
AST SERPL-CCNC: 33 U/L — SIGNIFICANT CHANGE UP (ref 15–37)
BILIRUB SERPL-MCNC: 0.7 MG/DL — SIGNIFICANT CHANGE UP (ref 0.2–1.2)
BUN SERPL-MCNC: 17 MG/DL — SIGNIFICANT CHANGE UP (ref 7–23)
CALCIUM SERPL-MCNC: 9 MG/DL — SIGNIFICANT CHANGE UP (ref 8.5–10.1)
CHLORIDE SERPL-SCNC: 110 MMOL/L — HIGH (ref 96–108)
CO2 SERPL-SCNC: 25 MMOL/L — SIGNIFICANT CHANGE UP (ref 22–31)
CREAT SERPL-MCNC: 0.8 MG/DL — SIGNIFICANT CHANGE UP (ref 0.5–1.3)
CRP SERPL-MCNC: 0.6 MG/DL — HIGH (ref 0–0.4)
CRP SERPL-MCNC: 0.6 MG/DL — HIGH (ref 0–0.4)
ERYTHROCYTE [SEDIMENTATION RATE] IN BLOOD: 46 MM/HR — HIGH (ref 0–20)
GLUCOSE SERPL-MCNC: 114 MG/DL — HIGH (ref 70–99)
POTASSIUM SERPL-MCNC: 3.9 MMOL/L — SIGNIFICANT CHANGE UP (ref 3.5–5.3)
POTASSIUM SERPL-SCNC: 3.9 MMOL/L — SIGNIFICANT CHANGE UP (ref 3.5–5.3)
PROT SERPL-MCNC: 8.4 GM/DL — HIGH (ref 6–8.3)
SODIUM SERPL-SCNC: 144 MMOL/L — SIGNIFICANT CHANGE UP (ref 135–145)

## 2017-09-20 PROCEDURE — 88304 TISSUE EXAM BY PATHOLOGIST: CPT | Mod: 26

## 2017-09-20 RX ORDER — METOPROLOL TARTRATE 50 MG
50 TABLET ORAL
Qty: 0 | Refills: 0 | Status: DISCONTINUED | OUTPATIENT
Start: 2017-09-20 | End: 2017-09-22

## 2017-09-20 RX ORDER — DEXTROSE 50 % IN WATER 50 %
25 SYRINGE (ML) INTRAVENOUS ONCE
Qty: 0 | Refills: 0 | Status: DISCONTINUED | OUTPATIENT
Start: 2017-09-20 | End: 2017-09-22

## 2017-09-20 RX ORDER — ACETAMINOPHEN 500 MG
650 TABLET ORAL EVERY 6 HOURS
Qty: 0 | Refills: 0 | Status: DISCONTINUED | OUTPATIENT
Start: 2017-09-20 | End: 2017-09-22

## 2017-09-20 RX ORDER — DEXTROSE 50 % IN WATER 50 %
12.5 SYRINGE (ML) INTRAVENOUS ONCE
Qty: 0 | Refills: 0 | Status: DISCONTINUED | OUTPATIENT
Start: 2017-09-20 | End: 2017-09-22

## 2017-09-20 RX ORDER — ACETAMINOPHEN 500 MG
1000 TABLET ORAL ONCE
Qty: 0 | Refills: 0 | Status: COMPLETED | OUTPATIENT
Start: 2017-09-20 | End: 2017-09-20

## 2017-09-20 RX ORDER — ENOXAPARIN SODIUM 100 MG/ML
40 INJECTION SUBCUTANEOUS EVERY 24 HOURS
Qty: 0 | Refills: 0 | Status: DISCONTINUED | OUTPATIENT
Start: 2017-09-20 | End: 2017-09-22

## 2017-09-20 RX ORDER — SODIUM CHLORIDE 9 MG/ML
1000 INJECTION, SOLUTION INTRAVENOUS
Qty: 0 | Refills: 0 | Status: DISCONTINUED | OUTPATIENT
Start: 2017-09-20 | End: 2017-09-20

## 2017-09-20 RX ORDER — MORPHINE SULFATE 50 MG/1
2 CAPSULE, EXTENDED RELEASE ORAL EVERY 6 HOURS
Qty: 0 | Refills: 0 | Status: DISCONTINUED | OUTPATIENT
Start: 2017-09-20 | End: 2017-09-22

## 2017-09-20 RX ORDER — AMLODIPINE BESYLATE 2.5 MG/1
10 TABLET ORAL DAILY
Qty: 0 | Refills: 0 | Status: DISCONTINUED | OUTPATIENT
Start: 2017-09-20 | End: 2017-09-22

## 2017-09-20 RX ORDER — DEXTROSE 50 % IN WATER 50 %
1 SYRINGE (ML) INTRAVENOUS ONCE
Qty: 0 | Refills: 0 | Status: DISCONTINUED | OUTPATIENT
Start: 2017-09-20 | End: 2017-09-22

## 2017-09-20 RX ORDER — FENTANYL CITRATE 50 UG/ML
25 INJECTION INTRAVENOUS
Qty: 0 | Refills: 0 | Status: DISCONTINUED | OUTPATIENT
Start: 2017-09-20 | End: 2017-09-20

## 2017-09-20 RX ORDER — NAFCILLIN 10 G/100ML
2 INJECTION, POWDER, FOR SOLUTION INTRAVENOUS EVERY 4 HOURS
Qty: 0 | Refills: 0 | Status: DISCONTINUED | OUTPATIENT
Start: 2017-09-20 | End: 2017-09-22

## 2017-09-20 RX ORDER — GLUCAGON INJECTION, SOLUTION 0.5 MG/.1ML
1 INJECTION, SOLUTION SUBCUTANEOUS ONCE
Qty: 0 | Refills: 0 | Status: DISCONTINUED | OUTPATIENT
Start: 2017-09-20 | End: 2017-09-22

## 2017-09-20 RX ORDER — SODIUM CHLORIDE 9 MG/ML
1000 INJECTION, SOLUTION INTRAVENOUS
Qty: 0 | Refills: 0 | Status: DISCONTINUED | OUTPATIENT
Start: 2017-09-20 | End: 2017-09-22

## 2017-09-20 RX ORDER — METFORMIN HYDROCHLORIDE 850 MG/1
500 TABLET ORAL DAILY
Qty: 0 | Refills: 0 | Status: DISCONTINUED | OUTPATIENT
Start: 2017-09-20 | End: 2017-09-22

## 2017-09-20 RX ORDER — FENTANYL CITRATE 50 UG/ML
50 INJECTION INTRAVENOUS
Qty: 0 | Refills: 0 | Status: DISCONTINUED | OUTPATIENT
Start: 2017-09-20 | End: 2017-09-20

## 2017-09-20 RX ORDER — CIPROFLOXACIN LACTATE 400MG/40ML
500 VIAL (ML) INTRAVENOUS
Qty: 0 | Refills: 0 | Status: DISCONTINUED | OUTPATIENT
Start: 2017-09-20 | End: 2017-09-22

## 2017-09-20 RX ORDER — INSULIN LISPRO 100/ML
VIAL (ML) SUBCUTANEOUS
Qty: 0 | Refills: 0 | Status: DISCONTINUED | OUTPATIENT
Start: 2017-09-20 | End: 2017-09-22

## 2017-09-20 RX ORDER — HYDRALAZINE HCL 50 MG
50 TABLET ORAL EVERY 8 HOURS
Qty: 0 | Refills: 0 | Status: DISCONTINUED | OUTPATIENT
Start: 2017-09-20 | End: 2017-09-22

## 2017-09-20 RX ADMIN — AMLODIPINE BESYLATE 10 MILLIGRAM(S): 2.5 TABLET ORAL at 06:11

## 2017-09-20 RX ADMIN — DEXTROSE MONOHYDRATE, SODIUM CHLORIDE, AND POTASSIUM CHLORIDE 75 MILLILITER(S): 50; .745; 4.5 INJECTION, SOLUTION INTRAVENOUS at 06:05

## 2017-09-20 RX ADMIN — NAFCILLIN 200 GRAM(S): 10 INJECTION, POWDER, FOR SOLUTION INTRAVENOUS at 02:00

## 2017-09-20 RX ADMIN — Medication 400 MILLIGRAM(S): at 12:39

## 2017-09-20 RX ADMIN — SODIUM CHLORIDE 75 MILLILITER(S): 9 INJECTION, SOLUTION INTRAVENOUS at 12:39

## 2017-09-20 RX ADMIN — Medication 650 MILLIGRAM(S): at 18:59

## 2017-09-20 RX ADMIN — Medication 50 MILLIGRAM(S): at 06:12

## 2017-09-20 RX ADMIN — Medication 50 MILLIGRAM(S): at 06:11

## 2017-09-20 RX ADMIN — METFORMIN HYDROCHLORIDE 500 MILLIGRAM(S): 850 TABLET ORAL at 17:44

## 2017-09-20 RX ADMIN — NAFCILLIN 200 GRAM(S): 10 INJECTION, POWDER, FOR SOLUTION INTRAVENOUS at 10:04

## 2017-09-20 RX ADMIN — Medication 500 MILLIGRAM(S): at 17:43

## 2017-09-20 RX ADMIN — ENOXAPARIN SODIUM 40 MILLIGRAM(S): 100 INJECTION SUBCUTANEOUS at 06:04

## 2017-09-20 RX ADMIN — MORPHINE SULFATE 2 MILLIGRAM(S): 50 CAPSULE, EXTENDED RELEASE ORAL at 22:40

## 2017-09-20 RX ADMIN — Medication 50 MILLIGRAM(S): at 21:34

## 2017-09-20 RX ADMIN — Medication 6: at 17:44

## 2017-09-20 RX ADMIN — Medication 1000 MILLIGRAM(S): at 12:55

## 2017-09-20 RX ADMIN — NAFCILLIN 200 GRAM(S): 10 INJECTION, POWDER, FOR SOLUTION INTRAVENOUS at 06:12

## 2017-09-20 RX ADMIN — Medication 50 MILLIGRAM(S): at 15:16

## 2017-09-20 RX ADMIN — Medication 500 MILLIGRAM(S): at 06:07

## 2017-09-20 RX ADMIN — MORPHINE SULFATE 2 MILLIGRAM(S): 50 CAPSULE, EXTENDED RELEASE ORAL at 22:24

## 2017-09-20 RX ADMIN — NAFCILLIN 200 GRAM(S): 10 INJECTION, POWDER, FOR SOLUTION INTRAVENOUS at 15:15

## 2017-09-20 RX ADMIN — NAFCILLIN 200 GRAM(S): 10 INJECTION, POWDER, FOR SOLUTION INTRAVENOUS at 21:34

## 2017-09-20 RX ADMIN — NAFCILLIN 200 GRAM(S): 10 INJECTION, POWDER, FOR SOLUTION INTRAVENOUS at 19:02

## 2017-09-20 RX ADMIN — Medication 50 MILLIGRAM(S): at 17:44

## 2017-09-20 NOTE — PROGRESS NOTE ADULT - SUBJECTIVE AND OBJECTIVE BOX
HPI:  · HPI: 66 yo female presents with abscess to left thigh area. Patient states that she has has more than one abscess to this site since receiving skin graft from this site. Patient presently on nafcillin and cipro. Patient presently in rehab for same.  CT scan done shows increasing fluid collection. Admitted for I & D by surgery. (19 Sep 2017 10:11)  feels fine     Allergies    No Known Allergies    Intolerances        MEDICATIONS  (STANDING):  influenza   Vaccine 0.5 milliLiter(s) IntraMuscular once  metFORMIN 500 milliGRAM(s) Oral daily  metoprolol 50 milliGRAM(s) Oral two times a day  amLODIPine   Tablet 10 milliGRAM(s) Oral daily  nafcillin  IVPB 2 Gram(s) IV Intermittent every 4 hours  ciprofloxacin     Tablet 500 milliGRAM(s) Oral two times a day  enoxaparin Injectable 40 milliGRAM(s) SubCutaneous every 24 hours  hydrALAZINE 50 milliGRAM(s) Oral every 8 hours  insulin lispro (HumaLOG) corrective regimen sliding scale   SubCutaneous three times a day before meals  dextrose 5%. 1000 milliLiter(s) (50 mL/Hr) IV Continuous <Continuous>  dextrose 50% Injectable 12.5 Gram(s) IV Push once  dextrose 50% Injectable 25 Gram(s) IV Push once  dextrose 50% Injectable 25 Gram(s) IV Push once    MEDICATIONS  (PRN):  acetaminophen   Tablet 650 milliGRAM(s) Oral every 6 hours PRN For Temp greater than 38 C (100.4 F) and headache  aluminum hydroxide/magnesium hydroxide/simethicone Suspension 30 milliLiter(s) Oral four times a day PRN Indigestion  acetaminophen   Tablet. 650 milliGRAM(s) Oral every 6 hours PRN Moderate Pain (4 - 6)  dextrose Gel 1 Dose(s) Oral once PRN Blood Glucose LESS THAN 70 milliGRAM(s)/deciliter  glucagon  Injectable 1 milliGRAM(s) IntraMuscular once PRN Glucose LESS THAN 70 milligrams/deciliter      REVIEW OF SYSTEMS:    CONSTITUTIONAL: No fever, chills, weight loss, or fatigue  HEENT: No sore throat, runny nose, ear ache  RESPIRATORY: No cough, wheezing, No shortness of breath  CARDIOVASCULAR: No chest pain, palpitations, dizziness  GASTROINTESTINAL: No abdominal pain. No nausea, vomiting, diarrhea  GENITOURINARY: No dysuria, increase frequency, hematuria, or incontinence  NEUROLOGICAL: No headaches, memory loss, loss of strength, numbness, or tremors, no weakness  EXTREMITY: limps and hip swelling   SKIN: No itching, burning, rashes, or lesions   dry skin    VITAL SIGNS:  T(C): 36.5 (09-20-17 @ 14:30), Max: 37.7 (09-19-17 @ 17:58)  T(F): 97.7 (09-20-17 @ 14:30), Max: 99.8 (09-19-17 @ 17:58)  HR: 79 (09-20-17 @ 14:30) (65 - 89)  BP: 159/82 (09-20-17 @ 14:30) (132/68 - 179/90)  RR: 17 (09-20-17 @ 14:30) (12 - 18)  SpO2: 99% (09-20-17 @ 13:45) (95% - 100%)  Wt(kg): --    PHYSICAL EXAM:    GENERAL: not in any distress  HEENT: Neck is supple, normocephalic, atraumatic   CHEST/LUNG: Clear to percussion bilaterally; No rales, rhonchi, wheezing  HEART: Regular rate and rhythm; No murmurs, rubs, or gallops  ABDOMEN: Soft, Nontender, Nondistended; Bowel sounds present, no rebound   EXTREMITIES:  2+ Peripheral Pulses, No clubbing, cyanosis, or edema  hip no change in status   GENITOURINARY: NEGATIVE   SKIN: no change in status   BACK: no pressor sore   NERVOUS SYSTEM:  Alert & Oriented X3, Good concentration  PSYCH: normal affect     LABS:                         10.2   4.9   )-----------( 296      ( 19 Sep 2017 07:32 )             31.0     09-20    144  |  110<H>  |  17  ----------------------------<  114<H>  3.9   |  25  |  0.80    Ca    9.0      20 Sep 2017 06:58  Mg     1.9     09-19    TPro  8.4<H>  /  Alb  3.1<L>  /  TBili  0.7  /  DBili  x   /  AST  33  /  ALT  24  /  AlkPhos  64  09-20    LIVER FUNCTIONS - ( 20 Sep 2017 06:58 )  Alb: 3.1 g/dL / Pro: 8.4 gm/dL / ALK PHOS: 64 U/L / ALT: 24 U/L / AST: 33 U/L / GGT: x           PT/INR - ( 18 Sep 2017 16:48 )   PT: 14.0 sec;   INR: 1.28 ratio         PTT - ( 18 Sep 2017 16:48 )  PTT:33.8 sec              Sedimentation Rate, Erythrocyte: 46 mm/hr (09-20 @ 06:58)  Sedimentation Rate, Erythrocyte: 38 mm/hr (09-19 @ 21:22)                          Radiology:    < from: CT Hip No Cont, Left (09.17.17 @ 16:37) >  mpression:    Large heterogeneous collection extending to the lateral left hip   involving the lateral hip musculature as above. Much of this collection   measures fluid by Hounsfield units and would be compatible with abscess.   If this is not surgically drained, its internal contents can be further   characterized with sonography to determine feasibility of attempted   percutaneous drainage.    Fibroid uterus. Abutting rounded cystic lesions may represent cystic   degenerated fibroids or cystic adnexal lesions. Recommend correlation   with pelvic sonography.                ION CH M.D. ATTENDING RADIOLOGIST  This document has been electronically signed. Sep 17 2017  5:58PM                < end of copied text > HPI:  · HPI: 68 yo female presents with abscess to left thigh area. Patient states that she has has more than one abscess to this site since receiving skin graft from this site. Patient presently on nafcillin and cipro. Patient presently in rehab for same.  CT scan done shows increasing fluid collection. Admitted for I & D by surgery. (19 Sep 2017 10:11)  sp i&d   feels fine     Allergies    No Known Allergies    Intolerances        MEDICATIONS  (STANDING):  influenza   Vaccine 0.5 milliLiter(s) IntraMuscular once  metFORMIN 500 milliGRAM(s) Oral daily  metoprolol 50 milliGRAM(s) Oral two times a day  amLODIPine   Tablet 10 milliGRAM(s) Oral daily  nafcillin  IVPB 2 Gram(s) IV Intermittent every 4 hours  ciprofloxacin     Tablet 500 milliGRAM(s) Oral two times a day  enoxaparin Injectable 40 milliGRAM(s) SubCutaneous every 24 hours  hydrALAZINE 50 milliGRAM(s) Oral every 8 hours  insulin lispro (HumaLOG) corrective regimen sliding scale   SubCutaneous three times a day before meals  dextrose 5%. 1000 milliLiter(s) (50 mL/Hr) IV Continuous <Continuous>  dextrose 50% Injectable 12.5 Gram(s) IV Push once  dextrose 50% Injectable 25 Gram(s) IV Push once  dextrose 50% Injectable 25 Gram(s) IV Push once    MEDICATIONS  (PRN):  acetaminophen   Tablet 650 milliGRAM(s) Oral every 6 hours PRN For Temp greater than 38 C (100.4 F) and headache  aluminum hydroxide/magnesium hydroxide/simethicone Suspension 30 milliLiter(s) Oral four times a day PRN Indigestion  acetaminophen   Tablet. 650 milliGRAM(s) Oral every 6 hours PRN Moderate Pain (4 - 6)  dextrose Gel 1 Dose(s) Oral once PRN Blood Glucose LESS THAN 70 milliGRAM(s)/deciliter  glucagon  Injectable 1 milliGRAM(s) IntraMuscular once PRN Glucose LESS THAN 70 milligrams/deciliter      REVIEW OF SYSTEMS:    CONSTITUTIONAL: No fever, chills, weight loss, or fatigue  HEENT: No sore throat, runny nose, ear ache  RESPIRATORY: No cough, wheezing, No shortness of breath  CARDIOVASCULAR: No chest pain, palpitations, dizziness  GASTROINTESTINAL: No abdominal pain. No nausea, vomiting, diarrhea  GENITOURINARY: No dysuria, increase frequency, hematuria, or incontinence  NEUROLOGICAL: No headaches, memory loss, loss of strength, numbness, or tremors, no weakness  EXTREMITY: limps and hip swelling  ; feels well  as yesterday   dry skin    VITAL SIGNS:  T(C): 36.5 (09-20-17 @ 14:30), Max: 37.7 (09-19-17 @ 17:58)  T(F): 97.7 (09-20-17 @ 14:30), Max: 99.8 (09-19-17 @ 17:58)  HR: 79 (09-20-17 @ 14:30) (65 - 89)  BP: 159/82 (09-20-17 @ 14:30) (132/68 - 179/90)  RR: 17 (09-20-17 @ 14:30) (12 - 18)  SpO2: 99% (09-20-17 @ 13:45) (95% - 100%)  Wt(kg): --    PHYSICAL EXAM:    GENERAL: not in any distress  HEENT: Neck is supple, normocephalic, atraumatic   CHEST/LUNG: Clear to percussion bilaterally; No rales, rhonchi, wheezing  HEART: Regular rate and rhythm; No murmurs, rubs, or gallops  ABDOMEN: Soft, Nontender, Nondistended; Bowel sounds present, no rebound   EXTREMITIES:  2+ Peripheral Pulses, No clubbing, cyanosis, or edema  hip dressing CDI  GENITOURINARY: NEGATIVE   SKIN: no change in status   BACK: no pressor sore   NERVOUS SYSTEM:  Alert & Oriented X3, Good concentration  PSYCH: normal affect     LABS:                         10.2   4.9   )-----------( 296      ( 19 Sep 2017 07:32 )             31.0     09-20    144  |  110<H>  |  17  ----------------------------<  114<H>  3.9   |  25  |  0.80    Ca    9.0      20 Sep 2017 06:58  Mg     1.9     09-19    TPro  8.4<H>  /  Alb  3.1<L>  /  TBili  0.7  /  DBili  x   /  AST  33  /  ALT  24  /  AlkPhos  64  09-20    LIVER FUNCTIONS - ( 20 Sep 2017 06:58 )  Alb: 3.1 g/dL / Pro: 8.4 gm/dL / ALK PHOS: 64 U/L / ALT: 24 U/L / AST: 33 U/L / GGT: x           PT/INR - ( 18 Sep 2017 16:48 )   PT: 14.0 sec;   INR: 1.28 ratio         PTT - ( 18 Sep 2017 16:48 )  PTT:33.8 sec              Sedimentation Rate, Erythrocyte: 46 mm/hr (09-20 @ 06:58)  Sedimentation Rate, Erythrocyte: 38 mm/hr (09-19 @ 21:22)                          Radiology:    < from: CT Hip No Cont, Left (09.17.17 @ 16:37) >  mpression:    Large heterogeneous collection extending to the lateral left hip   involving the lateral hip musculature as above. Much of this collection   measures fluid by Hounsfield units and would be compatible with abscess.   If this is not surgically drained, its internal contents can be further   characterized with sonography to determine feasibility of attempted   percutaneous drainage.    Fibroid uterus. Abutting rounded cystic lesions may represent cystic   degenerated fibroids or cystic adnexal lesions. Recommend correlation   with pelvic sonography.                ION CH M.D. ATTENDING RADIOLOGIST  This document has been electronically signed. Sep 17 2017  5:58PM                < end of copied text >

## 2017-09-20 NOTE — PROGRESS NOTE ADULT - ASSESSMENT
recurrent hip abscess ?? deroof abscess cavity ; on ancef and po cipro   antibiotics   follow up culture   will follow with you thanks recurrent hip abscess ?? deroof abscess cavity ; on ancef and po cipro   antibiotics to continue   follow up culture   will follow

## 2017-09-20 NOTE — PROGRESS NOTE ADULT - SUBJECTIVE AND OBJECTIVE BOX
Patient is a 67y old  Female who presents with a chief complaint of Swelling Left Hip (19 Sep 2017 10:11)      INTERVAL HPI/OVERNIGHT EVENTS:    MEDICATIONS  (STANDING):  metFORMIN 500 milliGRAM(s) Oral daily  metoprolol 50 milliGRAM(s) Oral two times a day  amLODIPine   Tablet 10 milliGRAM(s) Oral daily  nafcillin  IVPB 2 Gram(s) IV Intermittent every 4 hours  ciprofloxacin     Tablet 500 milliGRAM(s) Oral two times a day  enoxaparin Injectable 40 milliGRAM(s) SubCutaneous every 24 hours  insulin lispro (HumaLOG) corrective regimen sliding scale   SubCutaneous three times a day before meals  dextrose 5%. 1000 milliLiter(s) (50 mL/Hr) IV Continuous <Continuous>  dextrose 50% Injectable 12.5 Gram(s) IV Push once  dextrose 50% Injectable 25 Gram(s) IV Push once  dextrose 50% Injectable 25 Gram(s) IV Push once  hydrALAZINE 50 milliGRAM(s) Oral every 8 hours  influenza   Vaccine 0.5 milliLiter(s) IntraMuscular once  sodium chloride 0.45% with potassium chloride 20 mEq/L 1000 milliLiter(s) (75 mL/Hr) IV Continuous <Continuous>    MEDICATIONS  (PRN):  acetaminophen   Tablet 650 milliGRAM(s) Oral every 6 hours PRN For Temp greater than 38 C (100.4 F) and headache  aluminum hydroxide/magnesium hydroxide/simethicone Suspension 30 milliLiter(s) Oral four times a day PRN Indigestion  dextrose Gel 1 Dose(s) Oral once PRN Blood Glucose LESS THAN 70 milliGRAM(s)/deciliter  glucagon  Injectable 1 milliGRAM(s) IntraMuscular once PRN Glucose LESS THAN 70 milligrams/deciliter  acetaminophen   Tablet. 650 milliGRAM(s) Oral every 6 hours PRN Moderate Pain (4 - 6)      Allergies    No Known Allergies    Intolerances        REVIEW OF SYSTEMS:  CONSTITUTIONAL: No fever, weight loss, or fatigue  EYES: No eye pain, visual disturbances, or discharge  ENMT:  No difficulty hearing, tinnitus, vertigo; No sinus or throat pain  NECK: No pain or stiffness  BREASTS: No pain, masses, or nipple discharge  RESPIRATORY: No cough, wheezing, chills or hemoptysis; No shortness of breath  CARDIOVASCULAR: No chest pain, palpitations, dizziness, or leg swelling  GASTROINTESTINAL: No abdominal or epigastric pain. No nausea, vomiting, or hematemesis; No diarrhea or constipation. No melena or hematochezia.  GENITOURINARY: No dysuria, frequency, hematuria, or incontinence  NEUROLOGICAL: No headaches, memory loss, loss of strength, numbness, or tremors  SKIN: No itching, burning, rashes, or lesions   LYMPH NODES: No enlarged glands  ENDOCRINE: No heat or cold intolerance; No hair loss  MUSCULOSKELETAL: No joint pain or swelling; No muscle, back, or extremity pain  PSYCHIATRIC: No depression, anxiety, mood swings, or difficulty sleeping  HEME/LYMPH: No easy bruising, or bleeding gums  ALLERGY AND IMMUNOLOGIC: No hives or eczema    Vital Signs Last 24 Hrs  T(C): 36.5 (20 Sep 2017 04:54), Max: 37.7 (19 Sep 2017 17:58)  T(F): 97.7 (20 Sep 2017 04:54), Max: 99.8 (19 Sep 2017 17:58)  HR: 84 (20 Sep 2017 04:54) (64 - 89)  BP: 155/78 (20 Sep 2017 04:54) (138/64 - 184/91)  BP(mean): --  RR: 18 (20 Sep 2017 04:54) (16 - 18)  SpO2: 96% (20 Sep 2017 04:54) (96% - 98%)    PHYSICAL EXAM:  GENERAL: NAD, well-groomed, well-developed  HEAD:  Atraumatic, Normocephalic  EYES: EOMI, LEIGH, conjunctiva and sclera clear  ENMT:  Moist mucous membranes, Good dentition, No lesions  NECK: Supple, No JVD, Normal thyroid  NERVOUS SYSTEM:  Alert & Oriented X3, Good concentration; Motor Strength 5/5 B/L upper and lower extremities; DTRs 2+ intact and symmetric  CHEST/LUNG: Clear to percussion bilaterally; No rales, rhonchi, wheezing, or rubs  HEART: Regular rate and rhythm; No murmurs, rubs, or gallops  ABDOMEN: Soft, Nontender, Nondistended; Bowel sounds present  EXTREMITIES:  2+ Peripheral Pulses, No clubbing, cyanosis, or edema. Left sided weakness.  LYMPH: No lymphadenopathy noted  SKIN: No rashes or lesions    LABS:                        10.2   4.9   )-----------( 296      ( 19 Sep 2017 07:32 )             31.0     09-20    144  |  110<H>  |  17  ----------------------------<  114<H>  3.9   |  25  |  0.80    Ca    9.0      20 Sep 2017 06:58  Mg     1.9     09-19    TPro  8.4<H>  /  Alb  3.1<L>  /  TBili  0.7  /  DBili  x   /  AST  33  /  ALT  24  /  AlkPhos  64  09-20    PT/INR - ( 18 Sep 2017 16:48 )   PT: 14.0 sec;   INR: 1.28 ratio         PTT - ( 18 Sep 2017 16:48 )  PTT:33.8 sec    CAPILLARY BLOOD GLUCOSE  114 (20 Sep 2017 08:03)  76 (19 Sep 2017 16:53)  182 (19 Sep 2017 11:25)    Sedimentation Rate, Erythrocyte: 46 mm/hr (09-20 @ 06:58)  Sedimentation Rate, Erythrocyte: 38 mm/hr (09-19 @ 21:22)  Urine Culture:      Blood Culture:      RADIOLOGY & ADDITIONAL TESTS:  < from: MRI Hip w/o Cont, Left (09.19.17 @ 16:52) >    EXAM:  MRI HIP W O CONT LT                            PROCEDURE DATE:  09/19/2017          INTERPRETATION:  History: Abscess.    Multiplanar multisequence noncontrast MRI of the left hip was performed   utilizing axial coronal T1 and STIR pulse sequences of the entire pelvis   and sagittal T1 and STIR pulse sequences of the left hip.    Correlation is made with CT of the bony pelvis from September 17, 2017.    Findings:    There is redemonstration of a large fluid collection within the left   greater trochanteric bursa which extends from the level of the proximal   femur to the level of the iliac crest. This collection measures 6.4 x 4.2   x 8.4 cm and contains internal debris. Findings are consistent with   patient's stated history of abscess. This collection tracks along the   superficial myofascia along the periphery of the left gluteus minimus and   medius tendons and remains deep to the iliotibial tract. Mild   intramuscular edema is noted within the left gluteus minimus and medius  muscles suggestive of mild myositis. The collection is closely opposes   the lateral aspect of the greater trochanter. There is no subjacent   marrow signal abnormality to suggest osteomyelitis. Marrow signal within   the remainder of the pelvis ispreserved. There is no evidence of acute   fracture.    There is mild bilateral hip arthrosis. There is mild to moderate pubic   symphysis arthrosis. Sacroiliac joints are preserved. Lower lumbar spine   is grossly unremarkable.    There is no evidence of acute tendinous injury.    There is a fibroid uterus. Tubular cystic structures are noted within the   left adnexal region. The more rounded of these structures measures   approximately 5.2 x 5.1 x 4.1 cm. Further evaluation with pelvic   ultrasound is recommended.    Impression:    Large fluid collection within the left greater trochanteric bursa as   outlined above consistent with stated history of abscess. No evidence of   osteomyelitis. Mild adjacent myositis within the left gluteus minimus and   medius tendons.    Tubular cystic structures in the left adnexal region. Further evaluation   with pelvic ultrasound is recommended.                JEIMY GUTIERREZ M.D., ATTENDING RADIOLOGIST  This document has been electronically signed. Sep 19 2017  5:16PM                < end of copied text >    Imaging Personally Reviewed:  [ ] YES  [ ] NO    Consultant(s) Notes Reviewed:  [ ] YES  [ ] NO    Care Discussed with Consultants/Other Providers [ ] YES  [ ] NO    PROBLEMS:  ABSCESS  FROM ORZAC-DRAIN ABCESS  Abscess  Diabetes mellitus  Type 2 diabetes mellitus with other circulatory complication  Essential hypertension  Gastroesophageal reflux disease, esophagitis presence not specified  Abscess      Care discussed with family,         [  ]   yes  [  ]  No    imp:    stable[ ]    unstable[  ]     improving [   ]       unchanged  [  ]                Plans:  Continue present plans  [  ]               New consult [  ]   specialty  .......               order joshua[  ]    test name.                  Discharge Planning  [  ]

## 2017-09-20 NOTE — BRIEF OPERATIVE NOTE - PROCEDURE
<<-----Click on this checkbox to enter Procedure Irrigation and debridement of hip  09/20/2017    Active  [facility]

## 2017-09-20 NOTE — PROGRESS NOTE ADULT - SUBJECTIVE AND OBJECTIVE BOX
Pt S/E at bedside, no acute events overnight, pain controlled. Ambulating well.     Vital Signs Last 24 Hrs  T(C): 36.5 (20 Sep 2017 04:54), Max: 37.7 (19 Sep 2017 17:58)  T(F): 97.7 (20 Sep 2017 04:54), Max: 99.8 (19 Sep 2017 17:58)  HR: 84 (20 Sep 2017 04:54) (64 - 89)  BP: 155/78 (20 Sep 2017 04:54) (138/64 - 184/91)  BP(mean): --  RR: 18 (20 Sep 2017 04:54) (16 - 18)  SpO2: 96% (20 Sep 2017 04:54) (96% - 98%)  Gen: NAD, AAOx3    Left Lower Extremity:  Dressing clean dry intact  +EHL/FHL/TA/GS  SILT L3-S1  +DP/PT Pulses  Compartments soft  No calf TTP B/L   Pt has painless active ROM at hip

## 2017-09-20 NOTE — PROGRESS NOTE ADULT - ASSESSMENT
Pt. admitted with abscess left hip. To be drained by surgery. Continue IV Nafcillin and Po Cipro. Pot. supplemented for hypokalemia.  Asymptomatic. Potassium normalized. Cleared for surgery. Orthopedics and ID consults noted.

## 2017-09-20 NOTE — PROGRESS NOTE ADULT - ASSESSMENT
A/P: 67F with L hip subcutaneous abscess. Plan for I&d today with Dr. Dai  Pain control  WBAT  Cont medical management  Cont IV antibiotics per ID  No acute orthopedic surgery intervention at this time  Discussed with attending

## 2017-09-21 LAB
ALBUMIN SERPL ELPH-MCNC: 2.9 G/DL — LOW (ref 3.3–5)
ALP SERPL-CCNC: 59 U/L — SIGNIFICANT CHANGE UP (ref 40–120)
ALT FLD-CCNC: 31 U/L — SIGNIFICANT CHANGE UP (ref 12–78)
ANION GAP SERPL CALC-SCNC: 9 MMOL/L — SIGNIFICANT CHANGE UP (ref 5–17)
AST SERPL-CCNC: 32 U/L — SIGNIFICANT CHANGE UP (ref 15–37)
BILIRUB SERPL-MCNC: 0.9 MG/DL — SIGNIFICANT CHANGE UP (ref 0.2–1.2)
BUN SERPL-MCNC: 19 MG/DL — SIGNIFICANT CHANGE UP (ref 7–23)
CALCIUM SERPL-MCNC: 8.8 MG/DL — SIGNIFICANT CHANGE UP (ref 8.5–10.1)
CHLORIDE SERPL-SCNC: 109 MMOL/L — HIGH (ref 96–108)
CO2 SERPL-SCNC: 25 MMOL/L — SIGNIFICANT CHANGE UP (ref 22–31)
CREAT SERPL-MCNC: 0.86 MG/DL — SIGNIFICANT CHANGE UP (ref 0.5–1.3)
GLUCOSE SERPL-MCNC: 140 MG/DL — HIGH (ref 70–99)
HCT VFR BLD CALC: 33.6 % — LOW (ref 34.5–45)
HGB BLD-MCNC: 11.1 G/DL — LOW (ref 11.5–15.5)
MCHC RBC-ENTMCNC: 29.5 PG — SIGNIFICANT CHANGE UP (ref 27–34)
MCHC RBC-ENTMCNC: 32.9 GM/DL — SIGNIFICANT CHANGE UP (ref 32–36)
MCV RBC AUTO: 89.7 FL — SIGNIFICANT CHANGE UP (ref 80–100)
PLATELET # BLD AUTO: 346 K/UL — SIGNIFICANT CHANGE UP (ref 150–400)
POTASSIUM SERPL-MCNC: 3.4 MMOL/L — LOW (ref 3.5–5.3)
POTASSIUM SERPL-SCNC: 3.4 MMOL/L — LOW (ref 3.5–5.3)
PROT SERPL-MCNC: 8 GM/DL — SIGNIFICANT CHANGE UP (ref 6–8.3)
RBC # BLD: 3.74 M/UL — LOW (ref 3.8–5.2)
RBC # FLD: 14.9 % — SIGNIFICANT CHANGE UP (ref 11–15)
SODIUM SERPL-SCNC: 143 MMOL/L — SIGNIFICANT CHANGE UP (ref 135–145)
SURGICAL PATHOLOGY FINAL REPORT - CH: SIGNIFICANT CHANGE UP
WBC # BLD: 6.2 K/UL — SIGNIFICANT CHANGE UP (ref 3.8–10.5)
WBC # FLD AUTO: 6.2 K/UL — SIGNIFICANT CHANGE UP (ref 3.8–10.5)

## 2017-09-21 RX ORDER — POTASSIUM CHLORIDE 20 MEQ
20 PACKET (EA) ORAL DAILY
Qty: 0 | Refills: 0 | Status: DISCONTINUED | OUTPATIENT
Start: 2017-09-21 | End: 2017-09-22

## 2017-09-21 RX ORDER — POTASSIUM CHLORIDE 20 MEQ
40 PACKET (EA) ORAL ONCE
Qty: 0 | Refills: 0 | Status: COMPLETED | OUTPATIENT
Start: 2017-09-21 | End: 2017-09-21

## 2017-09-21 RX ADMIN — MORPHINE SULFATE 2 MILLIGRAM(S): 50 CAPSULE, EXTENDED RELEASE ORAL at 14:27

## 2017-09-21 RX ADMIN — Medication 50 MILLIGRAM(S): at 05:15

## 2017-09-21 RX ADMIN — METFORMIN HYDROCHLORIDE 500 MILLIGRAM(S): 850 TABLET ORAL at 11:09

## 2017-09-21 RX ADMIN — NAFCILLIN 200 GRAM(S): 10 INJECTION, POWDER, FOR SOLUTION INTRAVENOUS at 02:00

## 2017-09-21 RX ADMIN — NAFCILLIN 200 GRAM(S): 10 INJECTION, POWDER, FOR SOLUTION INTRAVENOUS at 10:00

## 2017-09-21 RX ADMIN — AMLODIPINE BESYLATE 10 MILLIGRAM(S): 2.5 TABLET ORAL at 05:15

## 2017-09-21 RX ADMIN — Medication 40 MILLIEQUIVALENT(S): at 10:13

## 2017-09-21 RX ADMIN — Medication 2: at 16:16

## 2017-09-21 RX ADMIN — Medication 20 MILLIEQUIVALENT(S): at 14:25

## 2017-09-21 RX ADMIN — Medication 6: at 10:59

## 2017-09-21 RX ADMIN — Medication 500 MILLIGRAM(S): at 18:47

## 2017-09-21 RX ADMIN — Medication 50 MILLIGRAM(S): at 21:45

## 2017-09-21 RX ADMIN — NAFCILLIN 200 GRAM(S): 10 INJECTION, POWDER, FOR SOLUTION INTRAVENOUS at 05:14

## 2017-09-21 RX ADMIN — MORPHINE SULFATE 2 MILLIGRAM(S): 50 CAPSULE, EXTENDED RELEASE ORAL at 14:57

## 2017-09-21 RX ADMIN — Medication 50 MILLIGRAM(S): at 18:47

## 2017-09-21 RX ADMIN — NAFCILLIN 200 GRAM(S): 10 INJECTION, POWDER, FOR SOLUTION INTRAVENOUS at 21:45

## 2017-09-21 RX ADMIN — ENOXAPARIN SODIUM 40 MILLIGRAM(S): 100 INJECTION SUBCUTANEOUS at 18:47

## 2017-09-21 RX ADMIN — NAFCILLIN 200 GRAM(S): 10 INJECTION, POWDER, FOR SOLUTION INTRAVENOUS at 18:47

## 2017-09-21 RX ADMIN — Medication 50 MILLIGRAM(S): at 14:25

## 2017-09-21 RX ADMIN — NAFCILLIN 200 GRAM(S): 10 INJECTION, POWDER, FOR SOLUTION INTRAVENOUS at 14:27

## 2017-09-21 RX ADMIN — Medication 500 MILLIGRAM(S): at 05:15

## 2017-09-21 NOTE — PROGRESS NOTE ADULT - SUBJECTIVE AND OBJECTIVE BOX
Patient is a 67y old  Female who presents with a chief complaint of Swelling Left Hip (19 Sep 2017 10:11)      INTERVAL HPI/OVERNIGHT EVENTS: S/P Surgery    MEDICATIONS  (STANDING):  influenza   Vaccine 0.5 milliLiter(s) IntraMuscular once  metFORMIN 500 milliGRAM(s) Oral daily  metoprolol 50 milliGRAM(s) Oral two times a day  amLODIPine   Tablet 10 milliGRAM(s) Oral daily  nafcillin  IVPB 2 Gram(s) IV Intermittent every 4 hours  ciprofloxacin     Tablet 500 milliGRAM(s) Oral two times a day  enoxaparin Injectable 40 milliGRAM(s) SubCutaneous every 24 hours  hydrALAZINE 50 milliGRAM(s) Oral every 8 hours  insulin lispro (HumaLOG) corrective regimen sliding scale   SubCutaneous three times a day before meals  dextrose 5%. 1000 milliLiter(s) (50 mL/Hr) IV Continuous <Continuous>  dextrose 50% Injectable 12.5 Gram(s) IV Push once  dextrose 50% Injectable 25 Gram(s) IV Push once  dextrose 50% Injectable 25 Gram(s) IV Push once    MEDICATIONS  (PRN):  acetaminophen   Tablet 650 milliGRAM(s) Oral every 6 hours PRN For Temp greater than 38 C (100.4 F) and headache  aluminum hydroxide/magnesium hydroxide/simethicone Suspension 30 milliLiter(s) Oral four times a day PRN Indigestion  acetaminophen   Tablet. 650 milliGRAM(s) Oral every 6 hours PRN Moderate Pain (4 - 6)  dextrose Gel 1 Dose(s) Oral once PRN Blood Glucose LESS THAN 70 milliGRAM(s)/deciliter  glucagon  Injectable 1 milliGRAM(s) IntraMuscular once PRN Glucose LESS THAN 70 milligrams/deciliter  morphine  - Injectable 2 milliGRAM(s) IV Push every 6 hours PRN Severe Pain (7 - 10)      Allergies    No Known Allergies    Intolerances        REVIEW OF SYSTEMS:  CONSTITUTIONAL: No fever, weight loss, or fatigue  EYES: No eye pain, visual disturbances, or discharge  ENMT:  No difficulty hearing, tinnitus, vertigo; No sinus or throat pain  NECK: No pain or stiffness  BREASTS: No pain, masses, or nipple discharge  RESPIRATORY: No cough, wheezing, chills or hemoptysis; No shortness of breath  CARDIOVASCULAR: No chest pain, palpitations, dizziness, or leg swelling  GASTROINTESTINAL: No abdominal or epigastric pain. No nausea, vomiting, or hematemesis; No diarrhea or constipation. No melena or hematochezia.  GENITOURINARY: No dysuria, frequency, hematuria, or incontinence  NEUROLOGICAL: No headaches, memory loss, loss of strength, numbness, or tremors  SKIN: No itching, burning, rashes, or lesions   LYMPH NODES: No enlarged glands  ENDOCRINE: No heat or cold intolerance; No hair loss  MUSCULOSKELETAL: No joint pain or swelling; No muscle, back, or extremity pain  PSYCHIATRIC: No depression, anxiety, mood swings, or difficulty sleeping  HEME/LYMPH: No easy bruising, or bleeding gums  ALLERGY AND IMMUNOLOGIC: No hives or eczema    Vital Signs Last 24 Hrs  T(C): 37.7 (21 Sep 2017 09:30), Max: 37.7 (21 Sep 2017 09:30)  T(F): 99.8 (21 Sep 2017 09:30), Max: 99.8 (21 Sep 2017 09:30)  HR: 91 (21 Sep 2017 09:30) (65 - 91)  BP: 159/73 (21 Sep 2017 09:30) (132/68 - 160/74)  BP(mean): --  RR: 18 (21 Sep 2017 09:30) (12 - 18)  SpO2: 97% (21 Sep 2017 09:30) (95% - 996%)    PHYSICAL EXAM:  GENERAL: NAD, well-groomed, well-developed  HEAD:  Atraumatic, Normocephalic  EYES: EOMI, LEIGH, conjunctiva and sclera clear  ENMT:  Moist mucous membranes, Good dentition, No lesions  NECK: Supple, No JVD, Normal thyroid  NERVOUS SYSTEM:  Alert & Oriented X3, Good concentration; Motor Strength 4/5 B/L upper and lower extremities; DTRs 2+ intact and symmetric  CHEST/LUNG: Clear to percussion bilaterally; No rales, rhonchi, wheezing, or rubs  HEART: Regular rate and rhythm; No murmurs, rubs, or gallops  ABDOMEN: Soft, Nontender, Nondistended; Bowel sounds present  EXTREMITIES:  2+ Peripheral Pulses, No clubbing, cyanosis, or edema  LYMPH: No lymphadenopathy noted  SKIN: No rashes or lesions    LABS:                        11.1   6.2   )-----------( 346      ( 21 Sep 2017 08:36 )             33.6     09-21    143  |  109<H>  |  19  ----------------------------<  140<H>  3.4<L>   |  25  |  0.86    Ca    8.8      21 Sep 2017 08:36    TPro  8.0  /  Alb  2.9<L>  /  TBili  0.9  /  DBili  x   /  AST  32  /  ALT  31  /  AlkPhos  59  09-21        CAPILLARY BLOOD GLUCOSE  268 (21 Sep 2017 11:02)  140 (21 Sep 2017 07:30)  105 (20 Sep 2017 22:50)  134 (20 Sep 2017 14:30)    Sedimentation Rate, Erythrocyte: 46 mm/hr (09-20 @ 06:58)  Sedimentation Rate, Erythrocyte: 38 mm/hr (09-19 @ 21:22)  Urine Culture:      Blood Culture:      RADIOLOGY & ADDITIONAL TESTS:  < from: MRI Hip w/o Cont, Left (09.19.17 @ 16:52) >    EXAM:  MRI HIP W O CONT LT                            PROCEDURE DATE:  09/19/2017          INTERPRETATION:  History: Abscess.    Multiplanar multisequence noncontrast MRI of the left hip was performed   utilizing axial coronal T1 and STIR pulse sequences of the entire pelvis   and sagittal T1 and STIR pulse sequences of the left hip.    Correlation is made with CT of the bony pelvis from September 17, 2017.    Findings:    There is redemonstration of a large fluid collection within the left   greater trochanteric bursa which extends from the level of the proximal   femur to the level of the iliac crest. This collection measures 6.4 x 4.2   x 8.4 cm and contains internal debris. Findings are consistent with   patient's stated history of abscess. This collection tracks along the   superficial myofascia along the periphery of the left gluteus minimus and   medius tendons and remains deep to the iliotibial tract. Mild   intramuscular edema is noted within the left gluteus minimus and medius  muscles suggestive of mild myositis. The collection is closely opposes   the lateral aspect of the greater trochanter. There is no subjacent   marrow signal abnormality to suggest osteomyelitis. Marrow signal within   the remainder of the pelvis ispreserved. There is no evidence of acute   fracture.    There is mild bilateral hip arthrosis. There is mild to moderate pubic   symphysis arthrosis. Sacroiliac joints are preserved. Lower lumbar spine   is grossly unremarkable.    There is no evidence of acute tendinous injury.    There is a fibroid uterus. Tubular cystic structures are noted within the   left adnexal region. The more rounded of these structures measures   approximately 5.2 x 5.1 x 4.1 cm. Further evaluation with pelvic   ultrasound is recommended.    Impression:    Large fluid collection within the left greater trochanteric bursa as   outlined above consistent with stated history of abscess. No evidence of   osteomyelitis. Mild adjacent myositis within the left gluteus minimus and   medius tendons.    Tubular cystic structures in the left adnexal region. Further evaluation   with pelvic ultrasound is recommended.    JEIMY GUTIERREZ M.D., ATTENDING RADIOLOGIST  This document has been electronically signed. Sep 19 2017  5:16PM      < end of copied text >    Imaging Personally Reviewed:  [ ] YES  [ ] NO    Consultant(s) Notes Reviewed:  [x ] YES  [ ] NO    Care Discussed with Consultants/Other Providers [ ] YES  [ ] NO    PROBLEMS:  ABSCESS  FROM ORZAC-DRAIN ABCESS  Abscess  Hypokalemia due to loss of potassium  Diabetes mellitus  Type 2 diabetes mellitus with other circulatory complication  Essential hypertension  Gastroesophageal reflux disease, esophagitis presence not specified  Abscess      Care discussed with family,         [  ]   yes  [  ]  No    imp:    stable[ ]    unstable[  ]     improving [   ]       unchanged  [  ]                Plans:  Continue present plans  [  ]               New consult [  ]   specialty  .......               order joshua[  ]    test name.                  Discharge Planning  [  ]

## 2017-09-21 NOTE — PHYSICAL THERAPY INITIAL EVALUATION ADULT - GAIT DEVIATIONS NOTED, PT EVAL
decreased velocity of limb motion/decreased weight-shifting ability/increased time in double stance/decreased stride length/decreased step length/decreased brian
decreased step length/decreased stride length/decreased weight-shifting ability/decreased brian/increased time in double stance/decreased velocity of limb motion

## 2017-09-21 NOTE — PROGRESS NOTE ADULT - SUBJECTIVE AND OBJECTIVE BOX
INTERVAL HPI/OVERNIGHT EVENTS:    Patient lying comfortably.  Complaint of min postop pain.  No acute event overnight.      Vital Signs Last 24 Hrs  T(C): 37.3 (21 Sep 2017 05:30), Max: 37.6 (20 Sep 2017 21:32)  T(F): 99.1 (21 Sep 2017 05:30), Max: 99.6 (20 Sep 2017 21:32)  HR: 91 (21 Sep 2017 05:30) (65 - 91)  BP: 149/81 (21 Sep 2017 05:30) (132/68 - 160/74)  BP(mean): --  RR: 16 (21 Sep 2017 05:30) (12 - 17)  SpO2: 97% (21 Sep 2017 05:30) (95% - 996%)    MEDICATIONS  (STANDING):  influenza   Vaccine 0.5 milliLiter(s) IntraMuscular once  metFORMIN 500 milliGRAM(s) Oral daily  metoprolol 50 milliGRAM(s) Oral two times a day  amLODIPine   Tablet 10 milliGRAM(s) Oral daily  nafcillin  IVPB 2 Gram(s) IV Intermittent every 4 hours  ciprofloxacin     Tablet 500 milliGRAM(s) Oral two times a day  enoxaparin Injectable 40 milliGRAM(s) SubCutaneous every 24 hours  hydrALAZINE 50 milliGRAM(s) Oral every 8 hours  insulin lispro (HumaLOG) corrective regimen sliding scale   SubCutaneous three times a day before meals  dextrose 5%. 1000 milliLiter(s) (50 mL/Hr) IV Continuous <Continuous>  dextrose 50% Injectable 12.5 Gram(s) IV Push once  dextrose 50% Injectable 25 Gram(s) IV Push once  dextrose 50% Injectable 25 Gram(s) IV Push once    MEDICATIONS  (PRN):  acetaminophen   Tablet 650 milliGRAM(s) Oral every 6 hours PRN For Temp greater than 38 C (100.4 F) and headache  aluminum hydroxide/magnesium hydroxide/simethicone Suspension 30 milliLiter(s) Oral four times a day PRN Indigestion  acetaminophen   Tablet. 650 milliGRAM(s) Oral every 6 hours PRN Moderate Pain (4 - 6)  dextrose Gel 1 Dose(s) Oral once PRN Blood Glucose LESS THAN 70 milliGRAM(s)/deciliter  glucagon  Injectable 1 milliGRAM(s) IntraMuscular once PRN Glucose LESS THAN 70 milligrams/deciliter  morphine  - Injectable 2 milliGRAM(s) IV Push every 6 hours PRN Severe Pain (7 - 10)      PHYSICAL EXAM:    GENERAL: NAD  HEAD:  Atraumatic, Normocephalic  EYES: EOMI, PERRLA, conjunctiva and sclera clear  CHEST/LUNG: Clear to ausculation, bilaterally   HEART: S1S2  ABDOMEN: non distended, +BS, soft, non tender, no guarding  EXTREMITIES:  Left hip wound dressing c/d/i    I&O's Detail    20 Sep 2017 07:01  -  21 Sep 2017 07:00  --------------------------------------------------------  IN:    lactated ringers.: 75 mL    Oral Fluid: 360 mL    Solution: 300 mL  Total IN: 735 mL    OUT:    Voided: 400 mL  Total OUT: 400 mL    Total NET: 335 mL          LABS:    09-20    144  |  110<H>  |  17  ----------------------------<  114<H>  3.9   |  25  |  0.80    Ca    9.0      20 Sep 2017 06:58    TPro  8.4<H>  /  Alb  3.1<L>  /  TBili  0.7  /  DBili  x   /  AST  33  /  ALT  24  /  AlkPhos  64  09-20    RADIOLOGY & ADDITIONAL STUDIES:    < from: MRI Hip w/o Cont, Left (09.19.17 @ 16:52) >  Large fluid collection within the left greater trochanteric bursa as   outlined above consistent with stated history of abscess. No evidence of   osteomyelitis. Mild adjacent myositis within the left gluteus minimus and   medius tendons.    Tubular cystic structures in the left adnexal region. Further evaluation   with pelvic ultrasound is recommended.    < end of copied text >    Impression:    67F with HTN, GERD, DM, a/w left hip abscess s/p I and D/Exc Debridement of Left hip POD 1     Plan:  keep dressing intact/reinforce prn   will take dressing down tomorrow.  PT eval for vac dressing   cont Abx   ffup C/S   continue medical management/supportive care   prophylactic measure

## 2017-09-21 NOTE — PHYSICAL THERAPY INITIAL EVALUATION ADULT - ADDITIONAL COMMENTS
Pt lives in house without stairs. Pt reports  she has a HHA 3 days a week for 3 hours a day to assist with ADL's. (cooking, cleaning), however would need to be set back up after hospitalization

## 2017-09-21 NOTE — PROGRESS NOTE ADULT - ASSESSMENT
Pt. admitted with abscess left hip. To be drained by surgery. Continue IV Nafcillin and Po Cipro. Pot. supplemented for hypokalemia.  Asymptomatic. Potassium normalized. Cleared for surgery. Orthopedics and ID consults noted. S/P  I & D of left hip collection. Continue IV antibiotics for now.  Awaitng culture reports.

## 2017-09-21 NOTE — PHYSICAL THERAPY INITIAL EVALUATION ADULT - MODIFIED CLINICAL TEST OF SENSORY INTEGRATION IN BALANCE TEST
Barthel Index: Feeding Score __10_, Bathing Score _5__, Grooming Score _5__, Dressing Score __10_, Bowels Score ___10, Bladder Score __10_, Toilet Score __10_, Transfers Score _15__, Mobility Score _0__, Stairs Score _0__,     Total Score __75_
Barthel Index: Feeding Score __10_, Bathing Score _5__, Grooming Score _5__, Dressing Score __10_, Bowels Score ___10, Bladder Score __10_, Toilet Score __10_, Transfers Score _15__, Mobility Score _10__, Stairs Score _0__,     Total Score __85_

## 2017-09-21 NOTE — PROGRESS NOTE ADULT - SUBJECTIVE AND OBJECTIVE BOX
HPI:  · HPI: 68 yo female presents with abscess to left thigh area. Patient states that she has has more than one abscess to this site since receiving skin graft from this site. Patient presently on nafcillin and cipro. Patient presently in rehab for same.  CT scan done shows increasing fluid collection. Admitted for I & D by surgery. (19 Sep 2017 10:11)  sp I&d  all culture NEGATIVE     Allergies    No Known Allergies    Intolerances        MEDICATIONS  (STANDING):  influenza   Vaccine 0.5 milliLiter(s) IntraMuscular once  metFORMIN 500 milliGRAM(s) Oral daily  metoprolol 50 milliGRAM(s) Oral two times a day  amLODIPine   Tablet 10 milliGRAM(s) Oral daily  nafcillin  IVPB 2 Gram(s) IV Intermittent every 4 hours  ciprofloxacin     Tablet 500 milliGRAM(s) Oral two times a day  enoxaparin Injectable 40 milliGRAM(s) SubCutaneous every 24 hours  hydrALAZINE 50 milliGRAM(s) Oral every 8 hours  insulin lispro (HumaLOG) corrective regimen sliding scale   SubCutaneous three times a day before meals  dextrose 5%. 1000 milliLiter(s) (50 mL/Hr) IV Continuous <Continuous>  dextrose 50% Injectable 12.5 Gram(s) IV Push once  dextrose 50% Injectable 25 Gram(s) IV Push once  dextrose 50% Injectable 25 Gram(s) IV Push once  potassium chloride    Tablet ER 20 milliEquivalent(s) Oral daily    MEDICATIONS  (PRN):  acetaminophen   Tablet 650 milliGRAM(s) Oral every 6 hours PRN For Temp greater than 38 C (100.4 F) and headache  aluminum hydroxide/magnesium hydroxide/simethicone Suspension 30 milliLiter(s) Oral four times a day PRN Indigestion  acetaminophen   Tablet. 650 milliGRAM(s) Oral every 6 hours PRN Moderate Pain (4 - 6)  dextrose Gel 1 Dose(s) Oral once PRN Blood Glucose LESS THAN 70 milliGRAM(s)/deciliter  glucagon  Injectable 1 milliGRAM(s) IntraMuscular once PRN Glucose LESS THAN 70 milligrams/deciliter  morphine  - Injectable 2 milliGRAM(s) IV Push every 6 hours PRN Severe Pain (7 - 10)      REVIEW OF SYSTEMS:  feels fine   CONSTITUTIONAL: No fever, chills, weight loss, or fatigue  HEENT: No sore throat, runny nose, ear ache  RESPIRATORY: No cough, wheezing, No shortness of breath  CARDIOVASCULAR: No chest pain, palpitations, dizziness  GASTROINTESTINAL: No abdominal pain. No nausea, vomiting, diarrhea  GENITOURINARY: No dysuria, increase frequency, hematuria, or incontinence  NEUROLOGICAL: No headaches, memory loss, loss of strength, numbness, or tremors, no weakness  EXTREMITY: some pain   SKIN: No itching, burning, rashes, or lesions     VITAL SIGNS:  T(C): 37.2 (09-21-17 @ 17:19), Max: 37.7 (09-21-17 @ 09:30)  T(F): 99 (09-21-17 @ 17:19), Max: 99.8 (09-21-17 @ 09:30)  HR: 106 (09-21-17 @ 17:19) (83 - 106)  BP: 138/87 (09-21-17 @ 17:19) (138/87 - 166/75)  RR: 18 (09-21-17 @ 17:19) (16 - 18)  SpO2: 97% (09-21-17 @ 17:19) (97% - 98%)  Wt(kg): --    PHYSICAL EXAM:    GENERAL: not in any distress  HEENT: Neck is supple, normocephalic, atraumatic   CHEST/LUNG: Clear  bilaterally; No rales, rhonchi, wheezing  HEART: Regular rate and rhythm; No murmurs, rubs, or gallops  ABDOMEN: Soft, Nontender, Nondistended; Bowel sounds present, no rebound   EXTREMITIES:  2+ Peripheral Pulses, No clubbing, cyanosis, or edema  hip no change in status   GENITOURINARY: NEGATIVE   SKIN: No rashes or lesions  BACK: no pressor sore   NERVOUS SYSTEM:  Alert & Oriented X3, Good concentration  PSYCH: normal affect     LABS:                         11.1   6.2   )-----------( 346      ( 21 Sep 2017 08:36 )             33.6     09-21    143  |  109<H>  |  19  ----------------------------<  140<H>  3.4<L>   |  25  |  0.86    Ca    8.8      21 Sep 2017 08:36    TPro  8.0  /  Alb  2.9<L>  /  TBili  0.9  /  DBili  x   /  AST  32  /  ALT  31  /  AlkPhos  59  09-21    LIVER FUNCTIONS - ( 21 Sep 2017 08:36 )  Alb: 2.9 g/dL / Pro: 8.0 gm/dL / ALK PHOS: 59 U/L / ALT: 31 U/L / AST: 32 U/L / GGT: x                         Sedimentation Rate, Erythrocyte: 46 mm/hr (09-20 @ 06:58)  Sedimentation Rate, Erythrocyte: 38 mm/hr (09-19 @ 21:22)                          Radiology:

## 2017-09-21 NOTE — PROGRESS NOTE ADULT - ASSESSMENT
recurrent hip abscess ?? deroof abscess cavity ; on ancef and po cipro   antibiotics to continue   follow up culture   will follow

## 2017-09-22 ENCOUNTER — TRANSCRIPTION ENCOUNTER (OUTPATIENT)
Age: 67
End: 2017-09-22

## 2017-09-22 VITALS
SYSTOLIC BLOOD PRESSURE: 155 MMHG | DIASTOLIC BLOOD PRESSURE: 67 MMHG | OXYGEN SATURATION: 96 % | TEMPERATURE: 98 F | RESPIRATION RATE: 16 BRPM | HEART RATE: 93 BPM

## 2017-09-22 DIAGNOSIS — I10 ESSENTIAL (PRIMARY) HYPERTENSION: ICD-10-CM

## 2017-09-22 DIAGNOSIS — K21.9 GASTRO-ESOPHAGEAL REFLUX DISEASE WITHOUT ESOPHAGITIS: ICD-10-CM

## 2017-09-22 LAB
ANION GAP SERPL CALC-SCNC: 8 MMOL/L — SIGNIFICANT CHANGE UP (ref 5–17)
BUN SERPL-MCNC: 18 MG/DL — SIGNIFICANT CHANGE UP (ref 7–23)
CALCIUM SERPL-MCNC: 8.2 MG/DL — LOW (ref 8.5–10.1)
CHLORIDE SERPL-SCNC: 110 MMOL/L — HIGH (ref 96–108)
CO2 SERPL-SCNC: 25 MMOL/L — SIGNIFICANT CHANGE UP (ref 22–31)
CREAT SERPL-MCNC: 0.8 MG/DL — SIGNIFICANT CHANGE UP (ref 0.5–1.3)
GLUCOSE SERPL-MCNC: 130 MG/DL — HIGH (ref 70–99)
HCT VFR BLD CALC: 30.6 % — LOW (ref 34.5–45)
HGB BLD-MCNC: 10.3 G/DL — LOW (ref 11.5–15.5)
MCHC RBC-ENTMCNC: 30.4 PG — SIGNIFICANT CHANGE UP (ref 27–34)
MCHC RBC-ENTMCNC: 33.5 GM/DL — SIGNIFICANT CHANGE UP (ref 32–36)
MCV RBC AUTO: 90.6 FL — SIGNIFICANT CHANGE UP (ref 80–100)
NIGHT BLUE STAIN TISS: SIGNIFICANT CHANGE UP
PLATELET # BLD AUTO: 278 K/UL — SIGNIFICANT CHANGE UP (ref 150–400)
POTASSIUM SERPL-MCNC: 3.5 MMOL/L — SIGNIFICANT CHANGE UP (ref 3.5–5.3)
POTASSIUM SERPL-SCNC: 3.5 MMOL/L — SIGNIFICANT CHANGE UP (ref 3.5–5.3)
RBC # BLD: 3.38 M/UL — LOW (ref 3.8–5.2)
RBC # FLD: 14.8 % — SIGNIFICANT CHANGE UP (ref 11–15)
SODIUM SERPL-SCNC: 143 MMOL/L — SIGNIFICANT CHANGE UP (ref 135–145)
SPECIMEN SOURCE: SIGNIFICANT CHANGE UP
WBC # BLD: 4.8 K/UL — SIGNIFICANT CHANGE UP (ref 3.8–10.5)
WBC # FLD AUTO: 4.8 K/UL — SIGNIFICANT CHANGE UP (ref 3.8–10.5)

## 2017-09-22 RX ORDER — METFORMIN HYDROCHLORIDE 850 MG/1
1 TABLET ORAL
Qty: 0 | Refills: 0 | DISCHARGE
Start: 2017-09-22

## 2017-09-22 RX ORDER — INSULIN LISPRO 100/ML
0 VIAL (ML) SUBCUTANEOUS
Qty: 0 | Refills: 0 | DISCHARGE
Start: 2017-09-22

## 2017-09-22 RX ORDER — ACETAMINOPHEN 500 MG
2 TABLET ORAL
Qty: 0 | Refills: 0 | DISCHARGE
Start: 2017-09-22

## 2017-09-22 RX ORDER — METFORMIN HYDROCHLORIDE 850 MG/1
1 TABLET ORAL
Qty: 0 | Refills: 0 | COMMUNITY

## 2017-09-22 RX ORDER — AMLODIPINE BESYLATE 2.5 MG/1
1 TABLET ORAL
Qty: 0 | Refills: 0 | DISCHARGE
Start: 2017-09-22

## 2017-09-22 RX ORDER — CIPROFLOXACIN LACTATE 400MG/40ML
1 VIAL (ML) INTRAVENOUS
Qty: 0 | Refills: 0 | DISCHARGE
Start: 2017-09-22

## 2017-09-22 RX ORDER — GLIMEPIRIDE 1 MG
1 TABLET ORAL
Qty: 0 | Refills: 0 | COMMUNITY

## 2017-09-22 RX ORDER — HYDRALAZINE HCL 50 MG
1 TABLET ORAL
Qty: 0 | Refills: 0 | DISCHARGE
Start: 2017-09-22

## 2017-09-22 RX ORDER — METOPROLOL TARTRATE 50 MG
1 TABLET ORAL
Qty: 0 | Refills: 0 | DISCHARGE
Start: 2017-09-22

## 2017-09-22 RX ORDER — NAFCILLIN 10 G/100ML
2 INJECTION, POWDER, FOR SOLUTION INTRAVENOUS
Qty: 480 | Refills: 0
Start: 2017-09-22 | End: 2017-11-01

## 2017-09-22 RX ADMIN — Medication 50 MILLIGRAM(S): at 14:23

## 2017-09-22 RX ADMIN — NAFCILLIN 200 GRAM(S): 10 INJECTION, POWDER, FOR SOLUTION INTRAVENOUS at 10:35

## 2017-09-22 RX ADMIN — Medication 50 MILLIGRAM(S): at 05:41

## 2017-09-22 RX ADMIN — AMLODIPINE BESYLATE 10 MILLIGRAM(S): 2.5 TABLET ORAL at 05:41

## 2017-09-22 RX ADMIN — NAFCILLIN 200 GRAM(S): 10 INJECTION, POWDER, FOR SOLUTION INTRAVENOUS at 14:23

## 2017-09-22 RX ADMIN — METFORMIN HYDROCHLORIDE 500 MILLIGRAM(S): 850 TABLET ORAL at 12:04

## 2017-09-22 RX ADMIN — Medication 20 MILLIEQUIVALENT(S): at 12:04

## 2017-09-22 RX ADMIN — NAFCILLIN 200 GRAM(S): 10 INJECTION, POWDER, FOR SOLUTION INTRAVENOUS at 05:41

## 2017-09-22 RX ADMIN — NAFCILLIN 200 GRAM(S): 10 INJECTION, POWDER, FOR SOLUTION INTRAVENOUS at 03:15

## 2017-09-22 RX ADMIN — Medication 500 MILLIGRAM(S): at 05:41

## 2017-09-22 RX ADMIN — Medication 2: at 10:35

## 2017-09-22 NOTE — DISCHARGE NOTE ADULT - MEDICATION SUMMARY - MEDICATIONS TO STOP TAKING
I will STOP taking the medications listed below when I get home from the hospital:    glimepiride  -- 1 tab(s) by mouth once a day

## 2017-09-22 NOTE — PROGRESS NOTE ADULT - ASSESSMENT
Pt. admitted with abscess left hip. To be drained by surgery. Continue IV Nafcillin and Po Cipro. Pot. supplemented for hypokalemia.  Asymptomatic. Potassium normalized. Cleared for surgery. Orthopedics and ID consults noted. S/P  I & D of left hip collection. Continue IV antibiotics for now.  Awaitng culture reports. On wound vac.

## 2017-09-22 NOTE — PROGRESS NOTE ADULT - ASSESSMENT
recurrent hip abscess ?? deroof abscess cavity ; on ancef and po cipro   antibiotics to continue   follow up culture so far NEGATIVE   case discussed with dr ruvalcaba ; all necrotic muscle not really abscess   regardless new culture for tb and fungus to be sent by surgical PA today and [facility]   discussed with NP migel today

## 2017-09-22 NOTE — CHART NOTE - NSCHARTNOTEFT_GEN_A_CORE
Patient seen with Dr. Brunson.  Tissue taken from left hip wound for AFB/Fungal.  Patient tolerated the procedure well.

## 2017-09-22 NOTE — DISCHARGE NOTE ADULT - PLAN OF CARE
resolving f/u with the tissue cx with Dr. Henriquez. and continue the IV and PO antibx as prescribed above stable continue home medication

## 2017-09-22 NOTE — PROGRESS NOTE ADULT - SUBJECTIVE AND OBJECTIVE BOX
Patient is a 67y old  Female who presents with a chief complaint of Swelling Left Hip (19 Sep 2017 10:11)      INTERVAL HPI/OVERNIGHT EVENTS:    MEDICATIONS  (STANDING):  influenza   Vaccine 0.5 milliLiter(s) IntraMuscular once  metFORMIN 500 milliGRAM(s) Oral daily  metoprolol 50 milliGRAM(s) Oral two times a day  amLODIPine   Tablet 10 milliGRAM(s) Oral daily  nafcillin  IVPB 2 Gram(s) IV Intermittent every 4 hours  ciprofloxacin     Tablet 500 milliGRAM(s) Oral two times a day  enoxaparin Injectable 40 milliGRAM(s) SubCutaneous every 24 hours  hydrALAZINE 50 milliGRAM(s) Oral every 8 hours  insulin lispro (HumaLOG) corrective regimen sliding scale   SubCutaneous three times a day before meals  dextrose 5%. 1000 milliLiter(s) (50 mL/Hr) IV Continuous <Continuous>  dextrose 50% Injectable 12.5 Gram(s) IV Push once  dextrose 50% Injectable 25 Gram(s) IV Push once  dextrose 50% Injectable 25 Gram(s) IV Push once  potassium chloride    Tablet ER 20 milliEquivalent(s) Oral daily    MEDICATIONS  (PRN):  acetaminophen   Tablet 650 milliGRAM(s) Oral every 6 hours PRN For Temp greater than 38 C (100.4 F) and headache  aluminum hydroxide/magnesium hydroxide/simethicone Suspension 30 milliLiter(s) Oral four times a day PRN Indigestion  acetaminophen   Tablet. 650 milliGRAM(s) Oral every 6 hours PRN Moderate Pain (4 - 6)  dextrose Gel 1 Dose(s) Oral once PRN Blood Glucose LESS THAN 70 milliGRAM(s)/deciliter  glucagon  Injectable 1 milliGRAM(s) IntraMuscular once PRN Glucose LESS THAN 70 milligrams/deciliter  morphine  - Injectable 2 milliGRAM(s) IV Push every 6 hours PRN Severe Pain (7 - 10)      Allergies    No Known Allergies    Intolerances        REVIEW OF SYSTEMS:  CONSTITUTIONAL: No fever, weight loss, or fatigue  EYES: No eye pain, visual disturbances, or discharge  ENMT:  No difficulty hearing, tinnitus, vertigo; No sinus or throat pain  NECK: No pain or stiffness  BREASTS: No pain, masses, or nipple discharge  RESPIRATORY: No cough, wheezing, chills or hemoptysis; No shortness of breath  CARDIOVASCULAR: No chest pain, palpitations, dizziness, or leg swelling  GASTROINTESTINAL: No abdominal or epigastric pain. No nausea, vomiting, or hematemesis; No diarrhea or constipation. No melena or hematochezia.  GENITOURINARY: No dysuria, frequency, hematuria, or incontinence  NEUROLOGICAL: No headaches, memory loss, loss of strength, numbness, or tremors  SKIN: No itching, burning, rashes, or lesions   LYMPH NODES: No enlarged glands  ENDOCRINE: No heat or cold intolerance; No hair loss  MUSCULOSKELETAL: No joint pain or swelling; No muscle, back, or extremity pain  PSYCHIATRIC: No depression, anxiety, mood swings, or difficulty sleeping  HEME/LYMPH: No easy bruising, or bleeding gums  ALLERGY AND IMMUNOLOGIC: No hives or eczema    Vital Signs Last 24 Hrs  T(C): 37.1 (22 Sep 2017 05:16), Max: 37.7 (21 Sep 2017 23:30)  T(F): 98.8 (22 Sep 2017 05:16), Max: 99.8 (21 Sep 2017 23:30)  HR: 81 (22 Sep 2017 05:16) (81 - 106)  BP: 156/83 (22 Sep 2017 05:16) (138/87 - 166/75)  BP(mean): --  RR: 16 (22 Sep 2017 05:16) (16 - 18)  SpO2: 97% (22 Sep 2017 05:16) (97% - 98%)    PHYSICAL EXAM:  GENERAL: NAD, well-groomed, well-developed  HEAD:  Atraumatic, Normocephalic  EYES: EOMI, PERRLA, conjunctiva and sclera clear  ENMT:  Moist mucous membranes, Good dentition, No lesions  NECK: Supple, No JVD, Normal thyroid  NERVOUS SYSTEM:  Alert & Oriented X3, Good concentration; Motor Strength 5/5 B/L upper and lower extremities; DTRs 2+ intact and symmetric  CHEST/LUNG: Clear to percussion bilaterally; No rales, rhonchi, wheezing, or rubs  HEART: Regular rate and rhythm; No murmurs, rubs, or gallops  ABDOMEN: Soft, Nontender, Nondistended; Bowel sounds present  EXTREMITIES:  2+ Peripheral Pulses, No clubbing, cyanosis, or edema  LYMPH: No lymphadenopathy noted  SKIN: No rashes or lesions    LABS:                        10.3   4.8   )-----------( 278      ( 22 Sep 2017 08:03 )             30.6     09-22    143  |  110<H>  |  18  ----------------------------<  130<H>  3.5   |  25  |  0.80    Ca    8.2<L>      22 Sep 2017 08:03    TPro  8.0  /  Alb  2.9<L>  /  TBili  0.9  /  DBili  x   /  AST  32  /  ALT  31  /  AlkPhos  59  09-21        CAPILLARY BLOOD GLUCOSE  129 (22 Sep 2017 07:41)  151 (21 Sep 2017 23:30)  153 (21 Sep 2017 16:24)  268 (21 Sep 2017 11:02)              Sedimentation Rate, Erythrocyte: 46 mm/hr (09-20 @ 06:58)  Sedimentation Rate, Erythrocyte: 38 mm/hr (09-19 @ 21:22)        Urine Culture:      Blood Culture:      RADIOLOGY & ADDITIONAL TESTS:    Imaging Personally Reviewed:  [ ] YES  [ ] NO    Consultant(s) Notes Reviewed:  [ ] YES  [ ] NO    Care Discussed with Consultants/Other Providers [ ] YES  [ ] NO    PROBLEMS:  ABSCESS  FROM ORZAC-DRAIN ABCESS  Abscess  Hypokalemia due to loss of potassium  Diabetes mellitus  Type 2 diabetes mellitus with other circulatory complication  Essential hypertension  Gastroesophageal reflux disease, esophagitis presence not specified  Abscess      Care discussed with family,         [  ]   yes  [  ]  No    imp:    stable[ ]    unstable[  ]     improving [   ]       unchanged  [  ]                Plans:  Continue present plans  [  ]               New consult [  ]   specialty  .......               order joshua[  ]    test name.                  Discharge Planning  [  ]

## 2017-09-22 NOTE — DISCHARGE NOTE ADULT - HOSPITAL COURSE
67y old  Female admitted with abscess left hip. Seen by surgery. Continue IV Nafcillin and Po Cipro as per Dr. Henriquez. Pot. supplemented for hypokalemia.  Asymptomatic. Potassium normalized. S/P  I & D of left hip collection. has a wound vac in place. Continue IV antibiotics for now.  Awaiting culture reports sent today.

## 2017-09-22 NOTE — DISCHARGE NOTE ADULT - PATIENT PORTAL LINK FT
“You can access the [facility] Patient Portal, offered by [facility], by registering with the following website: [email or web address]”

## 2017-09-22 NOTE — PROGRESS NOTE ADULT - SUBJECTIVE AND OBJECTIVE BOX
68 yo female presents with abscess to left thigh area. Patient states that she has has more than one abscess to this site since receiving skin graft from this site. Patient presently on nafcillin and cipro. Patient presently in rehab for same.  CT scan done shows increasing fluid collection. Admitted for I & D by surgery. (19 Sep 2017 10:11)      Allergies    No Known Allergies    Intolerances        MEDICATIONS  (STANDING):  influenza   Vaccine 0.5 milliLiter(s) IntraMuscular once  metFORMIN 500 milliGRAM(s) Oral daily  metoprolol 50 milliGRAM(s) Oral two times a day  amLODIPine   Tablet 10 milliGRAM(s) Oral daily  nafcillin  IVPB 2 Gram(s) IV Intermittent every 4 hours  ciprofloxacin     Tablet 500 milliGRAM(s) Oral two times a day  enoxaparin Injectable 40 milliGRAM(s) SubCutaneous every 24 hours  hydrALAZINE 50 milliGRAM(s) Oral every 8 hours  insulin lispro (HumaLOG) corrective regimen sliding scale   SubCutaneous three times a day before meals  dextrose 5%. 1000 milliLiter(s) (50 mL/Hr) IV Continuous <Continuous>  dextrose 50% Injectable 12.5 Gram(s) IV Push once  dextrose 50% Injectable 25 Gram(s) IV Push once  dextrose 50% Injectable 25 Gram(s) IV Push once  potassium chloride    Tablet ER 20 milliEquivalent(s) Oral daily    MEDICATIONS  (PRN):  acetaminophen   Tablet 650 milliGRAM(s) Oral every 6 hours PRN For Temp greater than 38 C (100.4 F) and headache  aluminum hydroxide/magnesium hydroxide/simethicone Suspension 30 milliLiter(s) Oral four times a day PRN Indigestion  acetaminophen   Tablet. 650 milliGRAM(s) Oral every 6 hours PRN Moderate Pain (4 - 6)  dextrose Gel 1 Dose(s) Oral once PRN Blood Glucose LESS THAN 70 milliGRAM(s)/deciliter  glucagon  Injectable 1 milliGRAM(s) IntraMuscular once PRN Glucose LESS THAN 70 milligrams/deciliter  morphine  - Injectable 2 milliGRAM(s) IV Push every 6 hours PRN Severe Pain (7 - 10)      REVIEW OF SYSTEMS:  feels fine   pain hip but all better   CONSTITUTIONAL: No fever, chills, weight loss, or fatigue  HEENT: No sore throat, runny nose, ear ache  RESPIRATORY: No cough, wheezing, No shortness of breath  CARDIOVASCULAR: No chest pain, palpitations, dizziness  GASTROINTESTINAL: No abdominal pain. No nausea, vomiting, diarrhea  GENITOURINARY: No dysuria, increase frequency, hematuria, or incontinence  NEUROLOGICAL: No headaches, memory loss, loss of strength, numbness, or tremors, no weakness  EXTREMITY: No pedal edema BLE  SKIN: No itching, burning, rashes, or lesions     VITAL SIGNS:  T(C): 37.1 (09-22-17 @ 11:10), Max: 37.7 (09-21-17 @ 23:30)  T(F): 98.8 (09-22-17 @ 11:10), Max: 99.8 (09-21-17 @ 23:30)  HR: 87 (09-22-17 @ 11:10) (81 - 106)  BP: 160/92 (09-22-17 @ 11:10) (138/87 - 160/92)  RR: 18 (09-22-17 @ 11:10) (16 - 18)  SpO2: 97% (09-22-17 @ 11:10) (97% - 98%)  Wt(kg): --    PHYSICAL EXAM:    GENERAL: not in any distress  HEENT: Neck is supple, normocephalic, atraumatic   CHEST/LUNG: Clear to percussion bilaterally; No rales, rhonchi, wheezing  HEART: Regular rate and rhythm; No murmurs, rubs, or gallops  ABDOMEN: Soft, Nontender, Nondistended; Bowel sounds present, no rebound   EXTREMITIES:  2+ Peripheral Pulses, No clubbing, cyanosis, or edema  wound vac to hip   BACK: no pressor sore   NERVOUS SYSTEM:  Alert & Oriented X3, Good concentration  PSYCH: normal affect     LABS:                         10.3   4.8   )-----------( 278      ( 22 Sep 2017 08:03 )             30.6     09-22    143  |  110<H>  |  18  ----------------------------<  130<H>  3.5   |  25  |  0.80    Ca    8.2<L>      22 Sep 2017 08:03    TPro  8.0  /  Alb  2.9<L>  /  TBili  0.9  /  DBili  x   /  AST  32  /  ALT  31  /  AlkPhos  59  09-21    LIVER FUNCTIONS - ( 21 Sep 2017 08:36 )  Alb: 2.9 g/dL / Pro: 8.0 gm/dL / ALK PHOS: 59 U/L / ALT: 31 U/L / AST: 32 U/L / GGT: x                         Sedimentation Rate, Erythrocyte: 46 mm/hr (09-20 @ 06:58)  Sedimentation Rate, Erythrocyte: 38 mm/hr (09-19 @ 21:22)                          Radiology:

## 2017-09-22 NOTE — PROGRESS NOTE ADULT - PROBLEM SELECTOR PROBLEM 2
GERD (gastroesophageal reflux disease)
Gastroesophageal reflux disease, esophagitis presence not specified

## 2017-09-22 NOTE — PROGRESS NOTE ADULT - PROVIDER SPECIALTY LIST ADULT
Heme/Onc
Infectious Disease
Infectious Disease
Internal Medicine
Orthopedics
Surgery

## 2017-09-22 NOTE — DISCHARGE NOTE ADULT - MEDICATION SUMMARY - MEDICATIONS TO TAKE
I will START or STAY ON the medications listed below when I get home from the hospital:    acetaminophen 325 mg oral tablet  -- 2 tab(s) by mouth every 6 hours, As needed, For Temp greater than 38 C (100.4 F) and headache  -- Indication: For As needed for tem    acetaminophen 325 mg oral tablet  -- 2 tab(s) by mouth every 6 hours, As needed, Moderate Pain (4 - 6)  -- Indication: For As needed for Pain    aluminum hydroxide-magnesium hydroxide 200 mg-200 mg/5 mL oral suspension  -- 30 milliliter(s) by mouth 4 times a day, As needed, Indigestion  -- Indication: For Dyspepsia    insulin lispro 100 units/mL subcutaneous solution  --  subcutaneous 3 times a day (before meals); 2 Unit(s) if Glucose 151 - 200  4 Unit(s) if Glucose 201 - 250  6 Unit(s) if Glucose 251 - 300  8 Unit(s) if Glucose 301 - 350  10 Unit(s) if Glucose 351 - 400  12 Unit(s) if Glucose Greater Than 400  -- Indication: For Diabetes mellitus    metFORMIN 500 mg oral tablet  -- 1 tab(s) by mouth once a day  -- Indication: For Diabetes mellitus    hydroCHLOROthiazide-triamterene 25 mg-37.5 mg oral capsule  -- 1 cap(s) by mouth once a day  -- Indication: For Essential hypertension    metoprolol tartrate 50 mg oral tablet  -- 1 tab(s) by mouth 2 times a day  -- Indication: For Essential hypertension    amLODIPine 10 mg oral tablet  -- 1 tab(s) by mouth once a day  -- Indication: For Essential hypertension    nafcillin 2 g/100 mL intravenous solution  -- 2 gram(s) intravenous every 4 hours  -- Indication: For Abscess    ciprofloxacin 500 mg oral tablet  -- 1 tab(s) by mouth 2 times a day  -- Indication: For Abscess    hydrALAZINE 50 mg oral tablet  -- 1 tab(s) by mouth every 8 hours  -- Indication: For Essential hypertension

## 2017-09-22 NOTE — DISCHARGE NOTE ADULT - CARE PLAN
Principal Discharge DX:	Abscess  Goal:	resolving  Instructions for follow-up, activity and diet:	f/u with the tissue cx with Dr. Henriquez. and continue the IV and PO antibx as prescribed above  Secondary Diagnosis:	Gastroesophageal reflux disease, esophagitis presence not specified  Goal:	stable  Instructions for follow-up, activity and diet:	continue home medication  Secondary Diagnosis:	Essential hypertension  Goal:	stable  Instructions for follow-up, activity and diet:	continue home medication  Secondary Diagnosis:	Type 2 diabetes mellitus with other circulatory complication  Goal:	stable  Instructions for follow-up, activity and diet:	continue home medication

## 2017-09-22 NOTE — DISCHARGE NOTE ADULT - CARE PROVIDER_API CALL
Lashonda Henriquez), Infectious Disease; Internal Medicine  [address] [place] [address]  Phone: [phone]  Fax: [phone]    Riky Glass), Surgery  [address] [place] [address]  Phone: [phone]  Fax: [phone]

## 2017-09-22 NOTE — DISCHARGE NOTE ADULT - SECONDARY DIAGNOSIS.
Gastroesophageal reflux disease, esophagitis presence not specified Essential hypertension Type 2 diabetes mellitus with other circulatory complication

## 2017-09-22 NOTE — CHART NOTE - NSCHARTNOTEFT_GEN_A_CORE
Patient seen at bedside for evaluation of wound vac. Wound vac off, when vac turned on large leak noted. Vac reinforced with Tegaderm dressings. Seal check showed low leak level. Continue wound vac.

## 2017-09-25 LAB
CULTURE RESULTS: SIGNIFICANT CHANGE UP
SPECIMEN SOURCE: SIGNIFICANT CHANGE UP

## 2017-09-26 LAB
M TB TUBERC IFN-G BLD QL: 0 IU/ML — SIGNIFICANT CHANGE UP
M TB TUBERC IFN-G BLD QL: 0.03 IU/ML — SIGNIFICANT CHANGE UP
M TB TUBERC IFN-G BLD QL: NEGATIVE — SIGNIFICANT CHANGE UP
MITOGEN IGNF BCKGRD COR BLD-ACNC: >10 IU/ML — SIGNIFICANT CHANGE UP

## 2017-09-27 DIAGNOSIS — I96 GANGRENE, NOT ELSEWHERE CLASSIFIED: ICD-10-CM

## 2017-09-27 DIAGNOSIS — I10 ESSENTIAL (PRIMARY) HYPERTENSION: ICD-10-CM

## 2017-09-27 DIAGNOSIS — E87.6 HYPOKALEMIA: ICD-10-CM

## 2017-09-27 DIAGNOSIS — K21.9 GASTRO-ESOPHAGEAL REFLUX DISEASE WITHOUT ESOPHAGITIS: ICD-10-CM

## 2017-09-27 DIAGNOSIS — L02.416 CUTANEOUS ABSCESS OF LEFT LOWER LIMB: ICD-10-CM

## 2017-09-27 DIAGNOSIS — Z79.4 LONG TERM (CURRENT) USE OF INSULIN: ICD-10-CM

## 2017-09-27 DIAGNOSIS — E11.59 TYPE 2 DIABETES MELLITUS WITH OTHER CIRCULATORY COMPLICATIONS: ICD-10-CM

## 2017-10-21 LAB
CULTURE RESULTS: SIGNIFICANT CHANGE UP
SPECIMEN SOURCE: SIGNIFICANT CHANGE UP

## 2017-10-26 ENCOUNTER — OUTPATIENT (OUTPATIENT)
Dept: OUTPATIENT SERVICES | Facility: HOSPITAL | Age: 67
LOS: 1 days | Discharge: HOME | End: 2017-10-26

## 2017-10-26 DIAGNOSIS — Z98.890 OTHER SPECIFIED POSTPROCEDURAL STATES: Chronic | ICD-10-CM

## 2017-10-26 DIAGNOSIS — L89.90 PRESSURE ULCER OF UNSPECIFIED SITE, UNSPECIFIED STAGE: ICD-10-CM

## 2017-10-31 DIAGNOSIS — L97.823 NON-PRESSURE CHRONIC ULCER OF OTHER PART OF LEFT LOWER LEG WITH NECROSIS OF MUSCLE: ICD-10-CM

## 2017-10-31 DIAGNOSIS — Z79.899 OTHER LONG TERM (CURRENT) DRUG THERAPY: ICD-10-CM

## 2017-10-31 DIAGNOSIS — T81.31XA DISRUPTION OF EXTERNAL OPERATION (SURGICAL) WOUND, NOT ELSEWHERE CLASSIFIED, INITIAL ENCOUNTER: ICD-10-CM

## 2017-10-31 DIAGNOSIS — Z98.42 CATARACT EXTRACTION STATUS, LEFT EYE: ICD-10-CM
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
## 2017-10-31 DIAGNOSIS — Z91.81 HISTORY OF FALLING: ICD-10-CM

## 2017-10-31 DIAGNOSIS — E78.5 HYPERLIPIDEMIA, UNSPECIFIED: ICD-10-CM

## 2017-10-31 DIAGNOSIS — Z79.84 LONG TERM (CURRENT) USE OF ORAL HYPOGLYCEMIC DRUGS: ICD-10-CM

## 2017-10-31 DIAGNOSIS — I10 ESSENTIAL (PRIMARY) HYPERTENSION: ICD-10-CM

## 2017-10-31 DIAGNOSIS — Z94.5 SKIN TRANSPLANT STATUS: ICD-10-CM

## 2017-10-31 DIAGNOSIS — Z74.1 NEED FOR ASSISTANCE WITH PERSONAL CARE: ICD-10-CM

## 2017-10-31 DIAGNOSIS — H26.9 UNSPECIFIED CATARACT: ICD-10-CM

## 2017-10-31 DIAGNOSIS — Z87.891 PERSONAL HISTORY OF NICOTINE DEPENDENCE: ICD-10-CM

## 2017-10-31 DIAGNOSIS — Z87.39 PERSONAL HISTORY OF OTHER DISEASES OF THE MUSCULOSKELETAL SYSTEM AND CONNECTIVE TISSUE: ICD-10-CM

## 2017-10-31 DIAGNOSIS — L02.416 CUTANEOUS ABSCESS OF LEFT LOWER LIMB: ICD-10-CM

## 2017-11-02 ENCOUNTER — OUTPATIENT (OUTPATIENT)
Dept: OUTPATIENT SERVICES | Facility: HOSPITAL | Age: 67
LOS: 1 days | Discharge: HOME | End: 2017-11-02

## 2017-11-02 DIAGNOSIS — L89.90 PRESSURE ULCER OF UNSPECIFIED SITE, UNSPECIFIED STAGE: ICD-10-CM

## 2017-11-02 DIAGNOSIS — Z98.890 OTHER SPECIFIED POSTPROCEDURAL STATES: Chronic | ICD-10-CM
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
## 2017-11-07 DIAGNOSIS — L97.823 NON-PRESSURE CHRONIC ULCER OF OTHER PART OF LEFT LOWER LEG WITH NECROSIS OF MUSCLE: ICD-10-CM

## 2017-11-07 DIAGNOSIS — Z79.84 LONG TERM (CURRENT) USE OF ORAL HYPOGLYCEMIC DRUGS: ICD-10-CM

## 2017-11-07 DIAGNOSIS — Z91.81 HISTORY OF FALLING: ICD-10-CM
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
## 2017-11-07 DIAGNOSIS — H26.9 UNSPECIFIED CATARACT: ICD-10-CM

## 2017-11-07 DIAGNOSIS — E11.9 TYPE 2 DIABETES MELLITUS WITHOUT COMPLICATIONS: ICD-10-CM

## 2017-11-07 DIAGNOSIS — L02.416 CUTANEOUS ABSCESS OF LEFT LOWER LIMB: ICD-10-CM

## 2017-11-07 DIAGNOSIS — T81.31XA DISRUPTION OF EXTERNAL OPERATION (SURGICAL) WOUND, NOT ELSEWHERE CLASSIFIED, INITIAL ENCOUNTER: ICD-10-CM

## 2017-11-07 DIAGNOSIS — Z79.899 OTHER LONG TERM (CURRENT) DRUG THERAPY: ICD-10-CM

## 2017-11-07 DIAGNOSIS — I10 ESSENTIAL (PRIMARY) HYPERTENSION: ICD-10-CM

## 2017-11-11 LAB
CULTURE RESULTS: SIGNIFICANT CHANGE UP
NIGHT BLUE STAIN TISS: SIGNIFICANT CHANGE UP
SPECIMEN SOURCE: SIGNIFICANT CHANGE UP

## 2017-11-16 ENCOUNTER — OUTPATIENT (OUTPATIENT)
Dept: OUTPATIENT SERVICES | Facility: HOSPITAL | Age: 67
LOS: 1 days | Discharge: HOME | End: 2017-11-16

## 2017-11-16 DIAGNOSIS — Z98.890 OTHER SPECIFIED POSTPROCEDURAL STATES: Chronic | ICD-10-CM

## 2017-11-16 DIAGNOSIS — L89.90 PRESSURE ULCER OF UNSPECIFIED SITE, UNSPECIFIED STAGE: ICD-10-CM

## 2017-11-21 DIAGNOSIS — Z91.81 HISTORY OF FALLING: ICD-10-CM
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
## 2017-11-21 DIAGNOSIS — I10 ESSENTIAL (PRIMARY) HYPERTENSION: ICD-10-CM

## 2017-11-21 DIAGNOSIS — Z79.899 OTHER LONG TERM (CURRENT) DRUG THERAPY: ICD-10-CM

## 2017-11-21 DIAGNOSIS — L97.823 NON-PRESSURE CHRONIC ULCER OF OTHER PART OF LEFT LOWER LEG WITH NECROSIS OF MUSCLE: ICD-10-CM

## 2017-11-21 DIAGNOSIS — T81.31XA DISRUPTION OF EXTERNAL OPERATION (SURGICAL) WOUND, NOT ELSEWHERE CLASSIFIED, INITIAL ENCOUNTER: ICD-10-CM

## 2017-11-21 DIAGNOSIS — L02.416 CUTANEOUS ABSCESS OF LEFT LOWER LIMB: ICD-10-CM
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
## 2017-11-21 DIAGNOSIS — Z79.84 LONG TERM (CURRENT) USE OF ORAL HYPOGLYCEMIC DRUGS: ICD-10-CM

## 2017-11-21 DIAGNOSIS — E11.9 TYPE 2 DIABETES MELLITUS WITHOUT COMPLICATIONS: ICD-10-CM

## 2017-11-30 ENCOUNTER — OUTPATIENT (OUTPATIENT)
Dept: OUTPATIENT SERVICES | Facility: HOSPITAL | Age: 67
LOS: 1 days | Discharge: HOME | End: 2017-11-30

## 2017-11-30 DIAGNOSIS — L89.90 PRESSURE ULCER OF UNSPECIFIED SITE, UNSPECIFIED STAGE: ICD-10-CM

## 2017-11-30 DIAGNOSIS — Z98.890 OTHER SPECIFIED POSTPROCEDURAL STATES: Chronic | ICD-10-CM

## 2017-12-07 ENCOUNTER — OUTPATIENT (OUTPATIENT)
Dept: OUTPATIENT SERVICES | Facility: HOSPITAL | Age: 67
LOS: 1 days | Discharge: HOME | End: 2017-12-07

## 2017-12-07 DIAGNOSIS — I10 ESSENTIAL (PRIMARY) HYPERTENSION: ICD-10-CM

## 2017-12-07 DIAGNOSIS — L97.823 NON-PRESSURE CHRONIC ULCER OF OTHER PART OF LEFT LOWER LEG WITH NECROSIS OF MUSCLE: ICD-10-CM

## 2017-12-07 DIAGNOSIS — L89.90 PRESSURE ULCER OF UNSPECIFIED SITE, UNSPECIFIED STAGE: ICD-10-CM
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
## 2017-12-07 DIAGNOSIS — Z98.890 OTHER SPECIFIED POSTPROCEDURAL STATES: Chronic | ICD-10-CM

## 2017-12-07 DIAGNOSIS — Z91.81 HISTORY OF FALLING: ICD-10-CM

## 2017-12-07 DIAGNOSIS — T81.31XA DISRUPTION OF EXTERNAL OPERATION (SURGICAL) WOUND, NOT ELSEWHERE CLASSIFIED, INITIAL ENCOUNTER: ICD-10-CM

## 2017-12-07 DIAGNOSIS — L02.416 CUTANEOUS ABSCESS OF LEFT LOWER LIMB: ICD-10-CM

## 2017-12-07 DIAGNOSIS — E11.9 TYPE 2 DIABETES MELLITUS WITHOUT COMPLICATIONS: ICD-10-CM
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
## 2017-12-07 DIAGNOSIS — Z79.84 LONG TERM (CURRENT) USE OF ORAL HYPOGLYCEMIC DRUGS: ICD-10-CM

## 2017-12-07 DIAGNOSIS — Z79.899 OTHER LONG TERM (CURRENT) DRUG THERAPY: ICD-10-CM
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
## 2017-12-13 DIAGNOSIS — Z79.84 LONG TERM (CURRENT) USE OF ORAL HYPOGLYCEMIC DRUGS: ICD-10-CM

## 2017-12-13 DIAGNOSIS — Z91.81 HISTORY OF FALLING: ICD-10-CM

## 2017-12-13 DIAGNOSIS — I10 ESSENTIAL (PRIMARY) HYPERTENSION: ICD-10-CM

## 2017-12-13 DIAGNOSIS — L02.416 CUTANEOUS ABSCESS OF LEFT LOWER LIMB: ICD-10-CM

## 2017-12-13 DIAGNOSIS — T81.31XA DISRUPTION OF EXTERNAL OPERATION (SURGICAL) WOUND, NOT ELSEWHERE CLASSIFIED, INITIAL ENCOUNTER: ICD-10-CM

## 2017-12-13 DIAGNOSIS — Z79.899 OTHER LONG TERM (CURRENT) DRUG THERAPY: ICD-10-CM

## 2017-12-13 DIAGNOSIS — E11.9 TYPE 2 DIABETES MELLITUS WITHOUT COMPLICATIONS: ICD-10-CM

## 2017-12-13 DIAGNOSIS — L97.823 NON-PRESSURE CHRONIC ULCER OF OTHER PART OF LEFT LOWER LEG WITH NECROSIS OF MUSCLE: ICD-10-CM

## 2018-01-11 ENCOUNTER — OUTPATIENT (OUTPATIENT)
Dept: OUTPATIENT SERVICES | Facility: HOSPITAL | Age: 68
LOS: 1 days | Discharge: HOME | End: 2018-01-11

## 2018-01-11 DIAGNOSIS — Z98.890 OTHER SPECIFIED POSTPROCEDURAL STATES: Chronic | ICD-10-CM

## 2018-01-11 DIAGNOSIS — L89.90 PRESSURE ULCER OF UNSPECIFIED SITE, UNSPECIFIED STAGE: ICD-10-CM

## 2018-01-18 ENCOUNTER — OUTPATIENT (OUTPATIENT)
Dept: OUTPATIENT SERVICES | Facility: HOSPITAL | Age: 68
LOS: 1 days | Discharge: HOME | End: 2018-01-18
Payer: COMMERCIAL

## 2018-01-18 ENCOUNTER — APPOINTMENT (OUTPATIENT)
Dept: RADIOLOGY | Facility: HOSPITAL | Age: 68
End: 2018-01-18

## 2018-01-18 DIAGNOSIS — L89.90 PRESSURE ULCER OF UNSPECIFIED SITE, UNSPECIFIED STAGE: ICD-10-CM

## 2018-01-18 DIAGNOSIS — S71.002A UNSPECIFIED OPEN WOUND, LEFT HIP, INITIAL ENCOUNTER: ICD-10-CM

## 2018-01-18 DIAGNOSIS — Z98.890 OTHER SPECIFIED POSTPROCEDURAL STATES: Chronic | ICD-10-CM

## 2018-01-18 PROCEDURE — 73502 X-RAY EXAM HIP UNI 2-3 VIEWS: CPT | Mod: 26,LT
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
## 2018-01-25 DIAGNOSIS — Z79.84 LONG TERM (CURRENT) USE OF ORAL HYPOGLYCEMIC DRUGS: ICD-10-CM

## 2018-01-25 DIAGNOSIS — Z82.49 FAMILY HISTORY OF ISCHEMIC HEART DISEASE AND OTHER DISEASES OF THE CIRCULATORY SYSTEM: ICD-10-CM

## 2018-01-25 DIAGNOSIS — Z83.3 FAMILY HISTORY OF DIABETES MELLITUS: ICD-10-CM

## 2018-01-25 DIAGNOSIS — T81.89XA OTHER COMPLICATIONS OF PROCEDURES, NOT ELSEWHERE CLASSIFIED, INITIAL ENCOUNTER: ICD-10-CM

## 2018-01-25 DIAGNOSIS — I10 ESSENTIAL (PRIMARY) HYPERTENSION: ICD-10-CM
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
## 2018-01-25 DIAGNOSIS — E78.5 HYPERLIPIDEMIA, UNSPECIFIED: ICD-10-CM

## 2018-01-25 DIAGNOSIS — Z94.5 SKIN TRANSPLANT STATUS: ICD-10-CM

## 2018-01-25 DIAGNOSIS — Z79.899 OTHER LONG TERM (CURRENT) DRUG THERAPY: ICD-10-CM

## 2018-01-25 DIAGNOSIS — E11.9 TYPE 2 DIABETES MELLITUS WITHOUT COMPLICATIONS: ICD-10-CM

## 2018-01-25 DIAGNOSIS — Z98.42 CATARACT EXTRACTION STATUS, LEFT EYE: ICD-10-CM

## 2018-01-25 DIAGNOSIS — Z87.891 PERSONAL HISTORY OF NICOTINE DEPENDENCE: ICD-10-CM

## 2018-01-25 DIAGNOSIS — L97.823 NON-PRESSURE CHRONIC ULCER OF OTHER PART OF LEFT LOWER LEG WITH NECROSIS OF MUSCLE: ICD-10-CM

## 2018-01-25 DIAGNOSIS — Z87.39 PERSONAL HISTORY OF OTHER DISEASES OF THE MUSCULOSKELETAL SYSTEM AND CONNECTIVE TISSUE: ICD-10-CM

## 2018-01-25 DIAGNOSIS — H26.9 UNSPECIFIED CATARACT: ICD-10-CM

## 2018-01-29 DIAGNOSIS — L97.823 NON-PRESSURE CHRONIC ULCER OF OTHER PART OF LEFT LOWER LEG WITH NECROSIS OF MUSCLE: ICD-10-CM
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
## 2018-01-29 DIAGNOSIS — E11.9 TYPE 2 DIABETES MELLITUS WITHOUT COMPLICATIONS: ICD-10-CM

## 2018-01-29 DIAGNOSIS — H26.9 UNSPECIFIED CATARACT: ICD-10-CM

## 2018-01-29 DIAGNOSIS — I10 ESSENTIAL (PRIMARY) HYPERTENSION: ICD-10-CM

## 2018-01-29 DIAGNOSIS — Z79.899 OTHER LONG TERM (CURRENT) DRUG THERAPY: ICD-10-CM

## 2018-01-29 DIAGNOSIS — Z79.84 LONG TERM (CURRENT) USE OF ORAL HYPOGLYCEMIC DRUGS: ICD-10-CM
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
## 2018-01-29 DIAGNOSIS — T81.89XA OTHER COMPLICATIONS OF PROCEDURES, NOT ELSEWHERE CLASSIFIED, INITIAL ENCOUNTER: ICD-10-CM

## 2018-02-01 ENCOUNTER — OUTPATIENT (OUTPATIENT)
Dept: OUTPATIENT SERVICES | Facility: HOSPITAL | Age: 68
LOS: 1 days | Discharge: HOME | End: 2018-02-01

## 2018-02-01 DIAGNOSIS — Z98.890 OTHER SPECIFIED POSTPROCEDURAL STATES: Chronic | ICD-10-CM

## 2018-02-01 DIAGNOSIS — L89.90 PRESSURE ULCER OF UNSPECIFIED SITE, UNSPECIFIED STAGE: ICD-10-CM

## 2018-02-15 ENCOUNTER — OUTPATIENT (OUTPATIENT)
Dept: OUTPATIENT SERVICES | Facility: HOSPITAL | Age: 68
LOS: 1 days | Discharge: HOME | End: 2018-02-15

## 2018-02-15 DIAGNOSIS — Z79.84 LONG TERM (CURRENT) USE OF ORAL HYPOGLYCEMIC DRUGS: ICD-10-CM

## 2018-02-15 DIAGNOSIS — E78.5 HYPERLIPIDEMIA, UNSPECIFIED: ICD-10-CM
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
## 2018-02-15 DIAGNOSIS — H26.9 UNSPECIFIED CATARACT: ICD-10-CM

## 2018-02-15 DIAGNOSIS — L89.90 PRESSURE ULCER OF UNSPECIFIED SITE, UNSPECIFIED STAGE: ICD-10-CM
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
## 2018-02-15 DIAGNOSIS — Z98.890 OTHER SPECIFIED POSTPROCEDURAL STATES: Chronic | ICD-10-CM
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
## 2018-02-15 DIAGNOSIS — Z79.899 OTHER LONG TERM (CURRENT) DRUG THERAPY: ICD-10-CM

## 2018-02-15 DIAGNOSIS — S71.002A UNSPECIFIED OPEN WOUND, LEFT HIP, INITIAL ENCOUNTER: ICD-10-CM

## 2018-02-15 DIAGNOSIS — E11.9 TYPE 2 DIABETES MELLITUS WITHOUT COMPLICATIONS: ICD-10-CM

## 2018-02-15 DIAGNOSIS — I10 ESSENTIAL (PRIMARY) HYPERTENSION: ICD-10-CM

## 2018-02-22 DIAGNOSIS — Z79.84 LONG TERM (CURRENT) USE OF ORAL HYPOGLYCEMIC DRUGS: ICD-10-CM

## 2018-02-22 DIAGNOSIS — S71.002A UNSPECIFIED OPEN WOUND, LEFT HIP, INITIAL ENCOUNTER: ICD-10-CM

## 2018-02-22 DIAGNOSIS — T81.89XA OTHER COMPLICATIONS OF PROCEDURES, NOT ELSEWHERE CLASSIFIED, INITIAL ENCOUNTER: ICD-10-CM

## 2018-02-22 DIAGNOSIS — I10 ESSENTIAL (PRIMARY) HYPERTENSION: ICD-10-CM
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
## 2018-02-22 DIAGNOSIS — H26.9 UNSPECIFIED CATARACT: ICD-10-CM
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
## 2018-02-22 DIAGNOSIS — E11.9 TYPE 2 DIABETES MELLITUS WITHOUT COMPLICATIONS: ICD-10-CM

## 2018-02-22 DIAGNOSIS — Z79.899 OTHER LONG TERM (CURRENT) DRUG THERAPY: ICD-10-CM
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
## 2018-02-22 DIAGNOSIS — E78.5 HYPERLIPIDEMIA, UNSPECIFIED: ICD-10-CM
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
## 2018-03-01 ENCOUNTER — OUTPATIENT (OUTPATIENT)
Dept: OUTPATIENT SERVICES | Facility: HOSPITAL | Age: 68
LOS: 1 days | Discharge: HOME | End: 2018-03-01

## 2018-03-01 DIAGNOSIS — Z98.890 OTHER SPECIFIED POSTPROCEDURAL STATES: Chronic | ICD-10-CM

## 2018-03-01 DIAGNOSIS — L89.90 PRESSURE ULCER OF UNSPECIFIED SITE, UNSPECIFIED STAGE: ICD-10-CM

## 2018-03-08 DIAGNOSIS — E78.5 HYPERLIPIDEMIA, UNSPECIFIED: ICD-10-CM

## 2018-03-08 DIAGNOSIS — S71.002A UNSPECIFIED OPEN WOUND, LEFT HIP, INITIAL ENCOUNTER: ICD-10-CM
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
## 2018-03-08 DIAGNOSIS — H26.9 UNSPECIFIED CATARACT: ICD-10-CM

## 2018-03-08 DIAGNOSIS — E11.9 TYPE 2 DIABETES MELLITUS WITHOUT COMPLICATIONS: ICD-10-CM

## 2018-03-08 DIAGNOSIS — Z79.899 OTHER LONG TERM (CURRENT) DRUG THERAPY: ICD-10-CM

## 2018-03-08 DIAGNOSIS — T81.89XA OTHER COMPLICATIONS OF PROCEDURES, NOT ELSEWHERE CLASSIFIED, INITIAL ENCOUNTER: ICD-10-CM

## 2018-03-08 DIAGNOSIS — I10 ESSENTIAL (PRIMARY) HYPERTENSION: ICD-10-CM

## 2018-03-08 DIAGNOSIS — Z79.84 LONG TERM (CURRENT) USE OF ORAL HYPOGLYCEMIC DRUGS: ICD-10-CM
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
## 2018-03-15 ENCOUNTER — OUTPATIENT (OUTPATIENT)
Dept: OUTPATIENT SERVICES | Facility: HOSPITAL | Age: 68
LOS: 1 days | Discharge: HOME | End: 2018-03-15

## 2018-03-15 DIAGNOSIS — Z98.890 OTHER SPECIFIED POSTPROCEDURAL STATES: Chronic | ICD-10-CM
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
## 2018-03-15 DIAGNOSIS — I10 ESSENTIAL (PRIMARY) HYPERTENSION: ICD-10-CM

## 2018-03-15 DIAGNOSIS — T81.89XA OTHER COMPLICATIONS OF PROCEDURES, NOT ELSEWHERE CLASSIFIED, INITIAL ENCOUNTER: ICD-10-CM

## 2018-03-15 DIAGNOSIS — S71.002A UNSPECIFIED OPEN WOUND, LEFT HIP, INITIAL ENCOUNTER: ICD-10-CM

## 2018-03-15 DIAGNOSIS — Z79.84 LONG TERM (CURRENT) USE OF ORAL HYPOGLYCEMIC DRUGS: ICD-10-CM

## 2018-03-15 DIAGNOSIS — E78.5 HYPERLIPIDEMIA, UNSPECIFIED: ICD-10-CM

## 2018-03-15 DIAGNOSIS — E11.9 TYPE 2 DIABETES MELLITUS WITHOUT COMPLICATIONS: ICD-10-CM

## 2018-03-15 DIAGNOSIS — H26.9 UNSPECIFIED CATARACT: ICD-10-CM

## 2018-03-15 DIAGNOSIS — L89.90 PRESSURE ULCER OF UNSPECIFIED SITE, UNSPECIFIED STAGE: ICD-10-CM
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
## 2018-03-15 DIAGNOSIS — Z79.899 OTHER LONG TERM (CURRENT) DRUG THERAPY: ICD-10-CM

## 2018-03-29 ENCOUNTER — OUTPATIENT (OUTPATIENT)
Dept: OUTPATIENT SERVICES | Facility: HOSPITAL | Age: 68
LOS: 1 days | Discharge: HOME | End: 2018-03-29

## 2018-03-29 DIAGNOSIS — Z98.890 OTHER SPECIFIED POSTPROCEDURAL STATES: Chronic | ICD-10-CM

## 2018-03-29 DIAGNOSIS — L89.90 PRESSURE ULCER OF UNSPECIFIED SITE, UNSPECIFIED STAGE: ICD-10-CM

## 2018-04-02 DIAGNOSIS — E11.9 TYPE 2 DIABETES MELLITUS WITHOUT COMPLICATIONS: ICD-10-CM

## 2018-04-02 DIAGNOSIS — T81.89XA OTHER COMPLICATIONS OF PROCEDURES, NOT ELSEWHERE CLASSIFIED, INITIAL ENCOUNTER: ICD-10-CM

## 2018-04-02 DIAGNOSIS — E78.5 HYPERLIPIDEMIA, UNSPECIFIED: ICD-10-CM

## 2018-04-02 DIAGNOSIS — H26.9 UNSPECIFIED CATARACT: ICD-10-CM
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
## 2018-04-02 DIAGNOSIS — S71.002A UNSPECIFIED OPEN WOUND, LEFT HIP, INITIAL ENCOUNTER: ICD-10-CM

## 2018-04-02 DIAGNOSIS — Z79.899 OTHER LONG TERM (CURRENT) DRUG THERAPY: ICD-10-CM

## 2018-04-02 DIAGNOSIS — I10 ESSENTIAL (PRIMARY) HYPERTENSION: ICD-10-CM

## 2018-04-02 DIAGNOSIS — Z79.84 LONG TERM (CURRENT) USE OF ORAL HYPOGLYCEMIC DRUGS: ICD-10-CM

## 2018-04-12 ENCOUNTER — OUTPATIENT (OUTPATIENT)
Dept: OUTPATIENT SERVICES | Facility: HOSPITAL | Age: 68
LOS: 1 days | Discharge: HOME | End: 2018-04-12

## 2018-04-12 DIAGNOSIS — Z98.890 OTHER SPECIFIED POSTPROCEDURAL STATES: Chronic | ICD-10-CM

## 2018-04-12 DIAGNOSIS — L89.90 PRESSURE ULCER OF UNSPECIFIED SITE, UNSPECIFIED STAGE: ICD-10-CM

## 2018-04-18 DIAGNOSIS — Z79.84 LONG TERM (CURRENT) USE OF ORAL HYPOGLYCEMIC DRUGS: ICD-10-CM

## 2018-04-18 DIAGNOSIS — S71.002A UNSPECIFIED OPEN WOUND, LEFT HIP, INITIAL ENCOUNTER: ICD-10-CM

## 2018-04-18 DIAGNOSIS — H26.9 UNSPECIFIED CATARACT: ICD-10-CM

## 2018-04-18 DIAGNOSIS — E78.5 HYPERLIPIDEMIA, UNSPECIFIED: ICD-10-CM
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
## 2018-04-18 DIAGNOSIS — I10 ESSENTIAL (PRIMARY) HYPERTENSION: ICD-10-CM

## 2018-04-18 DIAGNOSIS — Z79.899 OTHER LONG TERM (CURRENT) DRUG THERAPY: ICD-10-CM

## 2018-04-18 DIAGNOSIS — E11.9 TYPE 2 DIABETES MELLITUS WITHOUT COMPLICATIONS: ICD-10-CM
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
## 2018-04-18 DIAGNOSIS — T81.89XA OTHER COMPLICATIONS OF PROCEDURES, NOT ELSEWHERE CLASSIFIED, INITIAL ENCOUNTER: ICD-10-CM

## 2018-05-03 ENCOUNTER — OUTPATIENT (OUTPATIENT)
Dept: OUTPATIENT SERVICES | Facility: HOSPITAL | Age: 68
LOS: 1 days | Discharge: HOME | End: 2018-05-03

## 2018-05-03 DIAGNOSIS — Z98.890 OTHER SPECIFIED POSTPROCEDURAL STATES: Chronic | ICD-10-CM

## 2018-05-03 DIAGNOSIS — L89.90 PRESSURE ULCER OF UNSPECIFIED SITE, UNSPECIFIED STAGE: ICD-10-CM

## 2018-05-16 DIAGNOSIS — L92.2 GRANULOMA FACIALE [EOSINOPHILIC GRANULOMA OF SKIN]: ICD-10-CM

## 2018-05-16 DIAGNOSIS — Z79.899 OTHER LONG TERM (CURRENT) DRUG THERAPY: ICD-10-CM
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
## 2018-05-16 DIAGNOSIS — Z79.84 LONG TERM (CURRENT) USE OF ORAL HYPOGLYCEMIC DRUGS: ICD-10-CM

## 2018-05-16 DIAGNOSIS — E78.5 HYPERLIPIDEMIA, UNSPECIFIED: ICD-10-CM

## 2018-05-16 DIAGNOSIS — S71.002A UNSPECIFIED OPEN WOUND, LEFT HIP, INITIAL ENCOUNTER: ICD-10-CM

## 2018-05-16 DIAGNOSIS — H26.9 UNSPECIFIED CATARACT: ICD-10-CM
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
## 2018-05-16 DIAGNOSIS — I10 ESSENTIAL (PRIMARY) HYPERTENSION: ICD-10-CM

## 2018-05-16 DIAGNOSIS — T81.89XA OTHER COMPLICATIONS OF PROCEDURES, NOT ELSEWHERE CLASSIFIED, INITIAL ENCOUNTER: ICD-10-CM

## 2018-05-16 DIAGNOSIS — E11.9 TYPE 2 DIABETES MELLITUS WITHOUT COMPLICATIONS: ICD-10-CM
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
## 2018-05-17 ENCOUNTER — OUTPATIENT (OUTPATIENT)
Dept: OUTPATIENT SERVICES | Facility: HOSPITAL | Age: 68
LOS: 1 days | Discharge: HOME | End: 2018-05-17

## 2018-05-17 DIAGNOSIS — Z98.890 OTHER SPECIFIED POSTPROCEDURAL STATES: Chronic | ICD-10-CM

## 2018-05-17 DIAGNOSIS — L89.90 PRESSURE ULCER OF UNSPECIFIED SITE, UNSPECIFIED STAGE: ICD-10-CM

## 2018-05-22 ENCOUNTER — OUTPATIENT (OUTPATIENT)
Dept: OUTPATIENT SERVICES | Facility: HOSPITAL | Age: 68
LOS: 1 days | Discharge: HOME | End: 2018-05-22
Payer: COMMERCIAL

## 2018-05-22 ENCOUNTER — APPOINTMENT (OUTPATIENT)
Dept: MRI IMAGING | Facility: HOSPITAL | Age: 68
End: 2018-05-22
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
## 2018-05-22 DIAGNOSIS — E11.9 TYPE 2 DIABETES MELLITUS WITHOUT COMPLICATIONS: ICD-10-CM

## 2018-05-22 DIAGNOSIS — S71.002A UNSPECIFIED OPEN WOUND, LEFT HIP, INITIAL ENCOUNTER: ICD-10-CM

## 2018-05-22 DIAGNOSIS — M15.0 PRIMARY GENERALIZED (OSTEO)ARTHRITIS: ICD-10-CM

## 2018-05-22 DIAGNOSIS — E78.5 HYPERLIPIDEMIA, UNSPECIFIED: ICD-10-CM

## 2018-05-22 DIAGNOSIS — Z79.899 OTHER LONG TERM (CURRENT) DRUG THERAPY: ICD-10-CM

## 2018-05-22 DIAGNOSIS — T81.89XA OTHER COMPLICATIONS OF PROCEDURES, NOT ELSEWHERE CLASSIFIED, INITIAL ENCOUNTER: ICD-10-CM

## 2018-05-22 DIAGNOSIS — L92.2 GRANULOMA FACIALE [EOSINOPHILIC GRANULOMA OF SKIN]: ICD-10-CM

## 2018-05-22 DIAGNOSIS — Z98.890 OTHER SPECIFIED POSTPROCEDURAL STATES: Chronic | ICD-10-CM

## 2018-05-22 DIAGNOSIS — H26.9 UNSPECIFIED CATARACT: ICD-10-CM

## 2018-05-22 DIAGNOSIS — I10 ESSENTIAL (PRIMARY) HYPERTENSION: ICD-10-CM
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
## 2018-05-22 DIAGNOSIS — Z79.84 LONG TERM (CURRENT) USE OF ORAL HYPOGLYCEMIC DRUGS: ICD-10-CM
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
## 2018-05-22 LAB
BUN SERPL-MCNC: 24 MG/DL — HIGH (ref 7–23)
CREAT SERPL-MCNC: 0.77 MG/DL — SIGNIFICANT CHANGE UP (ref 0.5–1.3)

## 2018-05-22 PROCEDURE — 73721 MRI JNT OF LWR EXTRE W/O DYE: CPT | Mod: 26,LT

## 2018-05-31 ENCOUNTER — OUTPATIENT (OUTPATIENT)
Dept: OUTPATIENT SERVICES | Facility: HOSPITAL | Age: 68
LOS: 1 days | Discharge: HOME | End: 2018-05-31

## 2018-05-31 DIAGNOSIS — L89.90 PRESSURE ULCER OF UNSPECIFIED SITE, UNSPECIFIED STAGE: ICD-10-CM
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
## 2018-05-31 DIAGNOSIS — Z79.84 LONG TERM (CURRENT) USE OF ORAL HYPOGLYCEMIC DRUGS: ICD-10-CM

## 2018-05-31 DIAGNOSIS — H26.9 UNSPECIFIED CATARACT: ICD-10-CM
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
## 2018-05-31 DIAGNOSIS — S71.002A UNSPECIFIED OPEN WOUND, LEFT HIP, INITIAL ENCOUNTER: ICD-10-CM
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
## 2018-05-31 DIAGNOSIS — I10 ESSENTIAL (PRIMARY) HYPERTENSION: ICD-10-CM
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
## 2018-05-31 DIAGNOSIS — Z79.899 OTHER LONG TERM (CURRENT) DRUG THERAPY: ICD-10-CM

## 2018-05-31 DIAGNOSIS — L92.2 GRANULOMA FACIALE [EOSINOPHILIC GRANULOMA OF SKIN]: ICD-10-CM

## 2018-05-31 DIAGNOSIS — Z98.890 OTHER SPECIFIED POSTPROCEDURAL STATES: Chronic | ICD-10-CM

## 2018-05-31 DIAGNOSIS — E78.5 HYPERLIPIDEMIA, UNSPECIFIED: ICD-10-CM

## 2018-05-31 DIAGNOSIS — E11.9 TYPE 2 DIABETES MELLITUS WITHOUT COMPLICATIONS: ICD-10-CM

## 2018-05-31 DIAGNOSIS — T81.89XA OTHER COMPLICATIONS OF PROCEDURES, NOT ELSEWHERE CLASSIFIED, INITIAL ENCOUNTER: ICD-10-CM
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
## 2018-06-28 ENCOUNTER — OUTPATIENT (OUTPATIENT)
Dept: OUTPATIENT SERVICES | Facility: HOSPITAL | Age: 68
LOS: 1 days | Discharge: HOME | End: 2018-06-28

## 2018-06-28 DIAGNOSIS — Z98.890 OTHER SPECIFIED POSTPROCEDURAL STATES: Chronic | ICD-10-CM

## 2018-06-28 DIAGNOSIS — L89.90 PRESSURE ULCER OF UNSPECIFIED SITE, UNSPECIFIED STAGE: ICD-10-CM
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
## 2018-07-03 DIAGNOSIS — E78.5 HYPERLIPIDEMIA, UNSPECIFIED: ICD-10-CM

## 2018-07-03 DIAGNOSIS — Z79.899 OTHER LONG TERM (CURRENT) DRUG THERAPY: ICD-10-CM

## 2018-07-03 DIAGNOSIS — Z79.84 LONG TERM (CURRENT) USE OF ORAL HYPOGLYCEMIC DRUGS: ICD-10-CM
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
## 2018-07-03 DIAGNOSIS — I10 ESSENTIAL (PRIMARY) HYPERTENSION: ICD-10-CM
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
## 2018-07-03 DIAGNOSIS — L92.2 GRANULOMA FACIALE [EOSINOPHILIC GRANULOMA OF SKIN]: ICD-10-CM

## 2018-07-03 DIAGNOSIS — S71.002A UNSPECIFIED OPEN WOUND, LEFT HIP, INITIAL ENCOUNTER: ICD-10-CM
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
## 2018-07-03 DIAGNOSIS — T81.89XA OTHER COMPLICATIONS OF PROCEDURES, NOT ELSEWHERE CLASSIFIED, INITIAL ENCOUNTER: ICD-10-CM
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
## 2018-07-03 DIAGNOSIS — H26.9 UNSPECIFIED CATARACT: ICD-10-CM

## 2018-07-03 DIAGNOSIS — E11.9 TYPE 2 DIABETES MELLITUS WITHOUT COMPLICATIONS: ICD-10-CM

## 2018-12-17 NOTE — PROGRESS NOTE ADULT - ASSESSMENT
[Good] : ~his/her~  mood as  good [0] : 2) Feeling down, depressed, or hopeless: Not at all (0) A/P: 67F with L hip abscess and draining sinus tract  Discussed with radiology PA about IR CT guided hip aspiration, will Follow up IR for possible CT guided L hip aspiration tomorrow 8/23  Cont abx per ID  WBAT LLE  Pain control  Cont medical management  Discussed plan with attending and will advise if plan changes [None] : None [With Family] : lives with family [High School] : high school [] :  [# Of Children ___] : has [unfilled] children [Sexually Active] : sexually active [Feels Safe at Home] : Feels safe at home [Fully functional (bathing, dressing, toileting, transferring, walking, feeding)] : Fully functional (bathing, dressing, toileting, transferring, walking, feeding) [Fully functional (using the telephone, shopping, preparing meals, housekeeping, doing laundry, using] : Fully functional and needs no help or supervision to perform IADLs (using the telephone, shopping, preparing meals, housekeeping, doing laundry, using transportation, managing medications and managing finances) [Smoke Detector] : smoke detector [Carbon Monoxide Detector] : carbon monoxide detector [Seat Belt] :  uses seat belt [] : No [[id number]] : 0 [Change in mental status noted] : No change in mental status noted [Language] : denies difficulty with language [Reports changes in hearing] : Reports no changes in hearing [Reports changes in vision] : Reports no changes in vision [Reports changes in dental health] : Reports no changes in dental health [Guns at Home] : no guns at home

## 2019-06-01 NOTE — PHYSICAL THERAPY INITIAL EVALUATION ADULT - PHYSICAL ASSIST/NONPHYSICAL ASSIST, REHAB EVAL
76 yo F with HTN, IDDM, CAD s/p stent, breast ca s/p mastectomy, and ESRD on HD (pt reports on HD since 2010)  s/p open thrombectomy and revision on 5/31, admitted to SICU for hypotension requiring pressor support. Hemodynamically improving as pressors weaning down. s/p HD today, renal following. Pt reports good appetite, follows DM/renal diet at home, works with RD at HD center. Pt reports dry weight of 75kg. Pt consume >75% of lunch today. GI: abdomen soft/non-distended/non-tender, last BM 5/31 per flow sheet, pt denies N/V. Skin: Ronal score 20. Pain controlled at this time. Recommend continue current diet (renal, CSTCHO). Reviewed dietary restrictions with pt, encouraged adequate protein intake 2/2 increased needs on HD. Pt appeared receptive to recommendations, expect good compliance. RD to monitor and f/u per moderate risk protocol. supervision

## 2019-09-24 NOTE — PHYSICAL THERAPY INITIAL EVALUATION ADULT - COORDINATION ASSESSED, REHAB EVAL
No risk alerts present finger to nose/WNL (LUE not tested)/heel to shin finger to nose/heel to shin/WNL (LUE not tested)

## 2020-04-21 NOTE — PROGRESS NOTE ADULT - PROBLEM SELECTOR PROBLEM 8
04/21/20 1400   Activity/Group Checklist   Group   (Recovery Workshop )   Attendance Attended   Attendance Duration (min) 46-60   Interactions Interacted appropriately   Affect/Mood Appropriate   Goals Achieved Identified feelings; Able to listen to others; Able to engage in interactions; Able to self-disclose Hypotension

## 2020-12-15 NOTE — PROGRESS NOTE ADULT - PROBLEM SELECTOR PLAN 10
Please inform Rx(s) has (have) been sent.   Hemoglobin/hematocrit relatively stable  Transfuse 2 pack of red blood cells completed on 1/12/2017  Monitor CBC  G.I. follow up

## 2021-01-02 NOTE — OCCUPATIONAL THERAPY INITIAL EVALUATION ADULT - DEEP PRESSURE SENSATION, RLE, OT EVAL
Routine visit with Khushbu and infant. Infant snuggling skin to skin with Khushbu when LC into room. Khushbu nauseated and laying with eyes closed. States breastfeeding is going well. Had a breast augmentation since last pregnancy. Infant has had blood sugar checks WNL since delivery with exclusive breastfeeding. Khushbu had good supply with her previous 2 children. Plans to watch infant's weight closely with history of breast augmentation. Khushbu denies any questions or concerns at this time. Aware  is here for support if needed.  ALEX Lundberg RN, BSN, PHN, IBCLC     within normal limits

## 2021-02-15 NOTE — PROGRESS NOTE ADULT - PROBLEM SELECTOR PROBLEM 1
TCM RISK:  This patient is being reviewed for potential inclusion in the High Risk for readmission Care Transition Program.   Sepsis due to Escherichia coli

## 2021-04-08 NOTE — PHYSICAL THERAPY INITIAL EVALUATION ADULT - LEVEL OF INDEPENDENCE: GAIT, REHAB EVAL
[Nasal Congestion] : nasal congestion
[Cough] : cough
[Negative] : Genitourinary
TBA due to pt c/o fatigue
minimum assist (75% patients effort)
moderate assist (50% patients effort)

## 2021-04-21 PROBLEM — E11.9 TYPE 2 DIABETES MELLITUS WITHOUT COMPLICATIONS: Chronic | Status: ACTIVE | Noted: 2017-02-22

## 2021-04-21 PROBLEM — D69.6 THROMBOCYTOPENIA, UNSPECIFIED: Chronic | Status: ACTIVE | Noted: 2017-04-12

## 2021-04-21 PROBLEM — K21.9 GASTRO-ESOPHAGEAL REFLUX DISEASE WITHOUT ESOPHAGITIS: Chronic | Status: ACTIVE | Noted: 2017-02-22

## 2021-04-21 PROBLEM — G93.41 METABOLIC ENCEPHALOPATHY: Chronic | Status: ACTIVE | Noted: 2017-02-22

## 2021-04-21 PROBLEM — I10 ESSENTIAL (PRIMARY) HYPERTENSION: Chronic | Status: ACTIVE | Noted: 2017-02-22

## 2021-04-29 ENCOUNTER — APPOINTMENT (OUTPATIENT)
Dept: SURGERY | Facility: CLINIC | Age: 71
End: 2021-04-29
Payer: MEDICARE

## 2021-04-29 VITALS
SYSTOLIC BLOOD PRESSURE: 176 MMHG | HEART RATE: 89 BPM | HEIGHT: 66 IN | DIASTOLIC BLOOD PRESSURE: 83 MMHG | WEIGHT: 180 LBS | TEMPERATURE: 96.5 F | BODY MASS INDEX: 28.93 KG/M2

## 2021-04-29 DIAGNOSIS — Z63.4 DISAPPEARANCE AND DEATH OF FAMILY MEMBER: ICD-10-CM

## 2021-04-29 DIAGNOSIS — Z87.891 PERSONAL HISTORY OF NICOTINE DEPENDENCE: ICD-10-CM

## 2021-04-29 PROCEDURE — 99204 OFFICE O/P NEW MOD 45 MIN: CPT

## 2021-04-29 PROCEDURE — 99072 ADDL SUPL MATRL&STAF TM PHE: CPT

## 2021-04-29 SDOH — SOCIAL STABILITY - SOCIAL INSECURITY: DISSAPEARANCE AND DEATH OF FAMILY MEMBER: Z63.4

## 2021-05-10 ENCOUNTER — NON-APPOINTMENT (OUTPATIENT)
Age: 71
End: 2021-05-10

## 2021-05-10 NOTE — ASSESSMENT
[FreeTextEntry1] : Patient is a 70 y.o F, diagnosed with right breast cancer, metastatic to right axillary lymph node, S/P US guided biopsy 04/14/21. On exam, patient was found to have palpable masses of right breast, 4-5 cm palpable mass felt to the right upper breast, palpable 3-4 cm mass at 8-9:00 of right breast; palpable right axillary lymph node. Imaging and pathology results from [facility] reviewed. \par ER/[place] positive for A) R. breast 11-12:00 and B) R. breast, 9:00.

## 2021-05-10 NOTE — REASON FOR VISIT
[Consultation] : a consultation visit [FreeTextEntry1] : Right Breast CA, metastatic to right axillary lymph node

## 2021-05-10 NOTE — PLAN
[FreeTextEntry1] : Had  a long d/w the patient. She has newly diagnosed 2 right  breast cancers metastatic to the axillary lymph node. All the options, benefits and risks were discussed. The options between breast conservation versus mastectomy were discussed. Lumpectomy followed by radiation therapy versus mastectomy with or without reconstruction were discussed. Axillary sentinel node identification  with the potential for node dissection were discussed. The potential complications including infection, bleeding, upper extremity swelling, recurrence and other complications were also discussed. All the questions were answered to her satisfaction. Will schedule at [facility] at [place]\par As for the markers, Her 2 is being sent for FISH\par 2 large masses in different quadrants of the right breast\par Will check the fish study / Her 2 to see whether she needs neoadjuvant rx\par

## 2021-05-10 NOTE — HISTORY OF PRESENT ILLNESS
[de-identified] : BRADLEY RIDER is a 70 year old F w PMHX T2DM, who is referred to the office for consultation visit, she presents w the cc of having a right breast mass. Patient had a BL breast mammo and US done 12/29/20 with right breast masses, highly suspicious for malignancy w evidence of multicentric disease, BI RADS cat 5. US guided biopsy of 3 sites was done, 04/14/21, c/w 1. R. breast 11-12:00 location, 3 cm FN, invasive poorly differentiated ductal carcinoma, 2. R. 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 right axillary lymph node, c/w metastatic carcinoma. ER/[place] positive for 2 sites of R. breast. \par Patient admits to feeling a lump on the right breast, which she had first noticed October-November 2020. \par \par Family history includes Aunt, diagnosed w breast CA.\par

## 2021-05-10 NOTE — REVIEW OF SYSTEMS
[Arthralgias] : arthralgias [Joint Pain] : joint pain [Fever] : no fever [Chills] : no chills [Feeling Poorly] : not feeling poorly [Chest Pain] : no chest pain [Lower Ext Edema] : no lower extremity edema [Shortness Of Breath] : no shortness of breath [Cough] : no cough [Abdominal Pain] : no abdominal pain [Pelvic Pain] : no pelvic pain [FreeTextEntry9] : ambulates with a cane, S/P

## 2021-05-10 NOTE — PHYSICAL EXAM
[Normal Breath Sounds] : Normal breath sounds [Normal Rate and Rhythm] : normal rate and rhythm [No Rash or Lesion] : No rash or lesion [Alert] : alert [Oriented to Person] : oriented to person [Oriented to Place] : oriented to place [Oriented to Time] : oriented to time [Calm] : calm [JVD] : no jugular venous distention  [de-identified] : A/Ox3; NAD. appears comfortable [de-identified] : 4-5 cm palpable mass felt to the right upper breast, palpable 3-4 cm mass at 8-9:00 of right breast; palpable left axillary lymph node; no nipple discharge, no tenderness, no skin thickening, no chest deformity, no masses felt to the right breast, no left axillary lymph nodes felt  [de-identified] : EOMI; sclera anicteric. Nasal mucosa pink, septum midline. Oral mucosa pink. Tongue midline, Pharynx without exudates. [de-identified] : abd is soft, NT/ND\par  [de-identified] : +ROM, no joint swelling

## 2021-05-10 NOTE — DATA REVIEWED
[FreeTextEntry1] : Date of Exam: 2020\par EXAM: DIGITAL BILATERAL DIAGNOSTIC MAMMOGRAM AND TOMOSYNTHESIS AND BREAST ULTRASOUND\par \par HISTORY: The patient is 70 years old and is seen for palpable lump right breast. Past history benign left breast biopsy.\par \par CLINICAL BREAST EXAMINATION: The patient reports that her last clinical breast exam was within the past year. \par \par COMPARISON: 2018par \par MAMMOGRAM:\par \par TECHNIQUE: Full-field digital mammography of bilateral breasts was obtained. Additional imaging is composed of right spot compression 2-D and 3-D CC and MLO projections. Low-dose full-field digital breast tomosynthesis examination was performed with 3D acquisitions and C-View synthesized 2D reconstructed images. Computer-aided detection (CAD) was utilized. \par \par FINDINGS:\par BREAST COMPOSITION: The breasts are heterogeneously dense, which may obscure small masses.\par \par There is an approximately 3-4 cm irregular isodense mass middle third right breast 11 o'clock-12 o'clock axis which correlates with clinical history. Prominent normal size right axillary node. \par \par BREAST ULTRASOUND:  \par \par FINDINGS: \par Right breast: There is an approximately 4-5 cm irregular heterogeneous spiculated hypoechoic mass 11-12 o'clock axis 3 cm from the nipple which correlates with clinical and mammographic findings and is suspicious for malignancy. There is a 1 cm nonparallel irregular hypoechoic mass 9 o'clock axis 5 cm from the nipple suspicious for malignancy. There is a 1.5 cm irregular hypoechoic mass 9 o'clock-10 o'clock axis 4 cm from the nipple suspicious for malignancy. Several smaller hypoechoic masses suspicious for malignancy are also noted. There is a 1.5 cm right axillary node with focal cortical thickening. Adjacent smaller likely node with no demonstrable hilum is also noted.\par \par Left breast: No focal mass, cyst or abnormal shadowing suspicious for malignancy. No adverse axillary findings.\par \par IMPRESSION: Right breast masses strongly suspicious for malignancy with evidence of multicentric disease. Ultrasound-guided biopsy/biopsies suggested.\par \par FOLLOW-UP: Ultrasound guided biopsy. \par ASSESSMENT: BI-RADS Category 5:  Highly suggestive of malignancy. \par \par \par \par Date of Exam: 2021\par EXAM: ULTRASOUND-GUIDED CORE BIOPSY 3 SITES\par \par HISTORY: The patient is 70 years old and is referred for ultrasound-guided needle biopsies of a right 11 to 12 o'clock position mass, a right 9 o'clock position mass and a right axillary lymph node. \par \par COMPARISON: Prior breast imaging studies dated 2020 \par \par PROCEDURE: The risks and benefits of the procedure were conveyed to the patient, and the patient consented to the procedure. Universal timeout was performed.\par \par SITE 1:\par Targeted ultrasound performed prior to the procedure reconfirms the suspicious findin.4 cm mass at the right 11-12 o'clock location, 3 cm from the nipple.\par \par Using sterile technique, 10 mL of 1% lidocaine was administered. A skin incision was made prior to insertion of the biopsy needle. Under sonographic visualization, a 14-gauge spring-loaded core biopsy device was used to sample the target. 3 samples were obtained. An R shaped biopsy clip was deployed at the biopsy site. A sonographically visualized residual lesion was present post core biopsy. \par \par SITE 2:\par Targeted ultrasound performed prior to the procedure reconfirms the suspicious findin.6 cm mass at the right 9 o'clock location, 5 cm from the nipple. \par \par Using sterile technique, 10 mL of 1% lidocaine was administered. A skin incision was made prior to insertion of the biopsy needle. Under sonographic visualization, a 14-gauge spring-loaded core biopsy device was used to sample the target. 3 samples were obtained. An S shaped biopsy clip was deployed at the biopsy site. A sonographically visualized residual lesion was present post core biopsy. \par \par SITE 3:\par Targeted ultrasound performed prior to the procedure reconfirms the suspicious findin.9 cm abnormal right axillary lymph node. \par \par Using sterile technique, 10 mL of 1% lidocaine was administered. A skin incision was made prior to insertion of the biopsy needle. Under sonographic visualization, a 14-gauge spring-loaded core biopsy device was used to sample the target. 2 samples were obtained. An R shaped biopsy clip was deployed at the biopsy site. A sonographically visualized residual lesion was present post core biopsy. \par \par A postprocedure mammogram demonstrates appropriate placement of the clips. \par \par The patient tolerated the procedures well without complications. The patient was given postbiopsy care instructions. The specimens were subsequently sent to the pathology lab. \par \par IMPRESSION: 3 site ultrasound-guided core biopsies performed. 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

## 2021-05-10 NOTE — CONSULT LETTER
[Dear  ___] : Dear  [unfilled], [Consult Letter:] : I had the pleasure of evaluating your patient, [unfilled]. [Consult Closing:] : Thank you very much for allowing me to participate in the care of this patient.  If you have any questions, please do not hesitate to contact me. [Sincerely,] : Sincerely, [FreeTextEntry3] : Jayce Acosta MD\par

## 2021-05-11 NOTE — PROGRESS NOTE ADULT - PROBLEM SELECTOR PLAN 7
2 -secondary to renal failure  -IV hydration now  if bicarb remain low/drops may need sodium bicarb drip  or renal consult for questionable dialysis.

## 2021-06-04 ENCOUNTER — APPOINTMENT (OUTPATIENT)
Dept: DISASTER EMERGENCY | Facility: CLINIC | Age: 71
End: 2021-06-04

## 2021-06-04 DIAGNOSIS — Z01.818 ENCOUNTER FOR OTHER PREPROCEDURAL EXAMINATION: ICD-10-CM

## 2021-06-04 LAB — SARS-COV-2 N GENE NPH QL NAA+PROBE: NOT DETECTED

## 2021-06-09 ENCOUNTER — APPOINTMENT (OUTPATIENT)
Dept: ENDOVASCULAR SURGERY | Facility: CLINIC | Age: 71
End: 2021-06-09
Payer: MEDICARE

## 2021-06-09 DIAGNOSIS — I10 ESSENTIAL (PRIMARY) HYPERTENSION: ICD-10-CM

## 2021-06-09 DIAGNOSIS — E78.00 PURE HYPERCHOLESTEROLEMIA, UNSPECIFIED: ICD-10-CM

## 2021-06-09 DIAGNOSIS — E11.9 TYPE 2 DIABETES MELLITUS W/OUT COMPLICATIONS: ICD-10-CM

## 2021-06-09 PROCEDURE — 77001 FLUOROGUIDE FOR VEIN DEVICE: CPT

## 2021-06-09 PROCEDURE — 36561 INSERT TUNNELED CV CATH: CPT | Mod: LT

## 2021-06-09 PROCEDURE — 99202 OFFICE O/P NEW SF 15 MIN: CPT | Mod: 57

## 2021-06-09 PROCEDURE — 76937 US GUIDE VASCULAR ACCESS: CPT

## 2021-06-09 RX ORDER — AMLODIPINE BESYLATE 10 MG/1
10 TABLET ORAL
Refills: 0 | Status: ACTIVE | COMMUNITY

## 2021-06-09 RX ORDER — METFORMIN HYDROCHLORIDE 500 MG/1
500 TABLET, COATED ORAL
Refills: 0 | Status: ACTIVE | COMMUNITY

## 2021-06-09 RX ORDER — GLIMEPIRIDE 4 MG/1
4 TABLET ORAL
Refills: 0 | Status: ACTIVE | COMMUNITY

## 2021-06-09 RX ORDER — TRIAMTERENE AND HYDROCHLOROTHIAZIDE 37.5; 25 MG/1; MG/1
37.5-25 CAPSULE ORAL
Refills: 0 | Status: ACTIVE | COMMUNITY

## 2021-06-09 RX ORDER — PRAVASTATIN SODIUM 10 MG/1
10 TABLET ORAL
Refills: 0 | Status: ACTIVE | COMMUNITY

## 2021-06-09 RX ORDER — METOPROLOL SUCCINATE 50 MG/1
50 TABLET, EXTENDED RELEASE ORAL
Refills: 0 | Status: ACTIVE | COMMUNITY

## 2021-07-14 ENCOUNTER — APPOINTMENT (OUTPATIENT)
Dept: ENDOVASCULAR SURGERY | Facility: CLINIC | Age: 71
End: 2021-07-14
Payer: MEDICARE

## 2021-07-14 ENCOUNTER — RESULT REVIEW (OUTPATIENT)
Age: 71
End: 2021-07-14

## 2021-07-14 VITALS
WEIGHT: 180 LBS | DIASTOLIC BLOOD PRESSURE: 81 MMHG | TEMPERATURE: 97.3 F | SYSTOLIC BLOOD PRESSURE: 159 MMHG | RESPIRATION RATE: 16 BRPM | OXYGEN SATURATION: 95 % | HEIGHT: 66 IN | HEART RATE: 92 BPM | BODY MASS INDEX: 28.93 KG/M2

## 2021-07-14 PROCEDURE — 36582 REPLACE TUNNELED CV CATH: CPT

## 2021-07-14 PROCEDURE — 77001 FLUOROGUIDE FOR VEIN DEVICE: CPT

## 2021-07-14 NOTE — PAST MEDICAL HISTORY
[FreeTextEntry1] : \par \par Malignant Hyperthermis (MH) Screening Tool and Risk of Bleeding Assessement\par Ms. BRADLEY RIDER  denies family history of unexpected death following Anesthesia or Exercise.\par Denies Family history of Malignant Hyperthermia, Muscle or Neuromuscular disorder and High Temperature following exercise.\par \par Ms. BRADLEY RIDER denies history of Muscle Spasm, Dark or Chocolate - Colored urine and Unanticipated fever immediately following anesthesia or serious exercise. \par Ms. RIDER  also denies bleeding tendencies/ Risks of Bleeding\par

## 2021-07-14 NOTE — HISTORY OF PRESENT ILLNESS
[FreeTextEntry1] : Covid testing - not detected 6-4-21\par alert and oriented \par no reported falls\par accompanied by neighbor Juliette [phone]\par no meds today\par  [FreeTextEntry5] : 3am 6/9/2021 [FreeTextEntry6] : Dr. Lantigua

## 2021-08-24 NOTE — OCCUPATIONAL THERAPY INITIAL EVALUATION ADULT - ASSISTIVE DEVICE FOR TRANSFER: SIT/STAND, REHAB EVAL
Progress Note  [facility] Medicine        PATIENT DETAILS  Date: 8/24/2021   Talha Dumas  78 year old male  [id number]  /A   LOS: 8 days     Chief complaint:    Shortness of breath    Subjective    Sob resolved after thoracentesis.  Denies having chest pain, cough, fever or chills  Patient is visiting from [place] for his mother's 102nd birthday..    ROS  12 point comprehensive ROS was assessed and negative except for what was mentioned in subjective.     VITALS  Vital Last Value 24 Hour Range   Temperature 98.1 °F (36.7 °C) No data recorded   Pulse 90 No data recorded   Respiratory 18 No data recorded   Blood Pressure 125/60 No data recorded   Pulse Oximetry 97 % No data recorded   CVP   No data recorded     Vital Today Admit   Weight 109.5 kg Weight: 114.9 kg   Height N/A Height: 6' 2\" (188 cm)   BMI N/A BMI (Calculated): 34.35     BMI  Body mass index is 32.74 kg/m².     PHYSICAL EXAM  General - Patient is in no acute distress.  Patient is conversing freely in full sentences.  Eyes - Pupils equally round and reactive to light. Sclerae are anicteric  ENT - . Oropharyngeal mucous membranes are moist. Neck is soft and supple  LYMPHNODES - No cervical or axillary lymphadenopathy   MUSCULOSKELETAL - Warm and well perfused. No cyanosis or edema.  SKIN- No rashes or lesions.  CV Regular rate and rhythm without additional heartsounds.  No carotid bruits. Peripheral pulses are 2+ and symmetric.  PULM - decreased BS on left side-improving  ABDOMEN Soft, non-tender and non-distended. Bowel sounds are normoactive. No guarding or rebound tenderness.  NEURO CNs II-XII are intact.  Strength, sensation, and coordination of RLE/RUE/LLE/LUEs are grossly intact.  PSYCH Alert and oriented to person, place and time. Recent and remote memory intact.    LABS  .  Recent Labs   Lab 08/22/21  1036 08/22/21  0715 08/21/21  0852 08/19/21  0655 08/19/21  0645   WBC  --  9.9 12.2* 9.0  --    HCT  --  34.7* 36.2*  35.4*  --    HGB  --  10.8* 11.4* 11.6*  --    PLT  --  279 295 310  --    INR  --   --   --   --  1.2   SODIUM 136 136 135  --  140   POTASSIUM 4.2 4.0 4.1  --  3.8   CHLORIDE 101 101 99  --  99   CO2 32 31 30  --  32   GLUCOSE 125* 127* 116*  --  125*   BUN 36* 38* 42*  --  34*   CREATININE 1.41* 1.42* 1.62*  --  1.47*   CALCIUM 9.2 9.2 9.0  --  9.4   ALBUMIN  --   --  3.3*  --   --    MG  --  2.1 2.1  --   --    BILIRUBIN  --   --  1.4*  --   --    ALKPT  --   --  88  --   --    AST  --   --  8  --   --    GPT  --   --  12  --   --      Recent Labs   Lab 08/22/21  0715   NTPROB 2,298*        IVF      RADS      ASSESSMENT AND PLAN    Exertional chest pain   CAD, s/p [facility] - no intervention  Left pleural effusion, s/p left thoracenthesis  Coronary artery disease, had multiple stents  A flutter-on Eliquis  Chronic kidney disease stage 3-per care everywhere creatinine is at baseline  Type 2 diabetes mellitus  Chronic tobacco abuse    -nephrology is  Following, Lasix , ACe on hold   -received fluids  - renal function is improving   - pl fluid analysis with elevated LDH and 95% lymphocytes consider to be reactive  Cath showed moderate diffuse disease in RCA , and mild to moderate LAD and circumflex , severe in-stent stenosis in small to moderate size diagonal , his RWM described is same like it was in past 2012 and last few years based on records ., so PTA of diagonal will be of no clinical benefit he has no chest pain and there is risk of worsening his Kidney function that's already deteriorating . Medical therapy only for now per cardiology       Lost more than 5 kg with improved   Shortness of breath .  CT chest-left-sided pleural effusion   May resume Plavix and Eliquis  - Monitor heart rate  -echo shows EF of 47%  -continue metoprolol, Lipitor  -insulin sliding scale   Inputs and outputs and daily weight.  Lost around 5 kg  -patient was advised to quit smoking, I spent 7 minutes    Dr Del Rio  [facility]  Hospitalist      2:24 PM                 straight cane

## 2021-09-01 NOTE — HISTORY OF PRESENT ILLNESS
[FreeTextEntry1] : \par Covid test\par alert and oriented \par no reported falls\par accompanied by neighbor Juliette [phone]\par took BP meds today\par  [FreeTextEntry5] : 7/13/2021 11pm [FreeTextEntry6] : Dr. Hale

## 2021-09-01 NOTE — PAST MEDICAL HISTORY
[Increasing age ( >40 years old)] : Increasing age ( >40 years old) [Malignancy] : Malignancy [No therapy indicated for cases scheduled for less than one hour] : No therapy indicated for cases scheduled for less than one hour. [FreeTextEntry1] : \par \par Malignant Hyperthermis (MH) Screening Tool and Risk of Bleeding Assessement\par Ms. BRADLEY RIDER  denies family history of unexpected death following Anesthesia or Exercise.\par Denies Family history of Malignant Hyperthermia, Muscle or Neuromuscular disorder and High Temperature following exercise.\par \par Ms. BRADELY RIDER denies history of Muscle Spasm, Dark or Chocolate - Colored urine and Unanticipated fever immediately following anesthesia or serious exercise. \par Ms. RIDER  also denies bleeding tendencies/ Risks of Bleeding\par

## 2021-09-01 NOTE — PROCEDURE
[D/C IV on discharge] : D/C IV on discharge [Resume diet] : resume diet [Site check for bleeding/hematoma] : Site check for bleeding/hematoma [Vital signs on admission the q 15 mins x2] : Vital signs on admission the q 15 mins x2 [FreeTextEntry1] : Mediport exchange left chest wall

## 2021-10-25 ENCOUNTER — APPOINTMENT (OUTPATIENT)
Dept: SURGERY | Facility: CLINIC | Age: 71
End: 2021-10-25

## 2021-10-28 ENCOUNTER — APPOINTMENT (OUTPATIENT)
Dept: SURGERY | Facility: CLINIC | Age: 71
End: 2021-10-28
Payer: MEDICARE

## 2021-10-28 VITALS
DIASTOLIC BLOOD PRESSURE: 86 MMHG | WEIGHT: 178 LBS | BODY MASS INDEX: 28.61 KG/M2 | OXYGEN SATURATION: 99 % | SYSTOLIC BLOOD PRESSURE: 151 MMHG | HEART RATE: 114 BPM | HEIGHT: 66 IN

## 2021-10-28 VITALS — TEMPERATURE: 97.5 F

## 2021-10-28 PROCEDURE — 99215 OFFICE O/P EST HI 40 MIN: CPT

## 2021-10-28 NOTE — PHYSICAL EXAM
[Normal Breath Sounds] : Normal breath sounds [Normal Heart Sounds] : normal heart sounds [Normal Rate and Rhythm] : normal rate and rhythm [No Rash or Lesion] : No rash or lesion [Alert] : alert [Oriented to Person] : oriented to person [Oriented to Place] : oriented to place [Oriented to Time] : oriented to time [Calm] : calm [de-identified] : The patient is alert, well-groomed  [de-identified] : Head is normocephalic. Conjunctiva pink, anicteric. Nasal mucosa pink, septum midline. Oral mucosa pink. Tongue midline, pharynx without exudates. \par \par   [de-identified] : Neck supple. Trachea midline. Thyroid isthmus barely palpable, lobes not felt.\par   [de-identified] : masses decreased in size [de-identified] : full range of motion and no deformities appreciated.

## 2021-10-28 NOTE — ASSESSMENT
[FreeTextEntry1] : BRADLEY RIDER is a 71 year old female diagnosed with right breast cancer, metastatic to right axillary lymph node, S/P US guided biopsy 04/14/21\par \par \par Patient is on chemotherapy  last treatment will be on 11/012/2021\par We recommend Right breast modified radical  mastectomy. She agrees to proceed

## 2021-10-28 NOTE — DATA REVIEWED
[FreeTextEntry1] : Exam requested by:\par YOSEPH BACH MD\par [address] [facility]\par [facility] [address]\par SITE PERFORMED: [place]\par SITE PHONE: [phone]\par Patient: BRADLEY RIDER\par YOB: 1950\par Phone: [phone]\par MRN: [id number] Acc: [id number]\par Date of Exam: 05-\par  \par EXAM:  PET CT TUMOR IMAGING SKULL-THIGH\par \par Note - This patient has received 0 CT studies and 0 Myocardial Perfusion studies within our network over the previous 12 month period.\par \par HISTORY: \par 70-year-old woman with recently diagnosed right breast cancer is referred for PET/CT for further evaluation.\par \par PET/CT REQUESTED FOR:  Initial treatment strategy.\par \par TECHNIQUE:\par Approximately 60 minutes after the intravenous administration of 13.5 mCi of FDG, PET/CT was performed from the vertex of the skull through the mid-thighs. At the time of injection of FDG, the patient's blood glucose level measured 208 mg/ml.  Prior to the PET/CT imaging, a CT of the head, chest, abdomen and pelvis was performed without the administration of IV contrast. Multiplanar images were reviewed. \par \par One or more of the following dose reduction techniques were used: automated exposure control, adjustment of the mA and/or kV according to patient size, use of iterative reconstruction technique.\par \par COMPARISON:\par No prior cross-sectional imaging or the reports are available for comparison.\par \par FINDINGS: \par There is a large hypermetabolic lobular mass in the superior right breast, which measures 4.1 x 2.5 cm, image 136. The standard uptake values of this activity range from 6.3-8.8.\par \par There are additional smaller hypermetabolic areas of soft tissue nodularity in the right lateral breast. Representative measurements include an area of soft tissue nodularity which measures 1.8 x 1.3 cm, image 151. The standard uptake values of this activity range from 3.8-4.4.\par \par There are hypermetabolic right axillary lymph nodes. Representative measurements include a right axillary lymph node containing a biopsy clip, which measures 1.4 x 1 cm, image 124. The standard uptake values of this activity range from 2.3-3.3.\par \par There is a large complex cystic lesion in the left adnexa, which measures 6.3 x 4.9 cm, image 249. The solid components of this lesion are hypermetabolic on the PET images. The standard uptake values of this activity range from 6-11.8. The cystic components of this lesion are photopenic on the PET images.\par \par There is focal hypermetabolic activity in the sigmoid colon, which is not opacified, but appears focally thickened on the CT images, image 239. The standard uptake values of this activity range from 4-7.4. There are diverticula in this region of colon as well as mild stranding of the adjacent pericolonic fat.\par \par Otherwise, there is no evidence of abnormal hypermetabolic activity. There is physiologic distribution of the radiotracer in the head, neck, chest, abdomen and pelvis.\par \par On the CT images, there is no evidence of intracranial mass, midline shift or significant ventricular dilatation.\par \par There is no evidence of enlarged lymphadenopathy in either side of the neck.\par \par There is no evidence of enlarged mediastinal, hilar or left axillary lymphadenopathy. No significant pleural or pericardial effusions are noted. Coronary artery calcifications are noted.\par \par Examination of the lung parenchyma is somewhat limited secondary to respiratory motion, particularly in the lung bases. There is a 4 mm left upper lobe pulmonary nodule, image 16 of the lung windows. This nodule is below the size resolution of PET imaging. Dependent changes are noted throughout the lungs. Otherwise, there is no definite evidence of additional suspicious pulmonary nodule or infiltrate. \par \par Evaluation of the abdomen is somewhat limited secondary to respiratory motion and artifact from patient's arms.\par \par Gallstones are noted. There is no evidence of significant gallbladder wall thickening or pericholecystic fluid.\par \par The adrenal glands are mildly nodular in contour. Neither adrenal is significantly hypermetabolic on the corresponding PET images.\par \par There is a low-attenuation lesion in the left kidney, which likely represents a cyst. Otherwise, no intrinsic renal lesions are noted. There is no evidence of hydronephrosis. \par \par The visualized portions of the liver, spleen, and pancreas are unremarkable on this noncontrast examination.\par \par There is no evidence of enlarged intra-abdominal, pelvic or inguinal lymphadenopathy.\par \par The uterus is mildly enlarged and contains calcifications, most consistent with fibroids. Otherwise, the remaining visualized pelvic structures are unremarkable.\par \par Review of the bone windows reveals degenerative changes of the spine. Otherwise, there is no evidence of suspicious lytic or blastic lesion.\par \par IMPRESSION: \par 1.  Large hypermetabolic area of soft tissue nodularity in the superior right breast. These findings are consistent with patient's known breast cancer.\par 2.  Additional smaller areas of hypermetabolic soft tissue nodularity in the lateral right breast. These findings are also most consistent with malignancy. The imaging appearance suggests a multifocal breast cancer.\par 3.  Hypermetabolic right axillary lymph nodes, which are most consistent with metastatic edison disease.\par 4.  Hypermetabolic complex cystic left adnexal lesion. These findings are highly suspicious for malignancy. MRI of the pelvis with and without IV contrast would be helpful in further evaluation.\par 5.  Focal hypermetabolic activity in the sigmoid colon, which is not opacified, but appears focally thickened on the CT images. There are diverticula in this region of colon and there is mild stranding of the adjacent pericolonic fat. These findings are nonspecific, and may represent active infection/inflammation in an area of diverticular disease; however, an underlying colon cancer cannot be excluded. Colonoscopy may be helpful in further evaluation.\par 6.  Otherwise, no abnormal hypermetabolic activity to suggest malignancy at this time.\par 7.  4 mm left upper lobe pulmonary nodule, which is below the size resolution of PET imaging. Attention to this nodule follow-up examinations would be helpful in further evaluation.\par 8.  Cholelithiasis.\par 9.  Bilateral adrenal hyperplasia.\par 10.  Fibroid uterus.\par \par Thank you for the opportunity to participate in the care of this patient.  \par  \par Mey Quiros MD  - Electronically Signed: 05- 7:26 PM \par Physician to Physician Direct Line is: [phone]\par ____________________________________________________________________________\par \par Exam requested by:\par YOSEPH BACH MD\par [address]\par [facility] [address]\par SITE PERFORMED: [place]\par SITE PHONE: [phone]\par Patient: BRADLEY RIDER\par YOB: 1950\par Phone: [phone]\par MRN: [id number] Acc: [id number]\par Date of Exam: 06-\par  \par EXAM:  MRI ABDOMEN WITHOUT AND WITH CONTRAST\par \par HISTORY:  Adrenal nodule\par \par TECHNIQUE:  MRI of the abdomen performed.\par IV Contrast:  15 mL of Clariscan is administered from a 15 mL vial.\par Oral Contrast: None.\par Acquisition:  A multiplanar multiecho liver MRI examination was performed utilizing a 3T magnet. \par Subtraction views are created. Diffusion weighted imaging with multiple B values and ADC map imaging is also provided. \par \par COMPARISON:  PET/CT 5/27/2021\par \par FINDINGS: VISUALIZED PORTION OF CHEST\par LUNGS: No abnormal signal.\par HEART: No abnormal signal.\par \par FINDINGS: ABDOMEN\par \par CHEST WALL: Partially visualized masses within the right breast likely corresponding to patient's right breast cancer.\par \par LIVER: Enlarged. No evidence for steatosis. Couple of subcentimeter cysts are visualized in the right hepatic lobe. There is no solid liver lesion.\par \par BILIARY: Contracted gallbladder containing multiple stones.      \par \par PANCREAS: Unremarkable.\par \par SPLEEN: Unremarkable.\par \par ADRENAL GLANDS: Right adrenal gland is unremarkable. Left adrenal gland nodule measures 0.8 x 1.3 cm. Nodule demonstrates loss of signal intensity on out of phase sequence in keeping with intra-Voxel lipid.\par \par KIDNEYS: Simple bilateral renal cysts. Otherwise unremarkable kidneys.\par \par PROXIMAL URETERS: Unremarkable.\par \par STOMACH: Unremarkable.\par \par VISUALIZED PORTION OF BOWEL: Unremarkable.\par \par PERITONEUM: Unremarkable.\par \par LYMPH NODES: Partially visualized right axillary lymph node likely metastatic.\par \par VASCULATURE: Unremarkable.\par \par SOFT TISSUES: Unremarkable.\par \par BONES: Unremarkable.   \par \par IMPRESSION:  \par 1. MRI has characterized left adrenal gland nodule as a benign adrenal adenoma. No follow-up necessary. Unremarkable right adrenal gland.\par 2. Hepatomegaly without steatosis or solid liver mass.\par 3. Cholelithiasis.\par 4. Benign renal cysts, no follow-up required. Otherwise unremarkable kidneys.\par 5. Partially visualized right breast masses and likely metastatic right axillary lymph nodes.\par \par Thank you for the opportunity to participate in the care of this patient.  \par  \par Lior Mares MD  - Electronically Signed: 06- 12:09 PM \par Physician to Physician Direct Line is: [phone]\par

## 2021-10-28 NOTE — PLAN
[FreeTextEntry1] : Ms. BRADLEY RIDER Patient was told significance of findings, options, risks and benefits were explained. Informed consent for Right breast modified radical mastectomy and potential risks, benefits and alternatives (surgical options were discussed including non-surgical options or the option of no surgery) to the planned surgery were discussed in depth. All surgical options were discussed including non-surgical treatments. The patient wishes to proceed with surgery. We will plan for surgery on at the next available date, pending any required insurance pre-certification or pre-approval. Patient agrees to obtain any necessary pre-operative evaluations and testing prior to surgery.\par Patient advised to seek immediate medical attention with any acute change in symptoms or with the development of any new or worsening symptoms. Patient's questions and concerns addressed to patient's satisfaction, and patient verbalized an understanding of the information discussed.\par She had large cancers on the right breast with metastatic right axillary lymph nodes which has responded to chemo\par Had large cancers in the upper and to the lower inferior edge of the right breast\par The potential for positive margin was also discussed. She has minimally functioning left hand due to previous injury / was found unconscious on the floor with injuries at home\par The potential for lymph edema and other related complications were discussed\par \par Will schedule for the surgery at [facility].\par \par PST\par COVID\par Medical Clearance

## 2021-10-28 NOTE — HISTORY OF PRESENT ILLNESS
[de-identified] : BRADLEY RIDER is a 71 year old female who presents in the office for follow up visit with Right Breast CA, metastatic to right axillary lymph node. diagnosed with right breast cancer, metastatic to right axillary lymph node, S/P US guided biopsy 04/14/21. Imaging and pathology results from [facility] reviewed. Patient is on chemotherapy. On physical exam,  masses decreased in size. Patient denies any discomfort. \par \par  \par

## 2021-12-02 ENCOUNTER — OUTPATIENT (OUTPATIENT)
Dept: OUTPATIENT SERVICES | Facility: HOSPITAL | Age: 71
LOS: 1 days | End: 2021-12-02
Payer: COMMERCIAL

## 2021-12-02 VITALS
TEMPERATURE: 97 F | DIASTOLIC BLOOD PRESSURE: 83 MMHG | OXYGEN SATURATION: 99 % | HEIGHT: 66 IN | SYSTOLIC BLOOD PRESSURE: 147 MMHG | HEART RATE: 94 BPM | RESPIRATION RATE: 17 BRPM | WEIGHT: 179.9 LBS

## 2021-12-02 DIAGNOSIS — Z01.818 ENCOUNTER FOR OTHER PREPROCEDURAL EXAMINATION: ICD-10-CM

## 2021-12-02 DIAGNOSIS — Z98.890 OTHER SPECIFIED POSTPROCEDURAL STATES: Chronic | ICD-10-CM

## 2021-12-02 DIAGNOSIS — I10 ESSENTIAL (PRIMARY) HYPERTENSION: ICD-10-CM

## 2021-12-02 DIAGNOSIS — G47.33 OBSTRUCTIVE SLEEP APNEA (ADULT) (PEDIATRIC): ICD-10-CM

## 2021-12-02 DIAGNOSIS — C50.911 MALIGNANT NEOPLASM OF UNSPECIFIED SITE OF RIGHT FEMALE BREAST: ICD-10-CM

## 2021-12-02 DIAGNOSIS — Z29.9 ENCOUNTER FOR PROPHYLACTIC MEASURES, UNSPECIFIED: ICD-10-CM

## 2021-12-02 DIAGNOSIS — E11.9 TYPE 2 DIABETES MELLITUS WITHOUT COMPLICATIONS: ICD-10-CM

## 2021-12-02 LAB — BLD GP AB SCN SERPL QL: SIGNIFICANT CHANGE UP

## 2021-12-02 PROCEDURE — G0463: CPT

## 2021-12-02 NOTE — H&P PST ADULT - PROBLEM SELECTOR PLAN 2
Stop Bang Score =2 Pt denies sleep Apnea. Pt to be monitor and maintain with patent airway throughout hospital stay

## 2021-12-02 NOTE — H&P PST ADULT - HISTORY OF PRESENT ILLNESS
71 yr old Black female with history of HTN, diabetes (metformin and glimepiride), HDL had a fall in the house and found 3 days later sometime ago. Pt sustained many surgeries involving her left side: left breast, left hip, left arm and left lower leg. Pt has a deformed hand, poor grasp with fingers and many skin graphs to that area. Pt walks with a cane and lives alone. Pt felt a lump in her right breast Dec 2020. Around Jan a mammogram done revealed a lump in the right upper breast. A biopsy was done with positive results for cancer. Pt seen by Dr. Acosta in May 2021 and referred her for chemotherapy. Pt started treatment  every week in June for 5 months and just finished using a left med port. Pt now schedule for surgery on 12/8/2021 for right breast modified radical mastectomy. Pt has loss all hair and finger nail beds are blackened from chemo. Pt instructed on the use of chlorhexidene wash am of surgery.

## 2021-12-02 NOTE — H&P PST ADULT - ASSESSMENT
71 yr old Black female with history  of HTN, Hdl, diabetes (metformin and glimepiride) had a fall in her home and not discovered for several days. Pt had multiple surgeries involving her left side: left breast, hip, left lower arm, and left lower leg. Pt had multiple skin graphs and left hand deformed with contracted fingers and weak grasp. Pt felt a lump in the upper portion of her right breast and had mammogram in Jan 2021. A biopsy performed resulting in positive results. Pt had seen Dr. Acosta and she started with chemo therapy from May to June every week. Pt lost all hair and has blackened nail beds due to chemo. Pt uses a cane for stability, she lives alone at home. Pt now schedule for right breast modified radical mastectomy on 12/8/2021.

## 2021-12-02 NOTE — H&P PST ADULT - NSICDXPASTMEDICALHX_GEN_ALL_CORE_FT
PAST MEDICAL HISTORY:  Diabetes mellitus     GERD (gastroesophageal reflux disease)     Hypertension     Malignant neoplasm of unspecified site of right female breast     Metabolic encephalopathy     Thrombocytopenia

## 2021-12-02 NOTE — H&P PST ADULT - MUSCULOSKELETAL COMMENTS
left hand deformed with multiple skin graphs and large incisional scar left  lower leg deformed left hand multiple surgeries skin graph left lower leg scared

## 2021-12-02 NOTE — H&P PST ADULT - FALL HARM RISK - RISK INTERVENTIONS
Assistance OOB with selected safe patient handling equipment/Assistance with ambulation/Communicate Fall Risk and Risk Factors to all staff, patient, and family/Discuss with provider need for PT consult/Monitor gait and stability/Provide patient with walking aids - walker, cane, crutches/Reinforce activity limits and safety measures with patient and family/Sit up slowly, dangle for a short time, stand at bedside before walking/Use of alarms - bed, chair and/or voice tab/Visual Cue: Yellow wristband/Bed in lowest position, wheels locked, appropriate side rails in place/Call bell, personal items and telephone in reach/Instruct patient to call for assistance before getting out of bed or chair/Non-slip footwear when patient is out of bed/[place] to call system/Physically safe environment - no spills, clutter or unnecessary equipment/Purposeful Proactive Rounding/Room/bathroom lighting operational, light cord in reach

## 2021-12-02 NOTE — H&P PST ADULT - NSICDXPASTSURGICALHX_GEN_ALL_CORE_FT
PAST SURGICAL HISTORY:  H/O hand surgery left hand    H/O skin graft left hand    S/P debridement left leg, left wrist

## 2021-12-04 DIAGNOSIS — Z01.818 ENCOUNTER FOR OTHER PREPROCEDURAL EXAMINATION: ICD-10-CM

## 2021-12-05 ENCOUNTER — APPOINTMENT (OUTPATIENT)
Dept: DISASTER EMERGENCY | Facility: CLINIC | Age: 71
End: 2021-12-05

## 2021-12-06 LAB — SARS-COV-2 N GENE NPH QL NAA+PROBE: NOT DETECTED

## 2021-12-07 ENCOUNTER — TRANSCRIPTION ENCOUNTER (OUTPATIENT)
Age: 71
End: 2021-12-07

## 2021-12-08 ENCOUNTER — APPOINTMENT (OUTPATIENT)
Dept: SURGERY | Facility: HOSPITAL | Age: 71
End: 2021-12-08

## 2021-12-08 ENCOUNTER — TRANSCRIPTION ENCOUNTER (OUTPATIENT)
Age: 71
End: 2021-12-08

## 2021-12-08 ENCOUNTER — INPATIENT (INPATIENT)
Facility: HOSPITAL | Age: 71
LOS: 0 days | Discharge: HOME HEALTH | DRG: 580 | End: 2021-12-09
Attending: SURGERY | Admitting: SURGERY
Payer: COMMERCIAL

## 2021-12-08 ENCOUNTER — RESULT REVIEW (OUTPATIENT)
Age: 71
End: 2021-12-08

## 2021-12-08 VITALS
OXYGEN SATURATION: 99 % | HEART RATE: 83 BPM | TEMPERATURE: 98 F | SYSTOLIC BLOOD PRESSURE: 116 MMHG | DIASTOLIC BLOOD PRESSURE: 73 MMHG | HEIGHT: 66 IN | WEIGHT: 179.9 LBS | RESPIRATION RATE: 17 BRPM

## 2021-12-08 DIAGNOSIS — Z98.890 OTHER SPECIFIED POSTPROCEDURAL STATES: Chronic | ICD-10-CM

## 2021-12-08 DIAGNOSIS — C50.911 MALIGNANT NEOPLASM OF UNSPECIFIED SITE OF RIGHT FEMALE BREAST: ICD-10-CM

## 2021-12-08 LAB
BLD GP AB SCN SERPL QL: SIGNIFICANT CHANGE UP
GLUCOSE BLDC GLUCOMTR-MCNC: 162 MG/DL — HIGH (ref 70–99)
GLUCOSE BLDC GLUCOMTR-MCNC: 216 MG/DL — HIGH (ref 70–99)
GLUCOSE BLDC GLUCOMTR-MCNC: 258 MG/DL — HIGH (ref 70–99)

## 2021-12-08 PROCEDURE — 19307 MAST MOD RAD: CPT | Mod: RT

## 2021-12-08 PROCEDURE — 88309 TISSUE EXAM BY PATHOLOGIST: CPT | Mod: 26

## 2021-12-08 PROCEDURE — 19307 MAST MOD RAD: CPT | Mod: AS,RT

## 2021-12-08 RX ORDER — METOPROLOL TARTRATE 50 MG
50 TABLET ORAL
Refills: 0 | Status: DISCONTINUED | OUTPATIENT
Start: 2021-12-08 | End: 2021-12-08

## 2021-12-08 RX ORDER — INSULIN LISPRO 100/ML
VIAL (ML) SUBCUTANEOUS
Refills: 0 | Status: DISCONTINUED | OUTPATIENT
Start: 2021-12-08 | End: 2021-12-08

## 2021-12-08 RX ORDER — GLUCAGON INJECTION, SOLUTION 0.5 MG/.1ML
1 INJECTION, SOLUTION SUBCUTANEOUS ONCE
Refills: 0 | Status: DISCONTINUED | OUTPATIENT
Start: 2021-12-08 | End: 2021-12-08

## 2021-12-08 RX ORDER — FENTANYL CITRATE 50 UG/ML
25 INJECTION INTRAVENOUS
Refills: 0 | Status: DISCONTINUED | OUTPATIENT
Start: 2021-12-08 | End: 2021-12-08

## 2021-12-08 RX ORDER — DEXTROSE 50 % IN WATER 50 %
25 SYRINGE (ML) INTRAVENOUS ONCE
Refills: 0 | Status: DISCONTINUED | OUTPATIENT
Start: 2021-12-08 | End: 2021-12-08

## 2021-12-08 RX ORDER — ONDANSETRON 8 MG/1
4 TABLET, FILM COATED ORAL EVERY 6 HOURS
Refills: 0 | Status: DISCONTINUED | OUTPATIENT
Start: 2021-12-08 | End: 2021-12-09

## 2021-12-08 RX ORDER — INSULIN LISPRO 100/ML
VIAL (ML) SUBCUTANEOUS AT BEDTIME
Refills: 0 | Status: DISCONTINUED | OUTPATIENT
Start: 2021-12-08 | End: 2021-12-08

## 2021-12-08 RX ORDER — ACETAMINOPHEN 500 MG
650 TABLET ORAL EVERY 6 HOURS
Refills: 0 | Status: DISCONTINUED | OUTPATIENT
Start: 2021-12-08 | End: 2021-12-09

## 2021-12-08 RX ORDER — METFORMIN HYDROCHLORIDE 850 MG/1
1 TABLET ORAL
Qty: 0 | Refills: 0 | DISCHARGE

## 2021-12-08 RX ORDER — INFLUENZA VIRUS VACCINE 15; 15; 15; 15 UG/.5ML; UG/.5ML; UG/.5ML; UG/.5ML
0.7 SUSPENSION INTRAMUSCULAR ONCE
Refills: 0 | Status: COMPLETED | OUTPATIENT
Start: 2021-12-08 | End: 2021-12-09

## 2021-12-08 RX ORDER — SODIUM CHLORIDE 9 MG/ML
1000 INJECTION, SOLUTION INTRAVENOUS
Refills: 0 | Status: DISCONTINUED | OUTPATIENT
Start: 2021-12-08 | End: 2021-12-08

## 2021-12-08 RX ORDER — GLIMEPIRIDE 1 MG
1 TABLET ORAL
Qty: 0 | Refills: 0 | DISCHARGE

## 2021-12-08 RX ORDER — ACETAMINOPHEN 500 MG
1000 TABLET ORAL ONCE
Refills: 0 | Status: COMPLETED | OUTPATIENT
Start: 2021-12-08 | End: 2021-12-08

## 2021-12-08 RX ORDER — FENTANYL CITRATE 50 UG/ML
50 INJECTION INTRAVENOUS
Refills: 0 | Status: DISCONTINUED | OUTPATIENT
Start: 2021-12-08 | End: 2021-12-08

## 2021-12-08 RX ORDER — SODIUM CHLORIDE 9 MG/ML
3 INJECTION INTRAMUSCULAR; INTRAVENOUS; SUBCUTANEOUS EVERY 8 HOURS
Refills: 0 | Status: DISCONTINUED | OUTPATIENT
Start: 2021-12-08 | End: 2021-12-08

## 2021-12-08 RX ORDER — ONDANSETRON 8 MG/1
4 TABLET, FILM COATED ORAL ONCE
Refills: 0 | Status: COMPLETED | OUTPATIENT
Start: 2021-12-08 | End: 2021-12-08

## 2021-12-08 RX ORDER — SODIUM CHLORIDE 9 MG/ML
1000 INJECTION INTRAMUSCULAR; INTRAVENOUS; SUBCUTANEOUS
Refills: 0 | Status: DISCONTINUED | OUTPATIENT
Start: 2021-12-08 | End: 2021-12-09

## 2021-12-08 RX ORDER — DEXTROSE 50 % IN WATER 50 %
15 SYRINGE (ML) INTRAVENOUS ONCE
Refills: 0 | Status: DISCONTINUED | OUTPATIENT
Start: 2021-12-08 | End: 2021-12-08

## 2021-12-08 RX ORDER — TRIAMTERENE/HYDROCHLOROTHIAZID 75 MG-50MG
1 TABLET ORAL DAILY
Refills: 0 | Status: DISCONTINUED | OUTPATIENT
Start: 2021-12-08 | End: 2021-12-08

## 2021-12-08 RX ORDER — LANOLIN ALCOHOL/MO/W.PET/CERES
5 CREAM (GRAM) TOPICAL AT BEDTIME
Refills: 0 | Status: DISCONTINUED | OUTPATIENT
Start: 2021-12-08 | End: 2021-12-09

## 2021-12-08 RX ORDER — HYDROMORPHONE HYDROCHLORIDE 2 MG/ML
0.5 INJECTION INTRAMUSCULAR; INTRAVENOUS; SUBCUTANEOUS
Refills: 0 | Status: DISCONTINUED | OUTPATIENT
Start: 2021-12-08 | End: 2021-12-09

## 2021-12-08 RX ORDER — DEXTROSE 50 % IN WATER 50 %
12.5 SYRINGE (ML) INTRAVENOUS ONCE
Refills: 0 | Status: DISCONTINUED | OUTPATIENT
Start: 2021-12-08 | End: 2021-12-08

## 2021-12-08 RX ORDER — AMLODIPINE BESYLATE 2.5 MG/1
10 TABLET ORAL DAILY
Refills: 0 | Status: DISCONTINUED | OUTPATIENT
Start: 2021-12-08 | End: 2021-12-08

## 2021-12-08 RX ADMIN — Medication 1000 MILLIGRAM(S): at 14:39

## 2021-12-08 RX ADMIN — HYDROMORPHONE HYDROCHLORIDE 0.5 MILLIGRAM(S): 2 INJECTION INTRAMUSCULAR; INTRAVENOUS; SUBCUTANEOUS at 20:02

## 2021-12-08 RX ADMIN — FENTANYL CITRATE 50 MICROGRAM(S): 50 INJECTION INTRAVENOUS at 13:54

## 2021-12-08 RX ADMIN — Medication 400 MILLIGRAM(S): at 14:09

## 2021-12-08 RX ADMIN — Medication 5 MILLIGRAM(S): at 23:24

## 2021-12-08 RX ADMIN — HYDROMORPHONE HYDROCHLORIDE 0.5 MILLIGRAM(S): 2 INJECTION INTRAMUSCULAR; INTRAVENOUS; SUBCUTANEOUS at 20:24

## 2021-12-08 RX ADMIN — ONDANSETRON 4 MILLIGRAM(S): 8 TABLET, FILM COATED ORAL at 14:01

## 2021-12-08 RX ADMIN — FENTANYL CITRATE 50 MICROGRAM(S): 50 INJECTION INTRAVENOUS at 14:09

## 2021-12-08 NOTE — PROGRESS NOTE ADULT - ASSESSMENT
71y.o. Female s/p R MRM    -Diet as tolerated  -Pain control prn  -CONRAD care and documentation  -DVT ppx  -Incentive spirometry  -d/c planning in AM    DM  -ISS    HTN  -con't home meds

## 2021-12-08 NOTE — DISCHARGE NOTE PROVIDER - NSDCMRMEDTOKEN_GEN_ALL_CORE_FT
acetaminophen 325 mg oral tablet: 2 tab(s) orally every 6 hours, As needed, Moderate Pain (4 - 6)  amLODIPine 10 mg oral tablet: 1 tab(s) orally once a day  glimepiride 4 mg oral tablet: 1 tab(s) orally once a day  hydroCHLOROthiazide-triamterene 25 mg-37.5 mg oral capsule: 1 cap(s) orally once a day  metFORMIN 500 mg oral tablet: 1 tab(s) orally 2 times a day  metoprolol tartrate 50 mg oral tablet: 1 tab(s) orally 2 times a day  pravastatin 10 mg oral tablet: 1 tab(s) orally once a day

## 2021-12-08 NOTE — DISCHARGE NOTE PROVIDER - HOSPITAL COURSE
71 y.o. F w/PMH DM, HTN underwent elective R modified radical mastectomy for R breast cancer. Pt stable and discharged POD#1 with 2 JPs 71 y.o. F w/PMH DM, HTN underwent elective R modified radical mastectomy for R breast cancer. Pt stable and discharged POD#1 with 2 JPs and VNS

## 2021-12-08 NOTE — PATIENT PROFILE ADULT - FALL HARM RISK - UNIVERSAL INTERVENTIONS
Bed in lowest position, wheels locked, appropriate side rails in place/Call bell, personal items and telephone in reach/Instruct patient to call for assistance before getting out of bed or chair/Non-slip footwear when patient is out of bed/[place] to call system/Physically safe environment - no spills, clutter or unnecessary equipment/Purposeful Proactive Rounding/Room/bathroom lighting operational, light cord in reach

## 2021-12-08 NOTE — DISCHARGE NOTE PROVIDER - CARE PROVIDER_API CALL
Jayce Acosta (MD)  Surgery  [address] [place], [place] [id number]  Phone: [phone]  Fax: [phone]  Follow Up Time:

## 2021-12-08 NOTE — DISCHARGE NOTE PROVIDER - NSDCCPCAREPLAN_GEN_ALL_CORE_FT
PRINCIPAL DISCHARGE DIAGNOSIS  Diagnosis: Malignant neoplasm of unspecified site of right female breast  Assessment and Plan of Treatment: Keep dressing dry. Sponge bath only. Empty CONRAD drains daily, recording amount each time.      SECONDARY DISCHARGE DIAGNOSES  Diagnosis: HTN (hypertension)  Assessment and Plan of Treatment: Continue home medications    Diagnosis: DM (diabetes mellitus)  Assessment and Plan of Treatment: Resume home medications

## 2021-12-08 NOTE — ED ADULT NURSE NOTE - GASTROINTESTINAL ASSESSMENT
Patient called in stating that she has noticed her ankle has started to develop endima, she thought she might cut back on her flecainide (TAMBOCOR) 50 MG tablet but that did not dalila to affect it, patient is wondering what she should do now. Please call her back to discuss          Thank you    WDL

## 2021-12-09 ENCOUNTER — TRANSCRIPTION ENCOUNTER (OUTPATIENT)
Age: 71
End: 2021-12-09

## 2021-12-09 VITALS
TEMPERATURE: 100 F | HEART RATE: 88 BPM | OXYGEN SATURATION: 97 % | DIASTOLIC BLOOD PRESSURE: 79 MMHG | RESPIRATION RATE: 18 BRPM | SYSTOLIC BLOOD PRESSURE: 148 MMHG

## 2021-12-09 PROBLEM — C50.911 MALIGNANT NEOPLASM OF UNSPECIFIED SITE OF RIGHT FEMALE BREAST: Chronic | Status: ACTIVE | Noted: 2021-12-02

## 2021-12-09 LAB
BASOPHILS # BLD AUTO: 0.02 K/UL — SIGNIFICANT CHANGE UP (ref 0–0.2)
BASOPHILS NFR BLD AUTO: 0.3 % — SIGNIFICANT CHANGE UP (ref 0–2)
EOSINOPHIL # BLD AUTO: 0.11 K/UL — SIGNIFICANT CHANGE UP (ref 0–0.5)
EOSINOPHIL NFR BLD AUTO: 1.6 % — SIGNIFICANT CHANGE UP (ref 0–6)
HCT VFR BLD CALC: 29.5 % — LOW (ref 34.5–45)
HGB BLD-MCNC: 9.3 G/DL — LOW (ref 11.5–15.5)
IMM GRANULOCYTES NFR BLD AUTO: 0.3 % — SIGNIFICANT CHANGE UP (ref 0–1.5)
LYMPHOCYTES # BLD AUTO: 1.43 K/UL — SIGNIFICANT CHANGE UP (ref 1–3.3)
LYMPHOCYTES # BLD AUTO: 20.3 % — SIGNIFICANT CHANGE UP (ref 13–44)
MCHC RBC-ENTMCNC: 28.9 PG — SIGNIFICANT CHANGE UP (ref 27–34)
MCHC RBC-ENTMCNC: 31.5 GM/DL — LOW (ref 32–36)
MCV RBC AUTO: 91.6 FL — SIGNIFICANT CHANGE UP (ref 80–100)
MONOCYTES # BLD AUTO: 0.6 K/UL — SIGNIFICANT CHANGE UP (ref 0–0.9)
MONOCYTES NFR BLD AUTO: 8.5 % — SIGNIFICANT CHANGE UP (ref 2–14)
NEUTROPHILS # BLD AUTO: 4.87 K/UL — SIGNIFICANT CHANGE UP (ref 1.8–7.4)
NEUTROPHILS NFR BLD AUTO: 69 % — SIGNIFICANT CHANGE UP (ref 43–77)
NRBC # BLD: 0 /100 WBCS — SIGNIFICANT CHANGE UP (ref 0–0)
PLATELET # BLD AUTO: 252 K/UL — SIGNIFICANT CHANGE UP (ref 150–400)
RBC # BLD: 3.22 M/UL — LOW (ref 3.8–5.2)
RBC # FLD: 13.9 % — SIGNIFICANT CHANGE UP (ref 10.3–14.5)
WBC # BLD: 7.05 K/UL — SIGNIFICANT CHANGE UP (ref 3.8–10.5)
WBC # FLD AUTO: 7.05 K/UL — SIGNIFICANT CHANGE UP (ref 3.8–10.5)

## 2021-12-09 RX ORDER — OXYCODONE HYDROCHLORIDE 5 MG/1
1 TABLET ORAL
Qty: 8 | Refills: 0
Start: 2021-12-09

## 2021-12-09 RX ADMIN — INFLUENZA VIRUS VACCINE 0.7 MILLILITER(S): 15; 15; 15; 15 SUSPENSION INTRAMUSCULAR at 11:29

## 2021-12-09 NOTE — DISCHARGE NOTE NURSING/CASE MANAGEMENT/SOCIAL WORK - NSDCPEFALRISK_GEN_ALL_CORE
For information on Fall & Injury Prevention, visit: [email or web address] OR  [email or web address] OR  https://www.cdc.gov/steadi/patient.html

## 2021-12-09 NOTE — PROGRESS NOTE ADULT - ASSESSMENT
71y.o. Female s/p R MRM POD#1    -d/c home today with VNS and JPs    DM  -ISS    HTN  -con't home meds

## 2021-12-09 NOTE — DISCHARGE NOTE NURSING/CASE MANAGEMENT/SOCIAL WORK - NSDCVIVACCINE_GEN_ALL_CORE_FT
Tdap; 19-Dec-2016 01:28; Lissett Hernandez); [employer]; [id number]; IntraMuscular; Deltoid Right.; 0.5 milliLiter(s); VIS (VIS Published: 09-May-2013, VIS Presented: 19-Dec-2016);

## 2021-12-09 NOTE — DISCHARGE NOTE NURSING/CASE MANAGEMENT/SOCIAL WORK - PATIENT PORTAL LINK FT
You can access the FollowMyHealth Patient Portal offered by [facility] by registering at the following website: [email or web address]. By joining [employer]’s FollowMyHealth portal, you will also be able to view your health information using other applications (apps) compatible with our system.

## 2021-12-09 NOTE — PROGRESS NOTE ADULT - SUBJECTIVE AND OBJECTIVE BOX
Surgery    Subjective:  Pt resting comfortably. No acute complaints  Tolerating pain without meds.  Tolerating diet.  Voided post op.    T(C): 36.7 (12-08-21 @ 16:29), Max: 36.8 (12-08-21 @ 09:54)  HR: 88 (12-08-21 @ 16:29) (78 - 88)  BP: 144/77 (12-08-21 @ 16:29) (112/58 - 144/77)  RR: 18 (12-08-21 @ 16:29) (12 - 19)  SpO2: 97% (12-08-21 @ 16:29) (96% - 100%)    Physical:  Gen: A&O x3  Chest: Dressing C/D/I. No surrounding fluctuance or swelling. JPs with sanguinous output    
INTERVAL HPI/OVERNIGHT EVENTS:  Pt resting comfortably. No acute complaints.     MEDICATIONS  (STANDING):  influenza  Vaccine (HIGH DOSE) 0.7 milliLiter(s) IntraMuscular once  melatonin 5 milliGRAM(s) Oral at bedtime  sodium chloride 0.9%. 1000 milliLiter(s) (120 mL/Hr) IV Continuous <Continuous>    MEDICATIONS  (PRN):  acetaminophen     Tablet .. 650 milliGRAM(s) Oral every 6 hours PRN Temp greater or equal to 38C (100.4F), Mild Pain (1 - 3)  HYDROmorphone  Injectable 0.5 milliGRAM(s) IV Push every 3 hours PRN Moderate Pain (4 - 6)  ondansetron Injectable 4 milliGRAM(s) IV Push every 6 hours PRN Nausea    Vital Signs Last 24 Hrs  T(C): 37.6 (09 Dec 2021 05:00), Max: 37.6 (09 Dec 2021 05:00)  T(F): 99.7 (09 Dec 2021 05:00), Max: 99.7 (09 Dec 2021 05:00)  HR: 88 (09 Dec 2021 05:00) (78 - 93)  BP: 148/79 (09 Dec 2021 05:00) (112/58 - 148/79)  BP(mean): 71 (08 Dec 2021 16:09) (71 - 81)  RR: 18 (09 Dec 2021 05:00) (12 - 19)  SpO2: 97% (09 Dec 2021 05:00) (95% - 100%)    Physical:  General: A&Ox3. NAD.  Chest: Dressing C/D/I. No surrounding fluctuance or swelling. JPs with sanguinous output    I&O's Detail    08 Dec 2021 07:01  -  09 Dec 2021 07:00  --------------------------------------------------------  IN:    Lactated Ringers: 150 mL    Lactated Ringers Bolus: 900 mL  Total IN: 1050 mL    OUT:    Bulb (mL): 105 mL serosanguinous    Bulb (mL): 95 mL serosanguinous  Total OUT: 200 mL    Total NET: 850 mL    LABS:                        9.3    7.05  )-----------( 252      ( 09 Dec 2021 06:53 )             29.5

## 2021-12-13 ENCOUNTER — APPOINTMENT (OUTPATIENT)
Dept: SURGERY | Facility: CLINIC | Age: 71
End: 2021-12-13
Payer: MEDICARE

## 2021-12-13 VITALS — TEMPERATURE: 97 F

## 2021-12-13 PROCEDURE — 99024 POSTOP FOLLOW-UP VISIT: CPT

## 2021-12-13 RX ORDER — OXYCODONE AND ACETAMINOPHEN 5; 325 MG/1; MG/1
5-325 TABLET ORAL
Qty: 8 | Refills: 0 | Status: ACTIVE | COMMUNITY
Start: 2021-12-09

## 2021-12-13 NOTE — PLAN
[FreeTextEntry1] : Patient will follow upon Thursday and as  needed. Warning signs, follow up, and restrictions were discussed with the patient\par \par Patient's questions and concerns addressed to patient's satisfaction.

## 2021-12-13 NOTE — REVIEW OF SYSTEMS
[Negative] : Heme/Lymph [Fever] : no fever [Chills] : no chills [Feeling Poorly] : not feeling poorly [Shortness Of Breath] : no shortness of breath [Wheezing] : no wheezing

## 2021-12-13 NOTE — PHYSICAL EXAM
[Normal Breath Sounds] : Normal breath sounds [Normal Heart Sounds] : normal heart sounds [Normal Rate and Rhythm] : normal rate and rhythm [No Rash or Lesion] : No rash or lesion [Alert] : alert [Oriented to Person] : oriented to person [Oriented to Place] : oriented to place [Oriented to Time] : oriented to time [Calm] : calm [de-identified] : The patient is alert, well-groomed  [de-identified] : Incision sites are healing well with 2 CONRAD drains in place draining 50 ml each drain

## 2021-12-13 NOTE — ASSESSMENT
[FreeTextEntry1] :  BRADLEY RIDER is a 71 year old female who underwent a Right modified radical mastectomy on 12/08/2021 pathology results are pending \par \par \par Patient is doing well. Surgical incisions are healing well and as expected. There is no evidence of infection or complication, and patient is progressing as expected. Patient has 2 CONRAD drains that draining 50 ml of serosanguineous fluid from each drain. Post-operative wound care, activity, restrictions and precautions reinforced.  Patient's questions and concerns addressed to patient's satisfaction. \par

## 2021-12-13 NOTE — HISTORY OF PRESENT ILLNESS
[de-identified] : BRADLEY RIDER is a 71 year old female who presents in the office for postop up visit. She underwent a Right modified radical mastectomy on 12/08/2021 pathology results are pending. Today patient is doing well, offers no complaints. Denies any fevers, chills, nausea, vomiting, diarrhea or constipation. Patient able to tolerate regular diet with normal bowel movements. Surgical incision is healing well. No sign of inflammation or exudate. Patient has 2 CONRAD drains that draining 50 ml of serosanguineous fluid. Patient dines any other discomfort.

## 2021-12-13 NOTE — CONSULT LETTER
[Dear  ___] : Dear  [unfilled], [Courtesy Letter:] : I had the pleasure of seeing your patient, [unfilled], in my office today. [Consult Closing:] : Thank you very much for allowing me to participate in the care of this patient.  If you have any questions, please do not hesitate to contact me. [Sincerely,] : Sincerely, [FreeTextEntry3] : Dr Acosta

## 2021-12-13 NOTE — REASON FOR VISIT
[Post Op: _________] : a [unfilled] post op visit [FreeTextEntry1] : Right modified radical mastectomy on 12/08/2021

## 2021-12-16 ENCOUNTER — APPOINTMENT (OUTPATIENT)
Dept: SURGERY | Facility: CLINIC | Age: 71
End: 2021-12-16
Payer: MEDICARE

## 2021-12-16 VITALS — TEMPERATURE: 96.8 F

## 2021-12-16 PROCEDURE — 99024 POSTOP FOLLOW-UP VISIT: CPT

## 2021-12-16 NOTE — ASSESSMENT
[FreeTextEntry1] :  BRADLEY RIDER is a 71 year old female who underwent a Right modified radical mastectomy on 12/08/2021 pathology results are pending \par \par \par Patient is doing well. Surgical incisions are healing well and as expected. Patient has 2 CONRAD drains that draining  CONRAD drain #1 is draining 50 ml of serosanguineous fluid, and Drain # 2 is draining 25 ml of serosanguineous fluid. Today CONRAD drain #2 removed.  There is no evidence of infection or complication, and patient is progressing as expected. Post-operative wound care, activity, restrictions and precautions reinforced. Patient was instructed no heavy lifting  4-6 weeks. Patient's questions and concerns addressed to patient's satisfaction. \par

## 2021-12-16 NOTE — PHYSICAL EXAM
[Normal Breath Sounds] : Normal breath sounds [Normal Heart Sounds] : normal heart sounds [Normal Rate and Rhythm] : normal rate and rhythm [No Rash or Lesion] : No rash or lesion [Alert] : alert [Oriented to Person] : oriented to person [Oriented to Place] : oriented to place [Oriented to Time] : oriented to time [Calm] : calm [de-identified] : The patient is alert, well-groomed  [de-identified] : Incision sites are healing well, CONRAD drain #2 removed today

## 2021-12-16 NOTE — HISTORY OF PRESENT ILLNESS
[de-identified] : BRADLEY RIDER is a 71 year old female who presents in the office for postop up visit. She underwent a Right modified radical mastectomy on 12/08/2021 pathology results are pending. Today patient is doing well, offers no complaints. Denies any fevers, chills, nausea, vomiting, diarrhea or constipation. Patient able to tolerate regular diet with normal bowel movements. Surgical incision is healing well. No sign of inflammation or exudate. Patient has 2 CONRAD drains that draining  CONRAD drain #1 is draining 50 ml of serosanguineous fluid, and Drain # 2 is draining 25 ml of serosanguineous fluid. Today we will remove drain #2.   Patient dines any other discomfort.

## 2021-12-16 NOTE — REASON FOR VISIT
[Follow-Up: _____] : a [unfilled] follow-up visit [FreeTextEntry1] : Right modified radical mastectomy on 12/08/2021

## 2021-12-16 NOTE — PLAN
[FreeTextEntry1] : Patient will follow up next Tuesday. Warning signs, follow up, and restrictions were discussed with the patient\par To come back Tuesday for the 2nd drain to be removed\par

## 2021-12-20 LAB — SURGICAL PATHOLOGY STUDY: SIGNIFICANT CHANGE UP

## 2021-12-21 ENCOUNTER — APPOINTMENT (OUTPATIENT)
Dept: SURGERY | Facility: CLINIC | Age: 71
End: 2021-12-21
Payer: MEDICARE

## 2021-12-21 VITALS — TEMPERATURE: 96.7 F

## 2021-12-21 DIAGNOSIS — C77.3 MALIGNANT NEOPLASM OF UNSPECIFIED SITE OF RIGHT FEMALE BREAST: ICD-10-CM

## 2021-12-21 DIAGNOSIS — C50.911 MALIGNANT NEOPLASM OF UNSPECIFIED SITE OF RIGHT FEMALE BREAST: ICD-10-CM

## 2021-12-21 PROCEDURE — 99024 POSTOP FOLLOW-UP VISIT: CPT

## 2021-12-21 NOTE — PLAN
[FreeTextEntry1] : Patient will follow up in 2 weeks . Warning signs, follow up, and restrictions were discussed with the patient\par \par She will follow up with Dr Post tomorrow.

## 2021-12-21 NOTE — ASSESSMENT
[FreeTextEntry1] :  BRADLEY RIDER is a 71 year old female who underwent a Right modified radical mastectomy on 12/08/2021 \par \par Patient is doing well. Surgical incisions are healing well and as expected. Patient has 1 CONRAD drains that draining  25 ml of serosanguineous fluid. Today we will remove drain #1. Patient dines any other discomfort\par \par Patient has a follow up visit with Dr Post tomorrow \par \par \par

## 2021-12-21 NOTE — REASON FOR VISIT
[Post Op: _________] : a [unfilled] post op visit [FreeTextEntry1] : Right modified radical mastectomy on 12/08/2021.

## 2021-12-21 NOTE — HISTORY OF PRESENT ILLNESS
[de-identified] : BRADLEY RIDER is a 71 year old female who presents in the office for postop up visit. She underwent a Right modified radical mastectomy on 12/08/2021 pathology results are reviewed with the patient today.  Today patient is doing well, offers no complaints. Denies any fevers, chills, nausea, vomiting, diarrhea or constipation. Patient able to tolerate regular diet with normal bowel movements. Surgical incision is healing well. No sign of inflammation or exudate. Patient has 1 CONRAD drains that draining  25 ml of serosanguineous fluid. Today we will remove drain #1. Patient dines any other discomfort. \par

## 2021-12-21 NOTE — PHYSICAL EXAM
[Normal Breath Sounds] : Normal breath sounds [Normal Heart Sounds] : normal heart sounds [Normal Rate and Rhythm] : normal rate and rhythm [No Rash or Lesion] : No rash or lesion [Alert] : alert [Oriented to Person] : oriented to person [Oriented to Place] : oriented to place [Oriented to Time] : oriented to time [Calm] : calm [de-identified] : The patient is alert, well-groomed  [de-identified] : Incision sites are healing well, CONRAD drain #1 removed today

## 2021-12-23 NOTE — PROGRESS NOTE ADULT - SUBJECTIVE AND OBJECTIVE BOX
Wound RN Visit    Reason for visit: Wound care RN follow up visit.      Wound background:  Wound RN F/U visit completed today. Focused skin assessment of face completed after permission was obtained from patient. R cheek is healed and intact at this time, scar tissue present. Lips remain healed and intact at this time.     Assessment: See Flowsheets for further details      Wound Care RN recommendations as follows:  1. Moisturize face and lips per patient preference.     Findings and recommendations reviewed with patient and staff RNSanta. Both verbalized understanding. Wound RN team to sign off at this time. Please do not hesitate to contact the wound care team with any questions, concerns, or deteriorations in skin integrity.           12/23/21-Healed R cheek       Patient is a 66y old  Female who presents with a chief complaint of unresponsiveness (19 Dec 2016 08:49)      INTERVAL HPI/OVERNIGHT EVENTS:  no acute complaints  BUN/creatinine trending down  Creatine kinase trending down  I.D./gastroenterology follow up noted      MEDICATIONS  (STANDING):  insulin lispro (HumaLOG) corrective regimen sliding scale  SubCutaneous at bedtime  dextrose 5%. 1000milliLiter(s) IV Continuous <Continuous>  dextrose 50% Injectable 12.5Gram(s) IV Push once  dextrose 50% Injectable 25Gram(s) IV Push once  dextrose 50% Injectable 25Gram(s) IV Push once  piperacillin/tazobactam IVPB. 3.375Gram(s) IV Intermittent every 12 hours  silver sulfADIAZINE 1% Cream 1Application(s) Topical daily  insulin lispro (HumaLOG) corrective regimen sliding scale  SubCutaneous three times a day before meals  calcium acetate 1334milliGRAM(s) Oral three times a day with meals  insulin glargine Injectable (LANTUS) 10Unit(s) SubCutaneous at bedtime  influenza   Vaccine 0.5milliLiter(s) IntraMuscular once    MEDICATIONS  (PRN):  dextrose Gel 1Dose(s) Oral once PRN Blood Glucose LESS THAN 70 milliGRAM(s)/deciliter  glucagon  Injectable 1milliGRAM(s) IntraMuscular once PRN Glucose LESS THAN 70 milligrams/deciliter        REVIEW OF SYSTEMS:  CONSTITUTIONAL: No fever, weight loss, or fatigue  EYES: No eye pain, visual disturbances, or discharge  ENMT:  No difficulty hearing, tinnitus, vertigo; No sinus or throat pain  NECK: No pain or stiffness  RESPIRATORY: No cough, wheezing, chills or hemoptysis; No shortness of breath  CARDIOVASCULAR: No chest pain, palpitations, dizziness, or leg swelling  GASTROINTESTINAL: No abdominal or epigastric pain. No nausea, vomiting, or hematemesis; No diarrhea or constipation. No melena or hematochezia.  GENITOURINARY: No dysuria, frequency, hematuria, or incontinence  NEUROLOGICAL: No headaches, memory loss, loss of strength, numbness, or tremors  SKIN: No itching, burning, rashes, or lesions      Vital Signs Last 24 Hrs  T(C): 36.1, Max: 37.2 (12-24 @ 00:07)  T(F): 97, Max: 99 (12-24 @ 00:07)  HR: 79 (67 - 89)  BP: 152/67 (98/63 - 161/84)  BP(mean): --  RR: 18 (16 - 18)  SpO2: 95% (95% - 99%)    PHYSICAL EXAM:  GENERAL: NAD, well-groomed, well-developed  HEAD:  Atraumatic, Normocephalic  EYES: EOMI, PERRLA, conjunctiva and sclera clear  ENMT: No tonsillar erythema, exudates, or enlargement; Moist mucous membranes   NECK: Supple, No JVD   NERVOUS SYSTEM:  Alert & Oriented X3, Good concentration; Motor Strength 5/5 B/L upper and lower extremities; DTRs 2+ intact and symmetric  CHEST/LUNG: Clear to percussion bilaterally; No rales, rhonchi, wheezing, or rubs  HEART: Regular rate and rhythm; No murmurs, rubs, or gallops  ABDOMEN: Soft, Nontender, Nondistended; Bowel sounds present  EXTREMITIES:  2+ Peripheral Pulses, No clubbing, cyanosis, or edema  LYMPH: No lymphadenopathy noted  SKIN: +abrasions            +hematoma left hand    LABS:                        12.0   18.9  )-----------( 174      ( 24 Dec 2016 07:44 )             35.7     24 Dec 2016 07:44    148    |  109    |  134    ----------------------------<  178    3.2     |  25     |  5.76     Ca    8.1        24 Dec 2016 07:44  Phos  6.4       24 Dec 2016 07:44  Mg     2.5       24 Dec 2016 07:44          CAPILLARY BLOOD GLUCOSE  161 (24 Dec 2016 15:56)  165 (24 Dec 2016 07:51)      RADIOLOGY & ADDITIONAL TESTS:    Imaging Personally Reviewed:  [ ] YES  [ ] NO    Consultant(s) Notes Reviewed:  [ ] YES  [ ] NO    Care Discussed with Consultants/Other Providers [ ] YES  [ ] NO

## 2021-12-29 PROCEDURE — 86850 RBC ANTIBODY SCREEN: CPT

## 2021-12-29 PROCEDURE — 86901 BLOOD TYPING SEROLOGIC RH(D): CPT

## 2021-12-29 PROCEDURE — 82962 GLUCOSE BLOOD TEST: CPT

## 2021-12-29 PROCEDURE — 85025 COMPLETE CBC W/AUTO DIFF WBC: CPT

## 2021-12-29 PROCEDURE — 88309 TISSUE EXAM BY PATHOLOGIST: CPT

## 2021-12-29 PROCEDURE — 86900 BLOOD TYPING SEROLOGIC ABO: CPT

## 2021-12-29 PROCEDURE — 90662 IIV NO PRSV INCREASED AG IM: CPT

## 2021-12-29 PROCEDURE — 36415 COLL VENOUS BLD VENIPUNCTURE: CPT

## 2022-01-06 ENCOUNTER — APPOINTMENT (OUTPATIENT)
Dept: SURGERY | Facility: CLINIC | Age: 72
End: 2022-01-06

## 2022-01-09 NOTE — HISTORY OF PRESENT ILLNESS
[de-identified] : BRADLEY RIDER is a 71 year old female who presents in the office for postop up visit. She underwent a Right modified radical mastectomy on 12/08/2021 pathology results are reviewed with the patient today.

## 2022-01-09 NOTE — ASSESSMENT
[FreeTextEntry1] :  BRADLEY RIDER is a 71 year old female who underwent a Right modified radical mastectomy on 12/08/2021\par \par \par

## 2022-01-13 ENCOUNTER — APPOINTMENT (OUTPATIENT)
Dept: SURGERY | Facility: CLINIC | Age: 72
End: 2022-01-13

## 2022-03-23 NOTE — DISCHARGE NOTE ADULT - CAREGIVER NAME
Detail Level: Detailed Add 92385 Cpt? (Important Note: In 2017 The Use Of 59072 Is Being Tracked By Cms To Determine Future Global Period Reimbursement For Global Periods): no Sussy Almanza

## 2022-04-29 NOTE — H&P PST ADULT - CONSTITUTIONAL
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 Plan: Diabetes Mellitus Type II with regressed Proliferative Diabetic Retinopathy both eyes with absence of  macular edema - Discussed the pathophysiology of diabetes and its effect on the eye. Stressed the importance of strong glucose control. Advised of importance of scheduled dilated examinations, and to contact us immediately for any problems or concerns. Well-developed, well nourished

## 2022-11-03 NOTE — ED ADULT NURSE NOTE - CHIEF COMPLAINT QUOTE
pt down in bathroom for unknown length of times. neighbors called 911 after not hearing from patient for 2 days. as per pt's neighbor pt is more confused than baseline. at triage pt non verbal. pt noted with bruising and contracture to left arm. as per EMS pt was lying on her right arm when found. pt has history of htn and diabetes
BG

## 2023-01-12 NOTE — H&P ADULT - DOES THIS PATIENT HAVE A HISTORY OF OR HAS BEEN DX WITH HEART FAILURE?
Discharge Instructions Following Placement of Your Implanted Port    What is it?  · An implanted port is a central venous access device that is inserted under the collarbone in a large blood vessel near the heart. The port and catheter are entirely under the skin. After the original incision has healed, you should only feel a small bump under the skin.    What is it used for?  · By inserting a special needle through the skin into the port, IV fluids can be given or blood samples can be withdrawn. If your port will be used often, a catheter attached to a special needle will be left in the port. A sterile dressing will be applied to hold the catheter in place. The clinic will change the needle and dressing as needed.    What special care does the port need?  · You may have some pain, swelling, and bruising for one to two weeks after your implanted port is inserted. Your shoulder on the port side may feel stiff and sore. To relieve some of these symptoms you may try:    · ICE - Ice is best if started right after your port is inserted. Apply ice (crushed ice in a plastic bag covered by a towel) to the port area for 15 to 20 minutes every hour as long as you need it. Do not sleep with an ice pack on because it may cause frostbite.    · HEAT - You can apply a warm compress (small towel dampened with hot water and placed in a plastic bag or heating pad set on low) to your port site after the first 24 to 48 hours as needed for comfort. Apply heat for 15 to 20 minutes every hour as long as you need it. Do not sleep with heat on because it may cause a burn.    · MEDICATION - You can take over the counter medication (Tylenol, Motrin) as directed on the package to relieve pain and swelling as long as it does not interfere with any other medication that you are taking.    · Because the catheter is in the blood vessel, the catheter will need to be flushed to prevent blood clots from forming. The nurse will flush the catheter 
after each use. If the catheter is not used regularly, it will need to be flushed monthly to keep the catheter patent (open).  · Check with your clinic nurse to see when it will be necessary to come in to have your port flushed.    Activities:  · Since the port is entirely under the skin, most normal activities, including swimming and bathing, are permitted once the incision has healed completely.       Discharge instructions:  · There may be stitches or adhesive strips holding your incision together. These will usually stay in place for 7 to 10 days. Your doctor will remove the stitches at this time. The adhesive strips will probably fall off on their own.     · Do not take tub baths, use hot tubs or swim for the next 10 to 14 days.   · Keep the incision site clean and dry at all times. You may shower, but if a dressing is in place, you need to put a waterproof covering over the dressing while in the shower.  · No lifting, pushing or pulling over 10 pounds for the next 10 to 14 days.  · Do not remove the original dressing. Your dressing will be changed at the clinic.    For the next 24 hours:  · Do not drive.  · Do not operate heavy or potentially harmful equipment.  · Do not make legally binding decisions.  · Do not drink alcohol, including beer.    Call your doctor or the Interventional Radiology Department at ([phone]) if you have any of the following:  · Fever over 100.4 F  · Redness, tenderness or warmth around the port site  · Drainage from from the incision site  · Swelling in your face or neck  · Red streak from the port site up your chest  · Pain in your shoulder, arms or neck that does not go away or gets worse  · Movement of the port    
no
[place]

## 2023-04-24 NOTE — DISCHARGE NOTE PROVIDER - NSDCCPGOAL_GEN_ALL_CORE_FT
well developed, well nourished , in no acute distress , ambulating without difficulty , normal communication ability
To get better and follow your care plan as instructed.

## 2023-05-31 NOTE — PROGRESS NOTE ADULT - SUBJECTIVE AND OBJECTIVE BOX
INTERVAL HPI/OVERNIGHT EVENTS:  No fever    ANTIBIOTICS/RELEVANT:    MEDICATIONS  (STANDING):  influenza   Vaccine 0.5milliLiter(s) IntraMuscular once  piperacillin/tazobactam IVPB. 3.375Gram(s) IV Intermittent every 8 hours  silver sulfADIAZINE 1% Cream 1Application(s) Topical daily  calcium acetate 1334milliGRAM(s) Oral three times a day with meals  insulin glargine Injectable (LANTUS) 10Unit(s) SubCutaneous at bedtime  insulin lispro (HumaLOG) corrective regimen sliding scale  SubCutaneous three times a day before meals  insulin lispro (HumaLOG) corrective regimen sliding scale  SubCutaneous at bedtime  dextrose 5%. 1000milliLiter(s) IV Continuous <Continuous>  dextrose 50% Injectable 12.5Gram(s) IV Push once  dextrose 50% Injectable 25Gram(s) IV Push once  dextrose 50% Injectable 25Gram(s) IV Push once  sodium chloride 0.45%. 1000milliLiter(s) IV Continuous <Continuous>  heparin  Injectable 5000Unit(s) SubCutaneous every 12 hours    MEDICATIONS  (PRN):  oxyCODONE  5 mG/acetaminophen 325 mG 1Tablet(s) Oral every 4 hours PRN Severe Pain (7 - 10)  dextrose Gel 1Dose(s) Oral once PRN Blood Glucose LESS THAN 70 milliGRAM(s)/deciliter  glucagon  Injectable 1milliGRAM(s) IntraMuscular once PRN Glucose LESS THAN 70 milligrams/deciliter  acetaminophen   Tablet 650milliGRAM(s) Oral every 6 hours PRN For Temp greater than 38 C (100.4 F)      Allergies    No Known Allergies    Intolerances        Vital Signs Last 24 Hrs  T(C): 36.8, Max: 38.1 (01-01 @ 00:13)  T(F): 98.2, Max: 100.6 (01-01 @ 00:13)  HR: 84 (84 - 96)  BP: 154/72 (128/74 - 154/72)  BP(mean): --  RR: 16 (16 - 18)  SpO2: 98% (95% - 98%)    PHYSICAL EXAM:    General: not in any distress    HEENT: no pallor, moist oral cavity    Respiratory: clear to auscultation bilaterally    Cardiovascular: S1, S2, RRR    Gastrointestinal: +BS, soft, NT, NT    Extremities: left hand, Left thigh and leg +bandage     LABS:                        7.5    7.7   )-----------( 398      ( 01 Jan 2017 07:12 )             22.8     31 Dec 2016 07:17    148    |  113    |  39     ----------------------------<  132    3.8     |  24     |  2.27     Ca    7.8        31 Dec 2016 07:17    TPro  6.4    /  Alb  1.7    /  TBili  0.3    /  DBili  x      /  AST  63     /  ALT  35     /  AlkPhos  55     31 Dec 2016 07:17      MICROBIOLOGY:  Culture - Blood (12.29.16 @ 08:31)    Specimen Source: .Blood Blood-Peripheral    Culture Results:   No growth to date.    RADIOLOGY & ADDITIONAL STUDIES:  MRI Left hand  IMPRESSION: Motion limited exam. Marked dorsal subcutaneous soft tissue   edema. Diffuse muscle edema signal, most pronounced in the thenar   musculature, that may be related to rhabdomyolysis or nonspecific   myositis. Doxepin Pregnancy And Lactation Text: This medication is Pregnancy Category C and it isn't known if it is safe during pregnancy. It is also excreted in breast milk and breast feeding isn't recommended.

## 2023-07-27 NOTE — PHYSICAL THERAPY INITIAL EVALUATION ADULT - ASR WT BEARING STATUS EVAL
Left UE
SADIE SERRATO
Left LE/Left UE
PRINCIPAL DISCHARGE DIAGNOSIS  Diagnosis: Soft tissue infection  Assessment and Plan of Treatment: You were found to have an infection at your bunion surgery site. Take augmentin as directed. Use the surgical shoe as directed. Follow up with your podiatrist within 1-2 weeks. Return to the ED if any concerning symptoms arise      SECONDARY DISCHARGE DIAGNOSES  Diagnosis: DVT, lower extremity  Assessment and Plan of Treatment: You have a blood clot in your right leg which is likely related to the surgery.  You should follow up with your primary care doctor within 1-2 weeks. Take eliquis as directed.   • Tell all of your health care providers that you take this drug. This includes your doctors, nurses, pharmacists, and dentists. This drug may need to be stopped before certain types of surgery as your doctor has told you. If this drug is stopped, your doctor will tell you when to start taking this drug again after your surgery or procedure.  • Have blood work checked as you have been told by the doctor. Talk with the doctor.  • Do not run out of this drug.  • You may bleed more easily. Be careful and avoid injury. Use a soft toothbrush and an electric razor. Rarely, some bleeding problems have been deadly.  • If you fall or hurt yourself, or if you hit your head, call your doctor right away. Talk with your doctor even if you feel fine.  • Signs of bleeding like throwing up or coughing up blood; vomit that looks like coffee grounds; blood in the urine; black, red, or tarry stools; bleeding from the gums; abnormal vaginal bleeding; bruises without a cause or that get bigger; or bleeding you cannot stop.  • Weakness on 1 side of the body, trouble speaking or thinking, change in balance, drooping on one side of the face, or blurred eyesight.  • Dizziness or passing out.  • Feeling tired or weak.  • Feeling confused.  • Headache.  • Joint pain or swelling.  • Chest pain or pressure.    Diagnosis: Alcohol dependence with withdrawal  Assessment and Plan of Treatment: Follow up with your PMD for substance abuse treatment follow up.

## 2023-08-24 NOTE — ED PROVIDER NOTE - CHIEF COMPLAINT
The patient is a 67y Female complaining of medical evaluation. Cibinqo Pregnancy And Lactation Text: It is unknown if this medication will adversely affect pregnancy or breast feeding.  You should not take this medication if you are currently pregnant or planning a pregnancy or while breastfeeding.

## 2023-10-25 NOTE — ASU PATIENT PROFILE, ADULT - NS PRO CESSATION MED OFFERED
contraindicated Mucosal Advancement Flap Text: Given the location of the defect, shape of the defect and the proximity to free margins a mucosal advancement flap was deemed most appropriate. Incisions were made with a 15 blade scalpel in the appropriate fashion along the cutaneous vermilion border and the mucosal lip. The remaining actinically damaged mucosal tissue was excised.  The mucosal advancement flap was then elevated to the gingival sulcus with care taken to preserve the neurovascular structures and advanced into the primary defect. Care was taken to ensure that precise realignment of the vermilion border was achieved.

## 2024-01-12 NOTE — PRE-OP CHECKLIST - SURGICAL CONSENT
DISPLAY PLAN FREE TEXT
DISPLAY PLAN FREE TEXT
done
done
DISPLAY PLAN FREE TEXT
done
no
DISPLAY PLAN FREE TEXT

## 2024-02-20 NOTE — ED PROVIDER NOTE - RESPIRATORY, MLM
Has Your Skin Lesion Been Treated?: not been treated Is This A New Presentation, Or A Follow-Up?: Skin Lesions Breath sounds clear and equal bilaterally.

## 2024-04-03 NOTE — DIETITIAN INITIAL EVALUATION ADULT. - OTHER INFO
Treatment for Ectopic Pregnancy    Please call your physician's office with any questions after your injection.    What Is Ectopic Pregnancy?  During normal conception, the sperm enters the egg or ovum.  The egg is fertilized in the fallopian tube and travels to the uterus.  Once the fertilized egg reaches the uterus, it implants into the lining of the uterus.  Here the pregnancy is supported throughout the growth of the baby until birth.    Ectopic pregnancy develops when the fertilized egg becomes implanted into the lining of the fallopian tube.  The fertilized egg begins to grow even though it has not reached the uterus.  The fallopian tubes are not capable of supporting a normal pregnancy.  They do not expand to accommodate the growing size of the fertilized egg.  The growth hormone beta-HCG rises in the blood in response to the pregnancy, but not at the rate that would be expected in a normal pregnancy.  Often, before you have pain, nausea, bleeding or other symptoms, your doctor has determined that the pregnancy has implanted in the fallopian tubes by ultrasound and repeat beta-HCG testing.    If the pregnancy were allowed to continue to grow, the fallopian tube would stretch and eventually rupture, requiring emergency treatment and/or surgery. A ruptured fallopian tube can be life threatening for women.     Why You Are Here  Your doctor has chosen for you to receive methotrexate (MTX) for your ectopic pregnancy.  Methotrexate (MTX) is a medication that can be given safely for a variety of situations including ectopic pregnancy, arthritis, and certain cancers.  By utilizing this medication, there is no lengthy treatment, and no recovery time following the treatment.  This treatment is the safest, most effective, and most easily tolerated of all treatment for ectopic pregnancies.    What to Expect  Methotrexate (MTX) is given in two small injections, one in each hip muscle.  They are quick and usually painless.   If your doctor has not been able to obtain all the labs required for the treatment, we will draw your blood here.  Once we determine that these labs are normal, we can proceed with the injections.  After the injections you may return home. Occasionally, mild sensations of nausea may occur.  This may be related to the injection, or it may be related to the pregnancy hormones in your body.  Either way, it should be mild, and not require any intervention. Other possible side effects include vaginal bleeding (scant or similar to a menstrual period), menstrual like cramping, diarrhea, mouth sores, skin rash, and itching.    After Your Injection    We encourage you to drink plenty of fluids for the first three days after the injection.  You do not need to return to the [facility]fusion [facility] unless your doctor directs you to do so.  It is very important, however, that you do follow up with your obstetrician.  In order to determine that the injection has been effective, your doctor needs to check your beta-HCG level (a blood test) on the fourth (4th) day and the seventh (7th) day after the injection.  If you have not already done so, please call your doctor's office as soon as possible to schedule these blood tests.    Even though you will be seeing the doctor within two weeks, it is very important to call him/her if you have any of the following:    Fever greater than 100.4 degrees Fahrenheit  Heavy bleeding (saturating more than one pad in an hour)  Dizziness  Sudden onset of severe abdominal pain, or substantially increased abdominal or pelvic pain    Avoid alcohol, all vitamin supplements (including prenatal and multivitamins) and nonsteroidal anti-inflammatory medications, such as Advil, Motrin, Ibuprofen, Aleve and Naproxen Sodium until you follow up with your doctor.      Resources and Support  Many people have questions, and various emotions and responses to ectopic pregnancy.  They may wonder if they did something wrong  or could have done something different.  There may be anger or a sense of loss.  It is important for you to acknowledge these feelings and to begin to deal with these in a healthy manner.      It is also important to realize that your spouse or significant-other may be experiencing these feelings as well.  He may not know how to deal with his own feelings and those of helplessness as he watches the woman he loves go through this experience.  Try to communicate your feelings to each other.  Remember, you and he are both going through this together, and your emotions may be different from his.    There are resources available for you to meet with others who have experienced similar situations and get the support you need.  Please don't hesitate to ask if you have other concerns that may have not been addressed.               Share is a support group for parents who have lost a baby through miscarriage, ectopic pregnancy, stillbirth, or early infant death. Please visit [email or web address] for the most up to date information.     In-person Meetings -    Enter [facility] at the [facility]. The group meets in the Education Center, 3rd floor.   Meets the second and fourth Tuesday of each month from 7:15 - 9:15 p.m.    Online Meetings (via [employer]) -   To attend the SHARE online program, please email [email or web address] for the link.  Meets the first Tuesday of each month, from 8:00 - 9:30 p.m.    We want to respect you and maintain your dignity during this difficult time.  Please feel free to tell us if you prefer to be moved to a more private area, or if you have other concerns we can help you with.  We hope to make this difficult experience more tolerable for you.          Pt seen for RN consult 12/19 for pressure ulcer> stage II & HgbA1C > 7.0. Pt lives alone & does cooks cooking & food shopping PTA.  Pt with poor memory s/p fall on floor for 2 days. Pt presents in [facility] no HD @ this time.  Pt with DM type2; NemK2J=4.7%(12/20); ? diabetic medications PTA. Pt unable to recall diet hx & wt hx PTA. Po intake currently 25% meals & presents with coughing c thin liquids.

## 2024-07-03 NOTE — PHYSICAL THERAPY INITIAL EVALUATION ADULT - WEIGHT-BEARING RESTRICTIONS: GAIT, REHAB EVAL
[facility]  Occupational Therapy Treatment Note     Date: 7/3/2024  Name: Joseph Joshi  [facility] Number: [id number]    above     FOTO completed: OT read questions Brinda Bernard OT   7/3/2024      full weight-bearing

## 2024-09-20 NOTE — PROGRESS NOTE ADULT - PROBLEM SELECTOR PROBLEM 3
Patient with history of abnormal MRI of the liver 2/2024 and biliary dysfunction presents today for follow-up. Refers had cholecystectomy and liver bx found normal on 2/22/24. Refers previously reported abdominal pain and nausea have resolved. Refers upcoming CT scan next week due to history of lung nodule. Refers constipation well managed on mag citrate. Refers having intermittent epigastric pain mostly each night 7:30pm. Refers was started on PPI and [id number] with several adverse effects. Refers is now taking "gastromend" supplement with some relief. Denies nausea/vomiting, dysphagia, odynophagia, heartburn, melena, rectal bleeding, weight loss, fever. Rhabdomyolysis

## 2024-10-14 NOTE — PHYSICAL THERAPY INITIAL EVALUATION ADULT - PATIENT/FAMILY/SIGNIFICANT OTHER GOALS STATEMENT, PT EVAL
CARDIOLOGY H&P  NOTE            Chief complaint: Chest pain, shortness of breath, nausea, vomiting, abnormal EKG diffuse ST segment elevation with diffuse T wave inversion, elevated troponin         HISTORY OF PRESENT ILLNESS:  Deanne Barnes is a 61 year old female was admitted at [facility] for nausea vomiting abdominal pain and seizure-like disorder management however she developed worsening of abdominal pain and chest pain, repeat EKG shows dynamic diffused ST segment elevation, T wave inversion.  Troponin level increased to 3000.  Patient was transferred to [facility] for an emergent cardiac cath possible intervention.      PAST MEDICAL HISTORY:  Past Medical History:   Diagnosis Date    Abdominal pain, other specified site 6/5/2013    Absence of menstruation 3/5/2013    Acute pancreatitis (CMD) 6/21/2013    Anemia, unspecified 5/17/2013    Axillary mass 1/3/2013    Breast cancer  (CMD) 5/16/2013    Constipation 5/19/2013    Hepatitis C 3/12/2013    High risk HPV infection 3/2013    Hypomagnesemia 6/22/2013    Hypotension, resolved 5/19/2013    Localized superficial swelling, mass, or lump 01/2013    Left axillary mass, lipoma versus adenopathy    Malignant neoplasm of breast (female), unspecified site 3/11/2013    1/8/13 Mammogram: 3.4cm L axillary mass  1/31/13: L axillary Bx: LN, met breast, ER/WV/Her2 neg  2/12/13: CT: L retropectoral LAD, L axillary LAD, LUIQ spiculated breast lesion, bilat hilar LAD, hypervascular liver lesions PET (personally viewed): + breast primary, LAD, ? 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 Ant Leong-Left deep axillary mass biopsy    Gynecologic cryosurgery      Insert tunel cvcath w/port  03/18/2013    Dr. Ant Leong-Right subclavian 9-Syriac Smart port insertion    Laparoscopy,fulgur of oviducts  1985    Tubal Ligation, by fulgration    Mastectomy partial w axillary lymphadenecto  07/23/2013    Dr. Ant Leong-Left level II axillary lymph node dissection with left preoperative wire localization and partial mastectomy    Remov tunel cv device w port  8/14/2013    Dr. Ant Leong - port removal    Us guided breast core biopsy  2/27/2013    Lt. Breast 11:00       MEDICATIONS:  Current Facility-Administered Medications   Medication    [Transfer Hold] lactated ringers infusion    [Transfer Hold] heparin (porcine) 25,000 units/250 mL in dextrose 5 % infusion    [Transfer Hold] heparin (porcine) injection 3,500 Units    [Transfer Hold] heparin (porcine) injection 1,800 Units    [Transfer Hold] morphine injection 4 mg    [Transfer Hold] morphine injection 2 mg    [Transfer Hold] nitroGLYCERIN (NITROSTAT) sublingual tablet 0.4 mg    [Transfer Hold] trimethobenzamide (TIGAN) injection 200 mg    [Transfer Hold] ketorolac (TORADOL) injection 15 mg    [Transfer Hold] aspirin (ECOTRIN) enteric coated tablet 81 mg    [Transfer Hold] atorvastatin (LIPITOR) tablet 80 mg    [Transfer Hold] FLUoxetine (PROzac) capsule 10 mg    [Transfer Hold] sodium chloride (NORMAL SALINE) 0.9 % bolus 500 mL    [Transfer Hold] sodium chloride 0.9 % injection 10 mL    [Transfer Hold] sodium chloride 0.9 % injection 2 mL    [Held by provider] acetaminophen (TYLENOL) tablet 650 mg     Or    [Held by provider] acetaminophen (TYLENOL) suppository 650 mg    [Transfer Hold] metoCLOPramide (REGLAN) injection 5 mg    [Transfer Hold] docusate sodium-sennosides (SENOKOT S) 50-8.6 MG 2 tablet    [Transfer Hold] bisacodyl (DULCOLAX) suppository 10 mg    [Transfer Hold] magnesium hydroxide (MILK OF MAGNESIA) 400 MG/5ML suspension 30 mL    [Transfer Hold] melatonin tablet 3 mg    [Transfer Hold] Magnesium Standard Replacement Protocol    [Transfer Hold] Potassium Standard Replacement Protocol (Levels 3.5 and lower)    [Transfer Hold] Potassium Replacement (Levels 3.6 - 4)    [Transfer Hold] pantoprazole (PROTONIX INJECT) injection 40 mg    [Transfer Hold] LORazepam (ATIVAN) injection 1 mg    [Transfer Hold] diphenhydrAMINE (BENADRYL) injection 25 mg    [Transfer Hold] hydrALAZINE (APRESOLINE) injection 10 mg     Facility-Administered Medications Ordered in Other Encounters   Medication    heparin 2 unit/mL in NaCL 0.9% solution    lidocaine HCl (PF) 2 % PF injection    nitroGLYCERIN 50 mg in dextrose 5% 250 mL infusion    iohexol (OMNIPAQUE 350 INJECT) contrast solution         ALLERGIES:  ALLERGIES:   Allergen Reactions    Acetaminophen [Tylenol] Nausea & Vomiting         FAMILY HISTORY:  Family History   Problem Relation Age of Onset    NEGATIVE FAMILY HX OF Mother     Cancer Father         Lung    Cancer, Prostate Brother         Diagnosed in his late 60s    Cancer, Breast Maternal Cousin     Cancer, Ovarian Neg Hx     Cancer, Colon Neg Hx        SOCIAL HISTORY:  Social History     Tobacco Use    Smoking status: Never    Smokeless tobacco: Never   Substance Use Topics    Alcohol use: No     Alcohol/week: 10.0 standard drinks of alcohol    Drug use: No     Types: Marijuana     Comment: Pt used medical marijuana while revieving chemo 5 months ago. 01/2015         REVIEW OF SYSTEMS:  Eye Problem(s):negative  Cardiovascular problem(s): Chest pain   Respiratory problem(s):negative  Gastro-intestinal  problem(s):negative GI  Musculoskeletal problem(s):negative  Neurological problem(s):negative  Psychiatric problem(s):negative    Physical Exam  Blood pressure 138/85, pulse (!) 105, temperature 98.4 °F (36.9 °C), temperature source Oral, resp. rate (!) 21, height 5' 6\" (1.676 m), weight 58.5 kg (128 lb 14.4 oz), last menstrual period 08/20/2007, SpO2 99%.    General: Mild to moderate distress due to chest pain, alert oriented x 3    HEENT: EOM intact, PERRL, mmm  Neck: trachea midline, supple, no thyromegaly  Resp: CTAB  CVS: Tachycardia, regular  normal S1 S2, no murmurs, no JVD, no carotid bruits, peripheral pulses 2+ and symmetric   Abdomen:  soft, non-tender, non-distended  Extremities:  no edema or cyanosis,    Skin:  no rashes  Neuro: no gross motor or sensory deficits,        DATA REVIEWED:  EKG, echo, lab data    ASSESSMENT AND PLAN:  Elevated troponin level, ongoing chest pain, significant dynamic ST-T changes new as compared to prior EKG: Diffuse ST segment elevation more prominent in inferolateral leads with diffuse T wave inversion: Emergent cardiac cath possible intervention.  Seizure-like disorder: Continue management per neurology  Marijuana abuse: Recommend marijuana abuse cessation  Hypertension: Uncontrolled: Uptitrate hypertension medication after cardiac cath.  Alcoholic liver cirrhosis: Currently compensated.        Thank you for involving me in the management of this patient.  Please contact me for any questions or concerns.    Natalee Sargent MD  [facility]   Pager# [phone]     To return home

## 2024-12-09 NOTE — OCCUPATIONAL THERAPY INITIAL EVALUATION ADULT - MUSCLE TONE ASSESSMENT, REHAB EVAL
[facility]                                     Progress Note    Patient Name: Laya Zuleta  Date: 12/03/24           Service Type: Individual      Session Start Time: 11:03  Session End Time: 11:50     Session Length: 38 - 52 minutes    Session #: 44    Attendees: Client attended alone    Service Modality:  In Person     Treatment Plan Last Reviewed: November 5, 2024       DATA  Interactive Complexity: No  Crisis: No       Progress Since Last Session (Related to Symptoms / Goals / Homework):   Symptoms: Improving .    Homework: Achieved / completed to satisfaction with strong self-care performance.      Episode of Care Goals: Satisfactory progress - ACTION (Actively working towards change); Intervened by reinforcing change plan / affirming steps taken     Current / Ongoing Stressors and Concerns:   Low self-esteem  Family boundaries issues   Daughter's medical and mental health diagnoses   Relationship conflict   Family of origin conflict     Treatment Objective(s) Addressed in This Session:   Client will identify cognitive distortions and negative self-talk contributing to depression and anxiety.       Intervention:   Taught/reviewed cognitive distortion and negative self-talk identification, reality checking and coping strategies. Client reports she is aware that she worries frequently that she can't make mistakes or trigger negative feelings in others with the consequence that they will no longer want a relationship with her. She says she beleievs all of her relationships are tenuous and dependent on her pleasing them. Explored the history of her low regard for her own value. Processed emotions in session, validated, supportive counseling. Guidance offered to better utilize client's coping skills.    Assessments completed prior to visit:  The following assessments were completed by patient for this visit:   PHQ9:       10/19/2022    11:33 AM 6/8/2023     1:49 PM 10/6/2023     9:50  AM 1/19/2024    12:04 PM 4/25/2024     3:35 PM 8/1/2024    12:33 PM 11/7/2024    11:08 AM   PHQ-9 SCORE   PHQ-9 Total Score MyChart 0         PHQ-9 Total Score 0 0 0 0 0 0 0     GAD7:       7/2/2021     3:59 PM 6/8/2023     1:49 PM 10/6/2023     9:50 AM 1/19/2024    12:04 PM 4/25/2024     3:35 PM 8/1/2024    12:33 PM 11/7/2024    11:08 AM   ZAIRA-7 SCORE   Total Score 0 2 3 3 2 3 2     PROMIS 10-Global Health (only subscores and total score):       7/11/2023    11:29 AM 11/7/2024    11:08 AM   PROMIS-10 Scores Only   Global Mental Health Score 13    13 13   Global Physical Health Score 15    15 15   PROMIS TOTAL - SUBSCORES 28    28 28     Atlantic Suicide Severity Rating Scale (Short Version)      1/15/2021    10:27 AM 7/2/2021     4:00 PM 6/8/2023     1:50 PM 10/6/2023     9:51 AM 1/19/2024    12:05 PM 8/1/2024    12:34 PM 11/7/2024    11:10 AM   Atlantic Suicide Severity Rating (Short Version)   Over the past 2 weeks have you felt down, depressed, or hopeless? yes no        Over the past 2 weeks have you had thoughts of killing yourself? no no        Have you ever attempted to kill yourself? no no        1. Wish to be Dead (Since Last Contact)   N N N N N   2. Non-Specific Active Suicidal Thoughts (Since Last Contact)   N N N N N   Actual Attempt (Since Last Contact)   N N N N N   Has subject engaged in non-suicidal self-injurious behavior? (Since Last Contact)   N N N N N   Interrupted Attempts (Since Last Contact)   N N N N N   Aborted or Self-Interrupted Attempt (Since Last Contact)   N N N N N   Preparatory Acts or Behavior (Since Last Contact)   N N N N N   Suicide (Since Last Contact)   N N N N N   Calculated C-SSRS Risk Score (Since Last Contact)   No Risk Indicated No Risk Indicated No Risk Indicated No Risk Indicated No Risk Indicated         ASSESSMENT: Current Emotional / Mental Status (status of significant symptoms):   Risk status (Self / Other harm or suicidal ideation)   Patient denies current fears  or concerns for personal safety.   Patient denies current or recent suicidal ideation or behaviors.   Patientdenies current or recent homicidal ideation or behaviors.   Patient denies current or recent self injurious behavior or ideation.   Patient denies other safety concerns.   Patient reports there has been no change in risk factors since their last session.     Patientreports there has been no change in protective factors since their last session.     Recommended that patient call 911 or go to the local ED should there be a change in any of these risk factors     Appearance:   Appropriate    Eye Contact:   Good    Psychomotor Behavior: Normal    Attitude:   Interested   Orientation:   All   Speech    Rate / Production: Normal     Volume:  Normal    Mood:    Sad    Affect:    Appropriate    Thought Content:  Clear    Thought Form:  Coherent  Logical    Insight:    Good      Medication Review:   No changes to current psychiatric medication(s)     Medication Compliance:   Yes     Changes in Health Issues:   None reported     Chemical Use Review:   Substance Use: Chemical use reviewed, no active concerns identified      Tobacco Use: No current tobacco use.      Diagnosis:  Adjustment disorder with anxiety    Collateral Reports Completed:   Not Applicable    PLAN: (Patient Tasks / Therapist Tasks / Other)  Client will  use cognitive distortion and negative self-talk identification, reality checking and coping strategies.        Chung Martinez MA, LMFT                                                         ______________________________________________________________________                                                 Treatment Plan    Client's Name: Laya Zuleta  YOB: 1970    Date: November 5, 2024    DSM-V Diagnoses: 309.24 - Adjustment Disorder with Anxiety  ; V71.09 - No Diagnosis  Psychosocial / Contextual Factors: daughter with bone disease  WHODAS: 13    Referral /  Collaboration:  Referral to another professional/service is not indicated at this time..    Anticipated number of session or this episode of care: maintenance    Measurable Treatment Goal(s) related to diagnosis / functional impairment(s)   I will know I've met my goal when I learn tools to cope with and lower my anxiety.     Goal 1: Client will experience a significant reduction in ZAIRA-7 scores.     Objective #A   Client will learn and integrate DBT/CBT strategies to more effectively manage emotions and relationships.   Status: Continued: November 5, 2024  Intervention(s)   Therapist will teach emotional regulation, distress tolerance, interpersonal effectiveness, and mindfulness skills. Meditation resources will be offered. Therapist will teach TIPP skills: Temperature, Intense exercise, Paced breathing, and Paired Muscle Relaxation.    Objective #B   Client will identify cognitive distortions and negative self-talk contributing to depression and anxiety.   Status: Continued: November 5, 2024  Intervention(s)   Therapist will teach cognitive distortion identification and coping strategies.    Objective #C   Client will engage in positive self-care.  Status: Continued: November 5, 2024  Intervention(s)   Therapist will teach self-care goals:  Maintain balance in schedule (time for self/others, relaxation/activities, leisure/tasks, home/out of the house), assert needs and set limits with others, challenge negative thoughts/use affirming and encouraging self-talk, engage with support people on regular basis, practice behavior activation behaviors (sleep hygiene, balanced eating, physical activity, medical needs, personal hygiene), acknowledge and accept your feelings.    Objective #D  Client will learn and use Love and Logic parenting skills with their children.  Status: Continued: November 5, 2024  Intervention(s)  Therapist will teach Love and Logic parenting skills.      Patient has reviewed and agreed to the  above plan.    Rich Martinez MA, LMFT  November 5, 2024   left-side extremities/normal/right-side extremities

## 2025-01-20 NOTE — DISCHARGE NOTE ADULT - THE PATIENT HAS
----- Message from Shad Deng MD sent at 1/20/2025 10:45 AM CST -----  X ray notable for old compression of vertebrae. Continue with plan for conservative management     no difficulties

## 2025-02-23 NOTE — DISCHARGE NOTE ADULT - NSTOBACCOUSAGEY/N_GEN_A_CS
Problem: Hemodialysis  Goal: Dialysis: Safe, effective, and comfortable hemodialysis treatment (Hemodialysis nurse only)  Outcome: Monitoring/Evaluating progress  Note: Completed 3.0 hours of prescribed 3.0 hour treatment.   No issues or complications. Removed 1.0 kg of 0-1 kg as tolerated per orders; VSS; endorsed to pt primary RN.   Goal: Dialysis: Free of complications related to initiation/termination of dialysis (Hemodialysis nurse only)  Outcome: Monitoring/Evaluating progress  Note: Obtained flow following no complications; BFR of 400 per orders; lines visible and secure during tx; dressing clean dry and intact; post tx CVC flushed with NS, locked with Sodium Citrate, clamped and capped with red disinfectant cap         No
